# Patient Record
Sex: FEMALE | Race: BLACK OR AFRICAN AMERICAN | NOT HISPANIC OR LATINO | URBAN - METROPOLITAN AREA
[De-identification: names, ages, dates, MRNs, and addresses within clinical notes are randomized per-mention and may not be internally consistent; named-entity substitution may affect disease eponyms.]

---

## 2021-01-14 ENCOUNTER — EMERGENCY (EMERGENCY)
Facility: HOSPITAL | Age: 36
LOS: 1 days | Discharge: HOME | End: 2021-01-14
Attending: EMERGENCY MEDICINE | Admitting: EMERGENCY MEDICINE
Payer: MEDICAID

## 2021-01-14 VITALS
SYSTOLIC BLOOD PRESSURE: 105 MMHG | HEART RATE: 103 BPM | TEMPERATURE: 98 F | RESPIRATION RATE: 17 BRPM | OXYGEN SATURATION: 100 % | DIASTOLIC BLOOD PRESSURE: 67 MMHG

## 2021-01-14 VITALS
TEMPERATURE: 98 F | HEART RATE: 99 BPM | OXYGEN SATURATION: 100 % | RESPIRATION RATE: 18 BRPM | SYSTOLIC BLOOD PRESSURE: 117 MMHG | DIASTOLIC BLOOD PRESSURE: 84 MMHG

## 2021-01-14 LAB
ALBUMIN SERPL ELPH-MCNC: 3.7 G/DL — SIGNIFICANT CHANGE UP (ref 3.3–5)
ALP SERPL-CCNC: 67 U/L — SIGNIFICANT CHANGE UP (ref 40–120)
ALT FLD-CCNC: 35 U/L — HIGH (ref 4–33)
ANION GAP SERPL CALC-SCNC: 9 MMOL/L — SIGNIFICANT CHANGE UP (ref 7–14)
AST SERPL-CCNC: 30 U/L — SIGNIFICANT CHANGE UP (ref 4–32)
BASOPHILS # BLD AUTO: 0.01 K/UL — SIGNIFICANT CHANGE UP (ref 0–0.2)
BASOPHILS NFR BLD AUTO: 0.1 % — SIGNIFICANT CHANGE UP (ref 0–2)
BILIRUB SERPL-MCNC: 0.2 MG/DL — SIGNIFICANT CHANGE UP (ref 0.2–1.2)
BUN SERPL-MCNC: 15 MG/DL — SIGNIFICANT CHANGE UP (ref 7–23)
CALCIUM SERPL-MCNC: 9.2 MG/DL — SIGNIFICANT CHANGE UP (ref 8.4–10.5)
CHLORIDE SERPL-SCNC: 107 MMOL/L — SIGNIFICANT CHANGE UP (ref 98–107)
CO2 SERPL-SCNC: 27 MMOL/L — SIGNIFICANT CHANGE UP (ref 22–31)
CREAT SERPL-MCNC: 0.79 MG/DL — SIGNIFICANT CHANGE UP (ref 0.5–1.3)
EOSINOPHIL # BLD AUTO: 0.07 K/UL — SIGNIFICANT CHANGE UP (ref 0–0.5)
EOSINOPHIL NFR BLD AUTO: 0.9 % — SIGNIFICANT CHANGE UP (ref 0–6)
GLUCOSE SERPL-MCNC: 87 MG/DL — SIGNIFICANT CHANGE UP (ref 70–99)
HCT VFR BLD CALC: 41.3 % — SIGNIFICANT CHANGE UP (ref 34.5–45)
HGB BLD-MCNC: 13.1 G/DL — SIGNIFICANT CHANGE UP (ref 11.5–15.5)
IANC: 4.25 K/UL — SIGNIFICANT CHANGE UP (ref 1.5–8.5)
IMM GRANULOCYTES NFR BLD AUTO: 0.4 % — SIGNIFICANT CHANGE UP (ref 0–1.5)
LIDOCAIN IGE QN: 19 U/L — SIGNIFICANT CHANGE UP (ref 7–60)
LYMPHOCYTES # BLD AUTO: 2.11 K/UL — SIGNIFICANT CHANGE UP (ref 1–3.3)
LYMPHOCYTES # BLD AUTO: 28.6 % — SIGNIFICANT CHANGE UP (ref 13–44)
MCHC RBC-ENTMCNC: 28.5 PG — SIGNIFICANT CHANGE UP (ref 27–34)
MCHC RBC-ENTMCNC: 31.7 GM/DL — LOW (ref 32–36)
MCV RBC AUTO: 89.8 FL — SIGNIFICANT CHANGE UP (ref 80–100)
MONOCYTES # BLD AUTO: 0.92 K/UL — HIGH (ref 0–0.9)
MONOCYTES NFR BLD AUTO: 12.4 % — SIGNIFICANT CHANGE UP (ref 2–14)
NEUTROPHILS # BLD AUTO: 4.25 K/UL — SIGNIFICANT CHANGE UP (ref 1.8–7.4)
NEUTROPHILS NFR BLD AUTO: 57.6 % — SIGNIFICANT CHANGE UP (ref 43–77)
NRBC # BLD: 0 /100 WBCS — SIGNIFICANT CHANGE UP
NRBC # FLD: 0 K/UL — SIGNIFICANT CHANGE UP
PLATELET # BLD AUTO: 226 K/UL — SIGNIFICANT CHANGE UP (ref 150–400)
POTASSIUM SERPL-MCNC: 3.9 MMOL/L — SIGNIFICANT CHANGE UP (ref 3.5–5.3)
POTASSIUM SERPL-SCNC: 3.9 MMOL/L — SIGNIFICANT CHANGE UP (ref 3.5–5.3)
PROT SERPL-MCNC: 7.2 G/DL — SIGNIFICANT CHANGE UP (ref 6–8.3)
RBC # BLD: 4.6 M/UL — SIGNIFICANT CHANGE UP (ref 3.8–5.2)
RBC # FLD: 14.9 % — HIGH (ref 10.3–14.5)
SODIUM SERPL-SCNC: 143 MMOL/L — SIGNIFICANT CHANGE UP (ref 135–145)
TROPONIN T, HIGH SENSITIVITY RESULT: <6 NG/L — SIGNIFICANT CHANGE UP
WBC # BLD: 7.39 K/UL — SIGNIFICANT CHANGE UP (ref 3.8–10.5)
WBC # FLD AUTO: 7.39 K/UL — SIGNIFICANT CHANGE UP (ref 3.8–10.5)

## 2021-01-14 PROCEDURE — 99282 EMERGENCY DEPT VISIT SF MDM: CPT

## 2021-01-14 RX ORDER — SODIUM CHLORIDE 9 MG/ML
1000 INJECTION INTRAMUSCULAR; INTRAVENOUS; SUBCUTANEOUS ONCE
Refills: 0 | Status: COMPLETED | OUTPATIENT
Start: 2021-01-14 | End: 2021-01-14

## 2021-01-14 RX ORDER — ONDANSETRON 8 MG/1
8 TABLET, FILM COATED ORAL ONCE
Refills: 0 | Status: COMPLETED | OUTPATIENT
Start: 2021-01-14 | End: 2021-01-14

## 2021-01-14 RX ORDER — ONDANSETRON 8 MG/1
4 TABLET, FILM COATED ORAL ONCE
Refills: 0 | Status: COMPLETED | OUTPATIENT
Start: 2021-01-14 | End: 2021-01-14

## 2021-01-14 RX ADMIN — ONDANSETRON 8 MILLIGRAM(S): 8 TABLET, FILM COATED ORAL at 12:07

## 2021-01-14 RX ADMIN — SODIUM CHLORIDE 1000 MILLILITER(S): 9 INJECTION INTRAMUSCULAR; INTRAVENOUS; SUBCUTANEOUS at 12:07

## 2021-01-14 RX ADMIN — SODIUM CHLORIDE 1000 MILLILITER(S): 9 INJECTION INTRAMUSCULAR; INTRAVENOUS; SUBCUTANEOUS at 14:29

## 2021-01-14 NOTE — ED PROVIDER NOTE - PATIENT PORTAL LINK FT
You can access the FollowMyHealth Patient Portal offered by [facility] by registering at the following website: [email or web address]. By joining [employer]’s FollowMyHealth portal, you will also be able to view your health information using other applications (apps) compatible with our system.

## 2021-01-14 NOTE — ED PROVIDER NOTE - NSFOLLOWUPINSTRUCTIONS_ED_ALL_ED_FT
- Continue all regular medications  - Follow up with your primary doctor within 3 days  - Return to the ER for any worsening symptoms or concerns

## 2021-01-14 NOTE — ED ADULT NURSE NOTE - OBJECTIVE STATEMENT
pt from [facility] with staff at bed side. as per paperwork pt has been experiencing vomiting, denies abd pain abd non tender. was found to have elevated clonpipin levels so clonipin was held since yesterday. presents for vomiting. not currently vomiting denies urianry s/s. Vitally stable IV 20 g RAC labs sent medicated as ordered.

## 2021-01-14 NOTE — ED PROVIDER NOTE - PROGRESS NOTE DETAILS
Anabell PGY2: signed out patient to on call [facility] doctor. Also updated psychiatrist Dr. Arevalo of case and plan. Patient ambulatory only minimal lightheadedness sitting to standing. Orthstatics (-). Vomited x1, Will give more zofran and discharge. Tachycardia improved to 101.

## 2021-01-14 NOTE — ED PROVIDER NOTE - CLINICAL SUMMARY MEDICAL DECISION MAKING FREE TEXT BOX
lurdes: pt here with high clonazapine level (over 1000), wbc already checked by psych but they were concerned pt with orthostasis today.  pt with mild tachycardia and nasuea, abd soft no r/g.  will hydrate and check orthostasis

## 2021-01-14 NOTE — ED PROVIDER NOTE - ATTENDING CONTRIBUTION TO CARE
lurdes: pt here with high clonazapine level (over 1000), wbc already checked by psych but they were concerned pt with orthostasis today.  pt with mild tachycardia and nasuea, abd soft no r/g.  will hydrate and check orthostasis    I performed a history and physical exam of the patient and discussed their management with the resident and /or advanced care provider. I reviewed the resident and /or ACP's note and agree with the documented findings and plan of care. My medical decison making and observations are found above. lurdes: pt here with high clonazapine level (over 1000), wbc already checked by psych but they were concerned pt with orthostasis today.  pt with mild tachycardia and nasuea, abd soft no r/g.  will hydrate and check orthostasis    I performed a history and physical exam of the patient and discussed their management with the resident and /or advanced care provider. I reviewed the resident and /or ACP's note and agree with the documented findings and plan of care. My medical decision making and observations are found above..

## 2021-01-14 NOTE — ED PROVIDER NOTE - OBJECTIVE STATEMENT
34yo female schizophrenia, seizure d/o on depakote sent by [place] for increased Clozapine level of 1085 on 1/12/21. Per [facility] psychiatrist Mickey, patient recently transferred from [facility] and did new admission labs on her. Noted patient to be orthostatic today and nauseous with vomiting and diarrhea x1. Last clozapine dose 2 days ago. Patient says she has felt nauseous for 1 month with intermittent vomiting. Denies vaginal bleeding/discharge, abdominal pain, abdominal surgeries, fever.

## 2021-01-14 NOTE — ED ADULT NURSE NOTE - CHIEF COMPLAINT QUOTE
Patient brought to ER by EMS from [place] [address] for c/o nausea. As per facility they are concerned because her Clonazepam levels are high (1085). Pt accompanied by staff.

## 2021-01-14 NOTE — ED ADULT TRIAGE NOTE - CHIEF COMPLAINT QUOTE
Patient brought to ER by EMS from [place] [address] for c/o nausea. As per facility they are concerned because her Clonazepam levels are high (1085). Pt accompanied by staff.

## 2021-01-14 NOTE — ED PROVIDER NOTE - PHYSICAL EXAMINATION
Physical Exam:  Gen: NAD, AOx3, non-toxic appearing, able to ambulate without assistance  Head: NCAT  HEENT: EOMI, PEERLA, normal conjunctiva, tongue midline, oral mucosa moist  Lung: CTAB, no respiratory distress, no wheezes/rhonchi/rales B/L, speaking in full sentences  CV: RRR, no murmurs, rubs or gallops, distal pulses 2+ b/l  Abd: soft, NT, ND, no guarding, no rigidity, no rebound tenderness, no CVA tenderness   Skin: Warm, well perfused, no rash, no leg swelling  Psych: normal affect, calm

## 2021-01-15 ENCOUNTER — EMERGENCY (EMERGENCY)
Facility: HOSPITAL | Age: 36
LOS: 1 days | Discharge: HOME | End: 2021-01-15
Attending: EMERGENCY MEDICINE | Admitting: EMERGENCY MEDICINE
Payer: MEDICAID

## 2021-01-15 VITALS
OXYGEN SATURATION: 100 % | TEMPERATURE: 98 F | SYSTOLIC BLOOD PRESSURE: 129 MMHG | DIASTOLIC BLOOD PRESSURE: 50 MMHG | RESPIRATION RATE: 16 BRPM | HEART RATE: 100 BPM

## 2021-01-15 VITALS
TEMPERATURE: 98 F | HEART RATE: 109 BPM | SYSTOLIC BLOOD PRESSURE: 150 MMHG | DIASTOLIC BLOOD PRESSURE: 108 MMHG | RESPIRATION RATE: 18 BRPM

## 2021-01-15 PROBLEM — F20.9 SCHIZOPHRENIA, UNSPECIFIED: Chronic | Status: ACTIVE | Noted: 2021-01-14

## 2021-01-15 LAB
ALBUMIN SERPL ELPH-MCNC: 3.3 G/DL — SIGNIFICANT CHANGE UP (ref 3.3–5)
ALP SERPL-CCNC: 57 U/L — SIGNIFICANT CHANGE UP (ref 40–120)
ALT FLD-CCNC: 35 U/L — HIGH (ref 4–33)
ANION GAP SERPL CALC-SCNC: 10 MMOL/L — SIGNIFICANT CHANGE UP (ref 7–14)
AST SERPL-CCNC: 45 U/L — HIGH (ref 4–32)
BASOPHILS # BLD AUTO: 0.01 K/UL — SIGNIFICANT CHANGE UP (ref 0–0.2)
BASOPHILS NFR BLD AUTO: 0.1 % — SIGNIFICANT CHANGE UP (ref 0–2)
BILIRUB SERPL-MCNC: 0.3 MG/DL — SIGNIFICANT CHANGE UP (ref 0.2–1.2)
BUN SERPL-MCNC: 10 MG/DL — SIGNIFICANT CHANGE UP (ref 7–23)
CALCIUM SERPL-MCNC: 8.4 MG/DL — SIGNIFICANT CHANGE UP (ref 8.4–10.5)
CHLORIDE SERPL-SCNC: 109 MMOL/L — HIGH (ref 98–107)
CO2 SERPL-SCNC: 25 MMOL/L — SIGNIFICANT CHANGE UP (ref 22–31)
CREAT SERPL-MCNC: 0.76 MG/DL — SIGNIFICANT CHANGE UP (ref 0.5–1.3)
EOSINOPHIL # BLD AUTO: 0.16 K/UL — SIGNIFICANT CHANGE UP (ref 0–0.5)
EOSINOPHIL NFR BLD AUTO: 2.1 % — SIGNIFICANT CHANGE UP (ref 0–6)
GLUCOSE SERPL-MCNC: 90 MG/DL — SIGNIFICANT CHANGE UP (ref 70–99)
HCT VFR BLD CALC: 38.5 % — SIGNIFICANT CHANGE UP (ref 34.5–45)
HGB BLD-MCNC: 12.2 G/DL — SIGNIFICANT CHANGE UP (ref 11.5–15.5)
IANC: 4.3 K/UL — SIGNIFICANT CHANGE UP (ref 1.5–8.5)
IMM GRANULOCYTES NFR BLD AUTO: 0.3 % — SIGNIFICANT CHANGE UP (ref 0–1.5)
LYMPHOCYTES # BLD AUTO: 1.95 K/UL — SIGNIFICANT CHANGE UP (ref 1–3.3)
LYMPHOCYTES # BLD AUTO: 25.9 % — SIGNIFICANT CHANGE UP (ref 13–44)
MCHC RBC-ENTMCNC: 28.6 PG — SIGNIFICANT CHANGE UP (ref 27–34)
MCHC RBC-ENTMCNC: 31.7 GM/DL — LOW (ref 32–36)
MCV RBC AUTO: 90.4 FL — SIGNIFICANT CHANGE UP (ref 80–100)
MONOCYTES # BLD AUTO: 1.09 K/UL — HIGH (ref 0–0.9)
MONOCYTES NFR BLD AUTO: 14.5 % — HIGH (ref 2–14)
NEUTROPHILS # BLD AUTO: 4.3 K/UL — SIGNIFICANT CHANGE UP (ref 1.8–7.4)
NEUTROPHILS NFR BLD AUTO: 57.1 % — SIGNIFICANT CHANGE UP (ref 43–77)
NRBC # BLD: 0 /100 WBCS — SIGNIFICANT CHANGE UP
NRBC # FLD: 0.03 K/UL — HIGH
PLATELET # BLD AUTO: 211 K/UL — SIGNIFICANT CHANGE UP (ref 150–400)
POTASSIUM SERPL-MCNC: 3.9 MMOL/L — SIGNIFICANT CHANGE UP (ref 3.5–5.3)
POTASSIUM SERPL-SCNC: 3.9 MMOL/L — SIGNIFICANT CHANGE UP (ref 3.5–5.3)
PROT SERPL-MCNC: 6.4 G/DL — SIGNIFICANT CHANGE UP (ref 6–8.3)
RBC # BLD: 4.26 M/UL — SIGNIFICANT CHANGE UP (ref 3.8–5.2)
RBC # FLD: 14.9 % — HIGH (ref 10.3–14.5)
SARS-COV-2 RNA SPEC QL NAA+PROBE: SIGNIFICANT CHANGE UP
SODIUM SERPL-SCNC: 144 MMOL/L — SIGNIFICANT CHANGE UP (ref 135–145)
WBC # BLD: 7.53 K/UL — SIGNIFICANT CHANGE UP (ref 3.8–10.5)
WBC # FLD AUTO: 7.53 K/UL — SIGNIFICANT CHANGE UP (ref 3.8–10.5)

## 2021-01-15 PROCEDURE — 99284 EMERGENCY DEPT VISIT MOD MDM: CPT

## 2021-01-15 RX ORDER — SODIUM CHLORIDE 9 MG/ML
1000 INJECTION, SOLUTION INTRAVENOUS ONCE
Refills: 0 | Status: COMPLETED | OUTPATIENT
Start: 2021-01-15 | End: 2021-01-15

## 2021-01-15 RX ORDER — SODIUM CHLORIDE 9 MG/ML
1000 INJECTION, SOLUTION INTRAVENOUS
Refills: 0 | Status: DISCONTINUED | OUTPATIENT
Start: 2021-01-15 | End: 2021-01-19

## 2021-01-15 RX ADMIN — SODIUM CHLORIDE 1000 MILLILITER(S): 9 INJECTION, SOLUTION INTRAVENOUS at 22:44

## 2021-01-15 RX ADMIN — SODIUM CHLORIDE 1000 MILLILITER(S): 9 INJECTION, SOLUTION INTRAVENOUS at 14:29

## 2021-01-15 RX ADMIN — SODIUM CHLORIDE 2000 MILLILITER(S): 9 INJECTION, SOLUTION INTRAVENOUS at 20:30

## 2021-01-15 NOTE — ED ADULT NURSE NOTE - OBJECTIVE STATEMENT
36 y/o female presents to ED from [place] [address]. Pt a&ox3, arrives with staff, complaining of n/v/diarrhea. Pt brought in for hypotension. Pt complaining of 5 episodes of diarrhea since this morning. Pt here yesterday for same symptoms. 20g IV placed to RAC. Labs obtained as ordered. 20g IV placed to RAC. Labs obtained as ordered. Will monitor.

## 2021-01-15 NOTE — ED PROVIDER NOTE - CLINICAL SUMMARY MEDICAL DECISION MAKING FREE TEXT BOX
48yoF from [facility] schizophrenia, seizures on depakote presenting for hypotension in setting of n/v/d. Unchanged from yesterday. Suspecting orthostatic hypotension in setting of dehydration. Will give IVF, labs and check orthostatics. If negative can return to [facility].

## 2021-01-15 NOTE — ED PROVIDER NOTE - OBJECTIVE STATEMENT
48yoF from [facility] schizophrenia, seizures on depakote presenting for hypotension. Pt was here yesterday for same reason, had elevated clozapine level, found to have orthostatic hypotension that improved with IVF in setting of nausea, emesis, and diarrhea. Pt states she continues to be nauseous but no recent emesis, but having lots of diarrhea described as watery with partial formed portions. 35 yoF from [facility] schizophrenia, seizures on depakote presenting for hypotension. Pt was here yesterday for same reason, had elevated clozapine level, found to have orthostatic hypotension that improved with IVF in setting of nausea, emesis, and diarrhea. Pt states she continues to be nauseous but no recent emesis, but having lots of diarrhea described as watery with partial formed portions.

## 2021-01-15 NOTE — ED ADULT TRIAGE NOTE - CHIEF COMPLAINT QUOTE
Pt from [facility] arrives with staff ,sent in for hypotension was seen in [facility] yesterday for same thing. Pt also with n/v/d since last night. abnormal blood levels  clonazaine

## 2021-01-15 NOTE — ED CLERICAL - NS ED CLERK NOTE PRE-ARRIVAL INFORMATION; ADDITIONAL PRE-ARRIVAL INFORMATION
Seen yesterday for dehydration, diarrhea, dizziness. Returning with same symptoms.  Hx schizoaffective disorder

## 2021-01-15 NOTE — ED PROVIDER NOTE - ATTENDING CONTRIBUTION TO CARE
Dr. Ross: 34 yo female with seizure disorder, schizophrenia, ED visit yesterday for N/V/D and hypotension now with recurrence of N/V/D and low BP.  No abdominal pain or fever.  On exam pt overall well appearing, in NAD at time of my eval, +dry MM, heart RRR, lungs CTAB, abd NTND, extremities without swelling, strength 5/5 in all extremities and skin without rash.

## 2021-01-15 NOTE — ED PROVIDER NOTE - PATIENT PORTAL LINK FT
You can access the FollowMyHealth Patient Portal offered by [facility] by registering at the following website: [email or web address]. By joining [employer]’s FollowMyHealth portal, you will also be able to view your health information using other applications (apps) compatible with our system.

## 2021-01-15 NOTE — ED ADULT NURSE NOTE - CHIEF COMPLAINT QUOTE
Pt from [facility] 40 arrives with staff ,sent in for hypotension was seen in [facility] yesterday for same thing. Pt also with n/v/d since last night. abnormal blood levels  clonazaine

## 2021-01-15 NOTE — ED PROVIDER NOTE - NSFOLLOWUPINSTRUCTIONS_ED_ALL_ED_FT
- Continue all regular medications  - Eat small meals and take small sips of liquid while feeling nauseous and having diarrhea  - Follow up with your primary doctor within 3 days  - You were given copies of labs and/or imaging results if applicable, please take them to your follow up appointments  - Return to the ER for any worsening symptoms or concerns

## 2021-01-22 LAB
CLOZAPINE SERPL-MCNC: 987 NG/ML — HIGH (ref 350–600)
CLOZAPINE SPEC-SCNC: 1353 NG/ML — SIGNIFICANT CHANGE UP
NORCLOZAPINE SERPL-MCNC: 366 NG/ML — SIGNIFICANT CHANGE UP

## 2021-01-29 NOTE — ED POST DISCHARGE NOTE - RESULT SUMMARY
MICHELLE Bledsoe: Clozapine level elevated, known to pt. Called [facility] back, spoke w/ RN Ms. Alberto, states they are following her levels closely. Copies of results faxed to [phone] with confirmation.

## 2021-02-14 NOTE — ED ADULT NURSE NOTE - PRO INTERPRETER NEED 2
;      [facility] Emergency Department  [address], [place] [address]  [phone]     Clinical Summary     PERSON INFORMATION   Name LANDON DEVINE Age  46 Years  1972 12:00 AM   Acct# [id number] Sex Female Phone [phone]   Dispo Type Home - (i.e. Home on oxygen, DME)-  Arrival 2019 6:49 PM Checkout 2019 9:50 PM    Address: [address] [place] [address] [address] [place] [address]      Visit Reason MID BACK PAIN     ED Physician Note      ED Time Seen By Provider Entered On:  2019 19:18     Performed On:  2019 19:18  by Carlene Jacobs               Time Seen By Provider   Time Seen by Provider :   2019 19:18    Carlene Jacobs - 2019 19:18           VITALS INFORMATION  Vitals/Ht/Wt  Peripheral Pulse Rate:  77 bpm  Respiratory Rate:  16 br/min  Oxygen Saturation:  96 %  Oxygen Therapy:  Room air  Systolic Blood Pressure:  128 mmHg  Diastolic Blood Pressure:  99 mmHg  Mean Arterial Pressure:  109 mmHg  Height:  170 cm  Weight:  110 kg       MEDICAL INFORMATION   Allergy Info:          aspirin             Prescriptions:                  Prescription Display   acetaminophen-hydrocodone (Norco 325 mg-5 mg oral tablet) 1 tab, PO, q6hr, PRN for pain, Supervising Physician: Dr. Chad Torres  PREETHI [id number], x 2 day, # 8 tab, 0 Refill(s), Tablet   cyclobenzaprine (Flexeril 10 mg oral tablet) 10 mg = 1 tab(s), PO, Tab, TID, PRN for spasm, Supervising Physician: Dr. Chad Torres NPI [id number], X 3 day, # 9 tab, 0 Refill(s)   ibuprofen (Motrin 600 mg oral tablet) 600 mg = 1 tab, PO, Tablet, q6hr, Supervising Physician: Dr. Torres, X 5 day, # 20 tab, 0 Refill(s)   lidocaine-menthol topical (lidocaine-menthol 4.5%-5% topical film) 1 patch, Topical, qDay, Supervising Physician: Dr. Chad Torres NPI [id number], x 5 day, # 5 each, 0 Refill(s)   predniSONE (predniSONE 50 mg oral tablet) 50 mg = 1 tab(s), PO, Tab, qDay, Supervising Physician:  Dr. Chad Torres NPI [id number], X 3 day, # 3 tab, 0 Refill(s)          DISCHARGE INFORMATION   Discharge Disposition: Home - (i.e. Home on oxygen, DME)- 01     PATIENT EDUCATION INFORMATION   Instructions:       Musculoskeletal Pain; Back Exercises; Back Pain, Adult   Follow up:                  With: Address: When:   Follow up with primary care provider  Within 2 to 3 days   Comments:   Follow-up with primary care provider   Continue activity as tolerated   Take medication as directed   Do not drive or operate machinery while taking Flexeril or Norco   Return to emergency department for new or worsening symptoms            DIAGNOSIS  Back pain         English

## 2024-01-01 ENCOUNTER — EMERGENCY (EMERGENCY)
Facility: HOSPITAL | Age: 39
LOS: 1 days | Discharge: HOME | End: 2024-01-01
Attending: STUDENT IN AN ORGANIZED HEALTH CARE EDUCATION/TRAINING PROGRAM | Admitting: STUDENT IN AN ORGANIZED HEALTH CARE EDUCATION/TRAINING PROGRAM
Payer: MEDICAID

## 2024-01-01 VITALS
DIASTOLIC BLOOD PRESSURE: 82 MMHG | SYSTOLIC BLOOD PRESSURE: 124 MMHG | TEMPERATURE: 98 F | RESPIRATION RATE: 16 BRPM | OXYGEN SATURATION: 97 % | HEART RATE: 124 BPM

## 2024-01-01 PROCEDURE — 93010 ELECTROCARDIOGRAM REPORT: CPT

## 2024-01-01 PROCEDURE — 99284 EMERGENCY DEPT VISIT MOD MDM: CPT

## 2024-01-01 RX ORDER — SODIUM CHLORIDE 9 MG/ML
1000 INJECTION INTRAMUSCULAR; INTRAVENOUS; SUBCUTANEOUS ONCE
Refills: 0 | Status: COMPLETED | OUTPATIENT
Start: 2024-01-01 | End: 2024-01-01

## 2024-01-01 RX ADMIN — Medication 400 MILLIGRAM(S): at 23:58

## 2024-01-01 RX ADMIN — SODIUM CHLORIDE 1000 MILLILITER(S): 9 INJECTION INTRAMUSCULAR; INTRAVENOUS; SUBCUTANEOUS at 23:54

## 2024-01-01 NOTE — ED ADULT NURSE NOTE - OBJECTIVE STATEMENT
pt received to room 20 from [facility] 60 a&ox3 with past medical history of seizures and schizophrenia. pt denies SI/HI/AH/VH/TH. pt c/o tremors/unsteady gait/generalized weakness after taking 7pm medications. pt with no neuro or sensory deficits. able to move x4 extremities. pt abdomen soft and nondistended. pt warm to touch. pt febrile. MD aware. pt denies chest pain, sob, nausea, vomiting, dizziness, headache. pt endorses sick contacts. 20g placed to the Right AC. labs collected and sent. pt medicated as per EMAR. pt respirations even and unlabored with no accessory muscle use. pt pending XR. pt appears in NAD, safety maintained. pt received to room 20 from [facility] 59 a&ox3 with past medical history of seizures and schizophrenia. pt denies SI/HI/AH/VH/TH. pt c/o tremors/unsteady gait/generalized weakness after taking 7pm medications. pt with no neuro or sensory deficits. able to move x4 extremities. pt abdomen soft and nondistended. pt warm to touch. pt febrile. MD aware. pt denies chest pain, sob, nausea, vomiting, dizziness, headache. pt endorses sick contacts. 20g placed to the Right AC. labs collected and sent. pt medicated as per EMAR. pt respirations even and unlabored with no accessory muscle use. pt pending XR. pt appears in NAD, safety maintained.

## 2024-01-01 NOTE — ED PROVIDER NOTE - NSFOLLOWUPCLINICS_GEN_ALL_ED_FT
[facility] [place]  Internal Medicine  [address] [place] [address]  Phone: [phone]  Fax: [phone]  Follow Up Time: 7-10 Days     [facility] [place]  Internal Medicine  [address] [place] [address]  Phone: [phone]  Fax: [phone]  Follow Up Time: 7-10 Days

## 2024-01-01 NOTE — ED ADULT NURSE NOTE - NSFALLRISKINTERV_ED_ALL_ED
Assistance OOB with selected safe patient handling equipment if applicable/Assistance with ambulation/Communicate fall risk and risk factors to all staff, patient, and family/Provide visual cue: yellow wristband, yellow gown, etc/Reinforce activity limits and safety measures with patient and family/Call bell, personal items and telephone in reach/Instruct patient to call for assistance before getting out of bed/chair/stretcher/Non-slip footwear applied when patient is off stretcher/[place] to call system/Physically safe environment - no spills, clutter or unnecessary equipment/Purposeful Proactive Rounding/Room/bathroom lighting operational, light cord in reach Assistance OOB with selected safe patient handling equipment if applicable/Assistance with ambulation/Communicate fall risk and risk factors to all staff, patient, and family/Provide visual cue: yellow wristband, yellow gown, etc/Reinforce activity limits and safety measures with patient and family/Call bell, personal items and telephone in reach/Instruct patient to call for assistance before getting out of bed/chair/stretcher/Non-slip footwear applied when patient is off stretcher/[place] to call system/Physically safe environment - no spills, clutter or unnecessary equipment/Purposeful Proactive Rounding/Room/bathroom lighting operational, light cord in reach

## 2024-01-01 NOTE — ED PROVIDER NOTE - PROGRESS NOTE DETAILS
T 102.2. Will culture, send gas. Hold off on abx for now Lactate 2.8, will give 2nd liter of fluids UA not concerning for infection. Pt ambulating independently. Influenza positive. Social work aware and will d/w [place] regarding disposition.

## 2024-01-01 NOTE — ED ADULT NURSE NOTE - CHIEF COMPLAINT QUOTE
From [place], c/o tremors/unsteady gait/generalized weakness after taking 7pm medications. Following all commands, pt c/o feeling cold, no code stroke per MD Byoce h/o schizophrenia From [place], c/o tremors/unsteady gait/generalized weakness after taking 7pm medications. Following all commands, pt c/o feeling cold, no code stroke per MD Boyce h/o schizophrenia

## 2024-01-01 NOTE — ED PROVIDER NOTE - PHYSICAL EXAMINATION
VITALS:  T(C): 36.8 (01-01-24 @ 19:56), Max: 36.8 (01-01-24 @ 19:56)  HR: 124 (01-01-24 @ 19:56) (124 - 124)  BP: 124/82 (01-01-24 @ 19:56) (124/82 - 124/82)  RR: 16 (01-01-24 @ 19:56) (16 - 16)  SpO2: 97% (01-01-24 @ 19:56) (97% - 97%)    GENERAL: NAD, lying in bed comfortably   HEAD:  Atraumatic, Normocephalic  EYES: EOMI, PERRLA, conjunctiva and sclera clear  ENT: Dry mucous membranes  NECK: Supple, No JVD  CHEST/LUNG: Clear to auscultation bilaterally; No rhonchi or wheezing. Unlabored respirations  HEART: Tachycardic, no murmurs  Peripheral edema: Trace  ABDOMEN: BSx4; Soft, nontender, nondistended  EXTREMITIES:  1+ radial pulses  NERVOUS SYSTEM:  A&Ox3, no gross lateralizing deficits   SKIN: Dry LE skin VITALS:  T(C): 36.8 (01-01-24 @ 19:56), Max: 36.8 (01-01-24 @ 19:56)  HR: 124 (01-01-24 @ 19:56) (124 - 124)  BP: 124/82 (01-01-24 @ 19:56) (124/82 - 124/82)  RR: 16 (01-01-24 @ 19:56) (16 - 16)  SpO2: 97% (01-01-24 @ 19:56) (97% - 97%)    GENERAL: NAD, lying in bed comfortably, slow to answer questions  HEAD:  Atraumatic, Normocephalic  EYES: EOMI, PERRLA, conjunctiva and sclera clear  ENT: Dry mucous membranes  NECK: Supple, No JVD  CHEST/LUNG: Clear to auscultation bilaterally; No rhonchi or wheezing. Unlabored respirations  HEART: Tachycardic, no murmurs  Peripheral edema: Trace  ABDOMEN: BSx4; Soft, nontender, nondistended  EXTREMITIES:  1+ radial pulses  NERVOUS SYSTEM:  A&Ox3, faint jerking UEs  SKIN: Dry LE skin

## 2024-01-01 NOTE — ED ADULT TRIAGE NOTE - CHIEF COMPLAINT QUOTE
From [place], c/o tremors/unsteady gait/generalized weakness after taking 7pm medications. Following all commands, pt c/o feeling cold, no code stroke per MD Boyce h/o schizophrenia From [place], c/o tremors/unsteady gait/generalized weakness after taking 7pm medications. Following all commands, pt c/o feeling cold, no code stroke per MD Boyce h/o schizophrenia

## 2024-01-01 NOTE — ED PROVIDER NOTE - CLINICAL SUMMARY MEDICAL DECISION MAKING FREE TEXT BOX
The patient is a 38 YOF with history of schizophrenia on clozapine, seizures on depakote who presents from [place] with unsteady gait and 'not feeling well' since receiving her meds at 7pm. T 102.2. Differential includes bacterial, viral infection, electrolyte abnormality, abnormal depakote levels. Pt found to be influenza A positive. Given 2L IVF. UA not concerning for infection. Blood cultures sent. Will DC back to [place]. The patient is a 38 YOF with history of schizophrenia on clozapine, seizures on depakote who presents from [place] with unsteady gait and 'not feeling well' since receiving her meds at 7pm. T 102.2. Differential includes bacterial, viral infection, electrolyte abnormality, abnormal depakote levels. Pt found to be influenza A positive. Given 2L IVF. UA not concerning for infection. Blood cultures sent. Will DC back to [place].

## 2024-01-01 NOTE — ED PROVIDER NOTE - OBJECTIVE STATEMENT
The patient is a 38 YOF with history of schizophrenia on clozapine, seizures on depakote who presents from [place] with unsteady gait and 'not feeling well' since receiving her meds at 7pm. Patient herself states that she has been having b/l arm  tremors for the last 2-3 weeks but per facility patient had unsteady gait just from today. At baseline, patient is AOx3 and walks independently. She attends a day program.     VS notable for tachycardia to 124  On exam patient is laying comfortably, able to follow commands and answer simple questions.    Meds from [facility]: clozapine 100mg qhs  haldol depot 200mg q4 weeks  depakote 500mg 2 tabs qhs  senna 2 tabs qd  venlafaxine 150mg 1d The patient is a 38 YOF with history of schizophrenia on clozapine, seizures on depakote who presents from [place] with unsteady gait and 'not feeling well' since receiving her meds at 7pm. Patient herself states that she has been having b/l arm  tremors for the last 2-3 weeks but per facility patient had unsteady gait just from today. At baseline, patient is AOx3 and walks independently. She attends a day program.     VS notable for tachycardia to 124  On exam patient is laying comfortably, able to follow commands and answer simple questions.    Meds from [place]: clozapine 100mg qhs  haldol depot 200mg q4 weeks  depakote 500mg 2 tabs qhs  senna 2 tabs qd  venlafaxine 150mg 1d Two to four times a month The patient is a 38 YOF with history of schizophrenia on clozapine, seizures on depakote who presents from [place] with unsteady gait and 'not feeling well' since receiving her meds at 7pm. Patient herself states that she has been having b/l arm  tremors for the last 2-3 weeks but per facility patient had unsteady gait just from today. At baseline, patient is AOx3 and walks independently. She attends a day program.     VS notable for tachycardia to 124  On exam patient is laying comfortably, able to follow commands and answer simple questions.    Meds from [place]: clozapine 100mg qhs  haldol depot 200mg q4 weeks  depakote 500mg 2 tabs qhs  senna 2 tabs qd  venlafaxine 150mg 1d    Will check labs for lytes, depakote level, give 1L IVF The patient is a 38 YOF with history of schizophrenia on clozapine, seizures on depakote who presents from [place] with unsteady gait and 'not feeling well' since receiving her meds at 7pm. Patient herself states that she has been having b/l arm  tremors for the last 2-3 weeks but per facility patient had unsteady gait just from today. At baseline, patient is AOx3 and walks independently. She attends a day program.     VS notable for tachycardia to 124  On exam patient is laying comfortably, able to follow commands and answer simple questions.    Meds from [place]: clozapine 100mg qhs  haldol depot 200mg q4 weeks  depakote 500mg 2 tabs qhs  senna 2 tabs qd  venlafaxine 150mg 1d    Will check labs for lytes, depakote level, give 1L IVF

## 2024-01-01 NOTE — ED ADULT NURSE NOTE - NS ED NURSE RECORD ANOTHER VITAL SIGN
Cough for 2 weeks, low grade fever last night, should he be seen again? Seen at the start of this illness, (physical) mom is concerned that it is going on so long now. Treating his discomfort with Tylenol.  Does not seem to be having difficulty breathing, no retracting.  Drinking fine. Please advise. Appointment?  Simin Cuadra, [facility]  Triage Nurse   Yes

## 2024-01-01 NOTE — ED PROVIDER NOTE - NSFOLLOWUPINSTRUCTIONS_ED_ALL_ED_FT
You came to the hospital because you were not feeling well. You were found to have influenza A. Please follow up with your primary care doctor within 1 week of discharge for follow up of your symptoms. You should isolate yourself until your symptoms resolve.    Please follow up with your primary care doctor within 1 week of discharge for further medical care.  If you experience fevers, chills, shortness of breath, chest pain, severe abdominal pain, falls with head trauma, or fainting, then please seek immediate emergency medical services.

## 2024-01-01 NOTE — ED PROVIDER NOTE - ATTENDING CONTRIBUTION TO CARE
Dr. Silva, Attending Physician-  I performed a face to face bedside interview with patient regarding history of present illness, review of symptoms and past medical history. I completed an independent physical exam.  I have discussed patient's plan of care with the resident.

## 2024-01-01 NOTE — ED ADULT NURSE NOTE - DRUG PRE-SCREENING (DAST -1)
----- Message from Mark Waller MD sent at 10/22/2020  8:40 AM CDT -----  Please notify patient that she does not need  Medication for her bladder. Schedule Renal US. Statement Selected

## 2024-01-01 NOTE — ED PROVIDER NOTE - PATIENT PORTAL LINK FT
You can access the FollowMyHealth Patient Portal offered by [facility] by registering at the following website: [email or web address]. By joining [employer]’s FollowMyHealth portal, you will also be able to view your health information using other applications (apps) compatible with our system. You can access the FollowMyHealth Patient Portal offered by [facility] by registering at the following website: [email or web address]. By joining [email or web address]’s FollowMyHealth portal, you will also be able to view your health information using other applications (apps) compatible with our system.

## 2024-01-02 VITALS
OXYGEN SATURATION: 97 % | DIASTOLIC BLOOD PRESSURE: 76 MMHG | SYSTOLIC BLOOD PRESSURE: 132 MMHG | RESPIRATION RATE: 16 BRPM | HEART RATE: 97 BPM | TEMPERATURE: 99 F

## 2024-01-02 LAB
ALBUMIN SERPL ELPH-MCNC: 3.3 G/DL — SIGNIFICANT CHANGE UP (ref 3.3–5)
ALBUMIN SERPL ELPH-MCNC: 3.3 G/DL — SIGNIFICANT CHANGE UP (ref 3.3–5)
ALP SERPL-CCNC: 40 U/L — SIGNIFICANT CHANGE UP (ref 40–120)
ALP SERPL-CCNC: 40 U/L — SIGNIFICANT CHANGE UP (ref 40–120)
ALT FLD-CCNC: 17 U/L — SIGNIFICANT CHANGE UP (ref 4–33)
ALT FLD-CCNC: 17 U/L — SIGNIFICANT CHANGE UP (ref 4–33)
ANION GAP SERPL CALC-SCNC: 9 MMOL/L — SIGNIFICANT CHANGE UP (ref 7–14)
ANION GAP SERPL CALC-SCNC: 9 MMOL/L — SIGNIFICANT CHANGE UP (ref 7–14)
APPEARANCE UR: ABNORMAL
APPEARANCE UR: ABNORMAL
APTT BLD: 34.2 SEC — SIGNIFICANT CHANGE UP (ref 24.5–35.6)
APTT BLD: 34.2 SEC — SIGNIFICANT CHANGE UP (ref 24.5–35.6)
AST SERPL-CCNC: 28 U/L — SIGNIFICANT CHANGE UP (ref 4–32)
AST SERPL-CCNC: 28 U/L — SIGNIFICANT CHANGE UP (ref 4–32)
B PERT DNA SPEC QL NAA+PROBE: SIGNIFICANT CHANGE UP
B PERT DNA SPEC QL NAA+PROBE: SIGNIFICANT CHANGE UP
B PERT+PARAPERT DNA PNL SPEC NAA+PROBE: SIGNIFICANT CHANGE UP
B PERT+PARAPERT DNA PNL SPEC NAA+PROBE: SIGNIFICANT CHANGE UP
BACTERIA # UR AUTO: NEGATIVE /HPF — SIGNIFICANT CHANGE UP
BACTERIA # UR AUTO: NEGATIVE /HPF — SIGNIFICANT CHANGE UP
BASE EXCESS BLDV CALC-SCNC: 4.6 MMOL/L — HIGH (ref -2–3)
BASE EXCESS BLDV CALC-SCNC: 4.6 MMOL/L — HIGH (ref -2–3)
BASOPHILS # BLD AUTO: 0.04 K/UL — SIGNIFICANT CHANGE UP (ref 0–0.2)
BASOPHILS # BLD AUTO: 0.04 K/UL — SIGNIFICANT CHANGE UP (ref 0–0.2)
BASOPHILS NFR BLD AUTO: 0.5 % — SIGNIFICANT CHANGE UP (ref 0–2)
BASOPHILS NFR BLD AUTO: 0.5 % — SIGNIFICANT CHANGE UP (ref 0–2)
BILIRUB SERPL-MCNC: 0.3 MG/DL — SIGNIFICANT CHANGE UP (ref 0.2–1.2)
BILIRUB SERPL-MCNC: 0.3 MG/DL — SIGNIFICANT CHANGE UP (ref 0.2–1.2)
BILIRUB UR-MCNC: ABNORMAL
BILIRUB UR-MCNC: ABNORMAL
BORDETELLA PARAPERTUSSIS (RAPRVP): SIGNIFICANT CHANGE UP
BORDETELLA PARAPERTUSSIS (RAPRVP): SIGNIFICANT CHANGE UP
BUN SERPL-MCNC: 8 MG/DL — SIGNIFICANT CHANGE UP (ref 7–23)
BUN SERPL-MCNC: 8 MG/DL — SIGNIFICANT CHANGE UP (ref 7–23)
C PNEUM DNA SPEC QL NAA+PROBE: SIGNIFICANT CHANGE UP
C PNEUM DNA SPEC QL NAA+PROBE: SIGNIFICANT CHANGE UP
CA-I SERPL-SCNC: 1.25 MMOL/L — SIGNIFICANT CHANGE UP (ref 1.15–1.33)
CA-I SERPL-SCNC: 1.25 MMOL/L — SIGNIFICANT CHANGE UP (ref 1.15–1.33)
CALCIUM SERPL-MCNC: 9.1 MG/DL — SIGNIFICANT CHANGE UP (ref 8.4–10.5)
CALCIUM SERPL-MCNC: 9.1 MG/DL — SIGNIFICANT CHANGE UP (ref 8.4–10.5)
CAST: 28 /LPF — HIGH (ref 0–4)
CAST: 28 /LPF — HIGH (ref 0–4)
CHLORIDE BLDV-SCNC: 105 MMOL/L — SIGNIFICANT CHANGE UP (ref 96–108)
CHLORIDE BLDV-SCNC: 105 MMOL/L — SIGNIFICANT CHANGE UP (ref 96–108)
CHLORIDE SERPL-SCNC: 104 MMOL/L — SIGNIFICANT CHANGE UP (ref 98–107)
CHLORIDE SERPL-SCNC: 104 MMOL/L — SIGNIFICANT CHANGE UP (ref 98–107)
CK SERPL-CCNC: 300 U/L — HIGH (ref 25–170)
CK SERPL-CCNC: 300 U/L — HIGH (ref 25–170)
CO2 BLDV-SCNC: 31.2 MMOL/L — HIGH (ref 22–26)
CO2 BLDV-SCNC: 31.2 MMOL/L — HIGH (ref 22–26)
CO2 SERPL-SCNC: 27 MMOL/L — SIGNIFICANT CHANGE UP (ref 22–31)
CO2 SERPL-SCNC: 27 MMOL/L — SIGNIFICANT CHANGE UP (ref 22–31)
COLOR SPEC: SIGNIFICANT CHANGE UP
COLOR SPEC: SIGNIFICANT CHANGE UP
CREAT SERPL-MCNC: 0.94 MG/DL — SIGNIFICANT CHANGE UP (ref 0.5–1.3)
CREAT SERPL-MCNC: 0.94 MG/DL — SIGNIFICANT CHANGE UP (ref 0.5–1.3)
DIFF PNL FLD: NEGATIVE — SIGNIFICANT CHANGE UP
DIFF PNL FLD: NEGATIVE — SIGNIFICANT CHANGE UP
EGFR: 79 ML/MIN/1.73M2 — SIGNIFICANT CHANGE UP
EGFR: 79 ML/MIN/1.73M2 — SIGNIFICANT CHANGE UP
EOSINOPHIL # BLD AUTO: 0.03 K/UL — SIGNIFICANT CHANGE UP (ref 0–0.5)
EOSINOPHIL # BLD AUTO: 0.03 K/UL — SIGNIFICANT CHANGE UP (ref 0–0.5)
EOSINOPHIL NFR BLD AUTO: 0.4 % — SIGNIFICANT CHANGE UP (ref 0–6)
EOSINOPHIL NFR BLD AUTO: 0.4 % — SIGNIFICANT CHANGE UP (ref 0–6)
FLUAV H3 RNA SPEC QL NAA+PROBE: DETECTED
FLUAV H3 RNA SPEC QL NAA+PROBE: DETECTED
FLUBV RNA SPEC QL NAA+PROBE: SIGNIFICANT CHANGE UP
FLUBV RNA SPEC QL NAA+PROBE: SIGNIFICANT CHANGE UP
GAS PNL BLDV: 138 MMOL/L — SIGNIFICANT CHANGE UP (ref 136–145)
GAS PNL BLDV: 138 MMOL/L — SIGNIFICANT CHANGE UP (ref 136–145)
GAS PNL BLDV: SIGNIFICANT CHANGE UP
GAS PNL BLDV: SIGNIFICANT CHANGE UP
GLUCOSE BLDV-MCNC: 108 MG/DL — HIGH (ref 70–99)
GLUCOSE BLDV-MCNC: 108 MG/DL — HIGH (ref 70–99)
GLUCOSE SERPL-MCNC: 110 MG/DL — HIGH (ref 70–99)
GLUCOSE SERPL-MCNC: 110 MG/DL — HIGH (ref 70–99)
GLUCOSE UR QL: NEGATIVE MG/DL — SIGNIFICANT CHANGE UP
GLUCOSE UR QL: NEGATIVE MG/DL — SIGNIFICANT CHANGE UP
HADV DNA SPEC QL NAA+PROBE: SIGNIFICANT CHANGE UP
HADV DNA SPEC QL NAA+PROBE: SIGNIFICANT CHANGE UP
HCO3 BLDV-SCNC: 30 MMOL/L — HIGH (ref 22–29)
HCO3 BLDV-SCNC: 30 MMOL/L — HIGH (ref 22–29)
HCOV 229E RNA SPEC QL NAA+PROBE: SIGNIFICANT CHANGE UP
HCOV 229E RNA SPEC QL NAA+PROBE: SIGNIFICANT CHANGE UP
HCOV HKU1 RNA SPEC QL NAA+PROBE: SIGNIFICANT CHANGE UP
HCOV HKU1 RNA SPEC QL NAA+PROBE: SIGNIFICANT CHANGE UP
HCOV NL63 RNA SPEC QL NAA+PROBE: SIGNIFICANT CHANGE UP
HCOV NL63 RNA SPEC QL NAA+PROBE: SIGNIFICANT CHANGE UP
HCOV OC43 RNA SPEC QL NAA+PROBE: SIGNIFICANT CHANGE UP
HCOV OC43 RNA SPEC QL NAA+PROBE: SIGNIFICANT CHANGE UP
HCT VFR BLD CALC: 35 % — SIGNIFICANT CHANGE UP (ref 34.5–45)
HCT VFR BLD CALC: 35 % — SIGNIFICANT CHANGE UP (ref 34.5–45)
HCT VFR BLDA CALC: 34 % — LOW (ref 34.5–46.5)
HCT VFR BLDA CALC: 34 % — LOW (ref 34.5–46.5)
HGB BLD CALC-MCNC: 11.2 G/DL — LOW (ref 11.7–16.1)
HGB BLD CALC-MCNC: 11.2 G/DL — LOW (ref 11.7–16.1)
HGB BLD-MCNC: 11.1 G/DL — LOW (ref 11.5–15.5)
HGB BLD-MCNC: 11.1 G/DL — LOW (ref 11.5–15.5)
HMPV RNA SPEC QL NAA+PROBE: SIGNIFICANT CHANGE UP
HMPV RNA SPEC QL NAA+PROBE: SIGNIFICANT CHANGE UP
HPIV1 RNA SPEC QL NAA+PROBE: SIGNIFICANT CHANGE UP
HPIV1 RNA SPEC QL NAA+PROBE: SIGNIFICANT CHANGE UP
HPIV2 RNA SPEC QL NAA+PROBE: SIGNIFICANT CHANGE UP
HPIV2 RNA SPEC QL NAA+PROBE: SIGNIFICANT CHANGE UP
HPIV3 RNA SPEC QL NAA+PROBE: SIGNIFICANT CHANGE UP
HPIV3 RNA SPEC QL NAA+PROBE: SIGNIFICANT CHANGE UP
HPIV4 RNA SPEC QL NAA+PROBE: SIGNIFICANT CHANGE UP
HPIV4 RNA SPEC QL NAA+PROBE: SIGNIFICANT CHANGE UP
HYALINE CASTS # UR AUTO: PRESENT
HYALINE CASTS # UR AUTO: PRESENT
IANC: 5.29 K/UL — SIGNIFICANT CHANGE UP (ref 1.8–7.4)
IANC: 5.29 K/UL — SIGNIFICANT CHANGE UP (ref 1.8–7.4)
IMM GRANULOCYTES NFR BLD AUTO: 0.3 % — SIGNIFICANT CHANGE UP (ref 0–0.9)
IMM GRANULOCYTES NFR BLD AUTO: 0.3 % — SIGNIFICANT CHANGE UP (ref 0–0.9)
INR BLD: 1.21 RATIO — HIGH (ref 0.85–1.18)
INR BLD: 1.21 RATIO — HIGH (ref 0.85–1.18)
KETONES UR-MCNC: 15 MG/DL
KETONES UR-MCNC: 15 MG/DL
LACTATE BLDV-MCNC: 2.8 MMOL/L — HIGH (ref 0.5–2)
LACTATE BLDV-MCNC: 2.8 MMOL/L — HIGH (ref 0.5–2)
LEUKOCYTE ESTERASE UR-ACNC: ABNORMAL
LEUKOCYTE ESTERASE UR-ACNC: ABNORMAL
LYMPHOCYTES # BLD AUTO: 1.6 K/UL — SIGNIFICANT CHANGE UP (ref 1–3.3)
LYMPHOCYTES # BLD AUTO: 1.6 K/UL — SIGNIFICANT CHANGE UP (ref 1–3.3)
LYMPHOCYTES # BLD AUTO: 20.8 % — SIGNIFICANT CHANGE UP (ref 13–44)
LYMPHOCYTES # BLD AUTO: 20.8 % — SIGNIFICANT CHANGE UP (ref 13–44)
M PNEUMO DNA SPEC QL NAA+PROBE: SIGNIFICANT CHANGE UP
M PNEUMO DNA SPEC QL NAA+PROBE: SIGNIFICANT CHANGE UP
MAGNESIUM SERPL-MCNC: 2.4 MG/DL — SIGNIFICANT CHANGE UP (ref 1.6–2.6)
MAGNESIUM SERPL-MCNC: 2.4 MG/DL — SIGNIFICANT CHANGE UP (ref 1.6–2.6)
MCHC RBC-ENTMCNC: 26.3 PG — LOW (ref 27–34)
MCHC RBC-ENTMCNC: 26.3 PG — LOW (ref 27–34)
MCHC RBC-ENTMCNC: 31.7 GM/DL — LOW (ref 32–36)
MCHC RBC-ENTMCNC: 31.7 GM/DL — LOW (ref 32–36)
MCV RBC AUTO: 82.9 FL — SIGNIFICANT CHANGE UP (ref 80–100)
MCV RBC AUTO: 82.9 FL — SIGNIFICANT CHANGE UP (ref 80–100)
MONOCYTES # BLD AUTO: 0.72 K/UL — SIGNIFICANT CHANGE UP (ref 0–0.9)
MONOCYTES # BLD AUTO: 0.72 K/UL — SIGNIFICANT CHANGE UP (ref 0–0.9)
MONOCYTES NFR BLD AUTO: 9.4 % — SIGNIFICANT CHANGE UP (ref 2–14)
MONOCYTES NFR BLD AUTO: 9.4 % — SIGNIFICANT CHANGE UP (ref 2–14)
NEUTROPHILS # BLD AUTO: 5.29 K/UL — SIGNIFICANT CHANGE UP (ref 1.8–7.4)
NEUTROPHILS # BLD AUTO: 5.29 K/UL — SIGNIFICANT CHANGE UP (ref 1.8–7.4)
NEUTROPHILS NFR BLD AUTO: 68.6 % — SIGNIFICANT CHANGE UP (ref 43–77)
NEUTROPHILS NFR BLD AUTO: 68.6 % — SIGNIFICANT CHANGE UP (ref 43–77)
NITRITE UR-MCNC: NEGATIVE — SIGNIFICANT CHANGE UP
NITRITE UR-MCNC: NEGATIVE — SIGNIFICANT CHANGE UP
NRBC # BLD: 0 /100 WBCS — SIGNIFICANT CHANGE UP (ref 0–0)
NRBC # BLD: 0 /100 WBCS — SIGNIFICANT CHANGE UP (ref 0–0)
NRBC # FLD: 0 K/UL — SIGNIFICANT CHANGE UP (ref 0–0)
NRBC # FLD: 0 K/UL — SIGNIFICANT CHANGE UP (ref 0–0)
PCO2 BLDV: 46 MMHG — SIGNIFICANT CHANGE UP (ref 39–52)
PCO2 BLDV: 46 MMHG — SIGNIFICANT CHANGE UP (ref 39–52)
PH BLDV: 7.42 — SIGNIFICANT CHANGE UP (ref 7.32–7.43)
PH BLDV: 7.42 — SIGNIFICANT CHANGE UP (ref 7.32–7.43)
PH UR: 6 — SIGNIFICANT CHANGE UP (ref 5–8)
PH UR: 6 — SIGNIFICANT CHANGE UP (ref 5–8)
PLATELET # BLD AUTO: 162 K/UL — SIGNIFICANT CHANGE UP (ref 150–400)
PLATELET # BLD AUTO: 162 K/UL — SIGNIFICANT CHANGE UP (ref 150–400)
PO2 BLDV: 51 MMHG — HIGH (ref 25–45)
PO2 BLDV: 51 MMHG — HIGH (ref 25–45)
POTASSIUM BLDV-SCNC: 3.5 MMOL/L — SIGNIFICANT CHANGE UP (ref 3.5–5.1)
POTASSIUM BLDV-SCNC: 3.5 MMOL/L — SIGNIFICANT CHANGE UP (ref 3.5–5.1)
POTASSIUM SERPL-MCNC: 3.6 MMOL/L — SIGNIFICANT CHANGE UP (ref 3.5–5.3)
POTASSIUM SERPL-MCNC: 3.6 MMOL/L — SIGNIFICANT CHANGE UP (ref 3.5–5.3)
POTASSIUM SERPL-SCNC: 3.6 MMOL/L — SIGNIFICANT CHANGE UP (ref 3.5–5.3)
POTASSIUM SERPL-SCNC: 3.6 MMOL/L — SIGNIFICANT CHANGE UP (ref 3.5–5.3)
PROT SERPL-MCNC: 6.8 G/DL — SIGNIFICANT CHANGE UP (ref 6–8.3)
PROT SERPL-MCNC: 6.8 G/DL — SIGNIFICANT CHANGE UP (ref 6–8.3)
PROT UR-MCNC: 100 MG/DL
PROT UR-MCNC: 100 MG/DL
PROTHROM AB SERPL-ACNC: 13.5 SEC — HIGH (ref 9.5–13)
PROTHROM AB SERPL-ACNC: 13.5 SEC — HIGH (ref 9.5–13)
RAPID RVP RESULT: DETECTED
RAPID RVP RESULT: DETECTED
RBC # BLD: 4.22 M/UL — SIGNIFICANT CHANGE UP (ref 3.8–5.2)
RBC # BLD: 4.22 M/UL — SIGNIFICANT CHANGE UP (ref 3.8–5.2)
RBC # FLD: 17.7 % — HIGH (ref 10.3–14.5)
RBC # FLD: 17.7 % — HIGH (ref 10.3–14.5)
RBC CASTS # UR COMP ASSIST: 6 /HPF — HIGH (ref 0–4)
RBC CASTS # UR COMP ASSIST: 6 /HPF — HIGH (ref 0–4)
REVIEW: SIGNIFICANT CHANGE UP
REVIEW: SIGNIFICANT CHANGE UP
RSV RNA SPEC QL NAA+PROBE: SIGNIFICANT CHANGE UP
RSV RNA SPEC QL NAA+PROBE: SIGNIFICANT CHANGE UP
RV+EV RNA SPEC QL NAA+PROBE: SIGNIFICANT CHANGE UP
RV+EV RNA SPEC QL NAA+PROBE: SIGNIFICANT CHANGE UP
SAO2 % BLDV: 80.5 % — SIGNIFICANT CHANGE UP (ref 67–88)
SAO2 % BLDV: 80.5 % — SIGNIFICANT CHANGE UP (ref 67–88)
SARS-COV-2 RNA SPEC QL NAA+PROBE: SIGNIFICANT CHANGE UP
SARS-COV-2 RNA SPEC QL NAA+PROBE: SIGNIFICANT CHANGE UP
SODIUM SERPL-SCNC: 140 MMOL/L — SIGNIFICANT CHANGE UP (ref 135–145)
SODIUM SERPL-SCNC: 140 MMOL/L — SIGNIFICANT CHANGE UP (ref 135–145)
SP GR SPEC: 1.04 — HIGH (ref 1–1.03)
SP GR SPEC: 1.04 — HIGH (ref 1–1.03)
SQUAMOUS # UR AUTO: 4 /HPF — SIGNIFICANT CHANGE UP (ref 0–5)
SQUAMOUS # UR AUTO: 4 /HPF — SIGNIFICANT CHANGE UP (ref 0–5)
TSH SERPL-MCNC: 3.76 UIU/ML — SIGNIFICANT CHANGE UP (ref 0.27–4.2)
TSH SERPL-MCNC: 3.76 UIU/ML — SIGNIFICANT CHANGE UP (ref 0.27–4.2)
UROBILINOGEN FLD QL: 1 MG/DL — SIGNIFICANT CHANGE UP (ref 0.2–1)
UROBILINOGEN FLD QL: 1 MG/DL — SIGNIFICANT CHANGE UP (ref 0.2–1)
VALPROATE SERPL-MCNC: 85.4 UG/ML — SIGNIFICANT CHANGE UP (ref 50–100)
VALPROATE SERPL-MCNC: 85.4 UG/ML — SIGNIFICANT CHANGE UP (ref 50–100)
WBC # BLD: 7.7 K/UL — SIGNIFICANT CHANGE UP (ref 3.8–10.5)
WBC # BLD: 7.7 K/UL — SIGNIFICANT CHANGE UP (ref 3.8–10.5)
WBC # FLD AUTO: 7.7 K/UL — SIGNIFICANT CHANGE UP (ref 3.8–10.5)
WBC # FLD AUTO: 7.7 K/UL — SIGNIFICANT CHANGE UP (ref 3.8–10.5)
WBC UR QL: 1 /HPF — SIGNIFICANT CHANGE UP (ref 0–5)
WBC UR QL: 1 /HPF — SIGNIFICANT CHANGE UP (ref 0–5)

## 2024-01-02 PROCEDURE — 71045 X-RAY EXAM CHEST 1 VIEW: CPT | Mod: 26

## 2024-01-02 RX ORDER — ACETAMINOPHEN 500 MG
1000 TABLET ORAL ONCE
Refills: 0 | Status: COMPLETED | OUTPATIENT
Start: 2024-01-02 | End: 2024-01-01

## 2024-01-02 RX ORDER — SODIUM CHLORIDE 9 MG/ML
1000 INJECTION, SOLUTION INTRAVENOUS ONCE
Refills: 0 | Status: COMPLETED | OUTPATIENT
Start: 2024-01-02 | End: 2024-01-02

## 2024-01-02 RX ADMIN — Medication 1000 MILLIGRAM(S): at 01:35

## 2024-01-02 RX ADMIN — SODIUM CHLORIDE 1000 MILLILITER(S): 9 INJECTION, SOLUTION INTRAVENOUS at 01:23

## 2024-01-02 NOTE — PROVIDER CONTACT NOTE (OTHER) - ASSESSMENT
HENRIETTA called residence [phone]; spoke with Mr. Evans, who confirms that pt is a resident and can return.  As per Mr. Evans, pt should travel back to the residence via ambulance.  Discussed with provider, who is aware and in agreement with the plan. HENRIETTA called residence [phone]; spoke with Mr. Evans, who confirms that pt is a resident and can return.  As per Mr. Evans, pt should travel back to the residence via ambulance.  Discussed with provider, who is aware and in agreement with the plan.

## 2024-01-02 NOTE — ED ADULT NURSE REASSESSMENT NOTE - NS ED NURSE REASSESS COMMENT FT1
DROPLET PRECAUTIONS MAINTAINED. pt flu +. pt straight cath with rn ana at bedside using 14 fr moses sterile technique. ~200cc dark yellow urine collected and sent. pt ambulatory with steady gait. pt pending DC

## 2024-01-02 NOTE — ED ADULT NURSE REASSESSMENT NOTE - NS ED NURSE REASSESS COMMENT FT1
pt a&ox3, remains lethargic. pt unable to provide urine sample at this time. MD Silva aware. pt pending urine. pt respirations even and unlabored. pt pending dispo, safety maintained

## 2024-01-02 NOTE — ED ADULT NURSE REASSESSMENT NOTE - NS ED NURSE REASSESS COMMENT FT1
received report from audelia moon. Pt is a/o x 3. pt afebrile at this time. pt pending urine. pt respirations even and unlabored with no accessory muscle use, pt normal sinus on monitor. safety maintained

## 2024-01-02 NOTE — PROVIDER CONTACT NOTE (OTHER) - RECOMMENDATIONS
Pt to travel back to [place] residence via ambulance; verbal huddle complete. Pt to travel back to [place] residence via ambulance; verbal huddle complete.

## 2024-01-02 NOTE — PROVIDER CONTACT NOTE (OTHER) - BACKGROUND
Pt is from [facility] [place]; [address]. Pt is from [facility]; [address].

## 2024-01-03 ENCOUNTER — INPATIENT (INPATIENT)
Facility: HOSPITAL | Age: 39
LOS: 13 days | Discharge: PSYCHIATRIC OR SUBSTANCE USE FACILITY | End: 2024-01-17
Attending: STUDENT IN AN ORGANIZED HEALTH CARE EDUCATION/TRAINING PROGRAM | Admitting: STUDENT IN AN ORGANIZED HEALTH CARE EDUCATION/TRAINING PROGRAM
Payer: MEDICAID

## 2024-01-03 VITALS
SYSTOLIC BLOOD PRESSURE: 97 MMHG | TEMPERATURE: 98 F | RESPIRATION RATE: 18 BRPM | OXYGEN SATURATION: 94 % | HEART RATE: 116 BPM | DIASTOLIC BLOOD PRESSURE: 69 MMHG

## 2024-01-03 DIAGNOSIS — R78.81 BACTEREMIA: ICD-10-CM

## 2024-01-03 PROBLEM — G40.909 EPILEPSY, UNSPECIFIED, NOT INTRACTABLE, WITHOUT STATUS EPILEPTICUS: Chronic | Status: ACTIVE | Noted: 2024-01-01

## 2024-01-03 LAB
-  COAGULASE NEGATIVE STAPHYLOCOCCUS: SIGNIFICANT CHANGE UP
-  COAGULASE NEGATIVE STAPHYLOCOCCUS: SIGNIFICANT CHANGE UP
ALBUMIN SERPL ELPH-MCNC: 3 G/DL — LOW (ref 3.3–5)
ALBUMIN SERPL ELPH-MCNC: 3 G/DL — LOW (ref 3.3–5)
ALP SERPL-CCNC: 21 U/L — LOW (ref 40–120)
ALP SERPL-CCNC: 21 U/L — LOW (ref 40–120)
ALT FLD-CCNC: SIGNIFICANT CHANGE UP U/L (ref 4–33)
ALT FLD-CCNC: SIGNIFICANT CHANGE UP U/L (ref 4–33)
ANION GAP SERPL CALC-SCNC: 10 MMOL/L — SIGNIFICANT CHANGE UP (ref 7–14)
ANION GAP SERPL CALC-SCNC: 10 MMOL/L — SIGNIFICANT CHANGE UP (ref 7–14)
ANION GAP SERPL CALC-SCNC: 8 MMOL/L — SIGNIFICANT CHANGE UP (ref 7–14)
ANION GAP SERPL CALC-SCNC: 8 MMOL/L — SIGNIFICANT CHANGE UP (ref 7–14)
AST SERPL-CCNC: SIGNIFICANT CHANGE UP U/L (ref 4–32)
AST SERPL-CCNC: SIGNIFICANT CHANGE UP U/L (ref 4–32)
BASE EXCESS BLDV CALC-SCNC: 0.2 MMOL/L — SIGNIFICANT CHANGE UP (ref -2–3)
BASE EXCESS BLDV CALC-SCNC: 0.2 MMOL/L — SIGNIFICANT CHANGE UP (ref -2–3)
BASOPHILS # BLD AUTO: 0.03 K/UL — SIGNIFICANT CHANGE UP (ref 0–0.2)
BASOPHILS # BLD AUTO: 0.03 K/UL — SIGNIFICANT CHANGE UP (ref 0–0.2)
BASOPHILS NFR BLD AUTO: 0.7 % — SIGNIFICANT CHANGE UP (ref 0–2)
BASOPHILS NFR BLD AUTO: 0.7 % — SIGNIFICANT CHANGE UP (ref 0–2)
BILIRUB SERPL-MCNC: 0.4 MG/DL — SIGNIFICANT CHANGE UP (ref 0.2–1.2)
BILIRUB SERPL-MCNC: 0.4 MG/DL — SIGNIFICANT CHANGE UP (ref 0.2–1.2)
BLOOD GAS VENOUS COMPREHENSIVE RESULT: SIGNIFICANT CHANGE UP
BLOOD GAS VENOUS COMPREHENSIVE RESULT: SIGNIFICANT CHANGE UP
BUN SERPL-MCNC: 4 MG/DL — LOW (ref 7–23)
CALCIUM SERPL-MCNC: 7.7 MG/DL — LOW (ref 8.4–10.5)
CALCIUM SERPL-MCNC: 7.7 MG/DL — LOW (ref 8.4–10.5)
CALCIUM SERPL-MCNC: 8.3 MG/DL — LOW (ref 8.4–10.5)
CALCIUM SERPL-MCNC: 8.3 MG/DL — LOW (ref 8.4–10.5)
CHLORIDE BLDV-SCNC: 111 MMOL/L — HIGH (ref 96–108)
CHLORIDE BLDV-SCNC: 111 MMOL/L — HIGH (ref 96–108)
CHLORIDE SERPL-SCNC: 104 MMOL/L — SIGNIFICANT CHANGE UP (ref 98–107)
CHLORIDE SERPL-SCNC: 104 MMOL/L — SIGNIFICANT CHANGE UP (ref 98–107)
CHLORIDE SERPL-SCNC: 109 MMOL/L — HIGH (ref 98–107)
CHLORIDE SERPL-SCNC: 109 MMOL/L — HIGH (ref 98–107)
CO2 BLDV-SCNC: 26.7 MMOL/L — HIGH (ref 22–26)
CO2 BLDV-SCNC: 26.7 MMOL/L — HIGH (ref 22–26)
CO2 SERPL-SCNC: 25 MMOL/L — SIGNIFICANT CHANGE UP (ref 22–31)
CO2 SERPL-SCNC: 25 MMOL/L — SIGNIFICANT CHANGE UP (ref 22–31)
CO2 SERPL-SCNC: 26 MMOL/L — SIGNIFICANT CHANGE UP (ref 22–31)
CO2 SERPL-SCNC: 26 MMOL/L — SIGNIFICANT CHANGE UP (ref 22–31)
CREAT SERPL-MCNC: 0.76 MG/DL — SIGNIFICANT CHANGE UP (ref 0.5–1.3)
CREAT SERPL-MCNC: 0.76 MG/DL — SIGNIFICANT CHANGE UP (ref 0.5–1.3)
CREAT SERPL-MCNC: 0.9 MG/DL — SIGNIFICANT CHANGE UP (ref 0.5–1.3)
CREAT SERPL-MCNC: 0.9 MG/DL — SIGNIFICANT CHANGE UP (ref 0.5–1.3)
CULTURE RESULTS: NO GROWTH — SIGNIFICANT CHANGE UP
CULTURE RESULTS: NO GROWTH — SIGNIFICANT CHANGE UP
EGFR: 102 ML/MIN/1.73M2 — SIGNIFICANT CHANGE UP
EGFR: 102 ML/MIN/1.73M2 — SIGNIFICANT CHANGE UP
EGFR: 83 ML/MIN/1.73M2 — SIGNIFICANT CHANGE UP
EGFR: 83 ML/MIN/1.73M2 — SIGNIFICANT CHANGE UP
EOSINOPHIL # BLD AUTO: 0 K/UL — SIGNIFICANT CHANGE UP (ref 0–0.5)
EOSINOPHIL # BLD AUTO: 0 K/UL — SIGNIFICANT CHANGE UP (ref 0–0.5)
EOSINOPHIL NFR BLD AUTO: 0 % — SIGNIFICANT CHANGE UP (ref 0–6)
EOSINOPHIL NFR BLD AUTO: 0 % — SIGNIFICANT CHANGE UP (ref 0–6)
GAS PNL BLDV: 140 MMOL/L — SIGNIFICANT CHANGE UP (ref 136–145)
GAS PNL BLDV: 140 MMOL/L — SIGNIFICANT CHANGE UP (ref 136–145)
GAS PNL BLDV: SIGNIFICANT CHANGE UP
GAS PNL BLDV: SIGNIFICANT CHANGE UP
GLUCOSE BLDV-MCNC: 74 MG/DL — SIGNIFICANT CHANGE UP (ref 70–99)
GLUCOSE BLDV-MCNC: 74 MG/DL — SIGNIFICANT CHANGE UP (ref 70–99)
GLUCOSE SERPL-MCNC: 100 MG/DL — HIGH (ref 70–99)
GLUCOSE SERPL-MCNC: 100 MG/DL — HIGH (ref 70–99)
GLUCOSE SERPL-MCNC: 95 MG/DL — SIGNIFICANT CHANGE UP (ref 70–99)
GLUCOSE SERPL-MCNC: 95 MG/DL — SIGNIFICANT CHANGE UP (ref 70–99)
GRAM STN FLD: ABNORMAL
HCO3 BLDV-SCNC: 25 MMOL/L — SIGNIFICANT CHANGE UP (ref 22–29)
HCO3 BLDV-SCNC: 25 MMOL/L — SIGNIFICANT CHANGE UP (ref 22–29)
HCT VFR BLD CALC: 32.4 % — LOW (ref 34.5–45)
HCT VFR BLD CALC: 32.4 % — LOW (ref 34.5–45)
HCT VFR BLDA CALC: 26 % — LOW (ref 34.5–46.5)
HCT VFR BLDA CALC: 26 % — LOW (ref 34.5–46.5)
HGB BLD CALC-MCNC: 8.6 G/DL — LOW (ref 11.7–16.1)
HGB BLD CALC-MCNC: 8.6 G/DL — LOW (ref 11.7–16.1)
HGB BLD-MCNC: 9.9 G/DL — LOW (ref 11.5–15.5)
HGB BLD-MCNC: 9.9 G/DL — LOW (ref 11.5–15.5)
IANC: 1.51 K/UL — LOW (ref 1.8–7.4)
IANC: 1.51 K/UL — LOW (ref 1.8–7.4)
IMM GRANULOCYTES NFR BLD AUTO: 0.2 % — SIGNIFICANT CHANGE UP (ref 0–0.9)
IMM GRANULOCYTES NFR BLD AUTO: 0.2 % — SIGNIFICANT CHANGE UP (ref 0–0.9)
LACTATE BLDV-MCNC: 1 MMOL/L — SIGNIFICANT CHANGE UP (ref 0.5–2)
LACTATE BLDV-MCNC: 1 MMOL/L — SIGNIFICANT CHANGE UP (ref 0.5–2)
LYMPHOCYTES # BLD AUTO: 2.12 K/UL — SIGNIFICANT CHANGE UP (ref 1–3.3)
LYMPHOCYTES # BLD AUTO: 2.12 K/UL — SIGNIFICANT CHANGE UP (ref 1–3.3)
LYMPHOCYTES # BLD AUTO: 48.1 % — HIGH (ref 13–44)
LYMPHOCYTES # BLD AUTO: 48.1 % — HIGH (ref 13–44)
MCHC RBC-ENTMCNC: 26.4 PG — LOW (ref 27–34)
MCHC RBC-ENTMCNC: 26.4 PG — LOW (ref 27–34)
MCHC RBC-ENTMCNC: 30.6 GM/DL — LOW (ref 32–36)
MCHC RBC-ENTMCNC: 30.6 GM/DL — LOW (ref 32–36)
MCV RBC AUTO: 86.4 FL — SIGNIFICANT CHANGE UP (ref 80–100)
MCV RBC AUTO: 86.4 FL — SIGNIFICANT CHANGE UP (ref 80–100)
METHOD TYPE: SIGNIFICANT CHANGE UP
METHOD TYPE: SIGNIFICANT CHANGE UP
MONOCYTES # BLD AUTO: 0.74 K/UL — SIGNIFICANT CHANGE UP (ref 0–0.9)
MONOCYTES # BLD AUTO: 0.74 K/UL — SIGNIFICANT CHANGE UP (ref 0–0.9)
MONOCYTES NFR BLD AUTO: 16.8 % — HIGH (ref 2–14)
MONOCYTES NFR BLD AUTO: 16.8 % — HIGH (ref 2–14)
NEUTROPHILS # BLD AUTO: 1.51 K/UL — LOW (ref 1.8–7.4)
NEUTROPHILS # BLD AUTO: 1.51 K/UL — LOW (ref 1.8–7.4)
NEUTROPHILS NFR BLD AUTO: 34.2 % — LOW (ref 43–77)
NEUTROPHILS NFR BLD AUTO: 34.2 % — LOW (ref 43–77)
NRBC # BLD: 0 /100 WBCS — SIGNIFICANT CHANGE UP (ref 0–0)
NRBC # BLD: 0 /100 WBCS — SIGNIFICANT CHANGE UP (ref 0–0)
NRBC # FLD: 0 K/UL — SIGNIFICANT CHANGE UP (ref 0–0)
NRBC # FLD: 0 K/UL — SIGNIFICANT CHANGE UP (ref 0–0)
PCO2 BLDV: 43 MMHG — SIGNIFICANT CHANGE UP (ref 39–52)
PCO2 BLDV: 43 MMHG — SIGNIFICANT CHANGE UP (ref 39–52)
PH BLDV: 7.38 — SIGNIFICANT CHANGE UP (ref 7.32–7.43)
PH BLDV: 7.38 — SIGNIFICANT CHANGE UP (ref 7.32–7.43)
PLATELET # BLD AUTO: 239 K/UL — SIGNIFICANT CHANGE UP (ref 150–400)
PLATELET # BLD AUTO: 239 K/UL — SIGNIFICANT CHANGE UP (ref 150–400)
PO2 BLDV: 53 MMHG — HIGH (ref 25–45)
PO2 BLDV: 53 MMHG — HIGH (ref 25–45)
POTASSIUM BLDV-SCNC: 4.1 MMOL/L — SIGNIFICANT CHANGE UP (ref 3.5–5.1)
POTASSIUM BLDV-SCNC: 4.1 MMOL/L — SIGNIFICANT CHANGE UP (ref 3.5–5.1)
POTASSIUM SERPL-MCNC: 3.4 MMOL/L — LOW (ref 3.5–5.3)
POTASSIUM SERPL-MCNC: 3.4 MMOL/L — LOW (ref 3.5–5.3)
POTASSIUM SERPL-MCNC: SIGNIFICANT CHANGE UP MMOL/L (ref 3.5–5.3)
POTASSIUM SERPL-MCNC: SIGNIFICANT CHANGE UP MMOL/L (ref 3.5–5.3)
POTASSIUM SERPL-SCNC: 3.4 MMOL/L — LOW (ref 3.5–5.3)
POTASSIUM SERPL-SCNC: 3.4 MMOL/L — LOW (ref 3.5–5.3)
POTASSIUM SERPL-SCNC: SIGNIFICANT CHANGE UP MMOL/L (ref 3.5–5.3)
POTASSIUM SERPL-SCNC: SIGNIFICANT CHANGE UP MMOL/L (ref 3.5–5.3)
PROCALCITONIN SERPL-MCNC: 0.06 NG/ML — SIGNIFICANT CHANGE UP (ref 0.02–0.1)
PROCALCITONIN SERPL-MCNC: 0.06 NG/ML — SIGNIFICANT CHANGE UP (ref 0.02–0.1)
PROT SERPL-MCNC: SIGNIFICANT CHANGE UP G/DL (ref 6–8.3)
PROT SERPL-MCNC: SIGNIFICANT CHANGE UP G/DL (ref 6–8.3)
RBC # BLD: 3.75 M/UL — LOW (ref 3.8–5.2)
RBC # BLD: 3.75 M/UL — LOW (ref 3.8–5.2)
RBC # FLD: 19.8 % — HIGH (ref 10.3–14.5)
RBC # FLD: 19.8 % — HIGH (ref 10.3–14.5)
SAO2 % BLDV: 80.1 % — SIGNIFICANT CHANGE UP (ref 67–88)
SAO2 % BLDV: 80.1 % — SIGNIFICANT CHANGE UP (ref 67–88)
SODIUM SERPL-SCNC: 140 MMOL/L — SIGNIFICANT CHANGE UP (ref 135–145)
SODIUM SERPL-SCNC: 140 MMOL/L — SIGNIFICANT CHANGE UP (ref 135–145)
SODIUM SERPL-SCNC: 142 MMOL/L — SIGNIFICANT CHANGE UP (ref 135–145)
SODIUM SERPL-SCNC: 142 MMOL/L — SIGNIFICANT CHANGE UP (ref 135–145)
SPECIMEN SOURCE: SIGNIFICANT CHANGE UP
VALPROATE SERPL-MCNC: 49.3 UG/ML — LOW (ref 50–100)
VALPROATE SERPL-MCNC: 49.3 UG/ML — LOW (ref 50–100)
WBC # BLD: 4.41 K/UL — SIGNIFICANT CHANGE UP (ref 3.8–10.5)
WBC # BLD: 4.41 K/UL — SIGNIFICANT CHANGE UP (ref 3.8–10.5)
WBC # FLD AUTO: 4.41 K/UL — SIGNIFICANT CHANGE UP (ref 3.8–10.5)
WBC # FLD AUTO: 4.41 K/UL — SIGNIFICANT CHANGE UP (ref 3.8–10.5)

## 2024-01-03 PROCEDURE — 99223 1ST HOSP IP/OBS HIGH 75: CPT

## 2024-01-03 PROCEDURE — 71045 X-RAY EXAM CHEST 1 VIEW: CPT | Mod: 26

## 2024-01-03 PROCEDURE — 99285 EMERGENCY DEPT VISIT HI MDM: CPT

## 2024-01-03 RX ORDER — CLOZAPINE 150 MG/1
100 TABLET, ORALLY DISINTEGRATING ORAL AT BEDTIME
Refills: 0 | Status: DISCONTINUED | OUTPATIENT
Start: 2024-01-03 | End: 2024-01-04

## 2024-01-03 RX ORDER — SODIUM CHLORIDE 9 MG/ML
2000 INJECTION INTRAMUSCULAR; INTRAVENOUS; SUBCUTANEOUS ONCE
Refills: 0 | Status: COMPLETED | OUTPATIENT
Start: 2024-01-03 | End: 2024-01-03

## 2024-01-03 RX ORDER — LANOLIN ALCOHOL/MO/W.PET/CERES
3 CREAM (GRAM) TOPICAL AT BEDTIME
Refills: 0 | Status: DISCONTINUED | OUTPATIENT
Start: 2024-01-03 | End: 2024-01-17

## 2024-01-03 RX ORDER — FOLIC ACID/VIT B COMPLEX AND C 400 MCG
0 TABLET ORAL
Refills: 0 | DISCHARGE

## 2024-01-03 RX ORDER — ATORVASTATIN CALCIUM 80 MG/1
10 TABLET, FILM COATED ORAL AT BEDTIME
Refills: 0 | Status: DISCONTINUED | OUTPATIENT
Start: 2024-01-03 | End: 2024-01-17

## 2024-01-03 RX ORDER — ENOXAPARIN SODIUM 100 MG/ML
40 INJECTION SUBCUTANEOUS EVERY 12 HOURS
Refills: 0 | Status: DISCONTINUED | OUTPATIENT
Start: 2024-01-03 | End: 2024-01-17

## 2024-01-03 RX ORDER — AZITHROMYCIN 500 MG/1
500 TABLET, FILM COATED ORAL ONCE
Refills: 0 | Status: COMPLETED | OUTPATIENT
Start: 2024-01-03 | End: 2024-01-03

## 2024-01-03 RX ORDER — FAMOTIDINE 10 MG/ML
20 INJECTION INTRAVENOUS DAILY
Refills: 0 | Status: DISCONTINUED | OUTPATIENT
Start: 2024-01-03 | End: 2024-01-17

## 2024-01-03 RX ORDER — LITHIUM CARBONATE 300 MG/1
300 TABLET, EXTENDED RELEASE ORAL
Refills: 0 | Status: DISCONTINUED | OUTPATIENT
Start: 2024-01-03 | End: 2024-01-17

## 2024-01-03 RX ORDER — SENNA PLUS 8.6 MG/1
2 TABLET ORAL AT BEDTIME
Refills: 0 | Status: DISCONTINUED | OUTPATIENT
Start: 2024-01-03 | End: 2024-01-17

## 2024-01-03 RX ORDER — DIVALPROEX SODIUM 500 MG/1
1000 TABLET, DELAYED RELEASE ORAL AT BEDTIME
Refills: 0 | Status: DISCONTINUED | OUTPATIENT
Start: 2024-01-03 | End: 2024-01-17

## 2024-01-03 RX ORDER — ACETAMINOPHEN 500 MG
650 TABLET ORAL EVERY 6 HOURS
Refills: 0 | Status: DISCONTINUED | OUTPATIENT
Start: 2024-01-03 | End: 2024-01-17

## 2024-01-03 RX ORDER — POTASSIUM CHLORIDE 20 MEQ
40 PACKET (EA) ORAL ONCE
Refills: 0 | Status: COMPLETED | OUTPATIENT
Start: 2024-01-03 | End: 2024-01-04

## 2024-01-03 RX ORDER — VENLAFAXINE HCL 75 MG
150 CAPSULE, EXT RELEASE 24 HR ORAL DAILY
Refills: 0 | Status: DISCONTINUED | OUTPATIENT
Start: 2024-01-03 | End: 2024-01-17

## 2024-01-03 RX ORDER — CEFTRIAXONE 500 MG/1
1000 INJECTION, POWDER, FOR SOLUTION INTRAMUSCULAR; INTRAVENOUS ONCE
Refills: 0 | Status: COMPLETED | OUTPATIENT
Start: 2024-01-03 | End: 2024-01-03

## 2024-01-03 RX ORDER — METOPROLOL TARTRATE 50 MG
12.5 TABLET ORAL DAILY
Refills: 0 | Status: DISCONTINUED | OUTPATIENT
Start: 2024-01-03 | End: 2024-01-15

## 2024-01-03 RX ORDER — ONDANSETRON 8 MG/1
4 TABLET, FILM COATED ORAL EVERY 8 HOURS
Refills: 0 | Status: DISCONTINUED | OUTPATIENT
Start: 2024-01-03 | End: 2024-01-17

## 2024-01-03 RX ADMIN — CEFTRIAXONE 100 MILLIGRAM(S): 500 INJECTION, POWDER, FOR SOLUTION INTRAMUSCULAR; INTRAVENOUS at 14:13

## 2024-01-03 RX ADMIN — SODIUM CHLORIDE 2000 MILLILITER(S): 9 INJECTION INTRAMUSCULAR; INTRAVENOUS; SUBCUTANEOUS at 11:26

## 2024-01-03 RX ADMIN — AZITHROMYCIN 255 MILLIGRAM(S): 500 TABLET, FILM COATED ORAL at 19:42

## 2024-01-03 NOTE — ED PROVIDER NOTE - ATTENDING APP SHARED VISIT CONTRIBUTION OF CARE
39 yo F hx schizophrenia, seizure disorder, presenting from [facility] after patient was called back to ER for positive blood cultures. Pt seen in ED on 1/1/2024 and found to be flu+ at that time, blood cultures were sent and one bottle grew back gram positive cocci. Pt denies cp, sob, palpitations, abdominal pain. She reports nausea, no vomiting.    VITALS: reviewed  GEN: NAD, A & O x 4  HEAD/EYES: NCAT, EOMI, anicteric sclerae,   ENT: mucus membranes moist, oropharynx WNL, trachea midline,  RESP: lungs CTA with equal breath sounds bilaterally, chest wall nontender and atraumatic  CV: heart with reg rhythm S1, S2, distal pulses intact and symmetric bilaterally  ABDOMEN: normoactive bowel sounds, soft, nondistended, nontender, no palpable masses  : no CVAT  MSK: extremities atraumatic and nontender, no edema, no asymmetry.  SKIN: warm, dry, no rash, no bruising, no cyanosis. color appropriate for ethnicity  NEURO: alert, mentating appropriately, no facial asymmetry.   PSYCH: Affect appropriate    plan for repeat labs including blood cultures 37 yo F hx schizophrenia, seizure disorder, presenting from [facility] after patient was called back to ER for positive blood cultures. Pt seen in ED on 1/1/2024 and found to be flu+ at that time, blood cultures were sent and one bottle grew back gram positive cocci. Pt denies cp, sob, palpitations, abdominal pain. She reports nausea, no vomiting.    VITALS: reviewed  GEN: NAD, A & O x 4  HEAD/EYES: NCAT, EOMI, anicteric sclerae,   ENT: mucus membranes moist, oropharynx WNL, trachea midline,  RESP: lungs CTA with equal breath sounds bilaterally, chest wall nontender and atraumatic  CV: heart with reg rhythm S1, S2, distal pulses intact and symmetric bilaterally  ABDOMEN: normoactive bowel sounds, soft, nondistended, nontender, no palpable masses  : no CVAT  MSK: extremities atraumatic and nontender, no edema, no asymmetry.  SKIN: warm, dry, no rash, no bruising, no cyanosis. color appropriate for ethnicity  NEURO: alert, mentating appropriately, no facial asymmetry.   PSYCH: Affect appropriate    plan for repeat labs including blood cultures

## 2024-01-03 NOTE — H&P ADULT - NSHPREVIEWOFSYSTEMS_GEN_ALL_CORE
limited due to pt participation     CONSTITUTIONAL: +fatigue/weakness No weight loss, fever, chills  HEENT:  Eyes:  No visual loss, blurred vision, double vision or yellow sclerae. Ears, Nose, Throat:  No hearing loss, sneezing, congestion, runny nose or sore throat.  SKIN:  No rash or itching.  CARDIOVASCULAR:  No chest pain, chest pressure or chest discomfort. No palpitations or edema.  RESPIRATORY:  +cough No shortness of breath or sputum.  GASTROINTESTINAL:  No anorexia, nausea, vomiting or diarrhea. No abdominal pain or blood.  GENITOURINARY:  Denies hematuria, dysuria.   NEUROLOGICAL:  +UE tremors/twitching No headache, dizziness, syncope, paralysis, ataxia, numbness or tingling in the extremities. No change in bowel or bladder control.  MUSCULOSKELETAL:  No muscle, back pain, joint pain or stiffness.  PSYCHIATRIC:  No history of depression or anxiety.  ALLERGIES:  No history of asthma, hives, eczema or rhinitis.

## 2024-01-03 NOTE — ED ADULT NURSE NOTE - CHIEF COMPLAINT QUOTE
Pt arrives from [facility] 60. Pt called back for positive blood cultures. Recently seen for Flu. Hx Schizophrenia, Seizure disorder. Offers no complaints. Pt arrives from [facility] 60. Pt called back for positive blood cultures. Recently seen for Flu. Hx Schizophrenia, Seizure disorder. Offers no complaints.

## 2024-01-03 NOTE — ED PROVIDER NOTE - IV ALTEPLASE INCLUSION HIDDEN
----- Message from Elijah Mackey MD sent at 3/17/2017  4:47 PM CDT -----  Pls contact pt, let her know that OR should be put on hold for now.  CT does not reveal any significant stenosis of trachea.  Pt should RV to discuss results and re-evaluate symptoms in more detail in light of CT results.  DSL    ----- Message -----     From: Antony, Rad Results In     Sent: 3/17/2017   3:04 PM       To: SERA Alvarado Ent Result Pool        
Patient called and informed of results and md recommendations. F/u appointment moved up to discuss CT results further. Patient verbalized understanding, agrees with plan and will call back with any other questions or concerns.    Message routed to surgery scheduling to have procedure cancelled.  
show

## 2024-01-03 NOTE — ED ADULT NURSE NOTE - NSFALLUNIVINTERV_ED_ALL_ED
Bed/Stretcher in lowest position, wheels locked, appropriate side rails in place/Call bell, personal items and telephone in reach/Instruct patient to call for assistance before getting out of bed/chair/stretcher/Non-slip footwear applied when patient is off stretcher/[place] to call system/Physically safe environment - no spills, clutter or unnecessary equipment/Purposeful proactive rounding/Room/bathroom lighting operational, light cord in reach Bed/Stretcher in lowest position, wheels locked, appropriate side rails in place/Call bell, personal items and telephone in reach/Instruct patient to call for assistance before getting out of bed/chair/stretcher/Non-slip footwear applied when patient is off stretcher/[place] to call system/Physically safe environment - no spills, clutter or unnecessary equipment/Purposeful proactive rounding/Room/bathroom lighting operational, light cord in reach

## 2024-01-03 NOTE — ED ADULT NURSE NOTE - OBJECTIVE STATEMENT
Pt received to intake room 8 arrives from [facility] [address] coming to the ED as a call back for positive blood cultures. Patient A&Ox3, lethargic, awakes to verbal stimuli Hx of seizures and schizophrenia, answering questions appropriately. RR equal and unlabored. No acute distress noted. Denies SI/HI/hallucinations. FS obtained. 20G IV placed in the R AC, labs drawn and sent. Medicated as ordered. Care plan continued. Comfort measures provided. Safety maintained. Awaiting lab results and imaging. Pt received to intake room 8 arrives from [facility] building [address] coming to the ED as a call back for positive blood cultures. Patient A&Ox3, lethargic, awakes to verbal stimuli Hx of seizures and schizophrenia, answering questions appropriately. RR equal and unlabored. No acute distress noted. Denies SI/HI/hallucinations. FS obtained. 20G IV placed in the R AC, labs drawn and sent. Medicated as ordered. Care plan continued. Comfort measures provided. Safety maintained. Awaiting lab results and imaging.

## 2024-01-03 NOTE — H&P ADULT - NSHPLABSRESULTS_GEN_ALL_CORE
(01-03 @ 11:00)                      9.9  4.41 )-----------( 239                 32.4    Neutrophils = 1.51 (34.2%)  Lymphocytes = 2.12 (48.1%)  Eosinophils = 0.00 (0.0%)  Basophils = 0.03 (0.7%)  Monocytes = 0.74 (16.8%)  Bands = --%    01-03    142  |  109<H>  |  4<L>  ----------------------------<  100<H>  3.4<L>   |  25  |  0.90    Ca    7.7<L>      03 Jan 2024 15:14    TPro  TNP  /  Alb  3.0<L>  /  TBili  0.4  /  DBili  x   /  AST  TNP  /  ALT  TNP  /  AlkPhos  21<L>  01-03          RVP:(01-02 @ 01:23)  Detected      Venous Blood Gas:  01-03 @ 11:47  7.38/43/53/25/80.1  VBG Lactate: 1.0      Urinalysis Basic - ( 03 Jan 2024 15:14 )    Color: x / Appearance: x / SG: x / pH: x  Gluc: 100 mg/dL / Ketone: x  / Bili: x / Urobili: x   Blood: x / Protein: x / Nitrite: x   Leuk Esterase: x / RBC: x / WBC x   Sq Epi: x / Non Sq Epi: x / Bacteria: x    Labs personally reviewed  Imaging reviewed  EKG: sinus tachy @ 112, otherwise no acute st changes

## 2024-01-03 NOTE — ED PROVIDER NOTE - OBJECTIVE STATEMENT
38-year-old female with past medical history of schizophrenia, seizure disorder, from [facility] presenting for abnormal lab results after being recalled.  Patient was seen here few days ago, found to be influenza A positive, blood cultures returned positive for gram-positive cocci on cultures.  Patient complains of persistent cough, denies any chest pain or shortness of breath, also denies abdominal pain, nausea, vomiting, fevers, and chills.  Denies any other complaints. 38-year-old female with past medical history of schizophrenia, seizure disorder, from [facility] presenting for abnormal lab results after being recalled.  Patient was seen here few days ago, found to be influenza A positive, blood cultures returned positive for gram-positive cocci on cultures.  Patient complains of persistent cough, denies any chest pain or shortness of breath, also denies abdominal pain, nausea, vomiting, fevers, and chills.  Denies any other complaints.

## 2024-01-03 NOTE — ED ADULT NURSE NOTE - NS ED PATIENT SAFETY CONCERN
From: Mary Galarza  Sent: 7/29/2018 9:47 PM EDT  Subject: Medication Renewal Request    Mary Griffiths would like a refill of the following medications:     butalbital-acetaminophen-caffeine (FIORICET, [address]) -40 MG per tablet Ad Sheehan DOChemo    Preferred pharmacy: [facility], [address] [place] [address] [address] [phone] [facility] [phone]
No

## 2024-01-03 NOTE — ED ADULT TRIAGE NOTE - CHIEF COMPLAINT QUOTE
Pt arrives from [facility] 60. Pt called back for positive blood cultures. Recently seen for Flu. Hx Schizophrenia, Seizure disorder. Offers no complaints. Pt arrives from [facility] 60. Pt called back for positive blood cultures. Recently seen for Flu. Hx Schizophrenia, Seizure disorder. Offers no complaints.

## 2024-01-03 NOTE — H&P ADULT - PROBLEM SELECTOR PLAN 1
-Pt lethargic currently although arousable. No focal neuro deficits on exam however with intermittent jerking of upper extremities. AOx3 but slow to questioning (unclear if baseline)  -collateral at [facility] confirmed that pt often lethargic at night. Pt noted to be lethargic when she was in the ED a few nights ago as well  -unclear etiology. Related to infection - influenza, less likely bacterial infection vs 2/2 anti-psychotic medications, undiagnosed MAYCOL?  -c/f clozapine related side effects given lethargic, possible dystonic reaction w/ jerking/tremor of UE and drop in ANC. Hgb also with slight drop. Will decrease to clozapine 100mg as do not want to abruptly stop treatment  -check clozapine, lithium levels  -VPA levels low however check ammonia   -check VBG  - consult in am  -aspiration, fall, sz precautions -Pt lethargic currently although arousable. No focal neuro deficits on exam however with intermittent jerking of upper extremities. AOx3 but slow to questioning (unclear if baseline)  -collateral at [facility] confirmed that pt often lethargic at night. Pt noted to be lethargic when she was in the ED a few nights ago as well  -unclear etiology. Related to infection - influenza, less likely bacterial infection vs 2/2 anti-psychotic medications, undiagnosed MAYCOL?  -c/f clozapine related side effects given lethargic, possible dystonic reaction w/ jerking/tremor of UE and drop in ANC. Hgb also with slight drop. Will decrease to clozapine 100mg as do not want to abruptly stop treatment  -check clozapine, lithium levels  -VPA levels low however check ammonia   -check VBG  - consult in am  -aspiration, fall, sz precautions

## 2024-01-03 NOTE — ED PROVIDER NOTE - CLINICAL SUMMARY MEDICAL DECISION MAKING FREE TEXT BOX
38-year-old female with past medical history of schizophrenia, seizure disorder, from [facility] presenting for abnormal lab results after being recalled. VS reviewed, tachycardic. Pt is well appearing, in no acute distress. Imp: Positive blood cultures, recently tested positive for Influenza A, repeat blood cultures, likely admit. 38-year-old female with past medical history of schizophrenia, seizure disorder, from [facility] presenting for abnormal lab results after being recalled. VS reviewed, tachycardic. Pt is well appearing, in no acute distress. Imp: Positive blood cultures, recently tested positive for Influenza A, repeat blood cultures, likely admit.

## 2024-01-03 NOTE — ED ADULT NURSE REASSESSMENT NOTE - NS ED NURSE REASSESS COMMENT FT1
Pt alert and orientedx4, very lethargic. Pt VS are WDL and pt is arousable. MAR called as pt is not signed out to provider yet. New 22G iv placed in left hand.

## 2024-01-03 NOTE — H&P ADULT - NSHPPHYSICALEXAM_GEN_ALL_CORE
GENERAL APPEARANCE: Well developed, obese habitus, lethargic but arousable. NAD.   HEENT:  PERRL, EOMI. External auditory canals normal, hearing grossly intact.  NECK: Neck supple, non-tender   CARDIAC: Normal S1 and S2. No S3, S4 or murmurs. Rhythm is regular.  LUNGS: Clear to auscultation and percussion without rales, rhonchi, wheezing or diminished breath sounds.  ABDOMEN: Positive bowel sounds. Soft, nondistended, nontender. No guarding or rebound.   MUSCULOSKELETAL: ROM intact spine and extremities. No joint erythema or tenderness.   BACK: normal posture, no spinal deformity, symmetry of spinal muscles, without tenderness  EXTREMITIES: No significant deformity or joint abnormality. No edema. Peripheral pulses intact.   NEUROLOGICAL: CN II-XII intact. Strength and sensation symmetric and intact throughout. intermittent jerking motions of both arms   SKIN: no rashes noted  PSYCHIATRIC: AOx3 however pt slow to questioning. Odd affect

## 2024-01-03 NOTE — H&P ADULT - PROBLEM SELECTOR PLAN 4
-c/f clozapine toxicity as above. Decreasing dose to 100mg qhs.   -pt denies A/V hallucinations, no evidence of decompensation   -c/w lithium pending serum level  -c/w venlafaxine, VPA  -Please consult  in am for further recs

## 2024-01-03 NOTE — H&P ADULT - NSICDXFAMILYHX_GEN_ALL_CORE_FT
Refill denied, not seen in over a year. No f/u appt made, was \"return as needed\".    FAMILY HISTORY:  No pertinent family history in first degree relatives

## 2024-01-03 NOTE — H&P ADULT - ASSESSMENT
38-year-old female with past medical history of schizophrenia, seizure disorder, from [facility], HTN, DM?, HLD sent in for blood cx growing staph capitis in the setting of influenza + 38-year-old female with past medical history of schizophrenia, seizure disorder, from [facility], HTN, DM?, HLD sent in for blood cx growing staph capitis in the setting of influenza +

## 2024-01-03 NOTE — H&P ADULT - HISTORY OF PRESENT ILLNESS
38-year-old female with past medical history of schizophrenia, seizure disorder, from [facility],  HTN, DM?, HLD presenting for abnormal lab results after being recalled.  Patient was seen here few days ago, found to be influenza A positive, blood cultures returned positive for gram-positive cocci on cultures.  Patient complains of persistent cough, denies any chest pain or shortness of breath,also denies abdominal pain, nausea, vomiting, fevers, and chills.  Pt a limited historian, lethargic but does arouse to physical stimuli. During pt's initial ED visit, she reported unsteady gait and 'not feeling well' since receiving her meds at 7pm. Patient at the time states that she has been having b/l arm  tremors for the last 2-3 weeks. At baseline, patient is AOx3 and walks independently. She attends a day program. I reached out to [facility] to obtain more collateral, was able to speak to a nurse on the floor however he states not seeing the patient the last week. He reports pt normally lethargic in the evenings but the tremors in the upper extremities are new. Denies pt using cpap. 38-year-old female with past medical history of schizophrenia, seizure disorder, from [facility],  HTN, DM?, HLD presenting for abnormal lab results after being recalled.  Patient was seen here few days ago, found to be influenza A positive, blood cultures returned positive for gram-positive cocci on cultures.  Patient complains of persistent cough, denies any chest pain or shortness of breath,also denies abdominal pain, nausea, vomiting, fevers, and chills.  Pt a limited historian, lethargic but does arouse to physical stimuli. During pt's initial ED visit, she reported unsteady gait and 'not feeling well' since receiving her meds at 7pm. Patient at the time states that she has been having b/l arm  tremors for the last 2-3 weeks. At baseline, patient is AOx3 and walks independently. She attends a day program. I reached out to [facility] to obtain more collateral, was able to speak to a nurse on the floor however he states not seeing the patient the last week. He reports pt normally lethargic in the evenings but the tremors in the upper extremities are new. Denies pt using cpap.

## 2024-01-03 NOTE — H&P ADULT - PROBLEM SELECTOR PLAN 2
-blood cultures positive for staph capitis in 1 bottle. Staph capitis is a common skin nisreen. Given no white count, fevers, suspect more likely represents contamination. Procal neg  -treated with CTX, azithro, will hold on further antibiotics. Low threshold to restart if recurrent fevers or worsening WBC  -f/u repeat blood cultures

## 2024-01-04 DIAGNOSIS — F20.9 SCHIZOPHRENIA, UNSPECIFIED: ICD-10-CM

## 2024-01-04 DIAGNOSIS — Z29.9 ENCOUNTER FOR PROPHYLACTIC MEASURES, UNSPECIFIED: ICD-10-CM

## 2024-01-04 DIAGNOSIS — J10.1 INFLUENZA DUE TO OTHER IDENTIFIED INFLUENZA VIRUS WITH OTHER RESPIRATORY MANIFESTATIONS: ICD-10-CM

## 2024-01-04 DIAGNOSIS — G93.40 ENCEPHALOPATHY, UNSPECIFIED: ICD-10-CM

## 2024-01-04 DIAGNOSIS — G40.909 EPILEPSY, UNSPECIFIED, NOT INTRACTABLE, WITHOUT STATUS EPILEPTICUS: ICD-10-CM

## 2024-01-04 DIAGNOSIS — E11.9 TYPE 2 DIABETES MELLITUS WITHOUT COMPLICATIONS: ICD-10-CM

## 2024-01-04 DIAGNOSIS — R78.81 BACTEREMIA: ICD-10-CM

## 2024-01-04 LAB
A1C WITH ESTIMATED AVERAGE GLUCOSE RESULT: 4.8 % — SIGNIFICANT CHANGE UP (ref 4–5.6)
A1C WITH ESTIMATED AVERAGE GLUCOSE RESULT: 4.8 % — SIGNIFICANT CHANGE UP (ref 4–5.6)
ALBUMIN SERPL ELPH-MCNC: 3.2 G/DL — LOW (ref 3.3–5)
ALBUMIN SERPL ELPH-MCNC: 3.2 G/DL — LOW (ref 3.3–5)
ALP SERPL-CCNC: 33 U/L — LOW (ref 40–120)
ALP SERPL-CCNC: 33 U/L — LOW (ref 40–120)
ALT FLD-CCNC: 31 U/L — SIGNIFICANT CHANGE UP (ref 4–33)
ALT FLD-CCNC: 31 U/L — SIGNIFICANT CHANGE UP (ref 4–33)
ANION GAP SERPL CALC-SCNC: 11 MMOL/L — SIGNIFICANT CHANGE UP (ref 7–14)
ANION GAP SERPL CALC-SCNC: 11 MMOL/L — SIGNIFICANT CHANGE UP (ref 7–14)
AST SERPL-CCNC: 42 U/L — HIGH (ref 4–32)
AST SERPL-CCNC: 42 U/L — HIGH (ref 4–32)
BASE EXCESS BLDV CALC-SCNC: 3.7 MMOL/L — HIGH (ref -2–3)
BASE EXCESS BLDV CALC-SCNC: 3.7 MMOL/L — HIGH (ref -2–3)
BASOPHILS # BLD AUTO: 0.05 K/UL — SIGNIFICANT CHANGE UP (ref 0–0.2)
BASOPHILS # BLD AUTO: 0.05 K/UL — SIGNIFICANT CHANGE UP (ref 0–0.2)
BASOPHILS NFR BLD AUTO: 0.9 % — SIGNIFICANT CHANGE UP (ref 0–2)
BASOPHILS NFR BLD AUTO: 0.9 % — SIGNIFICANT CHANGE UP (ref 0–2)
BILIRUB SERPL-MCNC: 0.2 MG/DL — SIGNIFICANT CHANGE UP (ref 0.2–1.2)
BILIRUB SERPL-MCNC: 0.2 MG/DL — SIGNIFICANT CHANGE UP (ref 0.2–1.2)
BLOOD GAS VENOUS COMPREHENSIVE RESULT: SIGNIFICANT CHANGE UP
BLOOD GAS VENOUS COMPREHENSIVE RESULT: SIGNIFICANT CHANGE UP
BUN SERPL-MCNC: 5 MG/DL — LOW (ref 7–23)
BUN SERPL-MCNC: 5 MG/DL — LOW (ref 7–23)
BURR CELLS BLD QL SMEAR: PRESENT — SIGNIFICANT CHANGE UP
BURR CELLS BLD QL SMEAR: PRESENT — SIGNIFICANT CHANGE UP
CALCIUM SERPL-MCNC: 8.4 MG/DL — SIGNIFICANT CHANGE UP (ref 8.4–10.5)
CALCIUM SERPL-MCNC: 8.4 MG/DL — SIGNIFICANT CHANGE UP (ref 8.4–10.5)
CHLORIDE BLDV-SCNC: 108 MMOL/L — SIGNIFICANT CHANGE UP (ref 96–108)
CHLORIDE BLDV-SCNC: 108 MMOL/L — SIGNIFICANT CHANGE UP (ref 96–108)
CHLORIDE SERPL-SCNC: 106 MMOL/L — SIGNIFICANT CHANGE UP (ref 98–107)
CHLORIDE SERPL-SCNC: 106 MMOL/L — SIGNIFICANT CHANGE UP (ref 98–107)
CLOZAPINE SERPL-MCNC: 677 NG/ML — HIGH (ref 350–600)
CLOZAPINE SERPL-MCNC: 677 NG/ML — HIGH (ref 350–600)
CO2 BLDV-SCNC: 31.5 MMOL/L — HIGH (ref 22–26)
CO2 BLDV-SCNC: 31.5 MMOL/L — HIGH (ref 22–26)
CO2 SERPL-SCNC: 27 MMOL/L — SIGNIFICANT CHANGE UP (ref 22–31)
CO2 SERPL-SCNC: 27 MMOL/L — SIGNIFICANT CHANGE UP (ref 22–31)
CREAT SERPL-MCNC: 0.79 MG/DL — SIGNIFICANT CHANGE UP (ref 0.5–1.3)
CREAT SERPL-MCNC: 0.79 MG/DL — SIGNIFICANT CHANGE UP (ref 0.5–1.3)
CULTURE RESULTS: ABNORMAL
CULTURE RESULTS: ABNORMAL
DACRYOCYTES BLD QL SMEAR: SLIGHT — SIGNIFICANT CHANGE UP
DACRYOCYTES BLD QL SMEAR: SLIGHT — SIGNIFICANT CHANGE UP
EGFR: 98 ML/MIN/1.73M2 — SIGNIFICANT CHANGE UP
EGFR: 98 ML/MIN/1.73M2 — SIGNIFICANT CHANGE UP
ELLIPTOCYTES BLD QL SMEAR: SIGNIFICANT CHANGE UP
ELLIPTOCYTES BLD QL SMEAR: SIGNIFICANT CHANGE UP
EOSINOPHIL # BLD AUTO: 0.05 K/UL — SIGNIFICANT CHANGE UP (ref 0–0.5)
EOSINOPHIL # BLD AUTO: 0.05 K/UL — SIGNIFICANT CHANGE UP (ref 0–0.5)
EOSINOPHIL NFR BLD AUTO: 0.9 % — SIGNIFICANT CHANGE UP (ref 0–6)
EOSINOPHIL NFR BLD AUTO: 0.9 % — SIGNIFICANT CHANGE UP (ref 0–6)
ESTIMATED AVERAGE GLUCOSE: 91 — SIGNIFICANT CHANGE UP
ESTIMATED AVERAGE GLUCOSE: 91 — SIGNIFICANT CHANGE UP
GAS PNL BLDV: 141 MMOL/L — SIGNIFICANT CHANGE UP (ref 136–145)
GAS PNL BLDV: 141 MMOL/L — SIGNIFICANT CHANGE UP (ref 136–145)
GLUCOSE BLDC GLUCOMTR-MCNC: 101 MG/DL — HIGH (ref 70–99)
GLUCOSE BLDC GLUCOMTR-MCNC: 101 MG/DL — HIGH (ref 70–99)
GLUCOSE BLDC GLUCOMTR-MCNC: 103 MG/DL — HIGH (ref 70–99)
GLUCOSE BLDC GLUCOMTR-MCNC: 103 MG/DL — HIGH (ref 70–99)
GLUCOSE BLDC GLUCOMTR-MCNC: 108 MG/DL — HIGH (ref 70–99)
GLUCOSE BLDC GLUCOMTR-MCNC: 108 MG/DL — HIGH (ref 70–99)
GLUCOSE BLDC GLUCOMTR-MCNC: 109 MG/DL — HIGH (ref 70–99)
GLUCOSE BLDC GLUCOMTR-MCNC: 109 MG/DL — HIGH (ref 70–99)
GLUCOSE BLDC GLUCOMTR-MCNC: 110 MG/DL — HIGH (ref 70–99)
GLUCOSE BLDC GLUCOMTR-MCNC: 110 MG/DL — HIGH (ref 70–99)
GLUCOSE BLDC GLUCOMTR-MCNC: 84 MG/DL — SIGNIFICANT CHANGE UP (ref 70–99)
GLUCOSE BLDC GLUCOMTR-MCNC: 84 MG/DL — SIGNIFICANT CHANGE UP (ref 70–99)
GLUCOSE BLDV-MCNC: 95 MG/DL — SIGNIFICANT CHANGE UP (ref 70–99)
GLUCOSE BLDV-MCNC: 95 MG/DL — SIGNIFICANT CHANGE UP (ref 70–99)
GLUCOSE SERPL-MCNC: 108 MG/DL — HIGH (ref 70–99)
GLUCOSE SERPL-MCNC: 108 MG/DL — HIGH (ref 70–99)
HCO3 BLDV-SCNC: 30 MMOL/L — HIGH (ref 22–29)
HCO3 BLDV-SCNC: 30 MMOL/L — HIGH (ref 22–29)
HCT VFR BLD CALC: 30 % — LOW (ref 34.5–45)
HCT VFR BLD CALC: 30 % — LOW (ref 34.5–45)
HCT VFR BLDA CALC: 29 % — LOW (ref 34.5–46.5)
HCT VFR BLDA CALC: 29 % — LOW (ref 34.5–46.5)
HGB BLD CALC-MCNC: 9.5 G/DL — LOW (ref 11.7–16.1)
HGB BLD CALC-MCNC: 9.5 G/DL — LOW (ref 11.7–16.1)
HGB BLD-MCNC: 9.3 G/DL — LOW (ref 11.5–15.5)
HGB BLD-MCNC: 9.3 G/DL — LOW (ref 11.5–15.5)
IANC: 1.32 K/UL — LOW (ref 1.8–7.4)
IANC: 1.32 K/UL — LOW (ref 1.8–7.4)
LACTATE BLDV-MCNC: 1.3 MMOL/L — SIGNIFICANT CHANGE UP (ref 0.5–2)
LACTATE BLDV-MCNC: 1.3 MMOL/L — SIGNIFICANT CHANGE UP (ref 0.5–2)
LITHIUM SERPL-MCNC: 0.8 MMOL/L — SIGNIFICANT CHANGE UP (ref 0.6–1.2)
LITHIUM SERPL-MCNC: 0.8 MMOL/L — SIGNIFICANT CHANGE UP (ref 0.6–1.2)
LYMPHOCYTES # BLD AUTO: 2.53 K/UL — SIGNIFICANT CHANGE UP (ref 1–3.3)
LYMPHOCYTES # BLD AUTO: 2.53 K/UL — SIGNIFICANT CHANGE UP (ref 1–3.3)
LYMPHOCYTES # BLD AUTO: 46.4 % — HIGH (ref 13–44)
LYMPHOCYTES # BLD AUTO: 46.4 % — HIGH (ref 13–44)
MAGNESIUM SERPL-MCNC: 2.4 MG/DL — SIGNIFICANT CHANGE UP (ref 1.6–2.6)
MAGNESIUM SERPL-MCNC: 2.4 MG/DL — SIGNIFICANT CHANGE UP (ref 1.6–2.6)
MANUAL SMEAR VERIFICATION: SIGNIFICANT CHANGE UP
MANUAL SMEAR VERIFICATION: SIGNIFICANT CHANGE UP
MCHC RBC-ENTMCNC: 26.4 PG — LOW (ref 27–34)
MCHC RBC-ENTMCNC: 26.4 PG — LOW (ref 27–34)
MCHC RBC-ENTMCNC: 31 GM/DL — LOW (ref 32–36)
MCHC RBC-ENTMCNC: 31 GM/DL — LOW (ref 32–36)
MCV RBC AUTO: 85.2 FL — SIGNIFICANT CHANGE UP (ref 80–100)
MCV RBC AUTO: 85.2 FL — SIGNIFICANT CHANGE UP (ref 80–100)
MONOCYTES # BLD AUTO: 0.58 K/UL — SIGNIFICANT CHANGE UP (ref 0–0.9)
MONOCYTES # BLD AUTO: 0.58 K/UL — SIGNIFICANT CHANGE UP (ref 0–0.9)
MONOCYTES NFR BLD AUTO: 10.7 % — SIGNIFICANT CHANGE UP (ref 2–14)
MONOCYTES NFR BLD AUTO: 10.7 % — SIGNIFICANT CHANGE UP (ref 2–14)
NEUTROPHILS # BLD AUTO: 2.04 K/UL — SIGNIFICANT CHANGE UP (ref 1.8–7.4)
NEUTROPHILS # BLD AUTO: 2.04 K/UL — SIGNIFICANT CHANGE UP (ref 1.8–7.4)
NEUTROPHILS NFR BLD AUTO: 36.6 % — LOW (ref 43–77)
NEUTROPHILS NFR BLD AUTO: 36.6 % — LOW (ref 43–77)
NEUTS BAND # BLD: 0.9 % — SIGNIFICANT CHANGE UP (ref 0–6)
NEUTS BAND # BLD: 0.9 % — SIGNIFICANT CHANGE UP (ref 0–6)
NRBC # BLD: 0 /100 WBCS — SIGNIFICANT CHANGE UP (ref 0–0)
NRBC # BLD: 0 /100 WBCS — SIGNIFICANT CHANGE UP (ref 0–0)
NRBC # BLD: 1 /100 WBCS — HIGH (ref 0–0)
NRBC # BLD: 1 /100 WBCS — HIGH (ref 0–0)
NRBC # FLD: 0 K/UL — SIGNIFICANT CHANGE UP (ref 0–0)
NRBC # FLD: 0 K/UL — SIGNIFICANT CHANGE UP (ref 0–0)
ORGANISM # SPEC MICROSCOPIC CNT: ABNORMAL
PCO2 BLDV: 53 MMHG — HIGH (ref 39–52)
PCO2 BLDV: 53 MMHG — HIGH (ref 39–52)
PH BLDV: 7.36 — SIGNIFICANT CHANGE UP (ref 7.32–7.43)
PH BLDV: 7.36 — SIGNIFICANT CHANGE UP (ref 7.32–7.43)
PHOSPHATE SERPL-MCNC: 2.7 MG/DL — SIGNIFICANT CHANGE UP (ref 2.5–4.5)
PHOSPHATE SERPL-MCNC: 2.7 MG/DL — SIGNIFICANT CHANGE UP (ref 2.5–4.5)
PLAT MORPH BLD: ABNORMAL
PLAT MORPH BLD: ABNORMAL
PLATELET # BLD AUTO: 165 K/UL — SIGNIFICANT CHANGE UP (ref 150–400)
PLATELET # BLD AUTO: 165 K/UL — SIGNIFICANT CHANGE UP (ref 150–400)
PLATELET COUNT - ESTIMATE: NORMAL — SIGNIFICANT CHANGE UP
PLATELET COUNT - ESTIMATE: NORMAL — SIGNIFICANT CHANGE UP
PO2 BLDV: 38 MMHG — SIGNIFICANT CHANGE UP (ref 25–45)
PO2 BLDV: 38 MMHG — SIGNIFICANT CHANGE UP (ref 25–45)
POIKILOCYTOSIS BLD QL AUTO: SIGNIFICANT CHANGE UP
POIKILOCYTOSIS BLD QL AUTO: SIGNIFICANT CHANGE UP
POLYCHROMASIA BLD QL SMEAR: SLIGHT — SIGNIFICANT CHANGE UP
POLYCHROMASIA BLD QL SMEAR: SLIGHT — SIGNIFICANT CHANGE UP
POTASSIUM BLDV-SCNC: 3.1 MMOL/L — LOW (ref 3.5–5.1)
POTASSIUM BLDV-SCNC: 3.1 MMOL/L — LOW (ref 3.5–5.1)
POTASSIUM SERPL-MCNC: 3.3 MMOL/L — LOW (ref 3.5–5.3)
POTASSIUM SERPL-MCNC: 3.3 MMOL/L — LOW (ref 3.5–5.3)
POTASSIUM SERPL-SCNC: 3.3 MMOL/L — LOW (ref 3.5–5.3)
POTASSIUM SERPL-SCNC: 3.3 MMOL/L — LOW (ref 3.5–5.3)
PROT SERPL-MCNC: 6.5 G/DL — SIGNIFICANT CHANGE UP (ref 6–8.3)
PROT SERPL-MCNC: 6.5 G/DL — SIGNIFICANT CHANGE UP (ref 6–8.3)
RBC # BLD: 3.52 M/UL — LOW (ref 3.8–5.2)
RBC # BLD: 3.52 M/UL — LOW (ref 3.8–5.2)
RBC # FLD: 17.9 % — HIGH (ref 10.3–14.5)
RBC # FLD: 17.9 % — HIGH (ref 10.3–14.5)
RBC BLD AUTO: ABNORMAL
RBC BLD AUTO: ABNORMAL
SAO2 % BLDV: 52.1 % — LOW (ref 67–88)
SAO2 % BLDV: 52.1 % — LOW (ref 67–88)
SMUDGE CELLS # BLD: PRESENT — SIGNIFICANT CHANGE UP
SMUDGE CELLS # BLD: PRESENT — SIGNIFICANT CHANGE UP
SODIUM SERPL-SCNC: 144 MMOL/L — SIGNIFICANT CHANGE UP (ref 135–145)
SODIUM SERPL-SCNC: 144 MMOL/L — SIGNIFICANT CHANGE UP (ref 135–145)
SPECIMEN SOURCE: SIGNIFICANT CHANGE UP
SPECIMEN SOURCE: SIGNIFICANT CHANGE UP
TARGETS BLD QL SMEAR: SLIGHT — SIGNIFICANT CHANGE UP
TARGETS BLD QL SMEAR: SLIGHT — SIGNIFICANT CHANGE UP
VARIANT LYMPHS # BLD: 3.6 % — SIGNIFICANT CHANGE UP (ref 0–6)
VARIANT LYMPHS # BLD: 3.6 % — SIGNIFICANT CHANGE UP (ref 0–6)
WBC # BLD: 5.45 K/UL — SIGNIFICANT CHANGE UP (ref 3.8–10.5)
WBC # BLD: 5.45 K/UL — SIGNIFICANT CHANGE UP (ref 3.8–10.5)
WBC # FLD AUTO: 5.45 K/UL — SIGNIFICANT CHANGE UP (ref 3.8–10.5)
WBC # FLD AUTO: 5.45 K/UL — SIGNIFICANT CHANGE UP (ref 3.8–10.5)

## 2024-01-04 PROCEDURE — 99223 1ST HOSP IP/OBS HIGH 75: CPT

## 2024-01-04 PROCEDURE — 99233 SBSQ HOSP IP/OBS HIGH 50: CPT

## 2024-01-04 RX ORDER — DEXTROSE 50 % IN WATER 50 %
25 SYRINGE (ML) INTRAVENOUS ONCE
Refills: 0 | Status: DISCONTINUED | OUTPATIENT
Start: 2024-01-04 | End: 2024-01-11

## 2024-01-04 RX ORDER — SODIUM CHLORIDE 9 MG/ML
1000 INJECTION, SOLUTION INTRAVENOUS
Refills: 0 | Status: DISCONTINUED | OUTPATIENT
Start: 2024-01-04 | End: 2024-01-11

## 2024-01-04 RX ORDER — INSULIN LISPRO 100/ML
VIAL (ML) SUBCUTANEOUS
Refills: 0 | Status: DISCONTINUED | OUTPATIENT
Start: 2024-01-04 | End: 2024-01-05

## 2024-01-04 RX ORDER — POTASSIUM CHLORIDE 20 MEQ
10 PACKET (EA) ORAL
Refills: 0 | Status: COMPLETED | OUTPATIENT
Start: 2024-01-04 | End: 2024-01-04

## 2024-01-04 RX ORDER — DEXTROSE 50 % IN WATER 50 %
12.5 SYRINGE (ML) INTRAVENOUS ONCE
Refills: 0 | Status: DISCONTINUED | OUTPATIENT
Start: 2024-01-04 | End: 2024-01-11

## 2024-01-04 RX ORDER — DEXTROSE 50 % IN WATER 50 %
15 SYRINGE (ML) INTRAVENOUS ONCE
Refills: 0 | Status: DISCONTINUED | OUTPATIENT
Start: 2024-01-04 | End: 2024-01-11

## 2024-01-04 RX ORDER — INSULIN LISPRO 100/ML
VIAL (ML) SUBCUTANEOUS AT BEDTIME
Refills: 0 | Status: DISCONTINUED | OUTPATIENT
Start: 2024-01-04 | End: 2024-01-05

## 2024-01-04 RX ORDER — GLUCAGON INJECTION, SOLUTION 0.5 MG/.1ML
1 INJECTION, SOLUTION SUBCUTANEOUS ONCE
Refills: 0 | Status: DISCONTINUED | OUTPATIENT
Start: 2024-01-04 | End: 2024-01-11

## 2024-01-04 RX ADMIN — Medication 75 MILLIGRAM(S): at 18:54

## 2024-01-04 RX ADMIN — LITHIUM CARBONATE 300 MILLIGRAM(S): 300 TABLET, EXTENDED RELEASE ORAL at 15:17

## 2024-01-04 RX ADMIN — DIVALPROEX SODIUM 1000 MILLIGRAM(S): 500 TABLET, DELAYED RELEASE ORAL at 01:39

## 2024-01-04 RX ADMIN — Medication 40 MILLIEQUIVALENT(S): at 01:39

## 2024-01-04 RX ADMIN — Medication 75 MILLIGRAM(S): at 06:24

## 2024-01-04 RX ADMIN — DIVALPROEX SODIUM 1000 MILLIGRAM(S): 500 TABLET, DELAYED RELEASE ORAL at 22:42

## 2024-01-04 RX ADMIN — SENNA PLUS 2 TABLET(S): 8.6 TABLET ORAL at 22:41

## 2024-01-04 RX ADMIN — LITHIUM CARBONATE 300 MILLIGRAM(S): 300 TABLET, EXTENDED RELEASE ORAL at 18:54

## 2024-01-04 RX ADMIN — Medication 100 MILLIEQUIVALENT(S): at 06:22

## 2024-01-04 RX ADMIN — Medication 100 MILLIEQUIVALENT(S): at 11:19

## 2024-01-04 RX ADMIN — Medication 100 MILLIEQUIVALENT(S): at 09:41

## 2024-01-04 RX ADMIN — Medication 150 MILLIGRAM(S): at 12:13

## 2024-01-04 RX ADMIN — ATORVASTATIN CALCIUM 10 MILLIGRAM(S): 80 TABLET, FILM COATED ORAL at 22:42

## 2024-01-04 RX ADMIN — LITHIUM CARBONATE 300 MILLIGRAM(S): 300 TABLET, EXTENDED RELEASE ORAL at 01:39

## 2024-01-04 RX ADMIN — FAMOTIDINE 20 MILLIGRAM(S): 10 INJECTION INTRAVENOUS at 12:13

## 2024-01-04 NOTE — BH CONSULTATION LIAISON ASSESSMENT NOTE - SUMMARY
Patient is a 38-year-old woman with past history of schizoaffective disorder, seizure disorder, from [facility], HTN, DM?, HLD, pt. was  sent in for blood cx growing staph capitis in the setting of influenza +. Psychiatry consulted fro medication management.     Patient seen, oriented, but lethargic and slow to respond, mild tremors noted in both UE. She firmly denies AVH, denies CAH, denies SI and HI. Denies feeling unsafe in the hospital. Care coordinated with outpt. psychiatrist, at baseline pt. is alert and has chronic tremors in both UE.    Labs: Lithium level 0.8, ANC 1.51, VPA level 49.30, Clozapine level 677      PLAN:  1-HOLD Clozapine in the setting of lethargy, low ANC and high Clozapine level    [] Monitor ANC and Clozapine level closely  2-Continue Lithium 300mg BID and Effexor 150mg po daily     [] Monitor BUN/CR, Lithium Level  3- Haldol dec 200mg q4 weeks, last received 12/27/23, next injection is due on 1/24/24.  4- PRN for agitation: Zyprexa 2.5mg PO/IM q6h prn for agitation, may give additional Zyprexa 2.5mg for refractory agitation (ensure qtc <500)  5- Care coordinated with outpatient psychiatrist Dr. Whitaker [phone]  6-Will follow       Patient is a 38-year-old woman with past history of schizoaffective disorder, seizure disorder, from [facility], HTN, DM?, HLD, pt. was  sent in for blood cx growing staph capitis in the setting of influenza +. Psychiatry consulted fro medication management.     Patient seen, oriented, but lethargic and slow to respond, mild tremors noted in both UE. She firmly denies AVH, denies CAH, denies SI and HI. Denies feeling unsafe in the hospital. Care coordinated with outpt. psychiatrist, at baseline pt. is alert and has chronic tremors in both UE.    Labs: Lithium level 0.8, ANC 1.51, VPA level 49.30, Clozapine level 677      PLAN:  1-HOLD Clozapine in the setting of lethargy, low ANC and high Clozapine level    [] Monitor ANC and Clozapine level closely  2-Continue Lithium 300mg BID and Effexor 150mg po daily     [] Monitor BUN/CR, Lithium Level  3- Haldol dec 200mg q4 weeks, last received 12/27/23, next injection is due on 1/24/24.  4- PRN for agitation: Zyprexa 2.5mg PO/IM q6h prn for agitation, may give additional Zyprexa 2.5mg for refractory agitation (ensure qtc <500)  5- Care coordinated with outpatient psychiatrist Dr. Whitaker [phone]  6-Will follow       Patient is a 38-year-old woman with past history of schizoaffective disorder, seizure disorder, from [facility], HTN, DM?, HLD, pt. was  sent in for blood cx growing staph capitis in the setting of influenza +. Psychiatry consulted fro medication management.     Patient seen, oriented, but lethargic and slow to respond, mild tremors noted in both UE. She firmly denies AVH, denies CAH, denies SI and HI. Denies feeling unsafe in the hospital. Care coordinated with outpt. psychiatrist, at baseline pt. is alert and has chronic tremors in both UE.    Labs: Lithium level 0.8, ANC 1.51, VPA level 49.30, Clozapine level 677      PLAN:  1-HOLD Clozapine in the setting of lethargy, low ANC and high Clozapine level    [] Monitor ANC and Clozapine level closely  2-Continue Lithium 300mg BID and Effexor 150mg po daily     [] Monitor BUN/CR, Lithium Level  3- Haldol dec 200mg q4 weeks, last received 12/27/23, next injection is due on 1/24/24.  4- PRN for agitation: Zyprexa 2.5mg PO/IM q6h prn for agitation, may give additional Zyprexa 2.5mg for refractory agitation (ensure qtc <500)  5- Care coordinated with outpatient psychiatrist Dr. Whitaker [phone] and care coordinator Meagan Mckeon [phone]  6-Will follow       Patient is a 38-year-old woman with past history of schizoaffective disorder, seizure disorder, from [facility], HTN, DM?, HLD, pt. was  sent in for blood cx growing staph capitis in the setting of influenza +. Psychiatry consulted fro medication management.     Patient seen, oriented, but lethargic and slow to respond, mild tremors noted in both UE. She firmly denies AVH, denies CAH, denies SI and HI. Denies feeling unsafe in the hospital. Care coordinated with outpt. psychiatrist, at baseline pt. is alert and has chronic tremors in both UE.    Labs: Lithium level 0.8, ANC 1.51, VPA level 49.30, Clozapine level 677      PLAN:  1-HOLD Clozapine in the setting of lethargy, low ANC and high Clozapine level    [] Monitor ANC and Clozapine level closely  2-Continue Lithium 300mg BID and Effexor 150mg po daily     [] Monitor BUN/CR, Lithium Level  3- Haldol dec 200mg q4 weeks, last received 12/27/23, next injection is due on 1/24/24.  4- PRN for agitation: Zyprexa 2.5mg PO/IM q6h prn for agitation, may give additional Zyprexa 2.5mg for refractory agitation (ensure qtc <500)  5- Care coordinated with outpatient psychiatrist Dr. Whitaker [phone] and care coordinator Meagan Mckeon [phone]  6-Will follow       Patient is a 38-year-old woman with past history of schizoaffective disorder, seizure disorder, from [facility], HTN, DM?, HLD, pt. was  sent in for blood cx growing staph capitis in the setting of influenza +. Psychiatry consulted fro medication management.     Patient seen, oriented, but lethargic and slow to respond, mild tremors noted in both UE. She firmly denies AVH, denies CAH, denies SI and HI. Denies feeling unsafe in the hospital. Care coordinated with outpt. psychiatrist, at baseline pt. is alert and has chronic tremors in both UE.    Labs: Lithium level 0.8, ANC 1.51, VPA level 49.30, Clozapine level 677      PLAN:  1-HOLD Clozapine in the setting of lethargy, low ANC and high Clozapine level    [] Monitor ANC and Clozapine level closely  2-Continue Lithium 300mg BID and Effexor 150mg po daily     [] Monitor BUN/CR, Lithium Level  3- Haldol dec 200mg q4 weeks, last received 12/27/23, next injection is due on 1/24/24.  4- PRN for agitation: Zyprexa 2.5mg PO/IM q6h prn for agitation, may give additional Zyprexa 2.5mg for refractory agitation (ensure qtc <500)  5- Care coordinated with outpatient psychiatrist Dr. Whitaker [phone] and care coordinator Meagan Mckeon [phone]  6- Have low threshold to start 1:1 if needed    Case d/w ACP in detail. recs given    Patient is a 38-year-old woman with past history of schizoaffective disorder, seizure disorder, from [facility], HTN, DM?, HLD, pt. was  sent in for blood cx growing staph capitis in the setting of influenza +. Psychiatry consulted fro medication management.     Patient seen, oriented, but lethargic and slow to respond, mild tremors noted in both UE. She firmly denies AVH, denies CAH, denies SI and HI. Denies feeling unsafe in the hospital. Care coordinated with outpt. psychiatrist, at baseline pt. is alert and has chronic tremors in both UE.    Labs: Lithium level 0.8, ANC 1.51, VPA level 49.30, Clozapine level 677      PLAN:  1-HOLD Clozapine in the setting of lethargy, low ANC and high Clozapine level    [] Monitor ANC and Clozapine level closely  2-Continue Lithium 300mg BID and Effexor 150mg po daily     [] Monitor BUN/CR, Lithium Level  3- Haldol dec 200mg q4 weeks, last received 12/27/23, next injection is due on 1/24/24.  4- PRN for agitation: Zyprexa 2.5mg PO/IM q6h prn for agitation, may give additional Zyprexa 2.5mg for refractory agitation (ensure qtc <500)  5- Care coordinated with outpatient psychiatrist Dr. Whitaker [phone] and care coordinator Meagan Mckeon [phone]  6- Have low threshold to start 1:1 if needed    Case d/w ACP in detail. recs given

## 2024-01-04 NOTE — BH CONSULTATION LIAISON ASSESSMENT NOTE - CURRENT MEDICATION
MEDICATIONS  (STANDING):  atorvastatin 10 milliGRAM(s) Oral at bedtime  cloZAPine 100 milliGRAM(s) Oral at bedtime  dextrose 5%. 1000 milliLiter(s) (50 mL/Hr) IV Continuous <Continuous>  dextrose 5%. 1000 milliLiter(s) (100 mL/Hr) IV Continuous <Continuous>  dextrose 50% Injectable 12.5 Gram(s) IV Push once  dextrose 50% Injectable 25 Gram(s) IV Push once  dextrose 50% Injectable 25 Gram(s) IV Push once  divalproex DR 1000 milliGRAM(s) Oral at bedtime  enoxaparin Injectable 40 milliGRAM(s) SubCutaneous every 24 hours  famotidine    Tablet 20 milliGRAM(s) Oral daily  glucagon  Injectable 1 milliGRAM(s) IntraMuscular once  insulin lispro (ADMELOG) corrective regimen sliding scale   SubCutaneous three times a day before meals  insulin lispro (ADMELOG) corrective regimen sliding scale   SubCutaneous at bedtime  lithium 300 milliGRAM(s) Oral two times a day  metoprolol succinate ER 12.5 milliGRAM(s) Oral daily  oseltamivir 75 milliGRAM(s) Oral two times a day  senna 2 Tablet(s) Oral at bedtime  venlafaxine XR. 150 milliGRAM(s) Oral daily    MEDICATIONS  (PRN):  acetaminophen     Tablet .. 650 milliGRAM(s) Oral every 6 hours PRN Temp greater or equal to 38C (100.4F), Mild Pain (1 - 3)  aluminum hydroxide/magnesium hydroxide/simethicone Suspension 30 milliLiter(s) Oral every 4 hours PRN Dyspepsia  dextrose Oral Gel 15 Gram(s) Oral once PRN Blood Glucose LESS THAN 70 milliGRAM(s)/deciliter  melatonin 3 milliGRAM(s) Oral at bedtime PRN Insomnia  ondansetron Injectable 4 milliGRAM(s) IV Push every 8 hours PRN Nausea and/or Vomiting

## 2024-01-04 NOTE — BH CONSULTATION LIAISON ASSESSMENT NOTE - RISK ASSESSMENT
Risk factors; psychiatric diagnosis, h/o aggression, h/o hearing voices chronically  Protective factors; Has outpt. providers, denies si and hi, engaged in treatment

## 2024-01-04 NOTE — PROGRESS NOTE ADULT - PROBLEM SELECTOR PLAN 1
ANO3, No focal neuro deficits on exam however with intermittent jerking of upper extremities (which per outpt psychiatrist is her chronic baseline dt Haldol.     -collateral at [facility] confirmed that pt often lethargic at night.   -bilateral tremor known to outpt psych and is chronic  -per , hold clozapine for now (level 677)  -cont with lithium 300mg bid, effexor 150mg qd and Depakote 1g qhs (on for seizure)  -cont to monitor lithium, clozapine level and monitor ANC   -appreciate psych recs  -aspiration, fall, sz precautions ANO3, No focal neuro deficits on exam however with intermittent jerking of upper extremities (which per outpt psychiatrist is her chronic baseline dt Haldol.     -collateral at [facility] confirmed that pt often lethargic at night.   -bilateral tremor known to outpt psych and is chronic  -per , hold clozapine for now (level 677)  -cont with lithium 300mg bid, effexor 150mg qd and Depakote 1g qhs (on for seizure)  -cont to monitor lithium, clozapine level and monitor ANC   -appreciate psych recs  -aspiration, fall, sz precautions

## 2024-01-04 NOTE — PROGRESS NOTE ADULT - ASSESSMENT
38-year-old female with past medical history of schizophrenia, seizure disorder, from [facility], HTN, DM?, HLD sent in for blood cx growing staph capitis and noted to have of influenza + 38-year-old female with past medical history of schizophrenia, seizure disorder, from [facility], HTN, DM?, HLD sent in for blood cx growing staph capitis and noted to have of influenza +

## 2024-01-04 NOTE — BH CONSULTATION LIAISON ASSESSMENT NOTE - NSBHTIMEACTIVITIESPERFORMED_PSY_A_CORE
Care coordinated with medical team and outpt. providers Care coordinated with medical team and outpt. providers , psychiatrist/care coordinator

## 2024-01-04 NOTE — PROGRESS NOTE ADULT - PROBLEM SELECTOR PLAN 4
outpt meds: Clozapine 175mg qhs,  lithium 300mg bid, effexor 150mg qd and Depakote 1g qhs, Haldol dec inj 200mg q4 weeks, last received 12/27/23, next injection is due on 1/24/24.   outpt psych: Dr. Whitaker [phone]    -pt denies A/V hallucinations, no evidence of decompensation   -hold clozapine per psych  -cont with lithium 300mg bid, effexor 150mg qd and Depakote 1g qhs (on for seizure)  -cont to monitor lithium, clozapine level and monitor ANC   -appreciate psych recs outpt meds: Clozapine 175mg qhs,  lithium 300mg bid, effexor 150mg qd and Depakote 1g qhs, Haldol dec inj 200mg q4 weeks, last received 12/27/23, next injection is due on 1/24/24.   outpt psych: Dr. Whitaker [phone]    -pt denies A/V hallucinations, no evidence of decompensation   -hold clozapine per psych  -cont with lithium 300mg bid, effexor 150mg qd and Depakote 1g qhs (on for seizure)  -cont to monitor lithium, clozapine level and monitor ANC   -appreciate psych recs

## 2024-01-04 NOTE — BH CONSULTATION LIAISON ASSESSMENT NOTE - NSBHCHARTREVIEWVS_PSY_A_CORE FT
Vital Signs Last 24 Hrs  T(C): 37 (04 Jan 2024 09:47), Max: 37.2 (03 Jan 2024 22:20)  T(F): 98.6 (04 Jan 2024 09:47), Max: 99 (03 Jan 2024 22:20)  HR: 91 (04 Jan 2024 09:47) (82 - 91)  BP: 123/75 (04 Jan 2024 09:47) (94/58 - 138/82)  BP(mean): 80 (04 Jan 2024 02:04) (80 - 80)  RR: 17 (04 Jan 2024 09:47) (16 - 19)  SpO2: 97% (04 Jan 2024 09:47) (92% - 100%)    Parameters below as of 04 Jan 2024 09:47  Patient On (Oxygen Delivery Method): room air
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
## 2024-01-04 NOTE — PROGRESS NOTE ADULT - SUBJECTIVE AND OBJECTIVE BOX
[facility] Department of Hospital Medicine  Saurav Valencia MD  Available on MS Teams    Patient is a 38y old  Female who presents with a chief complaint of + blood cultures (03 Jan 2024 23:04)    OVERNIGHT EVENTS: No acute events overnight    SUBJECTIVE: Pt seen and examined at bedside this morning. Patient is calm, cooperative, states she came in to the hospital because of twitching. feels safe in he hospital. denies SI/HI, denies hallucinations. denies having fever/chills, no rhinorrhea or sob, no cp. unable to recall her medications. per staff, pt ate full breakfast with some assistance, able to ambulate to the bathroom without assistance.    MEDICATIONS  (STANDING):  atorvastatin 10 milliGRAM(s) Oral at bedtime  dextrose 5%. 1000 milliLiter(s) (50 mL/Hr) IV Continuous <Continuous>  dextrose 5%. 1000 milliLiter(s) (100 mL/Hr) IV Continuous <Continuous>  dextrose 50% Injectable 25 Gram(s) IV Push once  dextrose 50% Injectable 12.5 Gram(s) IV Push once  dextrose 50% Injectable 25 Gram(s) IV Push once  divalproex DR 1000 milliGRAM(s) Oral at bedtime  enoxaparin Injectable 40 milliGRAM(s) SubCutaneous every 24 hours  famotidine    Tablet 20 milliGRAM(s) Oral daily  glucagon  Injectable 1 milliGRAM(s) IntraMuscular once  insulin lispro (ADMELOG) corrective regimen sliding scale   SubCutaneous three times a day before meals  insulin lispro (ADMELOG) corrective regimen sliding scale   SubCutaneous at bedtime  lithium 300 milliGRAM(s) Oral two times a day  metoprolol succinate ER 12.5 milliGRAM(s) Oral daily  oseltamivir 75 milliGRAM(s) Oral two times a day  senna 2 Tablet(s) Oral at bedtime  venlafaxine XR. 150 milliGRAM(s) Oral daily    MEDICATIONS  (PRN):  acetaminophen     Tablet .. 650 milliGRAM(s) Oral every 6 hours PRN Temp greater or equal to 38C (100.4F), Mild Pain (1 - 3)  aluminum hydroxide/magnesium hydroxide/simethicone Suspension 30 milliLiter(s) Oral every 4 hours PRN Dyspepsia  dextrose Oral Gel 15 Gram(s) Oral once PRN Blood Glucose LESS THAN 70 milliGRAM(s)/deciliter  melatonin 3 milliGRAM(s) Oral at bedtime PRN Insomnia  ondansetron Injectable 4 milliGRAM(s) IV Push every 8 hours PRN Nausea and/or Vomiting      CAPILLARY BLOOD GLUCOSE      POCT Blood Glucose.: 109 mg/dL (04 Jan 2024 12:41)  POCT Blood Glucose.: 84 mg/dL (04 Jan 2024 08:54)  POCT Blood Glucose.: 103 mg/dL (04 Jan 2024 03:01)  POCT Blood Glucose.: 110 mg/dL (04 Jan 2024 00:08)    I&O's Summary    03 Jan 2024 07:01  -  04 Jan 2024 07:00  --------------------------------------------------------  IN: 0 mL / OUT: 800 mL / NET: -800 mL        PHYSICAL EXAM:  Vital Signs Last 24 Hrs  T(C): 37 (04 Jan 2024 09:47), Max: 37.2 (03 Jan 2024 22:20)  T(F): 98.6 (04 Jan 2024 09:47), Max: 99 (03 Jan 2024 22:20)  HR: 91 (04 Jan 2024 09:47) (82 - 91)  BP: 123/75 (04 Jan 2024 09:47) (94/58 - 138/82)  BP(mean): 80 (04 Jan 2024 02:04) (80 - 80)  RR: 17 (04 Jan 2024 09:47) (16 - 19)  SpO2: 97% (04 Jan 2024 09:47) (92% - 100%)    Parameters below as of 04 Jan 2024 09:47  Patient On (Oxygen Delivery Method): room air        CONSTITUTIONAL: NAD  HEAD: Normocephalic, atraumatic  EYES: EOMI, PERRL  RESPIRATORY: No increased work of breathing, CTAB, no wheezes or crackles appreciated  CARDIOVASCULAR: RRR, S1 and S2 present, no m/r/g  ABDOMEN: soft, NT, ND, bowel sounds present  EXTREMITIES: No LE edema  MUSCULOSKELETAL: no joint swelling, no tenderness to palpation  NEURO: A&Ox3, moving all extremities, no muscle rigidity  psych: flat affect and slowed speech, no abnormal behavior or disorganized thoughts    LABS:                        9.3    5.45  )-----------( 165      ( 04 Jan 2024 02:03 )             30.0     01-04    144  |  106  |  5<L>  ----------------------------<  108<H>  3.3<L>   |  27  |  0.79    Ca    8.4      04 Jan 2024 02:03  Phos  2.7     01-04  Mg     2.40     01-04    TPro  6.5  /  Alb  3.2<L>  /  TBili  0.2  /  DBili  x   /  AST  42<H>  /  ALT  31  /  AlkPhos  33<L>  01-04          Urinalysis Basic - ( 04 Jan 2024 02:03 )    Color: x / Appearance: x / SG: x / pH: x  Gluc: 108 mg/dL / Ketone: x  / Bili: x / Urobili: x   Blood: x / Protein: x / Nitrite: x   Leuk Esterase: x / RBC: x / WBC x   Sq Epi: x / Non Sq Epi: x / Bacteria: x        Culture - Urine (collected 02 Jan 2024 09:27)  Source: Clean Catch Clean Catch (Midstream)  Final Report (03 Jan 2024 08:21):    No growth    Culture - Blood (collected 02 Jan 2024 00:00)  Source: .Blood Blood  Preliminary Report (04 Jan 2024 07:01):    No growth at 48 Hours    Culture - Blood (collected 01 Jan 2024 23:45)  Source: .Blood Blood  Gram Stain (03 Jan 2024 15:35):    Growth in aerobic bottle: Gram Positive Cocci in Clusters    Growth in anaerobic bottle: Gram Positive Cocci in Clusters  Final Report (04 Jan 2024 13:24):    Growth in aerobic and anaerobic bottles: Staphylococcus capitis    Isolation of Coagulase negative Staphylococcus from single blood culture    sets may represent    contamination. Contact the Microbiology Department at [phone] if    susceptibility testing is    clinically indicated.    Direct identification is available within approximately 3-5    hours either by Blood Panel Multiplexed PCR or Direct    MALDI-TOF. Details: [email or web address]  Organism: Blood Culture PCR (04 Jan 2024 13:24)  Organism: Blood Culture PCR (04 Jan 2024 13:24)        RADIOLOGY & ADDITIONAL TESTS:    Results Reviewed:   Imaging Personally Reviewed:  Electrocardiogram Personally Reviewed:    COORDINATION OF CARE:  Care Discussed with Consultants/Other Providers [Y/N]:  Prior or Outpatient Records Reviewed [Y/N]:   [facility] Department of Hospital Medicine  Saurav Valencia MD  Available on MS Teams    Patient is a 38y old  Female who presents with a chief complaint of + blood cultures (03 Jan 2024 23:04)    OVERNIGHT EVENTS: No acute events overnight    SUBJECTIVE: Pt seen and examined at bedside this morning. Patient is calm, cooperative, states she came in to the hospital because of twitching. feels safe in he hospital. denies SI/HI, denies hallucinations. denies having fever/chills, no rhinorrhea or sob, no cp. unable to recall her medications. per staff, pt ate full breakfast with some assistance, able to ambulate to the bathroom without assistance.    MEDICATIONS  (STANDING):  atorvastatin 10 milliGRAM(s) Oral at bedtime  dextrose 5%. 1000 milliLiter(s) (50 mL/Hr) IV Continuous <Continuous>  dextrose 5%. 1000 milliLiter(s) (100 mL/Hr) IV Continuous <Continuous>  dextrose 50% Injectable 25 Gram(s) IV Push once  dextrose 50% Injectable 12.5 Gram(s) IV Push once  dextrose 50% Injectable 25 Gram(s) IV Push once  divalproex DR 1000 milliGRAM(s) Oral at bedtime  enoxaparin Injectable 40 milliGRAM(s) SubCutaneous every 24 hours  famotidine    Tablet 20 milliGRAM(s) Oral daily  glucagon  Injectable 1 milliGRAM(s) IntraMuscular once  insulin lispro (ADMELOG) corrective regimen sliding scale   SubCutaneous three times a day before meals  insulin lispro (ADMELOG) corrective regimen sliding scale   SubCutaneous at bedtime  lithium 300 milliGRAM(s) Oral two times a day  metoprolol succinate ER 12.5 milliGRAM(s) Oral daily  oseltamivir 75 milliGRAM(s) Oral two times a day  senna 2 Tablet(s) Oral at bedtime  venlafaxine XR. 150 milliGRAM(s) Oral daily    MEDICATIONS  (PRN):  acetaminophen     Tablet .. 650 milliGRAM(s) Oral every 6 hours PRN Temp greater or equal to 38C (100.4F), Mild Pain (1 - 3)  aluminum hydroxide/magnesium hydroxide/simethicone Suspension 30 milliLiter(s) Oral every 4 hours PRN Dyspepsia  dextrose Oral Gel 15 Gram(s) Oral once PRN Blood Glucose LESS THAN 70 milliGRAM(s)/deciliter  melatonin 3 milliGRAM(s) Oral at bedtime PRN Insomnia  ondansetron Injectable 4 milliGRAM(s) IV Push every 8 hours PRN Nausea and/or Vomiting      CAPILLARY BLOOD GLUCOSE      POCT Blood Glucose.: 109 mg/dL (04 Jan 2024 12:41)  POCT Blood Glucose.: 84 mg/dL (04 Jan 2024 08:54)  POCT Blood Glucose.: 103 mg/dL (04 Jan 2024 03:01)  POCT Blood Glucose.: 110 mg/dL (04 Jan 2024 00:08)    I&O's Summary    03 Jan 2024 07:01  -  04 Jan 2024 07:00  --------------------------------------------------------  IN: 0 mL / OUT: 800 mL / NET: -800 mL        PHYSICAL EXAM:  Vital Signs Last 24 Hrs  T(C): 37 (04 Jan 2024 09:47), Max: 37.2 (03 Jan 2024 22:20)  T(F): 98.6 (04 Jan 2024 09:47), Max: 99 (03 Jan 2024 22:20)  HR: 91 (04 Jan 2024 09:47) (82 - 91)  BP: 123/75 (04 Jan 2024 09:47) (94/58 - 138/82)  BP(mean): 80 (04 Jan 2024 02:04) (80 - 80)  RR: 17 (04 Jan 2024 09:47) (16 - 19)  SpO2: 97% (04 Jan 2024 09:47) (92% - 100%)    Parameters below as of 04 Jan 2024 09:47  Patient On (Oxygen Delivery Method): room air        CONSTITUTIONAL: NAD  HEAD: Normocephalic, atraumatic  EYES: EOMI, PERRL  RESPIRATORY: No increased work of breathing, CTAB, no wheezes or crackles appreciated  CARDIOVASCULAR: RRR, S1 and S2 present, no m/r/g  ABDOMEN: soft, NT, ND, bowel sounds present  EXTREMITIES: No LE edema  MUSCULOSKELETAL: no joint swelling, no tenderness to palpation  NEURO: A&Ox3, moving all extremities, no muscle rigidity  psych: flat affect and slowed speech, no abnormal behavior or disorganized thoughts    LABS:                        9.3    5.45  )-----------( 165      ( 04 Jan 2024 02:03 )             30.0     01-04    144  |  106  |  5<L>  ----------------------------<  108<H>  3.3<L>   |  27  |  0.79    Ca    8.4      04 Jan 2024 02:03  Phos  2.7     01-04  Mg     2.40     01-04    TPro  6.5  /  Alb  3.2<L>  /  TBili  0.2  /  DBili  x   /  AST  42<H>  /  ALT  31  /  AlkPhos  33<L>  01-04          Urinalysis Basic - ( 04 Jan 2024 02:03 )    Color: x / Appearance: x / SG: x / pH: x  Gluc: 108 mg/dL / Ketone: x  / Bili: x / Urobili: x   Blood: x / Protein: x / Nitrite: x   Leuk Esterase: x / RBC: x / WBC x   Sq Epi: x / Non Sq Epi: x / Bacteria: x        Culture - Urine (collected 02 Jan 2024 09:27)  Source: Clean Catch Clean Catch (Midstream)  Final Report (03 Jan 2024 08:21):    No growth    Culture - Blood (collected 02 Jan 2024 00:00)  Source: .Blood Blood  Preliminary Report (04 Jan 2024 07:01):    No growth at 48 Hours    Culture - Blood (collected 01 Jan 2024 23:45)  Source: .Blood Blood  Gram Stain (03 Jan 2024 15:35):    Growth in aerobic bottle: Gram Positive Cocci in Clusters    Growth in anaerobic bottle: Gram Positive Cocci in Clusters  Final Report (04 Jan 2024 13:24):    Growth in aerobic and anaerobic bottles: Staphylococcus capitis    Isolation of Coagulase negative Staphylococcus from single blood culture    sets may represent    contamination. Contact the Microbiology Department at [phone] if    susceptibility testing is    clinically indicated.    Direct identification is available within approximately 3-5    hours either by Blood Panel Multiplexed PCR or Direct    MALDI-TOF. Details: [email or web address]  Organism: Blood Culture PCR (04 Jan 2024 13:24)  Organism: Blood Culture PCR (04 Jan 2024 13:24)        RADIOLOGY & ADDITIONAL TESTS:    Results Reviewed:   Imaging Personally Reviewed:  Electrocardiogram Personally Reviewed:    COORDINATION OF CARE:  Care Discussed with Consultants/Other Providers [Y/N]:  Prior or Outpatient Records Reviewed [Y/N]:

## 2024-01-05 DIAGNOSIS — E86.0 DEHYDRATION: ICD-10-CM

## 2024-01-05 LAB
ALBUMIN SERPL ELPH-MCNC: 2.8 G/DL — LOW (ref 3.3–5)
ALBUMIN SERPL ELPH-MCNC: 2.8 G/DL — LOW (ref 3.3–5)
ALP SERPL-CCNC: 29 U/L — LOW (ref 40–120)
ALP SERPL-CCNC: 29 U/L — LOW (ref 40–120)
ALT FLD-CCNC: 26 U/L — SIGNIFICANT CHANGE UP (ref 4–33)
ALT FLD-CCNC: 26 U/L — SIGNIFICANT CHANGE UP (ref 4–33)
AMMONIA BLD-MCNC: 41 UMOL/L — SIGNIFICANT CHANGE UP (ref 11–55)
AMMONIA BLD-MCNC: 41 UMOL/L — SIGNIFICANT CHANGE UP (ref 11–55)
ANION GAP SERPL CALC-SCNC: 7 MMOL/L — SIGNIFICANT CHANGE UP (ref 7–14)
ANION GAP SERPL CALC-SCNC: 7 MMOL/L — SIGNIFICANT CHANGE UP (ref 7–14)
AST SERPL-CCNC: 31 U/L — SIGNIFICANT CHANGE UP (ref 4–32)
AST SERPL-CCNC: 31 U/L — SIGNIFICANT CHANGE UP (ref 4–32)
BASOPHILS # BLD AUTO: 0.03 K/UL — SIGNIFICANT CHANGE UP (ref 0–0.2)
BASOPHILS # BLD AUTO: 0.03 K/UL — SIGNIFICANT CHANGE UP (ref 0–0.2)
BASOPHILS NFR BLD AUTO: 0.5 % — SIGNIFICANT CHANGE UP (ref 0–2)
BASOPHILS NFR BLD AUTO: 0.5 % — SIGNIFICANT CHANGE UP (ref 0–2)
BILIRUB SERPL-MCNC: <0.2 MG/DL — SIGNIFICANT CHANGE UP (ref 0.2–1.2)
BILIRUB SERPL-MCNC: <0.2 MG/DL — SIGNIFICANT CHANGE UP (ref 0.2–1.2)
BUN SERPL-MCNC: 2 MG/DL — LOW (ref 7–23)
BUN SERPL-MCNC: 2 MG/DL — LOW (ref 7–23)
CALCIUM SERPL-MCNC: 8.6 MG/DL — SIGNIFICANT CHANGE UP (ref 8.4–10.5)
CALCIUM SERPL-MCNC: 8.6 MG/DL — SIGNIFICANT CHANGE UP (ref 8.4–10.5)
CHLORIDE SERPL-SCNC: 111 MMOL/L — HIGH (ref 98–107)
CHLORIDE SERPL-SCNC: 111 MMOL/L — HIGH (ref 98–107)
CO2 SERPL-SCNC: 28 MMOL/L — SIGNIFICANT CHANGE UP (ref 22–31)
CO2 SERPL-SCNC: 28 MMOL/L — SIGNIFICANT CHANGE UP (ref 22–31)
CREAT SERPL-MCNC: 0.75 MG/DL — SIGNIFICANT CHANGE UP (ref 0.5–1.3)
CREAT SERPL-MCNC: 0.75 MG/DL — SIGNIFICANT CHANGE UP (ref 0.5–1.3)
EGFR: 104 ML/MIN/1.73M2 — SIGNIFICANT CHANGE UP
EGFR: 104 ML/MIN/1.73M2 — SIGNIFICANT CHANGE UP
EOSINOPHIL # BLD AUTO: 0.18 K/UL — SIGNIFICANT CHANGE UP (ref 0–0.5)
EOSINOPHIL # BLD AUTO: 0.18 K/UL — SIGNIFICANT CHANGE UP (ref 0–0.5)
EOSINOPHIL NFR BLD AUTO: 2.9 % — SIGNIFICANT CHANGE UP (ref 0–6)
EOSINOPHIL NFR BLD AUTO: 2.9 % — SIGNIFICANT CHANGE UP (ref 0–6)
GLUCOSE BLDC GLUCOMTR-MCNC: 105 MG/DL — HIGH (ref 70–99)
GLUCOSE BLDC GLUCOMTR-MCNC: 105 MG/DL — HIGH (ref 70–99)
GLUCOSE BLDC GLUCOMTR-MCNC: 106 MG/DL — HIGH (ref 70–99)
GLUCOSE BLDC GLUCOMTR-MCNC: 106 MG/DL — HIGH (ref 70–99)
GLUCOSE BLDC GLUCOMTR-MCNC: 83 MG/DL — SIGNIFICANT CHANGE UP (ref 70–99)
GLUCOSE BLDC GLUCOMTR-MCNC: 83 MG/DL — SIGNIFICANT CHANGE UP (ref 70–99)
GLUCOSE BLDC GLUCOMTR-MCNC: 88 MG/DL — SIGNIFICANT CHANGE UP (ref 70–99)
GLUCOSE BLDC GLUCOMTR-MCNC: 88 MG/DL — SIGNIFICANT CHANGE UP (ref 70–99)
GLUCOSE BLDC GLUCOMTR-MCNC: 93 MG/DL — SIGNIFICANT CHANGE UP (ref 70–99)
GLUCOSE BLDC GLUCOMTR-MCNC: 93 MG/DL — SIGNIFICANT CHANGE UP (ref 70–99)
GLUCOSE SERPL-MCNC: 89 MG/DL — SIGNIFICANT CHANGE UP (ref 70–99)
GLUCOSE SERPL-MCNC: 89 MG/DL — SIGNIFICANT CHANGE UP (ref 70–99)
HCT VFR BLD CALC: 29.5 % — LOW (ref 34.5–45)
HCT VFR BLD CALC: 29.5 % — LOW (ref 34.5–45)
HGB BLD-MCNC: 9.1 G/DL — LOW (ref 11.5–15.5)
HGB BLD-MCNC: 9.1 G/DL — LOW (ref 11.5–15.5)
IANC: 1.07 K/UL — LOW (ref 1.8–7.4)
IANC: 1.07 K/UL — LOW (ref 1.8–7.4)
IMM GRANULOCYTES NFR BLD AUTO: 0.3 % — SIGNIFICANT CHANGE UP (ref 0–0.9)
IMM GRANULOCYTES NFR BLD AUTO: 0.3 % — SIGNIFICANT CHANGE UP (ref 0–0.9)
LYMPHOCYTES # BLD AUTO: 4.35 K/UL — HIGH (ref 1–3.3)
LYMPHOCYTES # BLD AUTO: 4.35 K/UL — HIGH (ref 1–3.3)
LYMPHOCYTES # BLD AUTO: 68.9 % — HIGH (ref 13–44)
LYMPHOCYTES # BLD AUTO: 68.9 % — HIGH (ref 13–44)
MAGNESIUM SERPL-MCNC: 2.7 MG/DL — HIGH (ref 1.6–2.6)
MAGNESIUM SERPL-MCNC: 2.7 MG/DL — HIGH (ref 1.6–2.6)
MCHC RBC-ENTMCNC: 26.2 PG — LOW (ref 27–34)
MCHC RBC-ENTMCNC: 26.2 PG — LOW (ref 27–34)
MCHC RBC-ENTMCNC: 30.8 GM/DL — LOW (ref 32–36)
MCHC RBC-ENTMCNC: 30.8 GM/DL — LOW (ref 32–36)
MCV RBC AUTO: 85 FL — SIGNIFICANT CHANGE UP (ref 80–100)
MCV RBC AUTO: 85 FL — SIGNIFICANT CHANGE UP (ref 80–100)
MONOCYTES # BLD AUTO: 0.66 K/UL — SIGNIFICANT CHANGE UP (ref 0–0.9)
MONOCYTES # BLD AUTO: 0.66 K/UL — SIGNIFICANT CHANGE UP (ref 0–0.9)
MONOCYTES NFR BLD AUTO: 10.5 % — SIGNIFICANT CHANGE UP (ref 2–14)
MONOCYTES NFR BLD AUTO: 10.5 % — SIGNIFICANT CHANGE UP (ref 2–14)
NEUTROPHILS # BLD AUTO: 1.07 K/UL — LOW (ref 1.8–7.4)
NEUTROPHILS # BLD AUTO: 1.07 K/UL — LOW (ref 1.8–7.4)
NEUTROPHILS NFR BLD AUTO: 16.9 % — LOW (ref 43–77)
NEUTROPHILS NFR BLD AUTO: 16.9 % — LOW (ref 43–77)
NRBC # BLD: 0 /100 WBCS — SIGNIFICANT CHANGE UP (ref 0–0)
NRBC # BLD: 0 /100 WBCS — SIGNIFICANT CHANGE UP (ref 0–0)
NRBC # FLD: 0 K/UL — SIGNIFICANT CHANGE UP (ref 0–0)
NRBC # FLD: 0 K/UL — SIGNIFICANT CHANGE UP (ref 0–0)
PLATELET # BLD AUTO: 199 K/UL — SIGNIFICANT CHANGE UP (ref 150–400)
PLATELET # BLD AUTO: 199 K/UL — SIGNIFICANT CHANGE UP (ref 150–400)
POTASSIUM SERPL-MCNC: 3.6 MMOL/L — SIGNIFICANT CHANGE UP (ref 3.5–5.3)
POTASSIUM SERPL-MCNC: 3.6 MMOL/L — SIGNIFICANT CHANGE UP (ref 3.5–5.3)
POTASSIUM SERPL-SCNC: 3.6 MMOL/L — SIGNIFICANT CHANGE UP (ref 3.5–5.3)
POTASSIUM SERPL-SCNC: 3.6 MMOL/L — SIGNIFICANT CHANGE UP (ref 3.5–5.3)
PROT SERPL-MCNC: 5.7 G/DL — LOW (ref 6–8.3)
PROT SERPL-MCNC: 5.7 G/DL — LOW (ref 6–8.3)
RBC # BLD: 3.47 M/UL — LOW (ref 3.8–5.2)
RBC # BLD: 3.47 M/UL — LOW (ref 3.8–5.2)
RBC # FLD: 18 % — HIGH (ref 10.3–14.5)
RBC # FLD: 18 % — HIGH (ref 10.3–14.5)
SODIUM SERPL-SCNC: 146 MMOL/L — HIGH (ref 135–145)
SODIUM SERPL-SCNC: 146 MMOL/L — HIGH (ref 135–145)
WBC # BLD: 6.31 K/UL — SIGNIFICANT CHANGE UP (ref 3.8–10.5)
WBC # BLD: 6.31 K/UL — SIGNIFICANT CHANGE UP (ref 3.8–10.5)
WBC # FLD AUTO: 6.31 K/UL — SIGNIFICANT CHANGE UP (ref 3.8–10.5)
WBC # FLD AUTO: 6.31 K/UL — SIGNIFICANT CHANGE UP (ref 3.8–10.5)

## 2024-01-05 PROCEDURE — 99232 SBSQ HOSP IP/OBS MODERATE 35: CPT

## 2024-01-05 PROCEDURE — 99233 SBSQ HOSP IP/OBS HIGH 50: CPT

## 2024-01-05 RX ORDER — SODIUM CHLORIDE 9 MG/ML
1000 INJECTION, SOLUTION INTRAVENOUS
Refills: 0 | Status: DISCONTINUED | OUTPATIENT
Start: 2024-01-05 | End: 2024-01-07

## 2024-01-05 RX ADMIN — ENOXAPARIN SODIUM 40 MILLIGRAM(S): 100 INJECTION SUBCUTANEOUS at 19:01

## 2024-01-05 RX ADMIN — DIVALPROEX SODIUM 1000 MILLIGRAM(S): 500 TABLET, DELAYED RELEASE ORAL at 22:41

## 2024-01-05 RX ADMIN — ATORVASTATIN CALCIUM 10 MILLIGRAM(S): 80 TABLET, FILM COATED ORAL at 22:41

## 2024-01-05 RX ADMIN — SENNA PLUS 2 TABLET(S): 8.6 TABLET ORAL at 22:41

## 2024-01-05 RX ADMIN — ENOXAPARIN SODIUM 40 MILLIGRAM(S): 100 INJECTION SUBCUTANEOUS at 07:29

## 2024-01-05 RX ADMIN — LITHIUM CARBONATE 300 MILLIGRAM(S): 300 TABLET, EXTENDED RELEASE ORAL at 07:01

## 2024-01-05 RX ADMIN — Medication 150 MILLIGRAM(S): at 12:32

## 2024-01-05 RX ADMIN — Medication 75 MILLIGRAM(S): at 07:01

## 2024-01-05 RX ADMIN — SODIUM CHLORIDE 50 MILLILITER(S): 9 INJECTION, SOLUTION INTRAVENOUS at 13:20

## 2024-01-05 RX ADMIN — FAMOTIDINE 20 MILLIGRAM(S): 10 INJECTION INTRAVENOUS at 12:32

## 2024-01-05 RX ADMIN — Medication 75 MILLIGRAM(S): at 19:01

## 2024-01-05 RX ADMIN — LITHIUM CARBONATE 300 MILLIGRAM(S): 300 TABLET, EXTENDED RELEASE ORAL at 19:01

## 2024-01-05 NOTE — PATIENT PROFILE ADULT - FUNCTIONAL SCREEN CURRENT LEVEL: SWALLOWING (IF SCORE 2 OR MORE FOR ANY ITEM, CONSULT REHAB SERVICES), MLM)
patient is lethargic, unable to assess patient is lethargic, unable to assess/0 = swallows foods/liquids without difficulty

## 2024-01-05 NOTE — PATIENT PROFILE ADULT - VISION (WITH CORRECTIVE LENSES IF THE PATIENT USUALLY WEARS THEM):
patient is lethargic, unable to assess patient is lethargic, unable to assess/Normal vision: sees adequately in most situations; can see medication labels, newsprint

## 2024-01-05 NOTE — PROGRESS NOTE ADULT - PROBLEM SELECTOR PLAN 1
ANO3 at baseline currently lethargic, No focal neuro deficits on exam however with intermittent jerking of upper extremities (which per outpt psychiatrist is her chronic baseline dt Haldol.     -collateral at [facility] confirmed that pt often lethargic at night but now is worse.  -bilateral tremor known to outpt psych and is chronic  -per , cont to hold clozapine for now (level 677)  -cont with lithium 300mg bid, effexor 150mg qd and Depakote 1g qhs (on for seizure)  -cont to monitor lithium, clozapine level and monitor ANC   - no ss of sepsis, work up neg  -appreciate psych recs  -aspiration, fall, sz precautions ANO3 at baseline currently lethargic, No focal neuro deficits on exam however with intermittent jerking of upper extremities (which per outpt psychiatrist is her chronic baseline dt Haldol.     -collateral at [facility] confirmed that pt often lethargic at night but now is worse.  -bilateral tremor known to outpt psych and is chronic  -per , cont to hold clozapine for now (level 677)  -cont with lithium 300mg bid, effexor 150mg qd and Depakote 1g qhs (on for seizure)  -cont to monitor lithium, clozapine level and monitor ANC   - no ss of sepsis, work up neg  -appreciate psych recs  -aspiration, fall, sz precautions

## 2024-01-05 NOTE — PATIENT PROFILE ADULT - FALL HARM RISK - HARM RISK INTERVENTIONS
Assistance with ambulation/Assistance OOB with selected safe patient handling equipment/Communicate Risk of Fall with Harm to all staff/Reinforce activity limits and safety measures with patient and family/Tailored Fall Risk Interventions/Visual Cue: Yellow wristband and red socks/Bed in lowest position, wheels locked, appropriate side rails in place/Call bell, personal items and telephone in reach/Instruct patient to call for assistance before getting out of bed or chair/Non-slip footwear when patient is out of bed/[place] to call system/Physically safe environment - no spills, clutter or unnecessary equipment/Purposeful Proactive Rounding/Room/bathroom lighting operational, light cord in reach Assistance with ambulation/Assistance OOB with selected safe patient handling equipment/Communicate Risk of Fall with Harm to all staff/Reinforce activity limits and safety measures with patient and family/Tailored Fall Risk Interventions/Visual Cue: Yellow wristband and red socks/Bed in lowest position, wheels locked, appropriate side rails in place/Call bell, personal items and telephone in reach/Instruct patient to call for assistance before getting out of bed or chair/Non-slip footwear when patient is out of bed/[place] to call system/Physically safe environment - no spills, clutter or unnecessary equipment/Purposeful Proactive Rounding/Room/bathroom lighting operational, light cord in reach

## 2024-01-05 NOTE — PATIENT PROFILE ADULT - CENTRAL VENOUS CATHETER/PICC LINE
ED Triage Note       ED Secondary Triage Entered On:  7/28/2022 3:33 EDT    Performed On:  7/28/2022 3:25 EDT by Mina MEREDITH               General Information   Barriers to Learning :   None evident   ED Home Meds Section :   Document assessment   [facility] ED Fall Risk Section :   Not applicable   ED Advance Directives Section :   Document assessment   ED Palliative Screen :   N/A (prefilled for <66yo)   Mina MEREDITH - 7/28/2022 3:33 EDT   (As Of: 7/28/2022 03:33:23 EDT)   Diagnoses(Active)    Body aches  Date:   7/28/2022 ; Diagnosis Type:   Reason For Visit ; Confirmation:   Complaint of ; Clinical Dx: Body aches ; Classification:   Medical ; Clinical Service:   Emergency medicine ; Code:   PNED ; Probability:   0 ; Diagnosis Code:   [id number]      Fever  Date:   7/28/2022 ; Diagnosis Type:   Reason For Visit ; Confirmation:   Complaint of ; Clinical Dx:    Fever ; Classification:   Medical ; Clinical Service:   Emergency medicine ; Code:   PNED ; Probability:   0 ; Diagnosis Code:   [id number]      Vomiting  Date:   7/28/2022 ; Diagnosis Type:   Reason For Visit ; Confirmation:   Complaint of ; Clinical Dx:   Vomiting ; Classification:   Medical ; Clinical Service:   Emergency medicine ; Code:   PNED ; Probability:   0 ; Diagnosis Code:   [id number]             -    Procedure History   (As Of: 7/28/2022 03:33:23 EDT)     ED Advance Directive   Advance Directive :   No   Mina MEREDITH - 7/28/2022 3:33 EDT   Med Hx   Medication List   (As Of: 7/28/2022 03:33:23 EDT)   Normal Order    ibuprofen 200 mg Tab  :   ibuprofen 200 mg Tab ; Status:   Ordered ; Ordered As Mnemonic:   Motrin ; Simple Display Line:   600 mg, 3 tabs, Oral, Once ; Ordering Provider:   Susan ANDERSEN; Catalog Code:   ibuprofen ; Order Dt/Tm:   7/28/2022 03:19:59 EDT          ondansetron 4 mg Dis Tab  :   ondansetron 4 mg Dis Tab ; Status:   Completed ; Ordered As Mnemonic:   Zofran ODT ; Simple Display Line:   4 mg, 1 tabs, Oral, Once ; Ordering Provider:   Vicky ANDERSEN; Catalog Code:   ondansetron ; Order Dt/Tm:   7/28/2022 03:19:59 EDT            Prescription/Discharge Order    ondansetron  :   ondansetron ; Status:   Prescribed ; Ordered As Mnemonic:   Zofran ODT 4 mg oral tablet, disintegrating ; Simple Display Line:   4 mg, 1 tabs, Oral, TID, for 3 days, 9 tabs, 0 Refill(s) ;  Ordering Provider:   Vicky ANDERSEN; Catalog Code:   ondansetron ; Order Dt/Tm:   7/28/2022 03:22:58 EDT no

## 2024-01-05 NOTE — BH CONSULTATION LIAISON PROGRESS NOTE - NSBHASSESSMENTFT_PSY_ALL_CORE
Patient is a 38-year-old woman with past history of schizoaffective disorder, seizure disorder, from [facility], HTN, DM?, HLD, pt. was  sent in for blood cx growing staph capitis in the setting of influenza +. Psychiatry consulted fro medication management.     1/4: Patient seen, oriented, but lethargic and slow to respond, mild tremors noted in both UE. She firmly denies AVH, denies CAH, denies SI and HI. Denies feeling unsafe in the hospital. Care coordinated with outpt. psychiatrist, at baseline pt. is alert and has chronic tremors in both UE.    Labs: Lithium level 0.8, ANC 1.51, VPA level 49.30, Clozapine level 677    1/5: Patient lethargic, interview limited at this time. Labs: ANC 1.07 today.         PLAN:  1-HOLD Clozapine in the setting of lethargy, low ANC and high Clozapine level    [] Monitor ANC closely and please obtain Clozapine level   2-Continue Lithium 300mg BID and Effexor 150mg po daily     [] Monitor BUN/CR, Lithium Level  3- Haldol dec 200mg q4 weeks, last received 12/27/23, next injection is due on 1/24/24.  4- PRN for agitation: Zyprexa 2.5mg PO/IM q6h prn for agitation, may give additional Zyprexa 2.5mg for refractory agitation (ensure qtc <500)  5- Care coordinated with outpatient psychiatrist Dr. Whitaker [phone] and care coordinator Meagan Mckeon [phone] on 1/4  6- Have low threshold to start 1:1 if needed given h/o agitation in the past     Patient is a 38-year-old woman with past history of schizoaffective disorder, seizure disorder, from [facility], HTN, DM?, HLD, pt. was  sent in for blood cx growing staph capitis in the setting of influenza +. Psychiatry consulted fro medication management.     1/4: Patient seen, oriented, but lethargic and slow to respond, mild tremors noted in both UE. She firmly denies AVH, denies CAH, denies SI and HI. Denies feeling unsafe in the hospital. Care coordinated with outpt. psychiatrist, at baseline pt. is alert and has chronic tremors in both UE.    Labs: Lithium level 0.8, ANC 1.51, VPA level 49.30, Clozapine level 677    1/5: Patient lethargic, interview limited at this time. Labs: ANC 1.07 today.         PLAN:  1-HOLD Clozapine in the setting of lethargy, low ANC and high Clozapine level    [] Monitor ANC closely and please obtain Clozapine level   2-Continue Lithium 300mg BID and Effexor 150mg po daily     [] Monitor BUN/CR, Lithium Level  3- Haldol dec 200mg q4 weeks, last received 12/27/23, next injection is due on 1/24/24.  4- PRN for agitation: Zyprexa 2.5mg PO/IM q6h prn for agitation, may give additional Zyprexa 2.5mg for refractory agitation (ensure qtc <500)  5- Care coordinated with outpatient psychiatrist Dr. Whitaker [phone] and care coordinator Meagan Mckeon [phone] on 1/4  6- Have low threshold to start 1:1 if needed given h/o agitation in the past     Patient is a 38-year-old woman with past history of schizoaffective disorder, seizure disorder, from [facility], HTN, DM?, HLD, pt. was  sent in for blood cx growing staph capitis in the setting of influenza +. Psychiatry consulted fro medication management.     1/4: Patient seen, oriented, but lethargic and slow to respond, mild tremors noted in both UE. She firmly denies AVH, denies CAH, denies SI and HI. Denies feeling unsafe in the hospital. Care coordinated with outpt. psychiatrist, at baseline pt. is alert and has chronic tremors in both UE.    Labs: Lithium level 0.8, ANC 1.51, VPA level 49.30, Clozapine level 677    1/5: Patient lethargic, interview limited at this time. No overt catatonic sxs . Labs: ANC 1.07 today.     PLAN:  1-HOLD Clozapine in the setting of lethargy, low ANC and high Clozapine level    [] Monitor ANC closely and please obtain Clozapine level   2-Continue Lithium 300mg BID and Effexor 150mg po daily     [] Monitor BUN/CR, Lithium Level  3- Haldol dec 200mg q4 weeks, last received 12/27/23, next injection is due on 1/24/24.  4- PRN for agitation: Zyprexa 2.5mg PO/IM q6h prn for agitation, may give additional Zyprexa 2.5mg for refractory agitation (ensure qtc <500)  5- Care coordinated with outpatient psychiatrist Dr. Whitaker [phone] and care coordinator Meagan Mckeon [phone] on 1/4  6- Have low threshold to start 1:1 if needed given h/o agitation in the past    Case d/w Dr. Valencia     Patient is a 38-year-old woman with past history of schizoaffective disorder, seizure disorder, from [facility], HTN, DM?, HLD, pt. was  sent in for blood cx growing staph capitis in the setting of influenza +. Psychiatry consulted fro medication management.     1/4: Patient seen, oriented, but lethargic and slow to respond, mild tremors noted in both UE. She firmly denies AVH, denies CAH, denies SI and HI. Denies feeling unsafe in the hospital. Care coordinated with outpt. psychiatrist, at baseline pt. is alert and has chronic tremors in both UE.    Labs: Lithium level 0.8, ANC 1.51, VPA level 49.30, Clozapine level 677    1/5: Patient lethargic, interview limited at this time. No overt catatonic sxs . Labs: ANC 1.07 today.     PLAN:  1-HOLD Clozapine in the setting of lethargy, low ANC and high Clozapine level    [] Monitor ANC closely and please obtain Clozapine level   2-Continue Lithium 300mg BID and Effexor 150mg po daily     [] Monitor BUN/CR, Lithium Level  3- Haldol dec 200mg q4 weeks, last received 12/27/23, next injection is due on 1/24/24.  4- PRN for agitation: Zyprexa 2.5mg PO/IM q6h prn for agitation, may give additional Zyprexa 2.5mg for refractory agitation (ensure qtc <500)  5- Care coordinated with outpatient psychiatrist Dr. Whitaker [phone] and care coordinator Meagan Mckeon [phone] on 1/4  6- Have low threshold to start 1:1 if needed given h/o agitation in the past    Case d/w Dr. Valencia     Patient is a 38-year-old woman with past history of schizoaffective disorder, seizure disorder, from [facility], HTN, DM?, HLD, pt. was  sent in for blood cx growing staph capitis in the setting of influenza +. Psychiatry consulted fro medication management.     1/4: Patient seen, oriented, but lethargic and slow to respond, mild tremors noted in both UE. She firmly denies AVH, denies CAH, denies SI and HI. Denies feeling unsafe in the hospital. Care coordinated with outpt. psychiatrist, at baseline pt. is alert and has chronic tremors in both UE.    Labs: Lithium level 0.8, ANC 1.51, VPA level 49.30, Clozapine level 677    1/5: Patient lethargic, interview limited at this time. No overt catatonic sxs , no stiffness or rigidity. Labs: ANC 1.07 today.     PLAN:  1-HOLD Clozapine in the setting of lethargy, low ANC and high Clozapine level    [] Monitor ANC closely and please obtain Clozapine level   2-Continue Lithium 300mg BID and Effexor 150mg po daily     [] Monitor BUN/CR, Lithium Level  3- Haldol dec 200mg q4 weeks, last received 12/27/23, next injection is due on 1/24/24.  4- PRN for agitation: Zyprexa 2.5mg PO/IM q6h prn for agitation, may give additional Zyprexa 2.5mg for refractory agitation (ensure qtc <500)  5- Care coordinated with outpatient psychiatrist Dr. Whitaker [phone] and care coordinator Meagan Mckeon [phone] on 1/4  6- Have low threshold to start 1:1 if needed given h/o agitation in the past    Case d/w Dr. Valencia     Patient is a 38-year-old woman with past history of schizoaffective disorder, seizure disorder, from [facility], HTN, DM?, HLD, pt. was  sent in for blood cx growing staph capitis in the setting of influenza +. Psychiatry consulted fro medication management.     1/4: Patient seen, oriented, but lethargic and slow to respond, mild tremors noted in both UE. She firmly denies AVH, denies CAH, denies SI and HI. Denies feeling unsafe in the hospital. Care coordinated with outpt. psychiatrist, at baseline pt. is alert and has chronic tremors in both UE.    Labs: Lithium level 0.8, ANC 1.51, VPA level 49.30, Clozapine level 677    1/5: Patient lethargic, interview limited at this time. No overt catatonic sxs , no stiffness or rigidity. Labs: ANC 1.07 today.     PLAN:  1-HOLD Clozapine in the setting of lethargy, low ANC and high Clozapine level    [] Monitor ANC closely and please obtain Clozapine level   2-Continue Lithium 300mg BID and Effexor 150mg po daily     [] Monitor BUN/CR, Lithium Level  3- Haldol dec 200mg q4 weeks, last received 12/27/23, next injection is due on 1/24/24.  4- PRN for agitation: Zyprexa 2.5mg PO/IM q6h prn for agitation, may give additional Zyprexa 2.5mg for refractory agitation (ensure qtc <500)  5- Care coordinated with outpatient psychiatrist Dr. Whitaker [phone] and care coordinator Meagan Mckeon [phone] on 1/4  6- Have low threshold to start 1:1 if needed given h/o agitation in the past    Case d/w Dr. Valencia

## 2024-01-05 NOTE — PROGRESS NOTE ADULT - ASSESSMENT
38-year-old female with past medical history of schizophrenia, seizure disorder, from [facility], HTN, DM?, HLD sent in for blood cx growing staph capitis and noted to have of influenza + 38-year-old female with past medical history of schizophrenia, seizure disorder, from [facility], HTN, DM?, HLD sent in for blood cx growing staph capitis and noted to have of influenza +

## 2024-01-05 NOTE — PATIENT PROFILE ADULT - FUNCTIONAL ASSESSMENT - BASIC MOBILITY 6.
3-calculated by average/Not able to assess (calculate score using [facility] averaging method)  3-calculated by average/Not able to assess (calculate score using [facility] averaging method) 4 = No assist / stand by assistance

## 2024-01-05 NOTE — PATIENT PROFILE ADULT - ARRIVAL FROM
[facility]

## 2024-01-05 NOTE — BH CONSULTATION LIAISON PROGRESS NOTE - NSBHFUPINTERVALHXFT_PSY_A_CORE
Chart/labs reviewed, no prns overnight. Patient seen/evaluated, remains lethargic/sedated, unable to engage in interview due to sedation.

## 2024-01-05 NOTE — PROGRESS NOTE ADULT - PROBLEM SELECTOR PLAN 5
outpt meds: Clozapine 175mg qhs,  lithium 300mg bid, effexor 150mg qd and Depakote 1g qhs, Haldol dec inj 200mg q4 weeks, last received 12/27/23, next injection is due on 1/24/24.   outpt psych: Dr. Whitaker [phone]    -pt denies A/V hallucinations, no evidence of decompensation   -cont hold clozapine per psych, repeat clozapine level  -cont with lithium 300mg bid, effexor 150mg qd and Depakote 1g qhs (on for seizure)  -cont to monitor lithium, clozapine level and monitor ANC   -appreciate psych recs outpt meds: Clozapine 175mg qhs,  lithium 300mg bid, effexor 150mg qd and Depakote 1g qhs, Haldol dec inj 200mg q4 weeks, last received 12/27/23, next injection is due on 1/24/24.   outpt psych: Dr. Whitaker [phone]    -pt denies A/V hallucinations, no evidence of decompensation   -cont hold clozapine per psych, repeat clozapine level  -cont with lithium 300mg bid, effexor 150mg qd and Depakote 1g qhs (on for seizure)  -cont to monitor lithium, clozapine level and monitor ANC   -appreciate psych recs

## 2024-01-05 NOTE — PROGRESS NOTE ADULT - SUBJECTIVE AND OBJECTIVE BOX
[facility] Department of Hospital Medicine  Saurav Valencia MD  Available on MS Teams    Patient is a 38y old  Female who presents with a chief complaint of + blood cultures (03 Jan 2024 23:04)    OVERNIGHT EVENTS: No acute events overnight    SUBJECTIVE: more lethargic today, less engaging and somnolent. didn't eat her breakfast. vss, no signs of rigidity noted.     MEDICATIONS  (STANDING):  atorvastatin 10 milliGRAM(s) Oral at bedtime  dextrose 5% + lactated ringers. 1000 milliLiter(s) (50 mL/Hr) IV Continuous <Continuous>  dextrose 5%. 1000 milliLiter(s) (100 mL/Hr) IV Continuous <Continuous>  dextrose 5%. 1000 milliLiter(s) (50 mL/Hr) IV Continuous <Continuous>  dextrose 50% Injectable 12.5 Gram(s) IV Push once  dextrose 50% Injectable 25 Gram(s) IV Push once  dextrose 50% Injectable 25 Gram(s) IV Push once  divalproex DR 1000 milliGRAM(s) Oral at bedtime  enoxaparin Injectable 40 milliGRAM(s) SubCutaneous every 12 hours  famotidine    Tablet 20 milliGRAM(s) Oral daily  glucagon  Injectable 1 milliGRAM(s) IntraMuscular once  insulin lispro (ADMELOG) corrective regimen sliding scale   SubCutaneous three times a day before meals  insulin lispro (ADMELOG) corrective regimen sliding scale   SubCutaneous at bedtime  lithium 300 milliGRAM(s) Oral two times a day  metoprolol succinate ER 12.5 milliGRAM(s) Oral daily  oseltamivir 75 milliGRAM(s) Oral two times a day  senna 2 Tablet(s) Oral at bedtime  venlafaxine XR. 150 milliGRAM(s) Oral daily    MEDICATIONS  (PRN):  acetaminophen     Tablet .. 650 milliGRAM(s) Oral every 6 hours PRN Temp greater or equal to 38C (100.4F), Mild Pain (1 - 3)  aluminum hydroxide/magnesium hydroxide/simethicone Suspension 30 milliLiter(s) Oral every 4 hours PRN Dyspepsia  dextrose Oral Gel 15 Gram(s) Oral once PRN Blood Glucose LESS THAN 70 milliGRAM(s)/deciliter  melatonin 3 milliGRAM(s) Oral at bedtime PRN Insomnia  ondansetron Injectable 4 milliGRAM(s) IV Push every 8 hours PRN Nausea and/or Vomiting      PHYSICAL EXAM:  Vital Signs Last 24 Hrs  T(C): 36.9 (05 Jan 2024 10:06), Max: 37.1 (04 Jan 2024 17:45)  T(F): 98.5 (05 Jan 2024 10:06), Max: 98.7 (04 Jan 2024 17:45)  HR: 77 (05 Jan 2024 10:06) (77 - 91)  BP: 107/62 (05 Jan 2024 10:06) (90/62 - 118/84)  BP(mean): --  RR: 18 (05 Jan 2024 10:06) (18 - 19)  SpO2: 98% (05 Jan 2024 10:06) (96% - 99%)    Parameters below as of 05 Jan 2024 10:06  Patient On (Oxygen Delivery Method): room air    CONSTITUTIONAL: NAD  HEAD: Normocephalic, atraumatic  EYES: EOMI, PERRL  RESPIRATORY: No increased work of breathing, CTAB, no wheezes or crackles appreciated  CARDIOVASCULAR: RRR, S1 and S2 present, no m/r/g  ABDOMEN: soft, NT, ND, bowel sounds present  EXTREMITIES: No LE edema  MUSCULOSKELETAL: no joint swelling, no tenderness to palpation  NEURO: A&Ox3, moving all extremities, no muscle rigidity  psych: flat affect and slowed speech, no abnormal behavior or disorganized thoughts    LABS:                            9.1    6.31  )-----------( 199      ( 05 Jan 2024 06:06 )             29.5     01-05    146<H>  |  111<H>  |  2<L>  ----------------------------<  89  3.6   |  28  |  0.75    Ca    8.6      05 Jan 2024 06:06  Phos  2.7     01-04  Mg     2.70     01-05    TPro  5.7<L>  /  Alb  2.8<L>  /  TBili  <0.2  /  DBili  x   /  AST  31  /  ALT  26  /  AlkPhos  29<L>  01-05      Urinalysis Basic - ( 05 Jan 2024 06:06 )    Color: x / Appearance: x / SG: x / pH: x  Gluc: 89 mg/dL / Ketone: x  / Bili: x / Urobili: x   Blood: x / Protein: x / Nitrite: x   Leuk Esterase: x / RBC: x / WBC x   Sq Epi: x / Non Sq Epi: x / Bacteria: x      CAPILLARY BLOOD GLUCOSE  93 (05 Jan 2024 09:05)      POCT Blood Glucose.: 101 mg/dL (04 Jan 2024 22:55)      RADIOLOGY & ADDITIONAL TESTS: Reviewed.

## 2024-01-05 NOTE — PATIENT PROFILE ADULT - FUNCTIONAL SCREEN CURRENT LEVEL: COMMUNICATION, MLM
patient is lethargic, unable to assess patient is lethargic, unable to assess/0 = understands/communicates without difficulty

## 2024-01-05 NOTE — BH CONSULTATION LIAISON PROGRESS NOTE - NSBHATTESTBILLING_PSY_A_CORE
[id number] OBS or IP - moderate complexity OR 35-49 mins [id number] OBS or IP - moderate complexity OR 35-49 mins

## 2024-01-06 LAB
ANION GAP SERPL CALC-SCNC: 9 MMOL/L — SIGNIFICANT CHANGE UP (ref 7–14)
ANION GAP SERPL CALC-SCNC: 9 MMOL/L — SIGNIFICANT CHANGE UP (ref 7–14)
BUN SERPL-MCNC: 3 MG/DL — LOW (ref 7–23)
BUN SERPL-MCNC: 3 MG/DL — LOW (ref 7–23)
CALCIUM SERPL-MCNC: 8.2 MG/DL — LOW (ref 8.4–10.5)
CALCIUM SERPL-MCNC: 8.2 MG/DL — LOW (ref 8.4–10.5)
CHLORIDE SERPL-SCNC: 110 MMOL/L — HIGH (ref 98–107)
CHLORIDE SERPL-SCNC: 110 MMOL/L — HIGH (ref 98–107)
CLOZAPINE SERPL-MCNC: 183 NG/ML — LOW (ref 350–600)
CLOZAPINE SERPL-MCNC: 183 NG/ML — LOW (ref 350–600)
CO2 SERPL-SCNC: 27 MMOL/L — SIGNIFICANT CHANGE UP (ref 22–31)
CO2 SERPL-SCNC: 27 MMOL/L — SIGNIFICANT CHANGE UP (ref 22–31)
CREAT SERPL-MCNC: 0.67 MG/DL — SIGNIFICANT CHANGE UP (ref 0.5–1.3)
CREAT SERPL-MCNC: 0.67 MG/DL — SIGNIFICANT CHANGE UP (ref 0.5–1.3)
EGFR: 114 ML/MIN/1.73M2 — SIGNIFICANT CHANGE UP
EGFR: 114 ML/MIN/1.73M2 — SIGNIFICANT CHANGE UP
GLUCOSE BLDC GLUCOMTR-MCNC: 87 MG/DL — SIGNIFICANT CHANGE UP (ref 70–99)
GLUCOSE BLDC GLUCOMTR-MCNC: 94 MG/DL — SIGNIFICANT CHANGE UP (ref 70–99)
GLUCOSE BLDC GLUCOMTR-MCNC: 94 MG/DL — SIGNIFICANT CHANGE UP (ref 70–99)
GLUCOSE BLDC GLUCOMTR-MCNC: 97 MG/DL — SIGNIFICANT CHANGE UP (ref 70–99)
GLUCOSE BLDC GLUCOMTR-MCNC: 97 MG/DL — SIGNIFICANT CHANGE UP (ref 70–99)
GLUCOSE SERPL-MCNC: 82 MG/DL — SIGNIFICANT CHANGE UP (ref 70–99)
GLUCOSE SERPL-MCNC: 82 MG/DL — SIGNIFICANT CHANGE UP (ref 70–99)
HCT VFR BLD CALC: 29.1 % — LOW (ref 34.5–45)
HCT VFR BLD CALC: 29.1 % — LOW (ref 34.5–45)
HGB BLD-MCNC: 9.1 G/DL — LOW (ref 11.5–15.5)
HGB BLD-MCNC: 9.1 G/DL — LOW (ref 11.5–15.5)
MAGNESIUM SERPL-MCNC: 2.6 MG/DL — SIGNIFICANT CHANGE UP (ref 1.6–2.6)
MAGNESIUM SERPL-MCNC: 2.6 MG/DL — SIGNIFICANT CHANGE UP (ref 1.6–2.6)
MCHC RBC-ENTMCNC: 26.1 PG — LOW (ref 27–34)
MCHC RBC-ENTMCNC: 26.1 PG — LOW (ref 27–34)
MCHC RBC-ENTMCNC: 31.3 GM/DL — LOW (ref 32–36)
MCHC RBC-ENTMCNC: 31.3 GM/DL — LOW (ref 32–36)
MCV RBC AUTO: 83.4 FL — SIGNIFICANT CHANGE UP (ref 80–100)
MCV RBC AUTO: 83.4 FL — SIGNIFICANT CHANGE UP (ref 80–100)
NRBC # BLD: 0 /100 WBCS — SIGNIFICANT CHANGE UP (ref 0–0)
NRBC # BLD: 0 /100 WBCS — SIGNIFICANT CHANGE UP (ref 0–0)
NRBC # FLD: 0.03 K/UL — HIGH (ref 0–0)
NRBC # FLD: 0.03 K/UL — HIGH (ref 0–0)
PHOSPHATE SERPL-MCNC: 3.4 MG/DL — SIGNIFICANT CHANGE UP (ref 2.5–4.5)
PHOSPHATE SERPL-MCNC: 3.4 MG/DL — SIGNIFICANT CHANGE UP (ref 2.5–4.5)
PLATELET # BLD AUTO: 237 K/UL — SIGNIFICANT CHANGE UP (ref 150–400)
PLATELET # BLD AUTO: 237 K/UL — SIGNIFICANT CHANGE UP (ref 150–400)
POTASSIUM SERPL-MCNC: 3.7 MMOL/L — SIGNIFICANT CHANGE UP (ref 3.5–5.3)
POTASSIUM SERPL-MCNC: 3.7 MMOL/L — SIGNIFICANT CHANGE UP (ref 3.5–5.3)
POTASSIUM SERPL-SCNC: 3.7 MMOL/L — SIGNIFICANT CHANGE UP (ref 3.5–5.3)
POTASSIUM SERPL-SCNC: 3.7 MMOL/L — SIGNIFICANT CHANGE UP (ref 3.5–5.3)
RBC # BLD: 3.49 M/UL — LOW (ref 3.8–5.2)
RBC # BLD: 3.49 M/UL — LOW (ref 3.8–5.2)
RBC # FLD: 18.3 % — HIGH (ref 10.3–14.5)
RBC # FLD: 18.3 % — HIGH (ref 10.3–14.5)
SODIUM SERPL-SCNC: 146 MMOL/L — HIGH (ref 135–145)
SODIUM SERPL-SCNC: 146 MMOL/L — HIGH (ref 135–145)
WBC # BLD: 7.73 K/UL — SIGNIFICANT CHANGE UP (ref 3.8–10.5)
WBC # BLD: 7.73 K/UL — SIGNIFICANT CHANGE UP (ref 3.8–10.5)
WBC # FLD AUTO: 7.73 K/UL — SIGNIFICANT CHANGE UP (ref 3.8–10.5)
WBC # FLD AUTO: 7.73 K/UL — SIGNIFICANT CHANGE UP (ref 3.8–10.5)

## 2024-01-06 PROCEDURE — 99233 SBSQ HOSP IP/OBS HIGH 50: CPT

## 2024-01-06 RX ADMIN — Medication 75 MILLIGRAM(S): at 18:35

## 2024-01-06 RX ADMIN — ENOXAPARIN SODIUM 40 MILLIGRAM(S): 100 INJECTION SUBCUTANEOUS at 06:59

## 2024-01-06 RX ADMIN — ATORVASTATIN CALCIUM 10 MILLIGRAM(S): 80 TABLET, FILM COATED ORAL at 21:20

## 2024-01-06 RX ADMIN — DIVALPROEX SODIUM 1000 MILLIGRAM(S): 500 TABLET, DELAYED RELEASE ORAL at 21:20

## 2024-01-06 RX ADMIN — Medication 12.5 MILLIGRAM(S): at 06:57

## 2024-01-06 RX ADMIN — SENNA PLUS 2 TABLET(S): 8.6 TABLET ORAL at 21:20

## 2024-01-06 RX ADMIN — Medication 150 MILLIGRAM(S): at 14:36

## 2024-01-06 RX ADMIN — LITHIUM CARBONATE 300 MILLIGRAM(S): 300 TABLET, EXTENDED RELEASE ORAL at 06:56

## 2024-01-06 RX ADMIN — LITHIUM CARBONATE 300 MILLIGRAM(S): 300 TABLET, EXTENDED RELEASE ORAL at 18:35

## 2024-01-06 RX ADMIN — Medication 75 MILLIGRAM(S): at 06:57

## 2024-01-06 RX ADMIN — ENOXAPARIN SODIUM 40 MILLIGRAM(S): 100 INJECTION SUBCUTANEOUS at 18:35

## 2024-01-06 RX ADMIN — FAMOTIDINE 20 MILLIGRAM(S): 10 INJECTION INTRAVENOUS at 14:36

## 2024-01-06 NOTE — PROGRESS NOTE ADULT - SUBJECTIVE AND OBJECTIVE BOX
[facility] Department of Hospital Medicine  Saurav Valencia MD  Available on MS Teams    Patient is a 38y old  Female who presents with a chief complaint of + blood cultures (03 Jan 2024 23:04)    OVERNIGHT EVENTS: No acute events overnight    SUBJECTIVE: more awake today than yesterday, answering questions. denies any complaints, state she ate breakfast and lunch.     MEDICATIONS  (STANDING):  atorvastatin 10 milliGRAM(s) Oral at bedtime  dextrose 5% + lactated ringers. 1000 milliLiter(s) (100 mL/Hr) IV Continuous <Continuous>  dextrose 5%. 1000 milliLiter(s) (100 mL/Hr) IV Continuous <Continuous>  dextrose 5%. 1000 milliLiter(s) (50 mL/Hr) IV Continuous <Continuous>  dextrose 50% Injectable 12.5 Gram(s) IV Push once  dextrose 50% Injectable 25 Gram(s) IV Push once  dextrose 50% Injectable 25 Gram(s) IV Push once  divalproex DR 1000 milliGRAM(s) Oral at bedtime  enoxaparin Injectable 40 milliGRAM(s) SubCutaneous every 12 hours  famotidine    Tablet 20 milliGRAM(s) Oral daily  glucagon  Injectable 1 milliGRAM(s) IntraMuscular once  lithium 300 milliGRAM(s) Oral two times a day  metoprolol succinate ER 12.5 milliGRAM(s) Oral daily  oseltamivir 75 milliGRAM(s) Oral two times a day  senna 2 Tablet(s) Oral at bedtime  venlafaxine XR. 150 milliGRAM(s) Oral daily    MEDICATIONS  (PRN):  acetaminophen     Tablet .. 650 milliGRAM(s) Oral every 6 hours PRN Temp greater or equal to 38C (100.4F), Mild Pain (1 - 3)  aluminum hydroxide/magnesium hydroxide/simethicone Suspension 30 milliLiter(s) Oral every 4 hours PRN Dyspepsia  dextrose Oral Gel 15 Gram(s) Oral once PRN Blood Glucose LESS THAN 70 milliGRAM(s)/deciliter  melatonin 3 milliGRAM(s) Oral at bedtime PRN Insomnia  ondansetron Injectable 4 milliGRAM(s) IV Push every 8 hours PRN Nausea and/or Vomiting    PHYSICAL EXAM:  Vital Signs Last 24 Hrs  Vital Signs Last 24 Hrs  T(C): 36.4 (06 Jan 2024 14:40), Max: 36.9 (05 Jan 2024 19:10)  T(F): 97.6 (06 Jan 2024 14:40), Max: 98.4 (05 Jan 2024 19:10)  HR: 92 (06 Jan 2024 14:40) (58 - 97)  BP: 110/90 (06 Jan 2024 14:40) (94/76 - 110/90)  BP(mean): --  RR: 18 (06 Jan 2024 14:40) (17 - 18)  SpO2: 100% (06 Jan 2024 14:40) (100% - 100%)    Parameters below as of 06 Jan 2024 14:40  Patient On (Oxygen Delivery Method): room air    CONSTITUTIONAL: NAD  HEAD: Normocephalic, atraumatic  EYES: EOMI, PERRL  RESPIRATORY: No increased work of breathing, CTAB, no wheezes or crackles appreciated  CARDIOVASCULAR: RRR, S1 and S2 present, no m/r/g  ABDOMEN: soft, NT, ND, bowel sounds present  EXTREMITIES: No LE edema  MUSCULOSKELETAL: no joint swelling, no tenderness to palpation  NEURO: A&Ox3, moving all extremities, no muscle rigidity  psych:  no abnormal behavior or disorganized thoughts    LABS:                                     9.1    7.73  )-----------( 237      ( 06 Jan 2024 06:18 )             29.1     01-06    146<H>  |  110<H>  |  3<L>  ----------------------------<  82  3.7   |  27  |  0.67    Ca    8.2<L>      06 Jan 2024 06:18  Phos  3.4     01-06  Mg     2.60     01-06    TPro  5.7<L>  /  Alb  2.8<L>  /  TBili  <0.2  /  DBili  x   /  AST  31  /  ALT  26  /  AlkPhos  29<L>  01-05      Urinalysis Basic - ( 06 Jan 2024 06:18 )    Color: x / Appearance: x / SG: x / pH: x  Gluc: 82 mg/dL / Ketone: x  / Bili: x / Urobili: x   Blood: x / Protein: x / Nitrite: x   Leuk Esterase: x / RBC: x / WBC x   Sq Epi: x / Non Sq Epi: x / Bacteria: x      CAPILLARY BLOOD GLUCOSE  93 (05 Jan 2024 09:05)      POCT Blood Glucose.: 97 mg/dL (06 Jan 2024 13:01)      RADIOLOGY & ADDITIONAL TESTS: Reviewed.

## 2024-01-06 NOTE — BH CONSULTATION LIAISON PROGRESS NOTE - NSBHASSESSMENTFT_PSY_ALL_CORE
Patient is a 38-year-old woman with past history of schizoaffective disorder, seizure disorder, from [facility], HTN, DM?, HLD, pt. was  sent in for blood cx growing staph capitis in the setting of influenza +. Psychiatry consulted for medication management.     1/4: Patient seen, oriented, but lethargic and slow to respond, mild tremors noted in both UE. She firmly denies AVH, denies CAH, denies SI and HI. Denies feeling unsafe in the hospital. Care coordinated with outpt. psychiatrist, at baseline pt. is alert and has chronic tremors in both UE.  Labs: Lithium level 0.8, ANC 1.51, VPA level 49.30, Clozapine level 677    1/5: Patient lethargic, interview limited at this time. No overt catatonic sxs , no stiffness or rigidity. Labs: ANC 1.07 today.   1/6: Patient remains lethargic, not engaged with writer.     PLAN:  1-HOLD Clozapine in the setting of lethargy, low ANC and high Clozapine level    [] Monitor ANC closely and please obtain Clozapine level   2-Continue Lithium 300mg BID and Effexor 150mg po daily     [] Monitor BUN/CR, Lithium Level  3- Haldol dec 200mg q4 weeks, last received 12/27/23, next injection is due on 1/24/24.  4- PRN for agitation: Zyprexa 2.5mg PO/IM q6h prn for agitation, may give additional Zyprexa 2.5mg for refractory agitation (ensure qtc <500)  5- Care coordinated with outpatient psychiatrist Dr. Whitaker [phone] and care coordinator Meagan Mckeon [phone] on 1/4  6- Have low threshold to start 1:1 if needed given h/o agitation in the past Patient is a 38-year-old woman with past history of schizoaffective disorder, seizure disorder, from [facility], HTN, DM?, HLD, pt. was  sent in for blood cx growing staph capitis in the setting of influenza +. Psychiatry consulted for medication management.     1/4: Patient seen, oriented, but lethargic and slow to respond, mild tremors noted in both UE. She firmly denies AVH, denies CAH, denies SI and HI. Denies feeling unsafe in the hospital. Care coordinated with outpt. psychiatrist, at baseline pt. is alert and has chronic tremors in both UE.  Labs: Lithium level 0.8, ANC 1.51, VPA level 49.30, Clozapine level 677    1/5: Patient lethargic, interview limited at this time. No overt catatonic sxs , no stiffness or rigidity. Labs: ANC 1.07 today.   1/6: Patient remains lethargic, not engaged with writer.     PLAN:  1-HOLD Clozapine in the setting of lethargy, low ANC and high Clozapine level    [] Monitor ANC closely and please obtain Clozapine level   2-Continue Lithium 300mg BID and Effexor 150mg po daily     [] Monitor BUN/CR, Lithium Level  3- Haldol dec 200mg q4 weeks, last received 12/27/23, next injection is due on 1/24/24.  4- PRN for agitation: Zyprexa 2.5mg PO/IM q6h prn for agitation, may give additional Zyprexa 2.5mg for refractory agitation (ensure qtc <500)  5- Care coordinated with outpatient psychiatrist Dr. Whitakre [phone] and care coordinator Meagan Mckeon [phone] on 1/4  6- Have low threshold to start 1:1 if needed given h/o agitation in the past

## 2024-01-06 NOTE — PROGRESS NOTE ADULT - PROBLEM SELECTOR PLAN 1
ANO3 at baseline currently lethargic, No focal neuro deficits on exam however with intermittent jerking of upper extremities (which per outpt psychiatrist is her chronic baseline dt Haldol.     -collateral at [facility] confirmed that pt often lethargic at night but now is worse.  -bilateral tremor known to outpt psych and is chronic  -per , cont to hold clozapine for now (level 677)  -cont with lithium 300mg bid, effexor 150mg qd and Depakote 1g qhs (on for seizure)  -cont to monitor lithium, clozapine level and monitor ANC   - no ss of sepsis, work up neg  -appreciate psych recs  -aspiration, fall, sz precautions ANO3 at baseline currently lethargic, No focal neuro deficits on exam however with intermittent jerking of upper extremities (which per outpt psychiatrist is her chronic baseline dt Haldol.     -collateral at [facility] confirmed that pt often lethargic at night but now is worse.  -bilateral tremor known to outpt psych and is chronic  -per , cont to hold clozapine for now (level 677)  -cont with lithium 300mg bid, effexor 150mg qd and Depakote 1g qhs (on for seizure)  -cont to monitor lithium, clozapine level and monitor ANC   - no ss of sepsis, work up neg  -appreciate psych recs  -aspiration, fall, sz precautions

## 2024-01-06 NOTE — PROGRESS NOTE ADULT - PROBLEM SELECTOR PLAN 5
outpt meds: Clozapine 175mg qhs,  lithium 300mg bid, effexor 150mg qd and Depakote 1g qhs, Haldol dec inj 200mg q4 weeks, last received 12/27/23, next injection is due on 1/24/24.   outpt psych: Dr. Whitaker [phone]    -pt denies A/V hallucinations, no evidence of decompensation   -cont hold clozapine per psych, repeat clozapine level  -cont with lithium 300mg bid, effexor 150mg qd and Depakote 1g qhs (on for seizure)  -cont to monitor lithium, clozapine level and monitor ANC   -appreciate psych recs outpt meds: Clozapine 175mg qhs,  lithium 300mg bid, effexor 150mg qd and Depakote 1g qhs, Haldol dec inj 200mg q4 weeks, last received 12/27/23, next injection is due on 1/24/24.   outpt psych: Dr. Whitaker [phone]    -pt denies A/V hallucinations, no evidence of decompensation   -cont hold clozapine per psych, repeat clozapine level  -cont with lithium 300mg bid, effexor 150mg qd and Depakote 1g qhs (on for seizure)  -cont to monitor lithium, clozapine level and monitor ANC   -appreciate psych recs

## 2024-01-06 NOTE — BH CONSULTATION LIAISON PROGRESS NOTE - NSBHFUPINTERVALHXFT_PSY_A_CORE
Chart/labs reviewed, no prns overnight. Patient seen/evaluated, remains lethargic/sedated, unable to engage in interview due to sedation. Wakes up momentarily to look at writer but then falls back asleep shortly.  Discussed with nurse at bedside who stated patient has been sleeping for most of her shift.

## 2024-01-06 NOTE — PROGRESS NOTE ADULT - ASSESSMENT
38-year-old female with past medical history of schizophrenia, seizure disorder, from [facility], HTN, DM?, HLD sent in for blood cx growing staph capitis and noted to have of influenza + 38-year-old female with past medical history of schizophrenia, seizure disorder, from [facility], HTN, DM?, HLD sent in for blood cx growing staph capitis and noted to have of influenza +

## 2024-01-07 LAB
ANION GAP SERPL CALC-SCNC: 12 MMOL/L — SIGNIFICANT CHANGE UP (ref 7–14)
ANION GAP SERPL CALC-SCNC: 12 MMOL/L — SIGNIFICANT CHANGE UP (ref 7–14)
BASOPHILS # BLD AUTO: 0.04 K/UL — SIGNIFICANT CHANGE UP (ref 0–0.2)
BASOPHILS # BLD AUTO: 0.04 K/UL — SIGNIFICANT CHANGE UP (ref 0–0.2)
BASOPHILS NFR BLD AUTO: 0.5 % — SIGNIFICANT CHANGE UP (ref 0–2)
BASOPHILS NFR BLD AUTO: 0.5 % — SIGNIFICANT CHANGE UP (ref 0–2)
BUN SERPL-MCNC: 3 MG/DL — LOW (ref 7–23)
BUN SERPL-MCNC: 3 MG/DL — LOW (ref 7–23)
CALCIUM SERPL-MCNC: 8.4 MG/DL — SIGNIFICANT CHANGE UP (ref 8.4–10.5)
CALCIUM SERPL-MCNC: 8.4 MG/DL — SIGNIFICANT CHANGE UP (ref 8.4–10.5)
CHLORIDE SERPL-SCNC: 111 MMOL/L — HIGH (ref 98–107)
CHLORIDE SERPL-SCNC: 111 MMOL/L — HIGH (ref 98–107)
CO2 SERPL-SCNC: 23 MMOL/L — SIGNIFICANT CHANGE UP (ref 22–31)
CO2 SERPL-SCNC: 23 MMOL/L — SIGNIFICANT CHANGE UP (ref 22–31)
CREAT SERPL-MCNC: 0.73 MG/DL — SIGNIFICANT CHANGE UP (ref 0.5–1.3)
CREAT SERPL-MCNC: 0.73 MG/DL — SIGNIFICANT CHANGE UP (ref 0.5–1.3)
CULTURE RESULTS: SIGNIFICANT CHANGE UP
CULTURE RESULTS: SIGNIFICANT CHANGE UP
EGFR: 107 ML/MIN/1.73M2 — SIGNIFICANT CHANGE UP
EGFR: 107 ML/MIN/1.73M2 — SIGNIFICANT CHANGE UP
EOSINOPHIL # BLD AUTO: 0.26 K/UL — SIGNIFICANT CHANGE UP (ref 0–0.5)
EOSINOPHIL # BLD AUTO: 0.26 K/UL — SIGNIFICANT CHANGE UP (ref 0–0.5)
EOSINOPHIL NFR BLD AUTO: 2.9 % — SIGNIFICANT CHANGE UP (ref 0–6)
EOSINOPHIL NFR BLD AUTO: 2.9 % — SIGNIFICANT CHANGE UP (ref 0–6)
GLUCOSE BLDC GLUCOMTR-MCNC: 102 MG/DL — HIGH (ref 70–99)
GLUCOSE BLDC GLUCOMTR-MCNC: 102 MG/DL — HIGH (ref 70–99)
GLUCOSE BLDC GLUCOMTR-MCNC: 126 MG/DL — HIGH (ref 70–99)
GLUCOSE BLDC GLUCOMTR-MCNC: 126 MG/DL — HIGH (ref 70–99)
GLUCOSE BLDC GLUCOMTR-MCNC: 81 MG/DL — SIGNIFICANT CHANGE UP (ref 70–99)
GLUCOSE BLDC GLUCOMTR-MCNC: 81 MG/DL — SIGNIFICANT CHANGE UP (ref 70–99)
GLUCOSE BLDC GLUCOMTR-MCNC: 86 MG/DL — SIGNIFICANT CHANGE UP (ref 70–99)
GLUCOSE BLDC GLUCOMTR-MCNC: 86 MG/DL — SIGNIFICANT CHANGE UP (ref 70–99)
GLUCOSE SERPL-MCNC: 83 MG/DL — SIGNIFICANT CHANGE UP (ref 70–99)
GLUCOSE SERPL-MCNC: 83 MG/DL — SIGNIFICANT CHANGE UP (ref 70–99)
HCT VFR BLD CALC: 32.7 % — LOW (ref 34.5–45)
HCT VFR BLD CALC: 32.7 % — LOW (ref 34.5–45)
HGB BLD-MCNC: 10 G/DL — LOW (ref 11.5–15.5)
HGB BLD-MCNC: 10 G/DL — LOW (ref 11.5–15.5)
IANC: 1.69 K/UL — LOW (ref 1.8–7.4)
IANC: 1.69 K/UL — LOW (ref 1.8–7.4)
IMM GRANULOCYTES NFR BLD AUTO: 0.9 % — SIGNIFICANT CHANGE UP (ref 0–0.9)
IMM GRANULOCYTES NFR BLD AUTO: 0.9 % — SIGNIFICANT CHANGE UP (ref 0–0.9)
LYMPHOCYTES # BLD AUTO: 5.95 K/UL — HIGH (ref 1–3.3)
LYMPHOCYTES # BLD AUTO: 5.95 K/UL — HIGH (ref 1–3.3)
LYMPHOCYTES # BLD AUTO: 67.4 % — HIGH (ref 13–44)
LYMPHOCYTES # BLD AUTO: 67.4 % — HIGH (ref 13–44)
MAGNESIUM SERPL-MCNC: 2.6 MG/DL — SIGNIFICANT CHANGE UP (ref 1.6–2.6)
MAGNESIUM SERPL-MCNC: 2.6 MG/DL — SIGNIFICANT CHANGE UP (ref 1.6–2.6)
MCHC RBC-ENTMCNC: 26 PG — LOW (ref 27–34)
MCHC RBC-ENTMCNC: 26 PG — LOW (ref 27–34)
MCHC RBC-ENTMCNC: 30.6 GM/DL — LOW (ref 32–36)
MCHC RBC-ENTMCNC: 30.6 GM/DL — LOW (ref 32–36)
MCV RBC AUTO: 85.2 FL — SIGNIFICANT CHANGE UP (ref 80–100)
MCV RBC AUTO: 85.2 FL — SIGNIFICANT CHANGE UP (ref 80–100)
MONOCYTES # BLD AUTO: 0.81 K/UL — SIGNIFICANT CHANGE UP (ref 0–0.9)
MONOCYTES # BLD AUTO: 0.81 K/UL — SIGNIFICANT CHANGE UP (ref 0–0.9)
MONOCYTES NFR BLD AUTO: 9.2 % — SIGNIFICANT CHANGE UP (ref 2–14)
MONOCYTES NFR BLD AUTO: 9.2 % — SIGNIFICANT CHANGE UP (ref 2–14)
NEUTROPHILS # BLD AUTO: 1.69 K/UL — LOW (ref 1.8–7.4)
NEUTROPHILS # BLD AUTO: 1.69 K/UL — LOW (ref 1.8–7.4)
NEUTROPHILS NFR BLD AUTO: 19.1 % — LOW (ref 43–77)
NEUTROPHILS NFR BLD AUTO: 19.1 % — LOW (ref 43–77)
NRBC # BLD: 0 /100 WBCS — SIGNIFICANT CHANGE UP (ref 0–0)
NRBC # BLD: 0 /100 WBCS — SIGNIFICANT CHANGE UP (ref 0–0)
NRBC # FLD: 0.03 K/UL — HIGH (ref 0–0)
NRBC # FLD: 0.03 K/UL — HIGH (ref 0–0)
PHOSPHATE SERPL-MCNC: 3.1 MG/DL — SIGNIFICANT CHANGE UP (ref 2.5–4.5)
PHOSPHATE SERPL-MCNC: 3.1 MG/DL — SIGNIFICANT CHANGE UP (ref 2.5–4.5)
PLATELET # BLD AUTO: 328 K/UL — SIGNIFICANT CHANGE UP (ref 150–400)
PLATELET # BLD AUTO: 328 K/UL — SIGNIFICANT CHANGE UP (ref 150–400)
POTASSIUM SERPL-MCNC: 4 MMOL/L — SIGNIFICANT CHANGE UP (ref 3.5–5.3)
POTASSIUM SERPL-MCNC: 4 MMOL/L — SIGNIFICANT CHANGE UP (ref 3.5–5.3)
POTASSIUM SERPL-SCNC: 4 MMOL/L — SIGNIFICANT CHANGE UP (ref 3.5–5.3)
POTASSIUM SERPL-SCNC: 4 MMOL/L — SIGNIFICANT CHANGE UP (ref 3.5–5.3)
RBC # BLD: 3.84 M/UL — SIGNIFICANT CHANGE UP (ref 3.8–5.2)
RBC # BLD: 3.84 M/UL — SIGNIFICANT CHANGE UP (ref 3.8–5.2)
RBC # FLD: 18.5 % — HIGH (ref 10.3–14.5)
RBC # FLD: 18.5 % — HIGH (ref 10.3–14.5)
SODIUM SERPL-SCNC: 146 MMOL/L — HIGH (ref 135–145)
SODIUM SERPL-SCNC: 146 MMOL/L — HIGH (ref 135–145)
SPECIMEN SOURCE: SIGNIFICANT CHANGE UP
SPECIMEN SOURCE: SIGNIFICANT CHANGE UP
WBC # BLD: 8.83 K/UL — SIGNIFICANT CHANGE UP (ref 3.8–10.5)
WBC # BLD: 8.83 K/UL — SIGNIFICANT CHANGE UP (ref 3.8–10.5)
WBC # FLD AUTO: 8.83 K/UL — SIGNIFICANT CHANGE UP (ref 3.8–10.5)
WBC # FLD AUTO: 8.83 K/UL — SIGNIFICANT CHANGE UP (ref 3.8–10.5)

## 2024-01-07 PROCEDURE — 99232 SBSQ HOSP IP/OBS MODERATE 35: CPT

## 2024-01-07 RX ORDER — SODIUM CHLORIDE 9 MG/ML
1000 INJECTION, SOLUTION INTRAVENOUS
Refills: 0 | Status: DISCONTINUED | OUTPATIENT
Start: 2024-01-07 | End: 2024-01-08

## 2024-01-07 RX ADMIN — SODIUM CHLORIDE 100 MILLILITER(S): 9 INJECTION, SOLUTION INTRAVENOUS at 18:35

## 2024-01-07 RX ADMIN — Medication 75 MILLIGRAM(S): at 05:56

## 2024-01-07 RX ADMIN — FAMOTIDINE 20 MILLIGRAM(S): 10 INJECTION INTRAVENOUS at 13:09

## 2024-01-07 RX ADMIN — SENNA PLUS 2 TABLET(S): 8.6 TABLET ORAL at 21:48

## 2024-01-07 RX ADMIN — DIVALPROEX SODIUM 1000 MILLIGRAM(S): 500 TABLET, DELAYED RELEASE ORAL at 21:48

## 2024-01-07 RX ADMIN — SODIUM CHLORIDE 100 MILLILITER(S): 9 INJECTION, SOLUTION INTRAVENOUS at 21:49

## 2024-01-07 RX ADMIN — LITHIUM CARBONATE 300 MILLIGRAM(S): 300 TABLET, EXTENDED RELEASE ORAL at 17:36

## 2024-01-07 RX ADMIN — ENOXAPARIN SODIUM 40 MILLIGRAM(S): 100 INJECTION SUBCUTANEOUS at 17:36

## 2024-01-07 RX ADMIN — LITHIUM CARBONATE 300 MILLIGRAM(S): 300 TABLET, EXTENDED RELEASE ORAL at 05:58

## 2024-01-07 RX ADMIN — Medication 150 MILLIGRAM(S): at 13:10

## 2024-01-07 RX ADMIN — Medication 75 MILLIGRAM(S): at 17:36

## 2024-01-07 RX ADMIN — ATORVASTATIN CALCIUM 10 MILLIGRAM(S): 80 TABLET, FILM COATED ORAL at 21:48

## 2024-01-07 RX ADMIN — ENOXAPARIN SODIUM 40 MILLIGRAM(S): 100 INJECTION SUBCUTANEOUS at 05:55

## 2024-01-07 NOTE — DIETITIAN INITIAL EVALUATION ADULT - PERTINENT LABORATORY DATA
01-07    146<H>  |  111<H>  |  3<L>  ----------------------------<  83  4.0   |  23  |  0.73    Ca    8.4      07 Jan 2024 05:40  Phos  3.1     01-07  Mg     2.60     01-07    POCT Blood Glucose.: 126 mg/dL (01-07-24 @ 12:14)  A1C with Estimated Average Glucose Result: 4.8 % (01-04-24 @ 02:03)

## 2024-01-07 NOTE — DISCHARGE NOTE PROVIDER - CARE PROVIDER_API CALL
jameson [facility] psych  Phone: [phone]  Fax: (   )    -  Follow Up Time:    jameson henriquez psych  Phone: [phone]  Fax: (   )    -  Follow Up Time:    jameson henriquez psych  Phone: [phone]  Fax: (   )    -  Follow Up Time:

## 2024-01-07 NOTE — DIETITIAN INITIAL EVALUATION ADULT - ORAL INTAKE PTA/DIET HISTORY
Patient unable to verbalize intake PTA. Attempted to contact pt's care coordinator at [facility] ([phone]), no answer. Writer left a voicemail with call back number.  Patient unable to verbalize intake PTA. Attempted to contact pt's care coordinator at [facility] ([phone]), no answer. Writer left a voicemail with call back number.  Patient unable to verbalize intake PTA. Attempted to contact pt's care coordinator at [facility]. \[facility].\"" ([phone]), no answer, unable to leave voicemail.  Patient unable to verbalize intake PTA. Attempted to contact pt's care coordinator at [facility] ([phone]), no answer, unable to leave voicemail.

## 2024-01-07 NOTE — DISCHARGE NOTE PROVIDER - DETAILS OF MALNUTRITION DIAGNOSIS/DIAGNOSES
This patient has been assessed with a concern for Malnutrition and was treated during this hospitalization for the following Nutrition diagnosis/diagnoses:     -  01/07/2024: Morbid obesity (BMI > 40)

## 2024-01-07 NOTE — DIETITIAN INITIAL EVALUATION ADULT - DIET TYPE
Recommend liberalize diet to regular to promote intake. Patient with current fair/variable intake and well managed DM (A1C 4.8, Glucose 82-89 mg/dL)/regular/colostomy Recommend liberalize diet to regular to promote intake. Patient with current fair/variable intake and well managed DM (A1C 4.8, Glucose 82-89 mg/dL)/regular

## 2024-01-07 NOTE — DIETITIAN INITIAL EVALUATION ADULT - PERTINENT MEDS FT
MEDICATIONS  (STANDING):  atorvastatin 10 milliGRAM(s) Oral at bedtime  dextrose 5% + lactated ringers. 1000 milliLiter(s) (100 mL/Hr) IV Continuous <Continuous>  dextrose 5%. 1000 milliLiter(s) (50 mL/Hr) IV Continuous <Continuous>  dextrose 5%. 1000 milliLiter(s) (100 mL/Hr) IV Continuous <Continuous>  dextrose 50% Injectable 12.5 Gram(s) IV Push once  dextrose 50% Injectable 25 Gram(s) IV Push once  dextrose 50% Injectable 25 Gram(s) IV Push once  divalproex DR 1000 milliGRAM(s) Oral at bedtime  enoxaparin Injectable 40 milliGRAM(s) SubCutaneous every 12 hours  famotidine    Tablet 20 milliGRAM(s) Oral daily  glucagon  Injectable 1 milliGRAM(s) IntraMuscular once  lithium 300 milliGRAM(s) Oral two times a day  metoprolol succinate ER 12.5 milliGRAM(s) Oral daily  oseltamivir 75 milliGRAM(s) Oral two times a day  senna 2 Tablet(s) Oral at bedtime  venlafaxine XR. 150 milliGRAM(s) Oral daily    MEDICATIONS  (PRN):  acetaminophen     Tablet .. 650 milliGRAM(s) Oral every 6 hours PRN Temp greater or equal to 38C (100.4F), Mild Pain (1 - 3)  aluminum hydroxide/magnesium hydroxide/simethicone Suspension 30 milliLiter(s) Oral every 4 hours PRN Dyspepsia  dextrose Oral Gel 15 Gram(s) Oral once PRN Blood Glucose LESS THAN 70 milliGRAM(s)/deciliter  melatonin 3 milliGRAM(s) Oral at bedtime PRN Insomnia  ondansetron Injectable 4 milliGRAM(s) IV Push every 8 hours PRN Nausea and/or Vomiting

## 2024-01-07 NOTE — DIETITIAN INITIAL EVALUATION ADULT - PROBLEM SELECTOR PLAN 1
-Pt lethargic currently although arousable. No focal neuro deficits on exam however with intermittent jerking of upper extremities. AOx3 but slow to questioning (unclear if baseline)  -collateral at [facility] confirmed that pt often lethargic at night. Pt noted to be lethargic when she was in the ED a few nights ago as well  -unclear etiology. Related to infection - influenza, less likely bacterial infection vs 2/2 anti-psychotic medications, undiagnosed MAYCOL?  -c/f clozapine related side effects given lethargic, possible dystonic reaction w/ jerking/tremor of UE and drop in ANC. Hgb also with slight drop. Will decrease to clozapine 100mg as do not want to abruptly stop treatment  -check clozapine, lithium levels  -VPA levels low however check ammonia   -check VBG  - consult in am  -aspiration, fall, sz precautions -Pt lethargic currently although arousable. No focal neuro deficits on exam however with intermittent jerking of upper extremities. AOx3 but slow to questioning (unclear if baseline)  -collateral at [facility] confirmed that pt often lethargic at night. Pt noted to be lethargic when she was in the ED a few nights ago as well  -unclear etiology. Related to infection - influenza, less likely bacterial infection vs 2/2 anti-psychotic medications, undiagnosed MAYCOL?  -c/f clozapine related side effects given lethargic, possible dystonic reaction w/ jerking/tremor of UE and drop in ANC. Hgb also with slight drop. Will decrease to clozapine 100mg as do not want to abruptly stop treatment  -check clozapine, lithium levels  -VPA levels low however check ammonia   -check VBG  - consult in am  -aspiration, fall, sz precautions

## 2024-01-07 NOTE — DIETITIAN INITIAL EVALUATION ADULT - PERSON TAUGHT/METHOD
Writer thanked nursing team for continued care and suggested continuing encouragement/feeding assistance at meal times for adequate PO intake./RN/verbal instruction/caregiver

## 2024-01-07 NOTE — PROGRESS NOTE ADULT - SUBJECTIVE AND OBJECTIVE BOX
[facility] Department of Hospital Medicine  Rosy Keys MD  Available on MS Teams  Pager: [phone]    Patient is a 38y old  Female who presents with a chief complaint of Bacteremia     (07 Jan 2024 13:25)      OVERNIGHT EVENTS: No acute events overnight    SUBJECTIVE: Pt seen and examined at bedside this morning.     ADDITIONAL REVIEW OF SYSTEMS:    MEDICATIONS  (STANDING):  atorvastatin 10 milliGRAM(s) Oral at bedtime  dextrose 5% + lactated ringers. 1000 milliLiter(s) (100 mL/Hr) IV Continuous <Continuous>  dextrose 5%. 1000 milliLiter(s) (100 mL/Hr) IV Continuous <Continuous>  dextrose 5%. 1000 milliLiter(s) (50 mL/Hr) IV Continuous <Continuous>  dextrose 50% Injectable 12.5 Gram(s) IV Push once  dextrose 50% Injectable 25 Gram(s) IV Push once  dextrose 50% Injectable 25 Gram(s) IV Push once  divalproex DR 1000 milliGRAM(s) Oral at bedtime  enoxaparin Injectable 40 milliGRAM(s) SubCutaneous every 12 hours  famotidine    Tablet 20 milliGRAM(s) Oral daily  glucagon  Injectable 1 milliGRAM(s) IntraMuscular once  lithium 300 milliGRAM(s) Oral two times a day  metoprolol succinate ER 12.5 milliGRAM(s) Oral daily  oseltamivir 75 milliGRAM(s) Oral two times a day  senna 2 Tablet(s) Oral at bedtime  venlafaxine XR. 150 milliGRAM(s) Oral daily    MEDICATIONS  (PRN):  acetaminophen     Tablet .. 650 milliGRAM(s) Oral every 6 hours PRN Temp greater or equal to 38C (100.4F), Mild Pain (1 - 3)  aluminum hydroxide/magnesium hydroxide/simethicone Suspension 30 milliLiter(s) Oral every 4 hours PRN Dyspepsia  dextrose Oral Gel 15 Gram(s) Oral once PRN Blood Glucose LESS THAN 70 milliGRAM(s)/deciliter  melatonin 3 milliGRAM(s) Oral at bedtime PRN Insomnia  ondansetron Injectable 4 milliGRAM(s) IV Push every 8 hours PRN Nausea and/or Vomiting      CAPILLARY BLOOD GLUCOSE      POCT Blood Glucose.: 126 mg/dL (07 Jan 2024 12:14)  POCT Blood Glucose.: 86 mg/dL (07 Jan 2024 08:51)  POCT Blood Glucose.: 94 mg/dL (06 Jan 2024 22:25)  POCT Blood Glucose.: 87 mg/dL (06 Jan 2024 17:38)    I&O's Summary      PHYSICAL EXAM:  Vital Signs Last 24 Hrs  T(C): 36.9 (07 Jan 2024 10:50), Max: 36.9 (07 Jan 2024 10:50)  T(F): 98.4 (07 Jan 2024 10:50), Max: 98.4 (07 Jan 2024 10:50)  HR: 76 (07 Jan 2024 10:50) (76 - 81)  BP: 94/81 (07 Jan 2024 10:50) (94/81 - 107/81)  BP(mean): --  RR: 16 (07 Jan 2024 10:50) (16 - 18)  SpO2: 99% (07 Jan 2024 05:55) (99% - 100%)    Parameters below as of 07 Jan 2024 05:55  Patient On (Oxygen Delivery Method): room air        CONSTITUTIONAL: NAD, well-developed  HEAD: Normocephalic, atraumatic  EYES: EOMI, PERRL  ENT: no rhinorrhea, no pharyngeal erythema  RESPIRATORY: No increased work of breathing, CTAB, no wheezes or crackles appreciated  CARDIOVASCULAR: RRR, S1 and S2 present, no m/r/g  ABDOMEN: soft, NT, ND, bowel sounds present  EXTREMITIES: No LE edema  MUSCULOSKELETAL: no joint swelling, no tenderness to palpation  NEURO: A&Ox3, moving all extremities    LABS:                        10.0   8.83  )-----------( 328      ( 07 Jan 2024 05:40 )             32.7     01-07    146<H>  |  111<H>  |  3<L>  ----------------------------<  83  4.0   |  23  |  0.73    Ca    8.4      07 Jan 2024 05:40  Phos  3.1     01-07  Mg     2.60     01-07            Urinalysis Basic - ( 07 Jan 2024 05:40 )    Color: x / Appearance: x / SG: x / pH: x  Gluc: 83 mg/dL / Ketone: x  / Bili: x / Urobili: x   Blood: x / Protein: x / Nitrite: x   Leuk Esterase: x / RBC: x / WBC x   Sq Epi: x / Non Sq Epi: x / Bacteria: x          RADIOLOGY & ADDITIONAL TESTS:    Results Reviewed:   Imaging Personally Reviewed:  Electrocardiogram Personally Reviewed:    COORDINATION OF CARE:  Care Discussed with Consultants/Other Providers [Y/N]:  Prior or Outpatient Records Reviewed [Y/N]:   [facility] Department of Hospital Medicine  Rosy Keys MD  Available on MS Teams  Pager: [phone]    Patient is a 38y old  Female who presents with a chief complaint of Bacteremia     (07 Jan 2024 13:25)      OVERNIGHT EVENTS: No acute events overnight    SUBJECTIVE: Pt seen and examined at bedside this morning.     ADDITIONAL REVIEW OF SYSTEMS:    MEDICATIONS  (STANDING):  atorvastatin 10 milliGRAM(s) Oral at bedtime  dextrose 5% + lactated ringers. 1000 milliLiter(s) (100 mL/Hr) IV Continuous <Continuous>  dextrose 5%. 1000 milliLiter(s) (100 mL/Hr) IV Continuous <Continuous>  dextrose 5%. 1000 milliLiter(s) (50 mL/Hr) IV Continuous <Continuous>  dextrose 50% Injectable 12.5 Gram(s) IV Push once  dextrose 50% Injectable 25 Gram(s) IV Push once  dextrose 50% Injectable 25 Gram(s) IV Push once  divalproex DR 1000 milliGRAM(s) Oral at bedtime  enoxaparin Injectable 40 milliGRAM(s) SubCutaneous every 12 hours  famotidine    Tablet 20 milliGRAM(s) Oral daily  glucagon  Injectable 1 milliGRAM(s) IntraMuscular once  lithium 300 milliGRAM(s) Oral two times a day  metoprolol succinate ER 12.5 milliGRAM(s) Oral daily  oseltamivir 75 milliGRAM(s) Oral two times a day  senna 2 Tablet(s) Oral at bedtime  venlafaxine XR. 150 milliGRAM(s) Oral daily    MEDICATIONS  (PRN):  acetaminophen     Tablet .. 650 milliGRAM(s) Oral every 6 hours PRN Temp greater or equal to 38C (100.4F), Mild Pain (1 - 3)  aluminum hydroxide/magnesium hydroxide/simethicone Suspension 30 milliLiter(s) Oral every 4 hours PRN Dyspepsia  dextrose Oral Gel 15 Gram(s) Oral once PRN Blood Glucose LESS THAN 70 milliGRAM(s)/deciliter  melatonin 3 milliGRAM(s) Oral at bedtime PRN Insomnia  ondansetron Injectable 4 milliGRAM(s) IV Push every 8 hours PRN Nausea and/or Vomiting      CAPILLARY BLOOD GLUCOSE      POCT Blood Glucose.: 126 mg/dL (07 Jan 2024 12:14)  POCT Blood Glucose.: 86 mg/dL (07 Jan 2024 08:51)  POCT Blood Glucose.: 94 mg/dL (06 Jan 2024 22:25)  POCT Blood Glucose.: 87 mg/dL (06 Jan 2024 17:38)    I&O's Summary      PHYSICAL EXAM:  Vital Signs Last 24 Hrs  T(C): 36.9 (07 Jan 2024 10:50), Max: 36.9 (07 Jan 2024 10:50)  T(F): 98.4 (07 Jan 2024 10:50), Max: 98.4 (07 Jan 2024 10:50)  HR: 76 (07 Jan 2024 10:50) (76 - 81)  BP: 94/81 (07 Jan 2024 10:50) (94/81 - 107/81)  BP(mean): --  RR: 16 (07 Jan 2024 10:50) (16 - 18)  SpO2: 99% (07 Jan 2024 05:55) (99% - 100%)    Parameters below as of 07 Jan 2024 05:55  Patient On (Oxygen Delivery Method): room air        CONSTITUTIONAL: NAD, well-developed  HEAD: Normocephalic, atraumatic  EYES: EOMI, PERRL  ENT: no rhinorrhea, no pharyngeal erythema  RESPIRATORY: No increased work of breathing, CTAB, no wheezes or crackles appreciated  CARDIOVASCULAR: RRR, S1 and S2 present, no m/r/g  ABDOMEN: soft, NT, ND, bowel sounds present  EXTREMITIES: No LE edema  MUSCULOSKELETAL: no joint swelling, no tenderness to palpation  NEURO: A&Ox3, moving all extremities    LABS:                        10.0   8.83  )-----------( 328      ( 07 Jan 2024 05:40 )             32.7     01-07    146<H>  |  111<H>  |  3<L>  ----------------------------<  83  4.0   |  23  |  0.73    Ca    8.4      07 Jan 2024 05:40  Phos  3.1     01-07  Mg     2.60     01-07            Urinalysis Basic - ( 07 Jan 2024 05:40 )    Color: x / Appearance: x / SG: x / pH: x  Gluc: 83 mg/dL / Ketone: x  / Bili: x / Urobili: x   Blood: x / Protein: x / Nitrite: x   Leuk Esterase: x / RBC: x / WBC x   Sq Epi: x / Non Sq Epi: x / Bacteria: x          RADIOLOGY & ADDITIONAL TESTS:    Results Reviewed:   Imaging Personally Reviewed:  Electrocardiogram Personally Reviewed:    COORDINATION OF CARE:  Care Discussed with Consultants/Other Providers [Y/N]:  Prior or Outpatient Records Reviewed [Y/N]:   [facility] Department of Hospital Medicine      Patient is a 38y old  Female who presents with a chief complaint of Bacteremia    SUBJECTIVE: NAEO. pt asleep but awakes to verbal, answers questions with short sentences. no ridigity noted. still with bilateral UE jerking.    MEDICATIONS  (STANDING):  atorvastatin 10 milliGRAM(s) Oral at bedtime  dextrose 5% + lactated ringers. 1000 milliLiter(s) (100 mL/Hr) IV Continuous <Continuous>  dextrose 5%. 1000 milliLiter(s) (100 mL/Hr) IV Continuous <Continuous>  dextrose 5%. 1000 milliLiter(s) (50 mL/Hr) IV Continuous <Continuous>  dextrose 50% Injectable 12.5 Gram(s) IV Push once  dextrose 50% Injectable 25 Gram(s) IV Push once  dextrose 50% Injectable 25 Gram(s) IV Push once  divalproex DR 1000 milliGRAM(s) Oral at bedtime  enoxaparin Injectable 40 milliGRAM(s) SubCutaneous every 12 hours  famotidine    Tablet 20 milliGRAM(s) Oral daily  glucagon  Injectable 1 milliGRAM(s) IntraMuscular once  lithium 300 milliGRAM(s) Oral two times a day  metoprolol succinate ER 12.5 milliGRAM(s) Oral daily  oseltamivir 75 milliGRAM(s) Oral two times a day  senna 2 Tablet(s) Oral at bedtime  venlafaxine XR. 150 milliGRAM(s) Oral daily    MEDICATIONS  (PRN):  acetaminophen     Tablet .. 650 milliGRAM(s) Oral every 6 hours PRN Temp greater or equal to 38C (100.4F), Mild Pain (1 - 3)  aluminum hydroxide/magnesium hydroxide/simethicone Suspension 30 milliLiter(s) Oral every 4 hours PRN Dyspepsia  dextrose Oral Gel 15 Gram(s) Oral once PRN Blood Glucose LESS THAN 70 milliGRAM(s)/deciliter  melatonin 3 milliGRAM(s) Oral at bedtime PRN Insomnia  ondansetron Injectable 4 milliGRAM(s) IV Push every 8 hours PRN Nausea and/or Vomiting      CAPILLARY BLOOD GLUCOSE      POCT Blood Glucose.: 126 mg/dL (07 Jan 2024 12:14)  POCT Blood Glucose.: 86 mg/dL (07 Jan 2024 08:51)  POCT Blood Glucose.: 94 mg/dL (06 Jan 2024 22:25)  POCT Blood Glucose.: 87 mg/dL (06 Jan 2024 17:38)    I&O's Summary      PHYSICAL EXAM:  Vital Signs Last 24 Hrs  T(C): 36.9 (07 Jan 2024 10:50), Max: 36.9 (07 Jan 2024 10:50)  T(F): 98.4 (07 Jan 2024 10:50), Max: 98.4 (07 Jan 2024 10:50)  HR: 76 (07 Jan 2024 10:50) (76 - 81)  BP: 94/81 (07 Jan 2024 10:50) (94/81 - 107/81)  BP(mean): --  RR: 16 (07 Jan 2024 10:50) (16 - 18)  SpO2: 99% (07 Jan 2024 05:55) (99% - 100%)    Parameters below as of 07 Jan 2024 05:55  Patient On (Oxygen Delivery Method): room air        CONSTITUTIONAL: NAD, well-developed  HEAD: Normocephalic, atraumatic  EYES: EOMI, PERRL  ENT: no rhinorrhea, no pharyngeal erythema  RESPIRATORY: No increased work of breathing, CTAB, no wheezes or crackles appreciated  CARDIOVASCULAR: RRR, S1 and S2 present, no m/r/g  ABDOMEN: soft, NT, ND, bowel sounds present  EXTREMITIES: No LE edema  MUSCULOSKELETAL: no joint swelling, no tenderness to palpation  NEURO: A&Ox3, moving all extremities    LABS:                        10.0   8.83  )-----------( 328      ( 07 Jan 2024 05:40 )             32.7     01-07    146<H>  |  111<H>  |  3<L>  ----------------------------<  83  4.0   |  23  |  0.73    Ca    8.4      07 Jan 2024 05:40  Phos  3.1     01-07  Mg     2.60     01-07            Urinalysis Basic - ( 07 Jan 2024 05:40 )    Color: x / Appearance: x / SG: x / pH: x  Gluc: 83 mg/dL / Ketone: x  / Bili: x / Urobili: x   Blood: x / Protein: x / Nitrite: x   Leuk Esterase: x / RBC: x / WBC x   Sq Epi: x / Non Sq Epi: x / Bacteria: x          RADIOLOGY & ADDITIONAL TESTS:    Results Reviewed:   Imaging Personally Reviewed:  Electrocardiogram Personally Reviewed:    COORDINATION OF CARE:  Care Discussed with Consultants/Other Providers [Y/N]:  Prior or Outpatient Records Reviewed [Y/N]:   [facility] Department of Hospital Medicine      Patient is a 38y old  Female who presents with a chief complaint of Bacteremia    SUBJECTIVE: NAEO. pt asleep but awakes to verbal, answers questions with short sentences. no ridigity noted. still with bilateral UE jerking.    MEDICATIONS  (STANDING):  atorvastatin 10 milliGRAM(s) Oral at bedtime  dextrose 5% + lactated ringers. 1000 milliLiter(s) (100 mL/Hr) IV Continuous <Continuous>  dextrose 5%. 1000 milliLiter(s) (100 mL/Hr) IV Continuous <Continuous>  dextrose 5%. 1000 milliLiter(s) (50 mL/Hr) IV Continuous <Continuous>  dextrose 50% Injectable 12.5 Gram(s) IV Push once  dextrose 50% Injectable 25 Gram(s) IV Push once  dextrose 50% Injectable 25 Gram(s) IV Push once  divalproex DR 1000 milliGRAM(s) Oral at bedtime  enoxaparin Injectable 40 milliGRAM(s) SubCutaneous every 12 hours  famotidine    Tablet 20 milliGRAM(s) Oral daily  glucagon  Injectable 1 milliGRAM(s) IntraMuscular once  lithium 300 milliGRAM(s) Oral two times a day  metoprolol succinate ER 12.5 milliGRAM(s) Oral daily  oseltamivir 75 milliGRAM(s) Oral two times a day  senna 2 Tablet(s) Oral at bedtime  venlafaxine XR. 150 milliGRAM(s) Oral daily    MEDICATIONS  (PRN):  acetaminophen     Tablet .. 650 milliGRAM(s) Oral every 6 hours PRN Temp greater or equal to 38C (100.4F), Mild Pain (1 - 3)  aluminum hydroxide/magnesium hydroxide/simethicone Suspension 30 milliLiter(s) Oral every 4 hours PRN Dyspepsia  dextrose Oral Gel 15 Gram(s) Oral once PRN Blood Glucose LESS THAN 70 milliGRAM(s)/deciliter  melatonin 3 milliGRAM(s) Oral at bedtime PRN Insomnia  ondansetron Injectable 4 milliGRAM(s) IV Push every 8 hours PRN Nausea and/or Vomiting      CAPILLARY BLOOD GLUCOSE      POCT Blood Glucose.: 126 mg/dL (07 Jan 2024 12:14)  POCT Blood Glucose.: 86 mg/dL (07 Jan 2024 08:51)  POCT Blood Glucose.: 94 mg/dL (06 Jan 2024 22:25)  POCT Blood Glucose.: 87 mg/dL (06 Jan 2024 17:38)    I&O's Summary      PHYSICAL EXAM:  Vital Signs Last 24 Hrs  T(C): 36.9 (07 Jan 2024 10:50), Max: 36.9 (07 Jan 2024 10:50)  T(F): 98.4 (07 Jan 2024 10:50), Max: 98.4 (07 Jan 2024 10:50)  HR: 76 (07 Jan 2024 10:50) (76 - 81)  BP: 94/81 (07 Jan 2024 10:50) (94/81 - 107/81)  BP(mean): --  RR: 16 (07 Jan 2024 10:50) (16 - 18)  SpO2: 99% (07 Jan 2024 05:55) (99% - 100%)    Parameters below as of 07 Jan 2024 05:55  Patient On (Oxygen Delivery Method): room air        CONSTITUTIONAL: NAD, well-developed  HEAD: Normocephalic, atraumatic  EYES: EOMI, PERRL  ENT: no rhinorrhea, no pharyngeal erythema  RESPIRATORY: No increased work of breathing, CTAB, no wheezes or crackles appreciated  CARDIOVASCULAR: RRR, S1 and S2 present, no m/r/g  ABDOMEN: soft, NT, ND, bowel sounds present  EXTREMITIES: No LE edema  MUSCULOSKELETAL: no joint swelling, no tenderness to palpation  NEURO: A&Ox3, moving all extremities    LABS:                        10.0   8.83  )-----------( 328      ( 07 Jan 2024 05:40 )             32.7     01-07    146<H>  |  111<H>  |  3<L>  ----------------------------<  83  4.0   |  23  |  0.73    Ca    8.4      07 Jan 2024 05:40  Phos  3.1     01-07  Mg     2.60     01-07            Urinalysis Basic - ( 07 Jan 2024 05:40 )    Color: x / Appearance: x / SG: x / pH: x  Gluc: 83 mg/dL / Ketone: x  / Bili: x / Urobili: x   Blood: x / Protein: x / Nitrite: x   Leuk Esterase: x / RBC: x / WBC x   Sq Epi: x / Non Sq Epi: x / Bacteria: x          RADIOLOGY & ADDITIONAL TESTS:    Results Reviewed:   Imaging Personally Reviewed:  Electrocardiogram Personally Reviewed:    COORDINATION OF CARE:  Care Discussed with Consultants/Other Providers [Y/N]:  Prior or Outpatient Records Reviewed [Y/N]:

## 2024-01-07 NOTE — DIETITIAN INITIAL EVALUATION ADULT - NS FNS DIET ORDER
Diet, Regular:   Consistent Carbohydrate {Evening Snack} (CSTCHOSN) (01-03-24 @ 22:36) [Active]

## 2024-01-07 NOTE — DISCHARGE NOTE PROVIDER - NSDCMRMEDTOKEN_GEN_ALL_CORE_FT
atorvastatin 10 mg oral tablet: 1 tab(s) orally once a day  cloZAPine 100 mg oral tablet: 1 tab(s) orally once a day (at bedtime)  cloZAPine 25 mg oral tablet: 1 tab(s) orally once a day (at bedtime)  cloZAPine 50 mg oral tablet: 1 tab(s) orally once a day (at bedtime)  Depakote 500 mg oral delayed release tablet: 2 tab(s) orally once a day (at bedtime)  Effexor  mg oral capsule, extended release: 1 cap(s) orally once a day  famotidine 20 mg oral tablet: 1 tab(s) orally once a day  Haldol Decanoate 100 mg/mL intramuscular solution: 200 milligram(s) intramuscularly every 4 weeks  lithium 300 mg oral tablet: 1 tab(s) orally 2 times a day  metFORMIN 500 mg oral tablet: 1 tab(s) orally 2 times a day  Metoprolol Succinate ER 25 mg oral tablet, extended release: 0.5 tab(s) orally once a day  Senna 8.6 mg oral tablet: 2 tab(s) orally once a day   atorvastatin 10 mg oral tablet: 1 tab(s) orally once a day  cholecalciferol oral tablet: 1000 unit(s) orally once a day  Depakote 500 mg oral delayed release tablet: 2 tab(s) orally once a day (at bedtime)  Effexor  mg oral capsule, extended release: 1 cap(s) orally once a day  famotidine 20 mg oral tablet: 1 tab(s) orally once a day  folic acid 1 mg oral tablet: 1 tab(s) orally once a day  Haldol Decanoate 100 mg/mL intramuscular solution: 200 milligram(s) intramuscularly every 4 weeks  lithium 300 mg oral tablet: 1 tab(s) orally 2 times a day  metFORMIN 500 mg oral tablet: 1 tab(s) orally 2 times a day  Metoprolol Succinate ER 25 mg oral tablet, extended release: 0.5 tab(s) orally once a day  OLANZapine 15 mg oral tablet: 1 tab(s) orally once a day (at bedtime)  Senna 8.6 mg oral tablet: 2 tab(s) orally once a day

## 2024-01-07 NOTE — PROGRESS NOTE ADULT - ASSESSMENT
38-year-old female with past medical history of schizophrenia, seizure disorder, from [facility], HTN, DM?, HLD sent in for blood cx growing staph capitis and noted to have of influenza + 38-year-old female with past medical history of schizophrenia, seizure disorder, from [facility], HTN, DM?, HLD sent in for blood cx growing staph capitis and noted to have of influenza +

## 2024-01-07 NOTE — DISCHARGE NOTE PROVIDER - NSDCCAREPROVSEEN_GEN_ALL_CORE_FT
Albertina Liu MD    [facility] ACP  Albertina Liu MD    [facility] ACP  Albertina Liu MD    [facility]

## 2024-01-07 NOTE — DIETITIAN INITIAL EVALUATION ADULT - NAME AND PHONE
Maria M Shafer RD  [employer] Teams or Pager [phone] Maria M Shafer RD  [employer] Teams or Pager [phone]

## 2024-01-07 NOTE — PROGRESS NOTE ADULT - PROBLEM SELECTOR PLAN 5
outpt meds: Clozapine 175mg qhs,  lithium 300mg bid, effexor 150mg qd and Depakote 1g qhs, Haldol dec inj 200mg q4 weeks, last received 12/27/23, next injection is due on 1/24/24.   outpt psych: Dr. Whitaker [phone]    -pt denies A/V hallucinations, no evidence of decompensation   -cont hold clozapine per psych, repeat clozapine level  -cont with lithium 300mg bid, effexor 150mg qd and Depakote 1g qhs (on for seizure)  -cont to monitor lithium, clozapine level and monitor ANC   -appreciate psych recs outpt meds: Clozapine 175mg qhs,  lithium 300mg bid, effexor 150mg qd and Depakote 1g qhs, Haldol dec inj 200mg q4 weeks, last received 12/27/23, next injection is due on 1/24/24.   outpt psych: Dr. Whitaker [phone]    -pt denies A/V hallucinations, no evidence of decompensation   -cont hold clozapine per psych, repeat clozapine level  -cont with lithium 300mg bid, effexor 150mg qd and Depakote 1g qhs (on for seizure)  -cont to monitor lithium, clozapine level and monitor ANC   -appreciate psych recs outpt meds: Clozapine 175mg qhs,  lithium 300mg bid, effexor 150mg qd and Depakote 1g qhs, Haldol dec inj 200mg q4 weeks, last received 12/27/23, next injection is due on 1/24/24.   outpt psych: Dr. Whitaker [phone]    -pt denies A/V hallucinations, no evidence of decompensation   -cont hold clozapine per psych, repeat clozapine level improved  -cont with lithium 300mg bid, effexor 150mg qd and Depakote 1g qhs (on for seizure)  -cont to monitor lithium, clozapine level and monitor ANC   -appreciate psych recs outpt meds: Clozapine 175mg qhs,  lithium 300mg bid, effexor 150mg qd and Depakote 1g qhs, Haldol dec inj 200mg q4 weeks, last received 12/27/23, next injection is due on 1/24/24.   outpt psych: Dr. Whitaker [phone]    -pt denies A/V hallucinations, no evidence of decompensation   -cont hold clozapine per psych, repeat clozapine level improved  -cont with lithium 300mg bid, effexor 150mg qd and Depakote 1g qhs (on for seizure)  -cont to monitor lithium, clozapine level and monitor ANC   -appreciate psych recs Personal collateral

## 2024-01-07 NOTE — DISCHARGE NOTE PROVIDER - PROVIDER TOKENS
FREE:[LAST:[martinez],PHONE:[phone]:[(   )    -],ADDRESS:[outpt psych]] FREE:[LAST:[martinez],PHONE:[phone]:[(   )    -],ADDRESS:[outpt psych]] FREE:[LAST:[martinez],PHONE:[phone]:[(   )    -],ADDRESS:[outpt psych]]

## 2024-01-07 NOTE — DIETITIAN INITIAL EVALUATION ADULT - OTHER INFO
Patient seen bedside, appeared to be sleeping but easily woken upon calling pt's name. Patient reported her appetite is "okay". Denied nausea/vomitting/diarrhea/constipation. Denied food allergies. Inquired if patient had any food preferences or enjoyed meals so far. Patient did not respond. Unclear if patient is reliable historian. Interview continued with RN. RN reported patient ate orange and diet gingerale this AM. Patient did not have a bowel movement this morning. Bowel movements not reported per flowsheet. Per chart review: Patient ate breakfast and lunch on 1/6 Progress Note. No weight history to assess per Octavio CHANDRA.      Patient seen bedside, appeared to be sleeping but easily woken upon calling pt's name. Patient reported her appetite is "okay". Denied nausea/vomitting/diarrhea/constipation. Denied food allergies. Inquired if patient had any food preferences or enjoyed meals so far. Patient did not respond. Unclear if patient is a reliable historian. Patient was on her side and turned away from writer, appearing to end discussion. Interview continued with RN. RN reported patient ate orange and diet gingerale this AM. Patient did not have a bowel movement this morning. Bowel movements not reported per flowsheet. Per chart review: Patient ate breakfast and lunch on 1/6 Progress Note. No weight history to assess per Octavio CHANDRA. DM appears well managed with glucose 82-89 mg/dL during hospitalization and A1C 4.8%.

## 2024-01-07 NOTE — PROGRESS NOTE ADULT - PROBLEM SELECTOR PLAN 1
ANO3 at baseline currently lethargic, No focal neuro deficits on exam however with intermittent jerking of upper extremities (which per outpt psychiatrist is her chronic baseline dt Haldol.     -collateral at [facility] confirmed that pt often lethargic at night but now is worse.  -bilateral tremor known to outpt psych and is chronic  -per , cont to hold clozapine for now (level 677)  -cont with lithium 300mg bid, effexor 150mg qd and Depakote 1g qhs (on for seizure)  -cont to monitor lithium, clozapine level and monitor ANC   - no ss of sepsis, work up neg  -appreciate psych recs  -aspiration, fall, sz precautions ANO3 at baseline currently lethargic, No focal neuro deficits on exam however with intermittent jerking of upper extremities (which per outpt psychiatrist is her chronic baseline dt Haldol.     -collateral at [facility] confirmed that pt often lethargic at night but now is worse.  -bilateral tremor known to outpt psych and is chronic  -per , cont to hold clozapine for now (level 677)  -cont with lithium 300mg bid, effexor 150mg qd and Depakote 1g qhs (on for seizure)  -cont to monitor lithium, clozapine level and monitor ANC   - no ss of sepsis, work up neg  -appreciate psych recs  -aspiration, fall, sz precautions ANO3 at baseline currently lethargic, No focal neuro deficits on exam however with intermittent jerking of upper extremities (which per outpt psychiatrist is her chronic baseline dt Haldol.     -collateral at [facility] confirmed that pt often lethargic at night but now is worse.  -bilateral tremor known to outpt psych and is chronic  -per , cont to hold clozapine for now (level 677-->183, ANC improved   -cont with lithium 300mg bid, effexor 150mg qd and Depakote 1g qhs (on for seizure)  -cont to monitor lithium, clozapine level and monitor ANC   - no ss of sepsis, work up neg  -appreciate psych recs  -aspiration, fall, sz precautions ANO3 at baseline currently lethargic, No focal neuro deficits on exam however with intermittent jerking of upper extremities (which per outpt psychiatrist is her chronic baseline dt Haldol.     -collateral at [facility] confirmed that pt often lethargic at night but now is worse.  -bilateral tremor known to outpt psych and is chronic  -per , cont to hold clozapine for now (level 677-->183, ANC improved   -cont with lithium 300mg bid, effexor 150mg qd and Depakote 1g qhs (on for seizure)  -cont to monitor lithium, clozapine level and monitor ANC   - no ss of sepsis, work up neg  -appreciate psych recs  -aspiration, fall, sz precautions

## 2024-01-07 NOTE — DISCHARGE NOTE PROVIDER - NSDCCPCAREPLAN_GEN_ALL_CORE_FT
PRINCIPAL DISCHARGE DIAGNOSIS  Diagnosis: Positive blood culture  Assessment and Plan of Treatment: Reapeat blood cultures were negative, likely representing contamination.      SECONDARY DISCHARGE DIAGNOSES  Diagnosis: Influenza  Assessment and Plan of Treatment: You were treated with course of tamliflu with improvement.    Diagnosis: Schizophrenia  Assessment and Plan of Treatment: Your clozapine dose was adjusted in setting of your viral illness. Follow up with your psychiatrist.     PRINCIPAL DISCHARGE DIAGNOSIS  Diagnosis: Positive blood culture  Assessment and Plan of Treatment: Reapeat blood cultures were negative, likely representing contamination.      SECONDARY DISCHARGE DIAGNOSES  Diagnosis: Influenza  Assessment and Plan of Treatment: You were treated with course of tamliflu with improvement.    Diagnosis: Schizophrenia  Assessment and Plan of Treatment: Clozapine was discontinued. Continue medications as recommended by psychiatry. Planned for transfer to inpatient psych at [facility]. 2PC consent done.     PRINCIPAL DISCHARGE DIAGNOSIS  Diagnosis: Positive blood culture  Assessment and Plan of Treatment: Reapeat blood cultures were negative, likely representing contamination.      SECONDARY DISCHARGE DIAGNOSES  Diagnosis: Influenza  Assessment and Plan of Treatment: You were treated with course of tamliflu with improvement.    Diagnosis: Schizophrenia  Assessment and Plan of Treatment: Clozapine was discontinued. Continue medications as recommended by psychiatry. Planned for transfer to inpatient psych at [facility]. 2PC consent done.     PRINCIPAL DISCHARGE DIAGNOSIS  Diagnosis: Positive blood culture  Assessment and Plan of Treatment: Reapeat blood cultures were negative, likely representing contamination.      SECONDARY DISCHARGE DIAGNOSES  Diagnosis: Influenza  Assessment and Plan of Treatment: You were treated with course of tamliflu with improvement.    Diagnosis: Schizophrenia  Assessment and Plan of Treatment: Clozapine was discontinued. Continue medications as recommended by psychiatry. Planned for transfer to inpatient psych at [facility]. 2PC consent done.

## 2024-01-07 NOTE — DIETITIAN INITIAL EVALUATION ADULT - ADD RECOMMEND
1. Monitor PO intake/tolerance, labs, weights, BMs, and skin integrity  2. Please obtain weekly wt  3. Provide feeding assistance prn   4. Encourage PO intake at meal times

## 2024-01-07 NOTE — DISCHARGE NOTE PROVIDER - HOSPITAL COURSE
38-year-old female with past medical history of schizophrenia, seizure disorder, from [facility], HTN, DM?, HLD sent in for blood cx growing staph capitis and noted to have Influenza. Repeat blood cultures performed which were all negative. Evaluated by psych for hx of schizophrenia on clozapine and possible acute change in mental status. initially recommended holding clozapine pending further evaluation.    38-year-old female with past medical history of schizophrenia, seizure disorder, from [facility], HTN, DM?, HLD sent in for blood cx growing staph capitis and noted to have Influenza. Repeat blood cultures performed which were all negative. Evaluated by psych for hx of schizophrenia on clozapine and possible acute change in mental status. initially recommended holding clozapine pending further evaluation.    38-year-old female with past medical history of schizophrenia, seizure disorder, from [facility], HTN, DM?, HLD sent in for blood cx growing staph capitis and noted to have Influenza. Repeat blood cultures performed which were all negative. Evaluated by psych for hx of schizophrenia on clozapine and possible acute change in mental status. initially recommended holding clozapine pending further evaluation.    38-year-old female with past medical history of schizophrenia, seizure disorder, from [facility], HTN, DM?, HLD sent in for blood cx growing staph capitis and noted to have Influenza. Repeat blood cultures performed which were all negative. Evaluated by psych for hx of schizophrenia on clozapine and possible acute change in mental status. initially recommended holding clozapine pending further evaluation. Closely Monitored Clozapine and ANC level.   38-year-old female with past medical history of schizophrenia, seizure disorder, from [facility], HTN, DM?, HLD sent in for blood cx growing staph capitis and noted to have Influenza. Repeat blood cultures performed which were all negative. Evaluated by psych for hx of schizophrenia on clozapine and possible acute change in mental status. initially recommended holding clozapine pending further evaluation. Closely Monitored Clozapine and ANC level.   38-year-old female with past medical history of schizophrenia, seizure disorder, from [facility], HTN, DM?, HLD sent in for blood cx growing staph capitis and noted to have Influenza. Repeat blood cultures performed which were all negative. Evaluated by psych for hx of schizophrenia on clozapine and possible acute change in mental status. initially recommended holding clozapine pending further evaluation. Closely Monitored Clozapine and ANC level.   38-year-old female with past medical history of schizophrenia, seizure disorder, from [facility], HTN, DM?, HLD sent in for blood cx growing staph capitis and noted to have Influenza. Repeat blood cultures performed which were all negative. Evaluated by psych for hx of schizophrenia on clozapine and possible acute change in mental status. initially recommended holding clozapine given sedation. You were found to be Flu+ and were treated with course of tamiflu. We monitored clozapine and ANC level and had to hold several doses due to drop in ANC. Clozapine was restarted and___   38-year-old female with past medical history of schizophrenia, seizure disorder, from [facility], HTN, DM?, HLD sent in for blood cx growing staph capitis and noted to have Influenza. Repeat blood cultures performed which were all negative. Evaluated by psych for hx of schizophrenia on clozapine and possible acute change in mental status. initially recommended holding clozapine given sedation. You were found to be Flu+ and were treated with course of tamiflu. We monitored clozapine and ANC level and had to hold several doses due to drop in ANC. Clozapine was restarted and___   38-year-old female with past medical history of schizophrenia, seizure disorder, from [facility], HTN, DM?, HLD sent in for blood cx growing staph capitis and noted to have Influenza. Repeat blood cultures performed which were all negative. Evaluated by psych for hx of schizophrenia on clozapine and possible acute change in mental status. initially recommended holding clozapine given sedation. You were found to be Flu+ and were treated with course of tamiflu. We monitored clozapine and ANC level and had to hold several doses due to drop in ANC. Clozapine was restarted and___   38-year-old female with past medical history of schizophrenia, seizure disorder, from [facility], HTN, DM?, HLD sent in for blood cx growing staph capitis and noted to have Influenza. Repeat blood cultures performed which were all negative. Evaluated by psych for hx of schizophrenia on clozapine and possible acute change in mental status. initially recommended holding clozapine given sedation. She was found to be Flu+ and were treated with course of tamiflu. We monitored clozapine and ANC level and had to hold several doses due to drop in ANC. Unfortunately due to holding clozapine she started to hear voices telling her to kill herself. She was started on olanzapine _____   38-year-old female with past medical history of schizophrenia, seizure disorder, from [facility], HTN, DM?, HLD sent in for blood cx growing staph capitis and noted to have Influenza. Repeat blood cultures performed which were all negative. Evaluated by psych for hx of schizophrenia on clozapine and possible acute change in mental status. initially recommended holding clozapine given sedation. She was found to be Flu+ and were treated with course of tamiflu. We monitored clozapine and ANC level and had to hold several doses due to drop in ANC. Unfortunately due to holding clozapine she started to hear voices telling her to kill herself. She was started on olanzapine _____   38-year-old female with past medical history of schizophrenia, seizure disorder, from [facility], HTN, DM?, HLD sent in for blood cx growing staph capitis and noted to have Influenza. Repeat blood cultures performed which were all negative. Evaluated by psych for hx of schizophrenia on clozapine and possible acute change in mental status. initially recommended holding clozapine given sedation. She was found to be Flu+ and were treated with course of tamiflu. We monitored clozapine and ANC level and had to hold several doses due to drop in ANC. Unfortunately due to holding clozapine she started to hear voices telling her to kill herself. She was started on olanzapine _____   38-year-old female with past medical history of schizophrenia, seizure disorder, from [facility], HTN, DM?, HLD sent in for blood cx growing staph capitis and noted to have Influenza. Repeat blood cultures performed which were all negative. Evaluated by psych for hx of schizophrenia on clozapine and possible acute change in mental status. initially recommended holding clozapine given sedation. She was found to be Flu+ and were treated with course of tamiflu. We monitored clozapine and ANC level and had to hold several doses due to drop in ANC. Unfortunately due to holding clozapine she started to hear voices telling her to kill herself. She was started on olanzapine   38-year-old female with past medical history of schizophrenia, seizure disorder, from [facility], HTN, DM?, HLD sent in for blood cx growing staph capitis and noted to have Influenza. Repeat blood cultures performed which were all negative. Evaluated by psych for hx of schizophrenia on clozapine and possible acute change in mental status. initially recommended holding clozapine given sedation. She was found to be Flu+ and were treated with course of tamiflu. We monitored clozapine and ANC level and had to hold several doses due to drop in ANC. Unfortunately due to holding clozapine she started to hear voices telling her to kill herself. She was started on olanzapine   38-year-old female with past medical history of schizophrenia, seizure disorder, from [facility], HTN, DM?, HLD sent in for blood cx growing staph capitis and noted to have Influenza. Repeat blood cultures performed which were all negative. Evaluated by psych for hx of schizophrenia on clozapine and possible acute change in mental status. initially recommended holding clozapine given sedation. She was found to be Flu+ and were treated with course of tamiflu. We monitored clozapine and ANC level and had to hold several doses due to drop in ANC. Unfortunately due to holding clozapine she started to hear voices telling her to kill herself. She was started on olanzapine   38-year-old female with past medical history of schizophrenia, seizure disorder, from [facility], HTN, DM?, HLD sent in for blood cx growing staph capitis and noted to have Influenza. Repeat blood cultures performed which were all negative. Evaluated by psych for hx of schizophrenia on clozapine and possible acute change in mental status. initially recommended holding clozapine given sedation. She was found to be Flu+ and were treated with course of tamiflu. We monitored clozapine and ANC level and had to hold several doses due to drop in ANC. Unfortunately due to holding clozapine she started to hear voices telling her to kill herself. She was started on olanzapine. Pt to be transferred to inpt psych [facility].  team coordinated care and 2PC consent done today. Was pending repeat RVP/covid which is negative 1/16.    38-year-old female with past medical history of schizophrenia, seizure disorder, from [facility], HTN, DM?, HLD sent in for blood cx growing staph capitis and noted to have Influenza. Repeat blood cultures performed which were all negative. Evaluated by psych for hx of schizophrenia on clozapine and possible acute change in mental status. initially recommended holding clozapine given sedation. She was found to be Flu+ and were treated with course of tamiflu. We monitored clozapine and ANC level and had to hold several doses due to drop in ANC. Unfortunately due to holding clozapine she started to hear voices telling her to kill herself. She was started on olanzapine. Pt to be transferred to inpt psych [facility].  team coordinated care and 2PC consent done today. Was pending repeat RVP/covid which is negative 1/16.    38-year-old female with past medical history of schizophrenia, seizure disorder, from [facility], HTN, DM?, HLD sent in for blood cx growing staph capitis and noted to have Influenza. Repeat blood cultures performed which were all negative. Evaluated by psych for hx of schizophrenia on clozapine and possible acute change in mental status. initially recommended holding clozapine given sedation. She was found to be Flu+ and were treated with course of tamiflu. We monitored clozapine and ANC level and had to hold several doses due to drop in ANC. Unfortunately due to holding clozapine she started to hear voices telling her to kill herself. She was started on olanzapine. Pt to be transferred to inpt psych [facility].  team coordinated care and 2PC consent done today. Was pending repeat RVP/covid which is negative 1/16.

## 2024-01-07 NOTE — DIETITIAN INITIAL EVALUATION ADULT - PHYSCIAL ASSESSMENT
Patient did not allow physical nutrition focused physical exam. Patient was seen in hospital gown laying on her side. Visual assessment conducted. No overt physical signs of malnutrition at present./obese Patient did not allow physical nutrition focused physical exam. Patient was seen in hospital gown laying on her side and turned away during conversation. Visual assessment conducted. No overt physical signs of malnutrition at present./obese

## 2024-01-08 LAB
ANION GAP SERPL CALC-SCNC: 10 MMOL/L — SIGNIFICANT CHANGE UP (ref 7–14)
ANION GAP SERPL CALC-SCNC: 10 MMOL/L — SIGNIFICANT CHANGE UP (ref 7–14)
BASOPHILS # BLD AUTO: 0.02 K/UL — SIGNIFICANT CHANGE UP (ref 0–0.2)
BASOPHILS # BLD AUTO: 0.02 K/UL — SIGNIFICANT CHANGE UP (ref 0–0.2)
BASOPHILS NFR BLD AUTO: 0.2 % — SIGNIFICANT CHANGE UP (ref 0–2)
BASOPHILS NFR BLD AUTO: 0.2 % — SIGNIFICANT CHANGE UP (ref 0–2)
BUN SERPL-MCNC: 3 MG/DL — LOW (ref 7–23)
BUN SERPL-MCNC: 3 MG/DL — LOW (ref 7–23)
CALCIUM SERPL-MCNC: 8.3 MG/DL — LOW (ref 8.4–10.5)
CALCIUM SERPL-MCNC: 8.3 MG/DL — LOW (ref 8.4–10.5)
CHLORIDE SERPL-SCNC: 111 MMOL/L — HIGH (ref 98–107)
CHLORIDE SERPL-SCNC: 111 MMOL/L — HIGH (ref 98–107)
CLOZAPINE SERPL-MCNC: 275 NG/ML — LOW (ref 350–600)
CLOZAPINE SERPL-MCNC: 275 NG/ML — LOW (ref 350–600)
CO2 SERPL-SCNC: 25 MMOL/L — SIGNIFICANT CHANGE UP (ref 22–31)
CO2 SERPL-SCNC: 25 MMOL/L — SIGNIFICANT CHANGE UP (ref 22–31)
CREAT SERPL-MCNC: 0.73 MG/DL — SIGNIFICANT CHANGE UP (ref 0.5–1.3)
CREAT SERPL-MCNC: 0.73 MG/DL — SIGNIFICANT CHANGE UP (ref 0.5–1.3)
CULTURE RESULTS: SIGNIFICANT CHANGE UP
EGFR: 107 ML/MIN/1.73M2 — SIGNIFICANT CHANGE UP
EGFR: 107 ML/MIN/1.73M2 — SIGNIFICANT CHANGE UP
EOSINOPHIL # BLD AUTO: 0.17 K/UL — SIGNIFICANT CHANGE UP (ref 0–0.5)
EOSINOPHIL # BLD AUTO: 0.17 K/UL — SIGNIFICANT CHANGE UP (ref 0–0.5)
EOSINOPHIL NFR BLD AUTO: 2.1 % — SIGNIFICANT CHANGE UP (ref 0–6)
EOSINOPHIL NFR BLD AUTO: 2.1 % — SIGNIFICANT CHANGE UP (ref 0–6)
GLUCOSE BLDC GLUCOMTR-MCNC: 101 MG/DL — HIGH (ref 70–99)
GLUCOSE BLDC GLUCOMTR-MCNC: 101 MG/DL — HIGH (ref 70–99)
GLUCOSE BLDC GLUCOMTR-MCNC: 123 MG/DL — HIGH (ref 70–99)
GLUCOSE BLDC GLUCOMTR-MCNC: 123 MG/DL — HIGH (ref 70–99)
GLUCOSE BLDC GLUCOMTR-MCNC: 82 MG/DL — SIGNIFICANT CHANGE UP (ref 70–99)
GLUCOSE BLDC GLUCOMTR-MCNC: 82 MG/DL — SIGNIFICANT CHANGE UP (ref 70–99)
GLUCOSE BLDC GLUCOMTR-MCNC: 91 MG/DL — SIGNIFICANT CHANGE UP (ref 70–99)
GLUCOSE BLDC GLUCOMTR-MCNC: 91 MG/DL — SIGNIFICANT CHANGE UP (ref 70–99)
GLUCOSE SERPL-MCNC: 88 MG/DL — SIGNIFICANT CHANGE UP (ref 70–99)
GLUCOSE SERPL-MCNC: 88 MG/DL — SIGNIFICANT CHANGE UP (ref 70–99)
HCT VFR BLD CALC: 31.9 % — LOW (ref 34.5–45)
HCT VFR BLD CALC: 31.9 % — LOW (ref 34.5–45)
HGB BLD-MCNC: 9.7 G/DL — LOW (ref 11.5–15.5)
HGB BLD-MCNC: 9.7 G/DL — LOW (ref 11.5–15.5)
IANC: 4.34 K/UL — SIGNIFICANT CHANGE UP (ref 1.8–7.4)
IANC: 4.34 K/UL — SIGNIFICANT CHANGE UP (ref 1.8–7.4)
IMM GRANULOCYTES NFR BLD AUTO: 0.7 % — SIGNIFICANT CHANGE UP (ref 0–0.9)
IMM GRANULOCYTES NFR BLD AUTO: 0.7 % — SIGNIFICANT CHANGE UP (ref 0–0.9)
LYMPHOCYTES # BLD AUTO: 2.63 K/UL — SIGNIFICANT CHANGE UP (ref 1–3.3)
LYMPHOCYTES # BLD AUTO: 2.63 K/UL — SIGNIFICANT CHANGE UP (ref 1–3.3)
LYMPHOCYTES # BLD AUTO: 32 % — SIGNIFICANT CHANGE UP (ref 13–44)
LYMPHOCYTES # BLD AUTO: 32 % — SIGNIFICANT CHANGE UP (ref 13–44)
MAGNESIUM SERPL-MCNC: 2.5 MG/DL — SIGNIFICANT CHANGE UP (ref 1.6–2.6)
MAGNESIUM SERPL-MCNC: 2.5 MG/DL — SIGNIFICANT CHANGE UP (ref 1.6–2.6)
MCHC RBC-ENTMCNC: 25.9 PG — LOW (ref 27–34)
MCHC RBC-ENTMCNC: 25.9 PG — LOW (ref 27–34)
MCHC RBC-ENTMCNC: 30.4 GM/DL — LOW (ref 32–36)
MCHC RBC-ENTMCNC: 30.4 GM/DL — LOW (ref 32–36)
MCV RBC AUTO: 85.3 FL — SIGNIFICANT CHANGE UP (ref 80–100)
MCV RBC AUTO: 85.3 FL — SIGNIFICANT CHANGE UP (ref 80–100)
MONOCYTES # BLD AUTO: 1 K/UL — HIGH (ref 0–0.9)
MONOCYTES # BLD AUTO: 1 K/UL — HIGH (ref 0–0.9)
MONOCYTES NFR BLD AUTO: 12.2 % — SIGNIFICANT CHANGE UP (ref 2–14)
MONOCYTES NFR BLD AUTO: 12.2 % — SIGNIFICANT CHANGE UP (ref 2–14)
NEUTROPHILS # BLD AUTO: 4.34 K/UL — SIGNIFICANT CHANGE UP (ref 1.8–7.4)
NEUTROPHILS # BLD AUTO: 4.34 K/UL — SIGNIFICANT CHANGE UP (ref 1.8–7.4)
NEUTROPHILS NFR BLD AUTO: 52.8 % — SIGNIFICANT CHANGE UP (ref 43–77)
NEUTROPHILS NFR BLD AUTO: 52.8 % — SIGNIFICANT CHANGE UP (ref 43–77)
NRBC # BLD: 0 /100 WBCS — SIGNIFICANT CHANGE UP (ref 0–0)
NRBC # BLD: 0 /100 WBCS — SIGNIFICANT CHANGE UP (ref 0–0)
NRBC # FLD: 0.02 K/UL — HIGH (ref 0–0)
NRBC # FLD: 0.02 K/UL — HIGH (ref 0–0)
PHOSPHATE SERPL-MCNC: 2.8 MG/DL — SIGNIFICANT CHANGE UP (ref 2.5–4.5)
PHOSPHATE SERPL-MCNC: 2.8 MG/DL — SIGNIFICANT CHANGE UP (ref 2.5–4.5)
PLATELET # BLD AUTO: 368 K/UL — SIGNIFICANT CHANGE UP (ref 150–400)
PLATELET # BLD AUTO: 368 K/UL — SIGNIFICANT CHANGE UP (ref 150–400)
POTASSIUM SERPL-MCNC: 3.6 MMOL/L — SIGNIFICANT CHANGE UP (ref 3.5–5.3)
POTASSIUM SERPL-MCNC: 3.6 MMOL/L — SIGNIFICANT CHANGE UP (ref 3.5–5.3)
POTASSIUM SERPL-SCNC: 3.6 MMOL/L — SIGNIFICANT CHANGE UP (ref 3.5–5.3)
POTASSIUM SERPL-SCNC: 3.6 MMOL/L — SIGNIFICANT CHANGE UP (ref 3.5–5.3)
RBC # BLD: 3.74 M/UL — LOW (ref 3.8–5.2)
RBC # BLD: 3.74 M/UL — LOW (ref 3.8–5.2)
RBC # FLD: 18.8 % — HIGH (ref 10.3–14.5)
RBC # FLD: 18.8 % — HIGH (ref 10.3–14.5)
SODIUM SERPL-SCNC: 146 MMOL/L — HIGH (ref 135–145)
SODIUM SERPL-SCNC: 146 MMOL/L — HIGH (ref 135–145)
SPECIMEN SOURCE: SIGNIFICANT CHANGE UP
WBC # BLD: 8.22 K/UL — SIGNIFICANT CHANGE UP (ref 3.8–10.5)
WBC # BLD: 8.22 K/UL — SIGNIFICANT CHANGE UP (ref 3.8–10.5)
WBC # FLD AUTO: 8.22 K/UL — SIGNIFICANT CHANGE UP (ref 3.8–10.5)
WBC # FLD AUTO: 8.22 K/UL — SIGNIFICANT CHANGE UP (ref 3.8–10.5)

## 2024-01-08 PROCEDURE — 99232 SBSQ HOSP IP/OBS MODERATE 35: CPT

## 2024-01-08 RX ORDER — CLOZAPINE 150 MG/1
25 TABLET, ORALLY DISINTEGRATING ORAL AT BEDTIME
Refills: 0 | Status: DISCONTINUED | OUTPATIENT
Start: 2024-01-08 | End: 2024-01-10

## 2024-01-08 RX ORDER — OLANZAPINE 15 MG/1
2.5 TABLET, FILM COATED ORAL EVERY 6 HOURS
Refills: 0 | Status: DISCONTINUED | OUTPATIENT
Start: 2024-01-08 | End: 2024-01-17

## 2024-01-08 RX ORDER — SODIUM CHLORIDE 9 MG/ML
1000 INJECTION, SOLUTION INTRAVENOUS
Refills: 0 | Status: DISCONTINUED | OUTPATIENT
Start: 2024-01-08 | End: 2024-01-11

## 2024-01-08 RX ADMIN — SENNA PLUS 2 TABLET(S): 8.6 TABLET ORAL at 23:15

## 2024-01-08 RX ADMIN — LITHIUM CARBONATE 300 MILLIGRAM(S): 300 TABLET, EXTENDED RELEASE ORAL at 17:28

## 2024-01-08 RX ADMIN — CLOZAPINE 25 MILLIGRAM(S): 150 TABLET, ORALLY DISINTEGRATING ORAL at 23:15

## 2024-01-08 RX ADMIN — Medication 75 MILLIGRAM(S): at 05:41

## 2024-01-08 RX ADMIN — ATORVASTATIN CALCIUM 10 MILLIGRAM(S): 80 TABLET, FILM COATED ORAL at 23:15

## 2024-01-08 RX ADMIN — ENOXAPARIN SODIUM 40 MILLIGRAM(S): 100 INJECTION SUBCUTANEOUS at 05:42

## 2024-01-08 RX ADMIN — Medication 75 MILLIGRAM(S): at 17:29

## 2024-01-08 RX ADMIN — DIVALPROEX SODIUM 1000 MILLIGRAM(S): 500 TABLET, DELAYED RELEASE ORAL at 23:14

## 2024-01-08 RX ADMIN — SODIUM CHLORIDE 100 MILLILITER(S): 9 INJECTION, SOLUTION INTRAVENOUS at 05:44

## 2024-01-08 RX ADMIN — FAMOTIDINE 20 MILLIGRAM(S): 10 INJECTION INTRAVENOUS at 12:11

## 2024-01-08 RX ADMIN — LITHIUM CARBONATE 300 MILLIGRAM(S): 300 TABLET, EXTENDED RELEASE ORAL at 05:41

## 2024-01-08 RX ADMIN — ENOXAPARIN SODIUM 40 MILLIGRAM(S): 100 INJECTION SUBCUTANEOUS at 17:28

## 2024-01-08 RX ADMIN — SODIUM CHLORIDE 125 MILLILITER(S): 9 INJECTION, SOLUTION INTRAVENOUS at 07:44

## 2024-01-08 RX ADMIN — Medication 150 MILLIGRAM(S): at 12:11

## 2024-01-08 NOTE — BH CONSULTATION LIAISON PROGRESS NOTE - NSBHATTESTBILLING_PSY_A_CORE
[id number] OBS or IP - moderate complexity OR 35-49 mins [id number] OBS or IP - moderate complexity OR 35-49 mins

## 2024-01-08 NOTE — PROGRESS NOTE ADULT - SUBJECTIVE AND OBJECTIVE BOX
Patient is a 38y old  Female who presents with a chief complaint of + blood cultures (07 Jan 2024 20:44)    Duglas Gonzalez MD   [facility] Division of Hospital Medicine   Pager [phone]  Reachable on [employer] Teams     SUBJECTIVE / OVERNIGHT EVENTS:  Patient seen and examined this morning. No events overnight.   She is very slow to respond to questions. Answering appropriately, knows that she is in hospital.  And that it is 2024. unable to tell me why she is here.       MEDICATIONS  (STANDING):  atorvastatin 10 milliGRAM(s) Oral at bedtime  dextrose 5% + lactated ringers. 1000 milliLiter(s) (125 mL/Hr) IV Continuous <Continuous>  dextrose 5%. 1000 milliLiter(s) (50 mL/Hr) IV Continuous <Continuous>  dextrose 5%. 1000 milliLiter(s) (100 mL/Hr) IV Continuous <Continuous>  dextrose 50% Injectable 12.5 Gram(s) IV Push once  dextrose 50% Injectable 25 Gram(s) IV Push once  dextrose 50% Injectable 25 Gram(s) IV Push once  divalproex DR 1000 milliGRAM(s) Oral at bedtime  enoxaparin Injectable 40 milliGRAM(s) SubCutaneous every 12 hours  famotidine    Tablet 20 milliGRAM(s) Oral daily  glucagon  Injectable 1 milliGRAM(s) IntraMuscular once  lithium 300 milliGRAM(s) Oral two times a day  metoprolol succinate ER 12.5 milliGRAM(s) Oral daily  oseltamivir 75 milliGRAM(s) Oral two times a day  senna 2 Tablet(s) Oral at bedtime  venlafaxine XR. 150 milliGRAM(s) Oral daily    MEDICATIONS  (PRN):  acetaminophen     Tablet .. 650 milliGRAM(s) Oral every 6 hours PRN Temp greater or equal to 38C (100.4F), Mild Pain (1 - 3)  aluminum hydroxide/magnesium hydroxide/simethicone Suspension 30 milliLiter(s) Oral every 4 hours PRN Dyspepsia  dextrose Oral Gel 15 Gram(s) Oral once PRN Blood Glucose LESS THAN 70 milliGRAM(s)/deciliter  melatonin 3 milliGRAM(s) Oral at bedtime PRN Insomnia  ondansetron Injectable 4 milliGRAM(s) IV Push every 8 hours PRN Nausea and/or Vomiting      Vital Signs Last 24 Hrs  T(C): 37.1 (08 Jan 2024 10:38), Max: 37.1 (08 Jan 2024 10:38)  T(F): 98.7 (08 Jan 2024 10:38), Max: 98.7 (08 Jan 2024 10:38)  HR: 87 (08 Jan 2024 10:38) (75 - 88)  BP: 108/87 (08 Jan 2024 10:38) (95/67 - 108/87)  BP(mean): --  RR: 16 (08 Jan 2024 10:38) (16 - 17)  SpO2: 100% (08 Jan 2024 10:38) (97% - 100%)  CAPILLARY BLOOD GLUCOSE      POCT Blood Glucose.: 91 mg/dL (08 Jan 2024 12:26)  POCT Blood Glucose.: 101 mg/dL (08 Jan 2024 09:10)  POCT Blood Glucose.: 102 mg/dL (07 Jan 2024 21:08)  POCT Blood Glucose.: 81 mg/dL (07 Jan 2024 16:32)    I&O's Summary    07 Jan 2024 07:01  -  08 Jan 2024 07:00  --------------------------------------------------------  IN: 1440 mL / OUT: 0 mL / NET: 1440 mL        General: woman lawing down in bed appears comfortable in NAD, awake and alert  Eyes: conjunctiva clear, nonicteric sclera  HENMT: NCAT, MMM  Respiratory: No respiratory distress, CTABL, No rales, rhonchi, wheezing.  Cardiovascular: S1,S2; Regular rate and rhythm; No m/g/r. 2+ peripheral pulses  Gastrointestinal: Soft, Nontender, obese abdomen; +BS.   Extremities: No c/c/e; warm to touch  Neurological: Moving all 4 extremities; Sensation to LT grossly in tact.  Skin: No rashes, No erythema   Psych: AAOx3; flat affect    LABS:                        9.7    8.22  )-----------( 368      ( 08 Jan 2024 05:53 )             31.9     01-08    146<H>  |  111<H>  |  3<L>  ----------------------------<  88  3.6   |  25  |  0.73    Ca    8.3<L>      08 Jan 2024 05:53  Phos  2.8     01-08  Mg     2.50     01-08            Urinalysis Basic - ( 08 Jan 2024 05:53 )    Color: x / Appearance: x / SG: x / pH: x  Gluc: 88 mg/dL / Ketone: x  / Bili: x / Urobili: x   Blood: x / Protein: x / Nitrite: x   Leuk Esterase: x / RBC: x / WBC x   Sq Epi: x / Non Sq Epi: x / Bacteria: x        RADIOLOGY & ADDITIONAL TESTS:    Imaging Personally Reviewed:    Consultant(s) Notes Reviewed:      Care Discussed with Consultants/Other Providers:   Patient is a 38y old  Female who presents with a chief complaint of + blood cultures (07 Jan 2024 20:44)    Duglas Gonzalez MD   [facility] Division of Hospital Medicine   Pager [phone]  Reachable on [employer] Teams     SUBJECTIVE / OVERNIGHT EVENTS:  Patient seen and examined this morning. No events overnight.   She is very slow to respond to questions. Answering appropriately, knows that she is in hospital.  And that it is 2024. unable to tell me why she is here.       MEDICATIONS  (STANDING):  atorvastatin 10 milliGRAM(s) Oral at bedtime  dextrose 5% + lactated ringers. 1000 milliLiter(s) (125 mL/Hr) IV Continuous <Continuous>  dextrose 5%. 1000 milliLiter(s) (50 mL/Hr) IV Continuous <Continuous>  dextrose 5%. 1000 milliLiter(s) (100 mL/Hr) IV Continuous <Continuous>  dextrose 50% Injectable 12.5 Gram(s) IV Push once  dextrose 50% Injectable 25 Gram(s) IV Push once  dextrose 50% Injectable 25 Gram(s) IV Push once  divalproex DR 1000 milliGRAM(s) Oral at bedtime  enoxaparin Injectable 40 milliGRAM(s) SubCutaneous every 12 hours  famotidine    Tablet 20 milliGRAM(s) Oral daily  glucagon  Injectable 1 milliGRAM(s) IntraMuscular once  lithium 300 milliGRAM(s) Oral two times a day  metoprolol succinate ER 12.5 milliGRAM(s) Oral daily  oseltamivir 75 milliGRAM(s) Oral two times a day  senna 2 Tablet(s) Oral at bedtime  venlafaxine XR. 150 milliGRAM(s) Oral daily    MEDICATIONS  (PRN):  acetaminophen     Tablet .. 650 milliGRAM(s) Oral every 6 hours PRN Temp greater or equal to 38C (100.4F), Mild Pain (1 - 3)  aluminum hydroxide/magnesium hydroxide/simethicone Suspension 30 milliLiter(s) Oral every 4 hours PRN Dyspepsia  dextrose Oral Gel 15 Gram(s) Oral once PRN Blood Glucose LESS THAN 70 milliGRAM(s)/deciliter  melatonin 3 milliGRAM(s) Oral at bedtime PRN Insomnia  ondansetron Injectable 4 milliGRAM(s) IV Push every 8 hours PRN Nausea and/or Vomiting      Vital Signs Last 24 Hrs  T(C): 37.1 (08 Jan 2024 10:38), Max: 37.1 (08 Jan 2024 10:38)  T(F): 98.7 (08 Jan 2024 10:38), Max: 98.7 (08 Jan 2024 10:38)  HR: 87 (08 Jan 2024 10:38) (75 - 88)  BP: 108/87 (08 Jan 2024 10:38) (95/67 - 108/87)  BP(mean): --  RR: 16 (08 Jan 2024 10:38) (16 - 17)  SpO2: 100% (08 Jan 2024 10:38) (97% - 100%)  CAPILLARY BLOOD GLUCOSE      POCT Blood Glucose.: 91 mg/dL (08 Jan 2024 12:26)  POCT Blood Glucose.: 101 mg/dL (08 Jan 2024 09:10)  POCT Blood Glucose.: 102 mg/dL (07 Jan 2024 21:08)  POCT Blood Glucose.: 81 mg/dL (07 Jan 2024 16:32)    I&O's Summary    07 Jan 2024 07:01  -  08 Jan 2024 07:00  --------------------------------------------------------  IN: 1440 mL / OUT: 0 mL / NET: 1440 mL        General: woman lawing down in bed appears comfortable in NAD, awake and alert  Eyes: conjunctiva clear, nonicteric sclera  HENMT: NCAT, MMM  Respiratory: No respiratory distress, CTABL, No rales, rhonchi, wheezing.  Cardiovascular: S1,S2; Regular rate and rhythm; No m/g/r. 2+ peripheral pulses  Gastrointestinal: Soft, Nontender, obese abdomen; +BS.   Extremities: No c/c/e; warm to touch  Neurological: Moving all 4 extremities; Sensation to LT grossly in tact.  Skin: No rashes, No erythema   Psych: AAOx3; flat affect    LABS:                        9.7    8.22  )-----------( 368      ( 08 Jan 2024 05:53 )             31.9     01-08    146<H>  |  111<H>  |  3<L>  ----------------------------<  88  3.6   |  25  |  0.73    Ca    8.3<L>      08 Jan 2024 05:53  Phos  2.8     01-08  Mg     2.50     01-08            Urinalysis Basic - ( 08 Jan 2024 05:53 )    Color: x / Appearance: x / SG: x / pH: x  Gluc: 88 mg/dL / Ketone: x  / Bili: x / Urobili: x   Blood: x / Protein: x / Nitrite: x   Leuk Esterase: x / RBC: x / WBC x   Sq Epi: x / Non Sq Epi: x / Bacteria: x        RADIOLOGY & ADDITIONAL TESTS:    Imaging Personally Reviewed:    Consultant(s) Notes Reviewed:      Care Discussed with Consultants/Other Providers:

## 2024-01-08 NOTE — PROGRESS NOTE ADULT - ASSESSMENT
38-year-old female with past medical history of schizophrenia, seizure disorder, from [facility], HTN, DM?, HLD sent in for blood cx growing staph capitis and noted to have of influenza + 38-year-old female with past medical history of schizophrenia, seizure disorder, from [facility], HTN, DM?, HLD sent in for blood cx growing staph capitis and noted to have of influenza +

## 2024-01-08 NOTE — BH CONSULTATION LIAISON PROGRESS NOTE - NSBHASSESSMENTFT_PSY_ALL_CORE
*** in progress, all recs incomplete    Patient is a 38-year-old woman with past history of schizoaffective disorder, seizure disorder, from [place], HTN, DM?, HLD, pt. was  sent in for blood cx growing staph capitis in the setting of influenza +. Psychiatry consulted for medication management.     1/4: Patient seen, oriented, but lethargic and slow to respond, mild tremors noted in both UE. She firmly denies AVH, denies CAH, denies SI and HI. Denies feeling unsafe in the hospital. Care coordinated with outpt. psychiatrist, at baseline pt. is alert and has chronic tremors in both UE.  Labs: Lithium level 0.8, ANC 1.51, VPA level 49.30, Clozapine level 677    1/5: Patient lethargic, interview limited at this time. No overt catatonic sxs , no stiffness or rigidity. Labs: ANC 1.07 today.   1/6: Patient remains lethargic, not engaged with writer.     PLAN:  1-HOLD Clozapine in the setting of lethargy, low ANC and high Clozapine level    [] Monitor ANC closely and please obtain Clozapine level   2-Continue Lithium 300mg BID and Effexor 150mg po daily     [] Monitor BUN/CR, Lithium Level  3- Haldol dec 200mg q4 weeks, last received 12/27/23, next injection is due on 1/24/24.  4- PRN for agitation: Zyprexa 2.5mg PO/IM q6h prn for agitation, may give additional Zyprexa 2.5mg for refractory agitation (ensure qtc <500)  5- Care coordinated with outpatient psychiatrist Dr. Whitaker [phone] and care coordinator Meagan Mckeon [phone] on 1/4  6- Have low threshold to start 1:1 if needed given h/o agitation in the past *** in progress, all recs incomplete    Patient is a 38-year-old woman with past history of schizoaffective disorder, seizure disorder, from [place], HTN, DM?, HLD, pt. was  sent in for blood cx growing staph capitis in the setting of influenza +. Psychiatry consulted for medication management.     1/4: Patient seen, oriented, but lethargic and slow to respond, mild tremors noted in both UE. She firmly denies AVH, denies CAH, denies SI and HI. Denies feeling unsafe in the hospital. Care coordinated with outpt. psychiatrist, at baseline pt. is alert and has chronic tremors in both UE.  Labs: Lithium level 0.8, ANC 1.51, VPA level 49.30, Clozapine level 677    1/5: Patient lethargic, interview limited at this time. No overt catatonic sxs , no stiffness or rigidity. Labs: ANC 1.07 today.   1/6: Patient remains lethargic, not engaged with writer.     PLAN:  1-HOLD Clozapine in the setting of lethargy, low ANC and high Clozapine level    [] Monitor ANC closely and please obtain Clozapine level   2-Continue Lithium 300mg BID and Effexor 150mg po daily     [] Monitor BUN/CR, Lithium Level  3- Haldol dec 200mg q4 weeks, last received 12/27/23, next injection is due on 1/24/24.  4- PRN for agitation: Zyprexa 2.5mg PO/IM q6h prn for agitation, may give additional Zyprexa 2.5mg for refractory agitation (ensure qtc <500)  5- Care coordinated with outpatient psychiatrist Dr. Whitaker [phone] and care coordinator Meagan Mckeon [phone] on 1/4  6- Have low threshold to start 1:1 if needed given h/o agitation in the past Patient is a 38-year-old woman with past history of schizoaffective disorder, living in group home on [facility] grounds, with multiple past inpatient admissions, and PMH of seizure disorder, HTN, DM?, HLD who was BIBEMS activated by facility due to acute influenza infection. She was medically admitted due to blood cultures growing staph capitis, now thought to be contaminant. Psychiatry was consulted for medication management of home clozapine dose in the setting of increased sedation, decreasing ANC, and missed dose.     1/4: Patient seen, oriented, but lethargic and slow to respond, mild tremors noted in both UE. She firmly denies AVH, denies CAH, denies SI and HI. Denies feeling unsafe in the hospital. Care coordinated with outpt. psychiatrist, at baseline pt. is alert and has chronic tremors in both UE.  Labs: Lithium level 0.8, ANC 1.51, VPA level 49.30, Clozapine level 677  1/5: Patient lethargic, interview limited at this time. No overt catatonic sxs , no stiffness or rigidity. Labs: ANC 1.07 today.   1/6: Patient remains lethargic, not engaged with writer.   1/8: Patient awakes to verbal and tactile stimulation, although still sedated during daytime. Asking appropriate questions about discharge plan. No evidence of acute psychosis or mood symptoms indicating decompensation. ANC improved to 4.34.    PLAN:  1-RESTART clozapine 25 mg qHS as patient's ANC normalized and lethargy has been improving. Monitor weekly ANCs. Ensure QTc < 500 ms.    [] Monitor weekly ANCs     [] Clozapine level slightly supratherapeutic at 677 on 1/4, decreased to 183 on 1/6 after stopped > please recheck in 5 days if still admitted  2-Continue Lithium 300 mg BID and Effexor 150mg po daily     [] Monitor BUN/CR, Lithium Level 0.8 on 1/4  3- Haldol dec 200mg q4 weeks, last received 12/27/23, next injection is due on 1/24/24.  4- PRN for agitation: Zyprexa 2.5mg PO/IM q6h prn for agitation, may give additional Zyprexa 2.5mg for refractory agitation (ensure QTc <500); avoid IM/IV Ativan within 1 hour of Zyprexa  5- Care coordinated with outpatient psychiatrist Dr. Whitaker [phone] and care coordinator Meagan Mckeon [phone] on 1/4 > team called on 1/8 to schedule outpatient follow-up early next week, office will call back with date  6- Continue routine obs as pt denies SI/HI. Have low threshold to start 1:1 if needed given h/o agitation in the past Patient is a 38-year-old woman with past history of schizoaffective disorder, living in group home on [facility] grounds, with multiple past inpatient admissions, and PMH of seizure disorder, HTN, DM?, HLD who was BIBEMS activated by facility due to acute influenza infection. She was medically admitted due to blood cultures growing staph capitis, now thought to be contaminant. Psychiatry was consulted for medication management of home clozapine dose in the setting of increased sedation, decreasing ANC, and missed dose.     1/4: Patient seen, oriented, but lethargic and slow to respond, mild tremors noted in both UE. She firmly denies AVH, denies CAH, denies SI and HI. Denies feeling unsafe in the hospital. Care coordinated with outpt. psychiatrist, at baseline pt. is alert and has chronic tremors in both UE.  Labs: Lithium level 0.8, ANC 1.51, VPA level 49.30, Clozapine level 677  1/5: Patient lethargic, interview limited at this time. No overt catatonic sxs , no stiffness or rigidity. Labs: ANC 1.07 today.   1/6: Patient remains lethargic, not engaged with writer.   1/8: Patient awakes to verbal and tactile stimulation, although still sedated during daytime. Asking appropriate questions about discharge plan. No evidence of acute psychosis or mood symptoms indicating decompensation. ANC improved to 4.34.    PLAN:  1-RESTART clozapine 25 mg qHS as patient's ANC normalized and lethargy has been improving. Monitor weekly ANCs. Ensure QTc < 500 ms.    [] Monitor weekly ANCs     [] Clozapine level slightly supratherapeutic at 677 on 1/4, decreased to 183 on 1/6 after stopped > please recheck in 5 days if still admitted  2-Continue Lithium 300 mg BID and Effexor 150mg po daily     [] Monitor BUN/CR, Lithium Level 0.8 on 1/4  3- Haldol dec 200mg q4 weeks, last received 12/27/23, next injection is due on 1/24/24.  4- PRN for agitation: Zyprexa 2.5mg PO/IM q6h prn for agitation, may give additional Zyprexa 2.5mg for refractory agitation (ensure QTc <500); avoid IM/IV Ativan within 1 hour of Zyprexa  5- Care coordinated with outpatient psychiatrist Dr. Whitaker [phone] and care coordinator Meagan Mckeon [phone] on 1/4 > team called on 1/8 to schedule outpatient follow-up early next week, office will call back with date  6- Continue routine obs as pt denies SI/HI. Have low threshold to start 1:1 if needed given h/o agitation in the past Patient is a 38-year-old woman with past history of schizoaffective disorder, living in group home on [facility] grounds, with multiple past inpatient admissions, and PMH of seizure disorder, HTN, DM?, HLD who was BIBEMS activated by facility due to acute influenza infection. She was medically admitted due to blood cultures growing staph capitis, now thought to be contaminant. Psychiatry was consulted for medication management of home clozapine dose in the setting of increased sedation, decreasing ANC, and missed dose.     1/4: Patient seen, oriented, but lethargic and slow to respond, mild tremors noted in both UE. She firmly denies AVH, denies CAH, denies SI and HI. Denies feeling unsafe in the hospital. Care coordinated with outpt. psychiatrist, at baseline pt. is alert and has chronic tremors in both UE.  Labs: Lithium level 0.8, ANC 1.51, VPA level 49.30, Clozapine level 677  1/5: Patient lethargic, interview limited at this time. No overt catatonic sxs , no stiffness or rigidity. Labs: ANC 1.07 today.   1/6: Patient remains lethargic, not engaged with writer.   1/8: Patient awakes to verbal and tactile stimulation, although still sedated during daytime. Asking appropriate questions about discharge plan. No evidence of acute psychosis or mood symptoms indicating decompensation. ANC improved to 4.34.    PLAN:  1-RESTART clozapine 25 mg qHS as patient's ANC normalized and lethargy has been improving. Monitor ANCs. Ensure QTc < 500 ms.    [] Monitor ANCs closely while at [facility]    [] Clozapine level slightly supratherapeutic at 677 on 1/4, decreased to 183 on 1/6 after stopped > please recheck in 5 days if still admitted  2-Continue Lithium 300 mg BID and Effexor 150mg po daily     [] Monitor BUN/CR, Lithium Level 0.8 on 1/4  3- Haldol dec 200mg q4 weeks, last received 12/27/23, next injection is due on 1/24/24.  4- PRN for agitation: Zyprexa 2.5mg PO/IM q6h prn for agitation, may give additional Zyprexa 2.5mg for refractory agitation (ensure QTc <500); avoid IM/IV Ativan within 1 hour of Zyprexa  5- Care coordinated with outpatient psychiatrist Dr. Wihtaker [phone] and care coordinator Meagan Mckeon [phone] on 1/4 > team called on 1/8 to schedule outpatient follow-up early next week, office will call back with date  6- Continue routine obs as pt denies SI/HI. Have low threshold to start 1:1 if needed given h/o agitation in the past Patient is a 38-year-old woman with past history of schizoaffective disorder, living in group home on [facility] grounds, with multiple past inpatient admissions, and PMH of seizure disorder, HTN, DM?, HLD who was BIBEMS activated by facility due to acute influenza infection. She was medically admitted due to blood cultures growing staph capitis, now thought to be contaminant. Psychiatry was consulted for medication management of home clozapine dose in the setting of increased sedation, decreasing ANC, and missed dose.     1/4: Patient seen, oriented, but lethargic and slow to respond, mild tremors noted in both UE. She firmly denies AVH, denies CAH, denies SI and HI. Denies feeling unsafe in the hospital. Care coordinated with outpt. psychiatrist, at baseline pt. is alert and has chronic tremors in both UE.  Labs: Lithium level 0.8, ANC 1.51, VPA level 49.30, Clozapine level 677  1/5: Patient lethargic, interview limited at this time. No overt catatonic sxs , no stiffness or rigidity. Labs: ANC 1.07 today.   1/6: Patient remains lethargic, not engaged with writer.   1/8: Patient awakes to verbal and tactile stimulation, although still sedated during daytime. Asking appropriate questions about discharge plan. No evidence of acute psychosis or mood symptoms indicating decompensation. ANC improved to 4.34.    PLAN:  1-RESTART clozapine 25 mg qHS as patient's ANC normalized and lethargy has been improving. Monitor ANCs. Ensure QTc < 500 ms.    [] Monitor ANCs closely while at [facility]    [] Clozapine level slightly supratherapeutic at 677 on 1/4, decreased to 183 on 1/6 after stopped > please recheck in 5 days if still admitted  2-Continue Lithium 300 mg BID and Effexor 150mg po daily     [] Monitor BUN/CR, Lithium Level 0.8 on 1/4  3- Haldol dec 200mg q4 weeks, last received 12/27/23, next injection is due on 1/24/24.  4- PRN for agitation: Zyprexa 2.5mg PO/IM q6h prn for agitation, may give additional Zyprexa 2.5mg for refractory agitation (ensure QTc <500); avoid IM/IV Ativan within 1 hour of Zyprexa  5- Care coordinated with outpatient psychiatrist Dr. Whitaker [phone] and care coordinator Meagan Mckeon [phone] on 1/4 > team called on 1/8 to schedule outpatient follow-up early next week, office will call back with date  6- Continue routine obs as pt denies SI/HI. Have low threshold to start 1:1 if needed given h/o agitation in the past Patient is a 38-year-old woman with past history of schizoaffective disorder, living in group home on [facility] grounds, with multiple past inpatient admissions, and PMH of seizure disorder, HTN, DM?, HLD who was BIBEMS activated by facility due to acute influenza infection. She was medically admitted due to blood cultures growing staph capitis, now thought to be contaminant. Psychiatry was consulted for medication management of home clozapine dose in the setting of increased sedation, decreasing ANC, and missed dose.     1/4: Patient seen, oriented, but lethargic and slow to respond, mild tremors noted in both UE. She firmly denies AVH, denies CAH, denies SI and HI. Denies feeling unsafe in the hospital. Care coordinated with outpt. psychiatrist, at baseline pt. is alert and has chronic tremors in both UE.  Labs: Lithium level 0.8, ANC 1.51, VPA level 49.30, Clozapine level 677  1/5: Patient lethargic, interview limited at this time. No overt catatonic sxs , no stiffness or rigidity. Labs: ANC 1.07 today.   1/6: Patient remains lethargic, not engaged with writer.   1/8: Patient awakes to verbal and tactile stimulation, although still sedated during daytime. Asking appropriate questions about discharge plan. No evidence of acute psychosis or mood symptoms indicating decompensation. ANC improved to 4.34.    PLAN:  1-RESTART clozapine 25 mg qHS as patient's ANC normalized and lethargy has been improving. Monitor ANCs. Ensure QTc < 500 ms.    [] Monitor ANCs closely while at [facility]    [] Clozapine level slightly supratherapeutic at 677 on 1/4, decreased to 183 on 1/6 after stopped  2-Continue Lithium 300 mg BID and Effexor 150mg po daily     [] Monitor BUN/CR, Lithium level 0.8 on 1/4  3- Haldol dec 200mg q4 weeks, last received 12/27/23, next injection is due on 1/24/24.  4- PRN for agitation: Zyprexa 2.5mg PO/IM q6h prn for agitation, may give additional Zyprexa 2.5mg for refractory agitation (ensure QTc <500); avoid IM/IV Ativan within 1 hour of Zyprexa  5- Care coordinated with outpatient psychiatrist Dr. Whitaker [phone] and care coordinator Meagan Mckeon [phone] on 1/4 > team called on 1/8 to schedule outpatient follow-up early next week, office will call back with date  6- Continue routine obs as pt denies SI/HI. Have low threshold to start 1:1 if needed given h/o agitation in the past Patient is a 38-year-old woman with past history of schizoaffective disorder, living in group home on [facility] grounds, with multiple past inpatient admissions, and PMH of seizure disorder, HTN, DM?, HLD who was BIBEMS activated by facility due to acute influenza infection. She was medically admitted due to blood cultures growing staph capitis, now thought to be contaminant. Psychiatry was consulted for medication management of home clozapine dose in the setting of increased sedation, decreasing ANC, and missed dose.     1/4: Patient seen, oriented, but lethargic and slow to respond, mild tremors noted in both UE. She firmly denies AVH, denies CAH, denies SI and HI. Denies feeling unsafe in the hospital. Care coordinated with outpt. psychiatrist, at baseline pt. is alert and has chronic tremors in both UE.  Labs: Lithium level 0.8, ANC 1.51, VPA level 49.30, Clozapine level 677  1/5: Patient lethargic, interview limited at this time. No overt catatonic sxs , no stiffness or rigidity. Labs: ANC 1.07 today.   1/6: Patient remains lethargic, not engaged with writer.   1/8: Patient awakes to verbal and tactile stimulation, although still sedated during daytime. Asking appropriate questions about discharge plan. No evidence of acute psychosis or mood symptoms indicating decompensation. ANC improved to 4.34.    PLAN:  1-RESTART clozapine 25 mg qHS as patient's ANC normalized and lethargy has been improving. Monitor ANCs. Ensure QTc < 500 ms.    [] Monitor ANCs closely while at [facility]    [] Clozapine level slightly supratherapeutic at 677 on 1/4, decreased to 183 on 1/6 after stopped  2-Continue Lithium 300 mg BID and Effexor 150mg po daily     [] Monitor BUN/CR, Lithium level 0.8 on 1/4  3- Haldol dec 200mg q4 weeks, last received 12/27/23, next injection is due on 1/24/24.  4- PRN for agitation: Zyprexa 2.5mg PO/IM q6h prn for agitation, may give additional Zyprexa 2.5mg for refractory agitation (ensure QTc <500); avoid IM/IV Ativan within 1 hour of Zyprexa  5- Care coordinated with outpatient psychiatrist Dr. Whitaker [phone] and care coordinator Meagan Mckeon [phone] on 1/4 > team called on 1/8 to schedule outpatient follow-up early next week, office will call back with date  6- Continue routine obs as pt denies SI/HI. Have low threshold to start 1:1 if needed given h/o agitation in the past

## 2024-01-08 NOTE — BH CONSULTATION LIAISON PROGRESS NOTE - NSBHFUPINTERVALHXFT_PSY_A_CORE
Chart/labs reviewed, no prns overnight. Patient seen/evaluated, remains lethargic/sedated, unable to engage in interview due to sedation. Wakes up momentarily to look at writer but then falls back asleep shortly.  Discussed with nurse at bedside who stated patient has been sleeping for most of her shift. Chart reviewed and case discussed with staff. No acute events over the weekend. No PRN medications required/requested. Adherent with standing medications. Per staff, patient remains sedated and sleeps for most of the day.    Today, patient is initially lethargic and minimally engaging in interview. However, she responds in 1-2 word answers and tracks interviewers with eyes when window blinds are opened and lights are turned on. Patient reports that her mood is fine. She denies any AVH, paranoia, S/IIP, or H/IIP. She reports sleeping more than her baseline, including both at night and during the day. Reports that her flu symptoms are improving. She states she has been eating meals. Patient asks team when she can return home and smiles at team appropriately. Chart reviewed and case discussed with staff. No acute events over the weekend. No PRN medications required/requested. Adherent with standing medications. Per staff, patient remains sedated and sleeps for most of the day.    Today, patient is initially lethargic and minimally engaging in interview. However, she responds in 1-2 word answers and tracks interviewers with eyes when window blinds are opened and lights are turned on. Patient reports that her mood is fine. She denies any AVH, paranoia, S/IIP, or H/IIP. Denies CAH. She reports sleeping more than her baseline, including both at night and during the day. Reports that her flu symptoms are improving. She states she has been eating meals. Patient asks team when she can return home and smiles at team appropriately.

## 2024-01-08 NOTE — PROGRESS NOTE ADULT - PROBLEM SELECTOR PLAN 1
ANO3 at baseline currently lethargic, No focal neuro deficits on exam however with intermittent jerking of upper extremities (which per outpt psychiatrist is her chronic baseline dt Haldol.   -previous attending confirmed with Ana Paula that pt often lethargic at night but now is worse.  -bilateral tremor known to outpt psych and is chronic  -per , cont to hold clozapine for now (level 677-->183, ANC improved   -cont with lithium 300mg bid, effexor 150mg qd and Depakote 1g qhs (on for seizure)  -cont to monitor lithium, clozapine level and monitor ANC   - no ss of sepsis, work up neg  -appreciate psych recs  -aspiration, fall, sz precautions

## 2024-01-08 NOTE — BH CONSULTATION LIAISON PROGRESS NOTE - MSE UNSTRUCTURED FT
Patient lethargic, unable to engage in interview. Interview/MSE limited at this time

## 2024-01-08 NOTE — PROGRESS NOTE ADULT - PROBLEM SELECTOR PLAN 5
outpt meds: Clozapine 175mg qhs,  lithium 300mg bid, effexor 150mg qd and Depakote 1g qhs, Haldol dec inj 200mg q4 weeks, last received 12/27/23, next injection is due on 1/24/24.   outpt psych: Dr. Whitaker [phone]    -pt denies A/V hallucinations, no evidence of decompensation   -cont hold clozapine per psych, repeat clozapine level low  -cont with lithium 300mg bid, effexor 150mg qd and Depakote 1g qhs (on for seizure)  -cont to monitor lithium, clozapine level and monitor ANC   -appreciate psych recs outpt meds: Clozapine 175mg qhs,  lithium 300mg bid, effexor 150mg qd and Depakote 1g qhs, Haldol dec inj 200mg q4 weeks, last received 12/27/23, next injection is due on 1/24/24.   outpt psych: Dr. Whitaker [phone]    -pt denies A/V hallucinations, no evidence of decompensation   -cont hold clozapine per psych, repeat clozapine level low  -cont with lithium 300mg bid, effexor 150mg qd and Depakote 1g qhs (on for seizure)  -cont to monitor lithium, clozapine level and monitor ANC   -appreciate psych recs

## 2024-01-09 LAB
24R-OH-CALCIDIOL SERPL-MCNC: 28.2 NG/ML — LOW (ref 30–80)
24R-OH-CALCIDIOL SERPL-MCNC: 28.2 NG/ML — LOW (ref 30–80)
ANION GAP SERPL CALC-SCNC: 10 MMOL/L — SIGNIFICANT CHANGE UP (ref 7–14)
ANION GAP SERPL CALC-SCNC: 10 MMOL/L — SIGNIFICANT CHANGE UP (ref 7–14)
BASOPHILS # BLD AUTO: 0.02 K/UL — SIGNIFICANT CHANGE UP (ref 0–0.2)
BASOPHILS # BLD AUTO: 0.02 K/UL — SIGNIFICANT CHANGE UP (ref 0–0.2)
BASOPHILS NFR BLD AUTO: 0.3 % — SIGNIFICANT CHANGE UP (ref 0–2)
BASOPHILS NFR BLD AUTO: 0.3 % — SIGNIFICANT CHANGE UP (ref 0–2)
BUN SERPL-MCNC: <2 MG/DL — LOW (ref 7–23)
BUN SERPL-MCNC: <2 MG/DL — LOW (ref 7–23)
CALCIUM SERPL-MCNC: 8.5 MG/DL — SIGNIFICANT CHANGE UP (ref 8.4–10.5)
CALCIUM SERPL-MCNC: 8.5 MG/DL — SIGNIFICANT CHANGE UP (ref 8.4–10.5)
CHLORIDE SERPL-SCNC: 111 MMOL/L — HIGH (ref 98–107)
CHLORIDE SERPL-SCNC: 111 MMOL/L — HIGH (ref 98–107)
CHOLEST SERPL-MCNC: 79 MG/DL — SIGNIFICANT CHANGE UP
CHOLEST SERPL-MCNC: 79 MG/DL — SIGNIFICANT CHANGE UP
CO2 SERPL-SCNC: 25 MMOL/L — SIGNIFICANT CHANGE UP (ref 22–31)
CO2 SERPL-SCNC: 25 MMOL/L — SIGNIFICANT CHANGE UP (ref 22–31)
CREAT SERPL-MCNC: 0.76 MG/DL — SIGNIFICANT CHANGE UP (ref 0.5–1.3)
CREAT SERPL-MCNC: 0.76 MG/DL — SIGNIFICANT CHANGE UP (ref 0.5–1.3)
EGFR: 102 ML/MIN/1.73M2 — SIGNIFICANT CHANGE UP
EGFR: 102 ML/MIN/1.73M2 — SIGNIFICANT CHANGE UP
EOSINOPHIL # BLD AUTO: 0.17 K/UL — SIGNIFICANT CHANGE UP (ref 0–0.5)
EOSINOPHIL # BLD AUTO: 0.17 K/UL — SIGNIFICANT CHANGE UP (ref 0–0.5)
EOSINOPHIL NFR BLD AUTO: 2.3 % — SIGNIFICANT CHANGE UP (ref 0–6)
EOSINOPHIL NFR BLD AUTO: 2.3 % — SIGNIFICANT CHANGE UP (ref 0–6)
FERRITIN SERPL-MCNC: 32 NG/ML — SIGNIFICANT CHANGE UP (ref 15–150)
FERRITIN SERPL-MCNC: 32 NG/ML — SIGNIFICANT CHANGE UP (ref 15–150)
FOLATE SERPL-MCNC: 6.8 NG/ML — SIGNIFICANT CHANGE UP (ref 3.1–17.5)
FOLATE SERPL-MCNC: 6.8 NG/ML — SIGNIFICANT CHANGE UP (ref 3.1–17.5)
GLUCOSE BLDC GLUCOMTR-MCNC: 101 MG/DL — HIGH (ref 70–99)
GLUCOSE BLDC GLUCOMTR-MCNC: 101 MG/DL — HIGH (ref 70–99)
GLUCOSE BLDC GLUCOMTR-MCNC: 102 MG/DL — HIGH (ref 70–99)
GLUCOSE BLDC GLUCOMTR-MCNC: 102 MG/DL — HIGH (ref 70–99)
GLUCOSE BLDC GLUCOMTR-MCNC: 82 MG/DL — SIGNIFICANT CHANGE UP (ref 70–99)
GLUCOSE BLDC GLUCOMTR-MCNC: 82 MG/DL — SIGNIFICANT CHANGE UP (ref 70–99)
GLUCOSE BLDC GLUCOMTR-MCNC: 88 MG/DL — SIGNIFICANT CHANGE UP (ref 70–99)
GLUCOSE BLDC GLUCOMTR-MCNC: 88 MG/DL — SIGNIFICANT CHANGE UP (ref 70–99)
GLUCOSE SERPL-MCNC: 85 MG/DL — SIGNIFICANT CHANGE UP (ref 70–99)
GLUCOSE SERPL-MCNC: 85 MG/DL — SIGNIFICANT CHANGE UP (ref 70–99)
HCT VFR BLD CALC: 31.4 % — LOW (ref 34.5–45)
HCT VFR BLD CALC: 31.4 % — LOW (ref 34.5–45)
HDLC SERPL-MCNC: 32 MG/DL — LOW
HDLC SERPL-MCNC: 32 MG/DL — LOW
HGB BLD-MCNC: 9.6 G/DL — LOW (ref 11.5–15.5)
HGB BLD-MCNC: 9.6 G/DL — LOW (ref 11.5–15.5)
IANC: 1.98 K/UL — SIGNIFICANT CHANGE UP (ref 1.8–7.4)
IANC: 1.98 K/UL — SIGNIFICANT CHANGE UP (ref 1.8–7.4)
IMM GRANULOCYTES NFR BLD AUTO: 0.9 % — SIGNIFICANT CHANGE UP (ref 0–0.9)
IMM GRANULOCYTES NFR BLD AUTO: 0.9 % — SIGNIFICANT CHANGE UP (ref 0–0.9)
IRON SATN MFR SERPL: 21 UG/DL — LOW (ref 30–160)
IRON SATN MFR SERPL: 21 UG/DL — LOW (ref 30–160)
IRON SATN MFR SERPL: 7 % — LOW (ref 14–50)
IRON SATN MFR SERPL: 7 % — LOW (ref 14–50)
LIPID PNL WITH DIRECT LDL SERPL: 24 MG/DL — SIGNIFICANT CHANGE UP
LIPID PNL WITH DIRECT LDL SERPL: 24 MG/DL — SIGNIFICANT CHANGE UP
LITHIUM SERPL-MCNC: 0.4 MMOL/L — LOW (ref 0.6–1.2)
LITHIUM SERPL-MCNC: 0.4 MMOL/L — LOW (ref 0.6–1.2)
LYMPHOCYTES # BLD AUTO: 3.81 K/UL — HIGH (ref 1–3.3)
LYMPHOCYTES # BLD AUTO: 3.81 K/UL — HIGH (ref 1–3.3)
LYMPHOCYTES # BLD AUTO: 51 % — HIGH (ref 13–44)
LYMPHOCYTES # BLD AUTO: 51 % — HIGH (ref 13–44)
MAGNESIUM SERPL-MCNC: 2.3 MG/DL — SIGNIFICANT CHANGE UP (ref 1.6–2.6)
MAGNESIUM SERPL-MCNC: 2.3 MG/DL — SIGNIFICANT CHANGE UP (ref 1.6–2.6)
MCHC RBC-ENTMCNC: 26 PG — LOW (ref 27–34)
MCHC RBC-ENTMCNC: 26 PG — LOW (ref 27–34)
MCHC RBC-ENTMCNC: 30.6 GM/DL — LOW (ref 32–36)
MCHC RBC-ENTMCNC: 30.6 GM/DL — LOW (ref 32–36)
MCV RBC AUTO: 85.1 FL — SIGNIFICANT CHANGE UP (ref 80–100)
MCV RBC AUTO: 85.1 FL — SIGNIFICANT CHANGE UP (ref 80–100)
MONOCYTES # BLD AUTO: 1.42 K/UL — HIGH (ref 0–0.9)
MONOCYTES # BLD AUTO: 1.42 K/UL — HIGH (ref 0–0.9)
MONOCYTES NFR BLD AUTO: 19 % — HIGH (ref 2–14)
MONOCYTES NFR BLD AUTO: 19 % — HIGH (ref 2–14)
NEUTROPHILS # BLD AUTO: 1.98 K/UL — SIGNIFICANT CHANGE UP (ref 1.8–7.4)
NEUTROPHILS # BLD AUTO: 1.98 K/UL — SIGNIFICANT CHANGE UP (ref 1.8–7.4)
NEUTROPHILS NFR BLD AUTO: 26.5 % — LOW (ref 43–77)
NEUTROPHILS NFR BLD AUTO: 26.5 % — LOW (ref 43–77)
NON HDL CHOLESTEROL: 47 MG/DL — SIGNIFICANT CHANGE UP
NON HDL CHOLESTEROL: 47 MG/DL — SIGNIFICANT CHANGE UP
NRBC # BLD: 0 /100 WBCS — SIGNIFICANT CHANGE UP (ref 0–0)
NRBC # BLD: 0 /100 WBCS — SIGNIFICANT CHANGE UP (ref 0–0)
NRBC # FLD: 0 K/UL — SIGNIFICANT CHANGE UP (ref 0–0)
NRBC # FLD: 0 K/UL — SIGNIFICANT CHANGE UP (ref 0–0)
PHOSPHATE SERPL-MCNC: 2.8 MG/DL — SIGNIFICANT CHANGE UP (ref 2.5–4.5)
PHOSPHATE SERPL-MCNC: 2.8 MG/DL — SIGNIFICANT CHANGE UP (ref 2.5–4.5)
PLATELET # BLD AUTO: 445 K/UL — HIGH (ref 150–400)
PLATELET # BLD AUTO: 445 K/UL — HIGH (ref 150–400)
POTASSIUM SERPL-MCNC: 3.5 MMOL/L — SIGNIFICANT CHANGE UP (ref 3.5–5.3)
POTASSIUM SERPL-MCNC: 3.5 MMOL/L — SIGNIFICANT CHANGE UP (ref 3.5–5.3)
POTASSIUM SERPL-SCNC: 3.5 MMOL/L — SIGNIFICANT CHANGE UP (ref 3.5–5.3)
POTASSIUM SERPL-SCNC: 3.5 MMOL/L — SIGNIFICANT CHANGE UP (ref 3.5–5.3)
RBC # BLD: 3.69 M/UL — LOW (ref 3.8–5.2)
RBC # BLD: 3.69 M/UL — LOW (ref 3.8–5.2)
RBC # FLD: 18.6 % — HIGH (ref 10.3–14.5)
RBC # FLD: 18.6 % — HIGH (ref 10.3–14.5)
SODIUM SERPL-SCNC: 146 MMOL/L — HIGH (ref 135–145)
SODIUM SERPL-SCNC: 146 MMOL/L — HIGH (ref 135–145)
TIBC SERPL-MCNC: 291 UG/DL — SIGNIFICANT CHANGE UP (ref 220–430)
TIBC SERPL-MCNC: 291 UG/DL — SIGNIFICANT CHANGE UP (ref 220–430)
TRIGL SERPL-MCNC: 115 MG/DL — SIGNIFICANT CHANGE UP
TRIGL SERPL-MCNC: 115 MG/DL — SIGNIFICANT CHANGE UP
UIBC SERPL-MCNC: 270 UG/DL — SIGNIFICANT CHANGE UP (ref 110–370)
UIBC SERPL-MCNC: 270 UG/DL — SIGNIFICANT CHANGE UP (ref 110–370)
VIT B12 SERPL-MCNC: 1221 PG/ML — HIGH (ref 200–900)
VIT B12 SERPL-MCNC: 1221 PG/ML — HIGH (ref 200–900)
WBC # BLD: 7.47 K/UL — SIGNIFICANT CHANGE UP (ref 3.8–10.5)
WBC # BLD: 7.47 K/UL — SIGNIFICANT CHANGE UP (ref 3.8–10.5)
WBC # FLD AUTO: 7.47 K/UL — SIGNIFICANT CHANGE UP (ref 3.8–10.5)
WBC # FLD AUTO: 7.47 K/UL — SIGNIFICANT CHANGE UP (ref 3.8–10.5)

## 2024-01-09 PROCEDURE — 99232 SBSQ HOSP IP/OBS MODERATE 35: CPT

## 2024-01-09 RX ORDER — FOLIC ACID 0.8 MG
1 TABLET ORAL DAILY
Refills: 0 | Status: DISCONTINUED | OUTPATIENT
Start: 2024-01-09 | End: 2024-01-17

## 2024-01-09 RX ORDER — IRON SUCROSE 20 MG/ML
200 INJECTION, SOLUTION INTRAVENOUS EVERY 24 HOURS
Refills: 0 | Status: COMPLETED | OUTPATIENT
Start: 2024-01-09 | End: 2024-01-12

## 2024-01-09 RX ORDER — CHOLECALCIFEROL (VITAMIN D3) 125 MCG
1000 CAPSULE ORAL DAILY
Refills: 0 | Status: DISCONTINUED | OUTPATIENT
Start: 2024-01-09 | End: 2024-01-17

## 2024-01-09 RX ADMIN — DIVALPROEX SODIUM 1000 MILLIGRAM(S): 500 TABLET, DELAYED RELEASE ORAL at 22:55

## 2024-01-09 RX ADMIN — ATORVASTATIN CALCIUM 10 MILLIGRAM(S): 80 TABLET, FILM COATED ORAL at 22:54

## 2024-01-09 RX ADMIN — SENNA PLUS 2 TABLET(S): 8.6 TABLET ORAL at 22:54

## 2024-01-09 RX ADMIN — LITHIUM CARBONATE 300 MILLIGRAM(S): 300 TABLET, EXTENDED RELEASE ORAL at 17:29

## 2024-01-09 RX ADMIN — Medication 150 MILLIGRAM(S): at 11:35

## 2024-01-09 RX ADMIN — CLOZAPINE 25 MILLIGRAM(S): 150 TABLET, ORALLY DISINTEGRATING ORAL at 22:58

## 2024-01-09 RX ADMIN — Medication 1000 UNIT(S): at 11:35

## 2024-01-09 RX ADMIN — Medication 1 MILLIGRAM(S): at 11:35

## 2024-01-09 RX ADMIN — IRON SUCROSE 200 MILLIGRAM(S): 20 INJECTION, SOLUTION INTRAVENOUS at 17:31

## 2024-01-09 RX ADMIN — ENOXAPARIN SODIUM 40 MILLIGRAM(S): 100 INJECTION SUBCUTANEOUS at 17:31

## 2024-01-09 RX ADMIN — LITHIUM CARBONATE 300 MILLIGRAM(S): 300 TABLET, EXTENDED RELEASE ORAL at 06:15

## 2024-01-09 RX ADMIN — FAMOTIDINE 20 MILLIGRAM(S): 10 INJECTION INTRAVENOUS at 11:36

## 2024-01-09 RX ADMIN — ENOXAPARIN SODIUM 40 MILLIGRAM(S): 100 INJECTION SUBCUTANEOUS at 06:14

## 2024-01-09 RX ADMIN — Medication 12.5 MILLIGRAM(S): at 06:14

## 2024-01-09 NOTE — PROGRESS NOTE ADULT - ASSESSMENT
38-year-old female with past medical history of schizophrenia, seizure disorder, from [facility], HTN, DM?, HLD sent in for blood cx growing staph capitis and noted to have of influenza + 38-year-old female with past medical history of schizophrenia, seizure disorder, from [facility], HTN, DM?, HLD sent in for blood cx growing staph capitis and noted to have of influenza +

## 2024-01-09 NOTE — PROGRESS NOTE ADULT - SUBJECTIVE AND OBJECTIVE BOX
Patient is a 38y old  Female who presents with a chief complaint of + blood cultures (08 Jan 2024 14:15)    Duglas Gonzalez MD   [facility] Division of Hospital Medicine   Pager [phone]  Reachable on Microsoft Teams     SUBJECTIVE / OVERNIGHT EVENTS:  Patient seen and examined this morning. Still with severely delayed responses, although improving.   Walked to bathroom by herself .    MEDICATIONS  (STANDING):  atorvastatin 10 milliGRAM(s) Oral at bedtime  cholecalciferol 1000 Unit(s) Oral daily  cloZAPine 25 milliGRAM(s) Oral at bedtime  dextrose 5% + lactated ringers. 1000 milliLiter(s) (125 mL/Hr) IV Continuous <Continuous>  dextrose 5%. 1000 milliLiter(s) (50 mL/Hr) IV Continuous <Continuous>  dextrose 5%. 1000 milliLiter(s) (100 mL/Hr) IV Continuous <Continuous>  dextrose 50% Injectable 12.5 Gram(s) IV Push once  dextrose 50% Injectable 25 Gram(s) IV Push once  dextrose 50% Injectable 25 Gram(s) IV Push once  divalproex DR 1000 milliGRAM(s) Oral at bedtime  enoxaparin Injectable 40 milliGRAM(s) SubCutaneous every 12 hours  famotidine    Tablet 20 milliGRAM(s) Oral daily  folic acid 1 milliGRAM(s) Oral daily  glucagon  Injectable 1 milliGRAM(s) IntraMuscular once  iron sucrose Injectable 200 milliGRAM(s) IV Push every 24 hours  lithium 300 milliGRAM(s) Oral two times a day  metoprolol succinate ER 12.5 milliGRAM(s) Oral daily  senna 2 Tablet(s) Oral at bedtime  venlafaxine XR. 150 milliGRAM(s) Oral daily    MEDICATIONS  (PRN):  acetaminophen     Tablet .. 650 milliGRAM(s) Oral every 6 hours PRN Temp greater or equal to 38C (100.4F), Mild Pain (1 - 3)  aluminum hydroxide/magnesium hydroxide/simethicone Suspension 30 milliLiter(s) Oral every 4 hours PRN Dyspepsia  dextrose Oral Gel 15 Gram(s) Oral once PRN Blood Glucose LESS THAN 70 milliGRAM(s)/deciliter  melatonin 3 milliGRAM(s) Oral at bedtime PRN Insomnia  OLANZapine Injectable 2.5 milliGRAM(s) IntraMuscular every 6 hours PRN severe agitation  ondansetron Injectable 4 milliGRAM(s) IV Push every 8 hours PRN Nausea and/or Vomiting      Vital Signs Last 24 Hrs  T(C): 36.6 (09 Jan 2024 10:57), Max: 37.2 (09 Jan 2024 06:16)  T(F): 97.9 (09 Jan 2024 10:57), Max: 98.9 (09 Jan 2024 06:16)  HR: 81 (09 Jan 2024 10:57) (81 - 106)  BP: 90/80 (09 Jan 2024 10:57) (90/80 - 117/74)  BP(mean): 82 (09 Jan 2024 06:16) (82 - 82)  RR: 18 (09 Jan 2024 10:57) (17 - 18)  SpO2: 100% (09 Jan 2024 10:57) (100% - 100%)  CAPILLARY BLOOD GLUCOSE      POCT Blood Glucose.: 102 mg/dL (09 Jan 2024 17:18)  POCT Blood Glucose.: 101 mg/dL (09 Jan 2024 12:53)  POCT Blood Glucose.: 82 mg/dL (09 Jan 2024 08:38)  POCT Blood Glucose.: 123 mg/dL (08 Jan 2024 21:42)  POCT Blood Glucose.: 82 mg/dL (08 Jan 2024 18:01)    I&O's Summary    08 Jan 2024 07:01  -  09 Jan 2024 07:00  --------------------------------------------------------  IN: 900 mL / OUT: 0 mL / NET: 900 mL      General: woman lawing down in bed appears comfortable in NAD, awake and alert  Respiratory: No respiratory distress, CTABL, No rales, rhonchi, wheezing.  Cardiovascular: S1,S2; Regular rate and rhythm; No m/g/r.  Gastrointestinal: Soft, Nontender, obese abdomen; +BS.   Extremities: No c/c/e; warm to touch  Neurological: Moving all 4 extremities; Sensation to LT grossly in tact in BLEs.   Skin: No rashes, No erythema   Psych: AAOx3; delayed reposnse, flat affect    LABS:                        9.6    7.47  )-----------( 445      ( 09 Jan 2024 07:06 )             31.4     01-09    146<H>  |  111<H>  |  <2<L>  ----------------------------<  85  3.5   |  25  |  0.76    Ca    8.5      09 Jan 2024 07:06  Phos  2.8     01-09  Mg     2.30     01-09            Urinalysis Basic - ( 09 Jan 2024 07:06 )    Color: x / Appearance: x / SG: x / pH: x  Gluc: 85 mg/dL / Ketone: x  / Bili: x / Urobili: x   Blood: x / Protein: x / Nitrite: x   Leuk Esterase: x / RBC: x / WBC x   Sq Epi: x / Non Sq Epi: x / Bacteria: x        RADIOLOGY & ADDITIONAL TESTS:    Imaging Personally Reviewed:    Consultant(s) Notes Reviewed:      Care Discussed with Consultants/Other Providers: Psych attending    Patient is a 38y old  Female who presents with a chief complaint of + blood cultures (08 Jan 2024 14:15)    Duglas Gonzalez MD   [facility] Division of Hospital Medicine   Pager [phone]  Reachable on Microsoft Teams     SUBJECTIVE / OVERNIGHT EVENTS:  Patient seen and examined this morning. Still with severely delayed responses, although improving.   Walked to bathroom by herself .    MEDICATIONS  (STANDING):  atorvastatin 10 milliGRAM(s) Oral at bedtime  cholecalciferol 1000 Unit(s) Oral daily  cloZAPine 25 milliGRAM(s) Oral at bedtime  dextrose 5% + lactated ringers. 1000 milliLiter(s) (125 mL/Hr) IV Continuous <Continuous>  dextrose 5%. 1000 milliLiter(s) (50 mL/Hr) IV Continuous <Continuous>  dextrose 5%. 1000 milliLiter(s) (100 mL/Hr) IV Continuous <Continuous>  dextrose 50% Injectable 12.5 Gram(s) IV Push once  dextrose 50% Injectable 25 Gram(s) IV Push once  dextrose 50% Injectable 25 Gram(s) IV Push once  divalproex DR 1000 milliGRAM(s) Oral at bedtime  enoxaparin Injectable 40 milliGRAM(s) SubCutaneous every 12 hours  famotidine    Tablet 20 milliGRAM(s) Oral daily  folic acid 1 milliGRAM(s) Oral daily  glucagon  Injectable 1 milliGRAM(s) IntraMuscular once  iron sucrose Injectable 200 milliGRAM(s) IV Push every 24 hours  lithium 300 milliGRAM(s) Oral two times a day  metoprolol succinate ER 12.5 milliGRAM(s) Oral daily  senna 2 Tablet(s) Oral at bedtime  venlafaxine XR. 150 milliGRAM(s) Oral daily    MEDICATIONS  (PRN):  acetaminophen     Tablet .. 650 milliGRAM(s) Oral every 6 hours PRN Temp greater or equal to 38C (100.4F), Mild Pain (1 - 3)  aluminum hydroxide/magnesium hydroxide/simethicone Suspension 30 milliLiter(s) Oral every 4 hours PRN Dyspepsia  dextrose Oral Gel 15 Gram(s) Oral once PRN Blood Glucose LESS THAN 70 milliGRAM(s)/deciliter  melatonin 3 milliGRAM(s) Oral at bedtime PRN Insomnia  OLANZapine Injectable 2.5 milliGRAM(s) IntraMuscular every 6 hours PRN severe agitation  ondansetron Injectable 4 milliGRAM(s) IV Push every 8 hours PRN Nausea and/or Vomiting      Vital Signs Last 24 Hrs  T(C): 36.6 (09 Jan 2024 10:57), Max: 37.2 (09 Jan 2024 06:16)  T(F): 97.9 (09 Jan 2024 10:57), Max: 98.9 (09 Jan 2024 06:16)  HR: 81 (09 Jan 2024 10:57) (81 - 106)  BP: 90/80 (09 Jan 2024 10:57) (90/80 - 117/74)  BP(mean): 82 (09 Jan 2024 06:16) (82 - 82)  RR: 18 (09 Jan 2024 10:57) (17 - 18)  SpO2: 100% (09 Jan 2024 10:57) (100% - 100%)  CAPILLARY BLOOD GLUCOSE      POCT Blood Glucose.: 102 mg/dL (09 Jan 2024 17:18)  POCT Blood Glucose.: 101 mg/dL (09 Jan 2024 12:53)  POCT Blood Glucose.: 82 mg/dL (09 Jan 2024 08:38)  POCT Blood Glucose.: 123 mg/dL (08 Jan 2024 21:42)  POCT Blood Glucose.: 82 mg/dL (08 Jan 2024 18:01)    I&O's Summary    08 Jan 2024 07:01  -  09 Jan 2024 07:00  --------------------------------------------------------  IN: 900 mL / OUT: 0 mL / NET: 900 mL      General: woman lawing down in bed appears comfortable in NAD, awake and alert  Respiratory: No respiratory distress, CTABL, No rales, rhonchi, wheezing.  Cardiovascular: S1,S2; Regular rate and rhythm; No m/g/r.  Gastrointestinal: Soft, Nontender, obese abdomen; +BS.   Extremities: No c/c/e; warm to touch  Neurological: Moving all 4 extremities; Sensation to LT grossly in tact in BLEs.   Skin: No rashes, No erythema   Psych: AAOx3; delayed reposnse, flat affect    LABS:                        9.6    7.47  )-----------( 445      ( 09 Jan 2024 07:06 )             31.4     01-09    146<H>  |  111<H>  |  <2<L>  ----------------------------<  85  3.5   |  25  |  0.76    Ca    8.5      09 Jan 2024 07:06  Phos  2.8     01-09  Mg     2.30     01-09            Urinalysis Basic - ( 09 Jan 2024 07:06 )    Color: x / Appearance: x / SG: x / pH: x  Gluc: 85 mg/dL / Ketone: x  / Bili: x / Urobili: x   Blood: x / Protein: x / Nitrite: x   Leuk Esterase: x / RBC: x / WBC x   Sq Epi: x / Non Sq Epi: x / Bacteria: x        RADIOLOGY & ADDITIONAL TESTS:    Imaging Personally Reviewed:    Consultant(s) Notes Reviewed:      Care Discussed with Consultants/Other Providers: Psych attending

## 2024-01-09 NOTE — PROGRESS NOTE ADULT - PROBLEM SELECTOR PLAN 5
outpt meds: Clozapine 175mg qhs,  lithium 300mg bid, effexor 150mg qd and Depakote 1g qhs, Haldol dec inj 200mg q4 weeks, last received 12/27/23, next injection is due on 1/24/24.   outpt psych: Dr. Whitaker [phone]    -pt denies A/V hallucinations, no evidence of decompensation   -adjusting psych medications as above  -appreciate psych recs outpt meds: Clozapine 175mg qhs,  lithium 300mg bid, effexor 150mg qd and Depakote 1g qhs, Haldol dec inj 200mg q4 weeks, last received 12/27/23, next injection is due on 1/24/24.   outpt psych: Dr. Whitaker [phone]    -pt denies A/V hallucinations, no evidence of decompensation   -adjusting psych medications as above  -appreciate psych recs

## 2024-01-09 NOTE — PROGRESS NOTE ADULT - PROBLEM SELECTOR PLAN 1
currently lethargic, No focal neuro deficits on exam however with intermittent jerking of upper extremities (which per outpt psychiatrist is her chronic baseline dt Haldol.   -previous attending confirmed with Ana Paula that pt often lethargic at night but now is worse.  -bilateral tremor known to outpt psych and is chronic  -cont with lithium 300mg bid, effexor 150mg qd and Depakote 1g qhs (on for seizure)  -c/w clozapine 25mg   -cont to monitor lithium, clozapine level and monitor ANC   -no ss of sepsis, work up neg  -appreciate psych recs  -aspiration, fall, sz precautions

## 2024-01-09 NOTE — BH CONSULTATION LIAISON PROGRESS NOTE - NSBHFUPINTERVALHXFT_PSY_A_CORE
Chart reviewed and case discussed with staff. No acute events over the weekend. No PRN medications required/requested. Adherent with standing medications. Per staff, patient remains sedated and sleeps for most of the day.    Today, patient is initially lethargic and minimally engaging in interview. However, she responds in 1-2 word answers and tracks interviewers with eyes when window blinds are opened and lights are turned on. Patient reports that her mood is fine. She denies any AVH, paranoia, S/IIP, or H/IIP. Denies CAH. She reports sleeping more than her baseline, including both at night and during the day. Reports that her flu symptoms are improving. She states she has been eating meals. Patient asks team when she can return home and smiles at team appropriately. Chart reviewed and case discussed with staff. No acute events overnight. No PRN medications required/requested. Adherent with standing medications. Per staff, patient remains sedated and sleeps for most of the day.    Today, patient is initially lethargic and minimally engaging in interview. However, she responds in 1-2 word answers and tracks interviewers with eyes when window blinds are opened and lights are turned on. Patient reports that her mood is fine. She denies any AVH, CAH, paranoia, S/IIP, or H/IIP. She reports sleeping more than her baseline, including both at night and during the day. Reports that her flu symptoms are improving. Denies any side effects from Clozapine. Pt sat up at EOB and ate meal. Able to express needs and follow simple commands. Patient asks team when she can return home and smiles at team appropriately. Chart reviewed and case discussed with staff. No acute events overnight. No PRN medications required/requested. Adherent with standing medications. Per staff, patient remains sedated and sleeps for most of the day.    Today, patient is initially lethargic and minimally engaging in interview. However, she responds in 1-2 word answers and tracks interviewers with eyes. Patient reports that her mood is fine. She denies any AVH, CAH, paranoia, S/IIP, or H/IIP. She reports sleeping more than her baseline, including both at night and during the day. Reports that her flu symptoms are improving. Denies any side effects from Clozapine. Pt sat up at EOB and ate meal. Able to express needs and follow simple commands. Patient states she likes sweet foods and smiles at team appropriately.    Collateral information obtained from outpatient psychiatrist at [place], Dr. Treviño ([phone]). At baseline, pt is quiet and only begins to speak in longer sentences after establishing rapport with interviewer. She has reported trouble waking up in the morning, although she does not appear sedated during outpatient appts. No recent acute safety concerns. Dr. Treviño has a standing weekly appt with her on Thursdays at 1:30, including on 1/11. She confirms that patient can also be seen by Dr. Whitaker in her clinic on 1/16 at 1:30 pm for hospital follow-up appt.   Chart reviewed and case discussed with staff. No acute events overnight. No PRN medications required/requested. Adherent with standing medications. Per staff, patient remains sedated and sleeps for most of the day.    Today, patient is initially lethargic and minimally engaging in interview. However, she responds in 1-2 word answers and tracks interviewers with eyes. Patient reports that her mood is fine. She denies any AVH, CAH, paranoia, S/IIP, or H/IIP. She reports sleeping more than her baseline, including both at night and during the day. Reports that her flu symptoms are improving. Denies any side effects from Clozapine. Pt sat up at EOB and ate meal. Able to express needs and follow simple commands. Patient states she likes sweet foods and smiles at team appropriately.    Collateral information obtained from outpatient psychiatrist at [place], Dr. Treviño ([phone]). At baseline, pt is quiet and only begins to speak in longer sentences after establishing rapport with interviewer. She has reported trouble waking up in the morning, although she does not appear sedated during outpatient appts. No recent acute safety concerns. Dr. Treviño has a standing weekly appt with her on Thursdays at 1:30, including on 1/11. She confirms that patient can also be seen by Dr. Whitaker in her clinic on 1/16 at 1:30 pm for hospital follow-up appt.

## 2024-01-09 NOTE — BH CONSULTATION LIAISON PROGRESS NOTE - NSBHFUPINTERVALCCFT_PSY_A_CORE
follow-up of clozapine management and schizoaffective d/o  ***in progress follow-up of clozapine management and schizoaffective d/o  ***in progress*** follow-up of clozapine management and schizoaffective d/o

## 2024-01-09 NOTE — BH CONSULTATION LIAISON PROGRESS NOTE - NSBHASSESSMENTFT_PSY_ALL_CORE
***in progress, all recs incomplete    Patient is a 38-year-old woman with past history of schizoaffective disorder, living in group home on [facility] grounds, with multiple past inpatient admissions, and PMH of seizure disorder, HTN, DM?, HLD who was BIBEMS activated by facility due to acute influenza infection. She was medically admitted due to blood cultures growing staph capitis, now thought to be contaminant. Psychiatry was consulted for medication management of home clozapine dose in the setting of increased sedation, decreasing ANC, and missed dose.     1/4: Patient seen, oriented, but lethargic and slow to respond, mild tremors noted in both UE. She firmly denies AVH, denies CAH, denies SI and HI. Denies feeling unsafe in the hospital. Care coordinated with outpt. psychiatrist, at baseline pt. is alert and has chronic tremors in both UE.  Labs: Lithium level 0.8, ANC 1.51, VPA level 49.30, Clozapine level 677  1/5: Patient lethargic, interview limited at this time. No overt catatonic sxs , no stiffness or rigidity. Labs: ANC 1.07 today.   1/6: Patient remains lethargic, not engaged with writer.   1/8: Patient awakes to verbal and tactile stimulation, although still sedated during daytime. Asking appropriate questions about discharge plan. No evidence of acute psychosis or mood symptoms indicating decompensation. ANC improved to 4.34.    PLAN:  1-RESTART clozapine 25 mg qHS as patient's ANC normalized and lethargy has been improving. Monitor ANCs. Ensure QTc < 500 ms.    [] Monitor ANCs closely while at [facility]    [] Clozapine level slightly supratherapeutic at 677 on 1/4, decreased to 183 on 1/6 after stopped  2-Continue Lithium 300 mg BID and Effexor 150mg po daily     [] Monitor BUN/CR, Lithium level 0.8 on 1/4  3- Haldol dec 200mg q4 weeks, last received 12/27/23, next injection is due on 1/24/24.  4- PRN for agitation: Zyprexa 2.5mg PO/IM q6h prn for agitation, may give additional Zyprexa 2.5mg for refractory agitation (ensure QTc <500); avoid IM/IV Ativan within 1 hour of Zyprexa  5- Care coordinated with outpatient psychiatrist Dr. Whitaker [phone] and care coordinator Meagan Mckeon [phone] on 1/4 > team called on 1/8 to schedule outpatient follow-up early next week, office will call back with date  6- Continue routine obs as pt denies SI/HI. Have low threshold to start 1:1 if needed given h/o agitation in the past ***in progress, all recs incomplete    Patient is a 38-year-old woman with past history of schizoaffective disorder, living in group home on [facility] grounds, with multiple past inpatient admissions, and PMH of seizure disorder, HTN, DM?, HLD who was BIBEMS activated by facility due to acute influenza infection. She was medically admitted due to blood cultures growing staph capitis, now thought to be contaminant. Psychiatry was consulted for medication management of home clozapine dose in the setting of increased sedation, decreasing ANC, and missed dose.     1/4: Patient seen, oriented, but lethargic and slow to respond, mild tremors noted in both UE. She firmly denies AVH, denies CAH, denies SI and HI. Denies feeling unsafe in the hospital. Care coordinated with outpt. psychiatrist, at baseline pt. is alert and has chronic tremors in both UE.  Labs: Lithium level 0.8, ANC 1.51, VPA level 49.30, Clozapine level 677  1/5: Patient lethargic, interview limited at this time. No overt catatonic sxs , no stiffness or rigidity. Labs: ANC 1.07 today.   1/6: Patient remains lethargic, not engaged with writer.   1/8: Patient awakes to verbal and tactile stimulation, although still sedated during daytime. Asking appropriate questions about discharge plan. No evidence of acute psychosis or mood symptoms indicating decompensation. ANC improved to 4.34.    PLAN:  1-RESTART clozapine 25 mg qHS as patient's ANC normalized and lethargy has been improving. Monitor ANCs. Ensure QTc < 500 ms.    [] Monitor ANCs closely while at [facility]    [] Clozapine level slightly supratherapeutic at 677 on 1/4, decreased to 183 on 1/6 after stopped  2-Continue Lithium 300 mg BID and Effexor 150mg po daily     [] Monitor BUN/CR, Lithium level 0.8 on 1/4  3- Haldol dec 200mg q4 weeks, last received 12/27/23, next injection is due on 1/24/24.  4- PRN for agitation: Zyprexa 2.5mg PO/IM q6h prn for agitation, may give additional Zyprexa 2.5mg for refractory agitation (ensure QTc <500); avoid IM/IV Ativan within 1 hour of Zyprexa  5- Care coordinated with outpatient psychiatrist Dr. Whitaker [phone] and care coordinator Meagan Mckeon [phone] on 1/4 > team called on 1/8 to schedule outpatient follow-up early next week, office will call back with date  6- Continue routine obs as pt denies SI/HI. Have low threshold to start 1:1 if needed given h/o agitation in the past ***in progress***    Patient is a 38-year-old woman with past history of schizoaffective disorder, living in group home on [facility] grounds, with multiple past inpatient admissions, and PMH of seizure disorder, HTN, DM?, HLD who was BIBEMS activated by facility due to acute influenza infection. She was medically admitted due to blood cultures growing staph capitis, now thought to be contaminant. Psychiatry was consulted for medication management of home clozapine dose in the setting of increased sedation, decreasing ANC, and missed dose.     1/4: Patient seen, oriented, but lethargic and slow to respond, mild tremors noted in both UE. She firmly denies AVH, denies CAH, denies SI and HI. Denies feeling unsafe in the hospital. Care coordinated with outpt. psychiatrist, at baseline pt. is alert and has chronic tremors in both UE.  Labs: Lithium level 0.8, ANC 1.51, VPA level 49.30, Clozapine level 677  1/5: Patient lethargic, interview limited at this time. No overt catatonic sxs , no stiffness or rigidity. Labs: ANC 1.07 today.   1/6: Patient remains lethargic, not engaged with writer.   1/8: Patient awakes to verbal and tactile stimulation, although still sedated during daytime. Asking appropriate questions about discharge plan. No evidence of acute psychosis or mood symptoms indicating decompensation. ANC improved to 4.34.  1/9: Patient seen, awakes to verbal and tactile stimulation, still sedated at this time. No overt catatonic sxs, no stiffness or rigidity. No evidence of mood symptoms indicating decompensation. ANC 1.98.    PLAN:  1-RESTART clozapine 25 mg qHS as patient's ANC normalized and lethargy has been improving. Monitor ANCs. Ensure QTc < 500 ms.    [] Monitor ANCs closely while at [facility]    [] Clozapine level slightly supratherapeutic at 677 on 1/4, decreased to 183 on 1/6 after stopped  2-Continue Lithium 300 mg BID and Effexor 150mg po daily     [] Monitor BUN/CR, Lithium level 0.8 on 1/4  3- Haldol dec 200mg q4 weeks, last received 12/27/23, next injection is due on 1/24/24.  4- PRN for agitation: Zyprexa 2.5mg PO/IM q6h prn for agitation, may give additional Zyprexa 2.5mg for refractory agitation (ensure QTc <500); avoid IM/IV Ativan within 1 hour of Zyprexa  5- Care coordinated with outpatient psychiatrist Dr. Whitaker [phone] and care coordinator Meagan Mckeon [phone] on 1/4 > 1/9 outpatient follow-up scheduled for 1/16 at 1:30 w/ Dr. Whitaker. Pt has standing apt with Dr. Treviño Thursdays at 1:30. Fax Dr. Treviño with any medical information at [phone].  6- Continue routine obs as pt denies SI/HI. Have low threshold to start 1:1 if needed given h/o agitation in the past ***in progress***    Patient is a 38-year-old woman with past history of schizoaffective disorder, living in group home on [facility] grounds, with multiple past inpatient admissions, and PMH of seizure disorder, HTN, DM?, HLD who was BIBEMS activated by facility due to acute influenza infection. She was medically admitted due to blood cultures growing staph capitis, now thought to be contaminant. Psychiatry was consulted for medication management of home clozapine dose in the setting of increased sedation, decreasing ANC, and missed dose.     1/4: Patient seen, oriented, but lethargic and slow to respond, mild tremors noted in both UE. She firmly denies AVH, denies CAH, denies SI and HI. Denies feeling unsafe in the hospital. Care coordinated with outpt. psychiatrist, at baseline pt. is alert and has chronic tremors in both UE.  Labs: Lithium level 0.8, ANC 1.51, VPA level 49.30, Clozapine level 677  1/5: Patient lethargic, interview limited at this time. No overt catatonic sxs , no stiffness or rigidity. Labs: ANC 1.07 today.   1/6: Patient remains lethargic, not engaged with writer.   1/8: Patient awakes to verbal and tactile stimulation, although still sedated during daytime. Asking appropriate questions about discharge plan. No evidence of acute psychosis or mood symptoms indicating decompensation. ANC improved to 4.34.  1/9: Patient seen, awakes to verbal and tactile stimulation, still sedated at this time. No overt catatonic sxs, no stiffness or rigidity. No evidence of mood symptoms indicating decompensation. ANC 1.98.    PLAN:  1-RESTART clozapine 25 mg qHS as patient's ANC normalized and lethargy has been improving. Monitor ANCs. Ensure QTc < 500 ms.    [] Monitor ANCs closely while at [facility]    [] Clozapine level slightly supratherapeutic at 677 on 1/4, decreased to 183 on 1/6 after stopped  2-Continue Lithium 300 mg BID and Effexor 150mg po daily     [] Monitor BUN/CR, Lithium level 0.8 on 1/4  3- Haldol dec 200mg q4 weeks, last received 12/27/23, next injection is due on 1/24/24.  4- PRN for agitation: Zyprexa 2.5mg PO/IM q6h prn for agitation, may give additional Zyprexa 2.5mg for refractory agitation (ensure QTc <500); avoid IM/IV Ativan within 1 hour of Zyprexa  5- Care coordinated with outpatient psychiatrist Dr. Whitaker [phone] and care coordinator Meagan Mckeon [phone] on 1/4 > 1/9 outpatient follow-up scheduled for 1/16 at 1:30 w/ Dr. Whitaker. Pt has standing apt with Dr. Treviño Thursdays at 1:30. Fax Dr. Treviño with any medical information at [phone].  6- Continue routine obs as pt denies SI/HI. Have low threshold to start 1:1 if needed given h/o agitation in the past Patient is a 38-year-old woman with past history of schizoaffective disorder, living in group home on [facility] grounds, with multiple past inpatient admissions, and PMH of seizure disorder, HTN, DM?, HLD who was BIBEMS activated by facility due to acute influenza infection. She was medically admitted due to blood cultures growing staph capitis, now thought to be contaminant. Psychiatry was consulted for medication management of home clozapine dose in the setting of increased sedation, decreasing ANC (1.07), and missed dose.     1/4: Patient seen, oriented, but lethargic and slow to respond, mild tremors noted in both UE. She firmly denies AVH, denies CAH, denies SI and HI. Denies feeling unsafe in the hospital. Care coordinated with outpt. psychiatrist, at baseline pt. is alert and has chronic tremors in both UE.  Labs: Lithium level 0.8, ANC 1.51, VPA level 49.30, Clozapine level 677  1/5: Patient lethargic, interview limited at this time. No overt catatonic sxs , no stiffness or rigidity. Labs: ANC 1.07 today.   1/6: Patient remains lethargic, not engaged with writer.   1/8: Patient awakes to verbal and tactile stimulation, although still sedated during daytime. Asking appropriate questions about discharge plan. No evidence of acute psychosis or mood symptoms indicating decompensation. ANC improved to 4.34.  1/9: Patient seen, awakes to verbal and tactile stimulation, still sedated at this time. No overt catatonic sxs, no stiffness or rigidity. No evidence of acute mood r psychotic symptoms. ANC 1.98.    PLAN:  1-RESTART clozapine 25 mg qHS as patient's ANC normalized and lethargy has been improving. Ensure QTc < 500 ms.    [] Monitor ANCs daily while at [facility]    [] Clozapine level slightly supratherapeutic at 677 on 1/4, decreased to 183 on 1/6 after stopped  2-Continue Lithium 300 mg BID, Depakote 100 mg HS, and Effexor 150 mg po daily    [] Monitor BUN/CR, Lithium level 0.8 on 1/4 and 0.4 on 1/9    [] VPA level 85.40 on 1/1 and 49.30 on 1/3  3- Haldol decanoate 200mg q4 weeks, last received 12/27/23, next injection is due on 1/24/24.  4- PRN for agitation: Zyprexa 2.5mg PO/IM q6h prn for agitation, may give additional Zyprexa 2.5mg for refractory agitation (ensure QTc <500); avoid IM/IV Ativan within 1 hour of Zyprexa  5- Care coordinated with outpatient psychiatrist Dr. Whitaker [phone] and care coordinator Meagan Mckeon [phone] on 1/4     [] Outpatient psych follow-up appt scheduled for 1/16 at 1:30 pm w/ Dr. Whitaker.     [] Pt also has standing apt with Dr. Treviño on Thursdays at 1:30, including 1/11.     [] Please fax DC summary to OP psychiatrist Dr. Treviño at [phone].  6- Continue routine obs as pt denies SI/HI. Have low threshold to start 1:1 if needed given h/o agitation in the past Patient is a 38-year-old woman with past history of schizoaffective disorder, living in group home on [facility] grounds, with multiple past inpatient admissions, and PMH of seizure disorder, HTN, DM?, HLD who was BIBEMS activated by facility due to acute influenza infection. She was medically admitted due to blood cultures growing staph capitis, now thought to be contaminant. Psychiatry was consulted for medication management of home clozapine dose in the setting of increased sedation, decreasing ANC (1.07), and missed dose.     1/4: Patient seen, oriented, but lethargic and slow to respond, mild tremors noted in both UE. She firmly denies AVH, denies CAH, denies SI and HI. Denies feeling unsafe in the hospital. Care coordinated with outpt. psychiatrist, at baseline pt. is alert and has chronic tremors in both UE.  Labs: Lithium level 0.8, ANC 1.51, VPA level 49.30, Clozapine level 677  1/5: Patient lethargic, interview limited at this time. No overt catatonic sxs , no stiffness or rigidity. Labs: ANC 1.07 today.   1/6: Patient remains lethargic, not engaged with writer.   1/8: Patient awakes to verbal and tactile stimulation, although still sedated during daytime. Asking appropriate questions about discharge plan. No evidence of acute psychosis or mood symptoms indicating decompensation. ANC improved to 4.34.  1/9: Patient seen, awakes to verbal and tactile stimulation, still sedated at this time. No overt catatonic sxs, no stiffness or rigidity. No evidence of acute mood r psychotic symptoms. ANC 1.98.    PLAN:  1-RESTART clozapine 25 mg qHS as patient's ANC normalized and lethargy has been improving. Ensure QTc < 500 ms.    [] Monitor ANCs daily while at [facility]    [] Clozapine level slightly supratherapeutic at 677 on 1/4, decreased to 183 on 1/6 after stopped  2-Continue Lithium 300 mg BID, Depakote 100 mg HS, and Effexor 150 mg po daily    [] Monitor BUN/CR, Lithium level 0.8 on 1/4 and 0.4 on 1/9    [] VPA level 85.40 on 1/1 and 49.30 on 1/3  3- Haldol decanoate 200mg q4 weeks, last received 12/27/23, next injection is due on 1/24/24.  4- PRN for agitation: Zyprexa 2.5mg PO/IM q6h prn for agitation, may give additional Zyprexa 2.5mg for refractory agitation (ensure QTc <500); avoid IM/IV Ativan within 1 hour of Zyprexa  5- Care coordinated with outpatient psychiatrist Dr. Whitaker [phone] and care coordinator Meagan Mckeon [phone] on 1/4     [] Outpatient psych follow-up appt scheduled for 1/16 at 1:30 pm w/ Dr. Whitaker.     [] Pt also has standing apt with Dr. Treviño on Thursdays at 1:30, including 1/11.     [] Please fax DC summary to OP psychiatrist Dr. Treviño at [phone].  6- Continue routine obs as pt denies SI/HI. Have low threshold to start 1:1 if needed given h/o agitation in the past Patient is a 38-year-old woman with past history of schizoaffective disorder, living in group home on [facility] grounds, with multiple past inpatient admissions, and PMH of seizure disorder, HTN, DM?, HLD who was BIBEMS activated by facility due to acute influenza infection. She was medically admitted due to blood cultures growing staph capitis, now thought to be contaminant. Psychiatry was consulted for medication management of home clozapine dose in the setting of increased sedation, decreasing ANC (1.07), and missed dose.     1/4: Patient seen, oriented, but lethargic and slow to respond, mild tremors noted in both UE. She firmly denies AVH, denies CAH, denies SI and HI. Denies feeling unsafe in the hospital. Care coordinated with outpt. psychiatrist, at baseline pt. is alert and has chronic tremors in both UE.  Labs: Lithium level 0.8, ANC 1.51, VPA level 49.30, Clozapine level 677  1/5: Patient lethargic, interview limited at this time. No overt catatonic sxs , no stiffness or rigidity. Labs: ANC 1.07 today.   1/6: Patient remains lethargic, not engaged with writer.   1/8: Patient awakes to verbal and tactile stimulation, although still sedated during daytime. Asking appropriate questions about discharge plan. No evidence of acute psychosis or mood symptoms indicating decompensation. ANC improved to 4.34.  1/9: Patient seen, awakes to verbal and tactile stimulation, still sedated at this time. No overt catatonic sxs, no stiffness or rigidity. No evidence of acute mood r psychotic symptoms. ANC 1.98.    PLAN:  1-RESTART clozapine 25 mg qHS as patient's ANC normalized and lethargy has been improving. Ensure QTc < 500 ms.    [] Monitor ANCs daily while at [facility]    [] Clozapine level slightly supratherapeutic at 677 on 1/4, decreased to 183 on 1/6 after stopped  2-Continue Lithium 300 mg BID, Depakote 1000 mg HS, and Effexor 150 mg po daily    [] Monitor BUN/CR, Lithium level 0.8 on 1/4 and 0.4 on 1/9    [] VPA level 85.40 on 1/1 and 49.30 on 1/3  3- Haldol decanoate 200mg q4 weeks, last received 12/27/23, next injection is due on 1/24/24.  4- PRN for agitation: Zyprexa 2.5mg PO/IM q6h prn for agitation, may give additional Zyprexa 2.5mg for refractory agitation (ensure QTc <500); avoid IM/IV Ativan within 1 hour of Zyprexa  5- Care coordinated with outpatient psychiatrist Dr. Whitaker [phone] and care coordinator Meagan Mckeon [phone] on 1/4     [] Outpatient psych follow-up appt scheduled for 1/16 at 1:30 pm w/ Dr. Whitaker.     [] Pt also has standing apt with Dr. Treviño on Thursdays at 1:30, including 1/11.     [] Please fax DC summary to OP psychiatrist Dr. Treviño at [phone].  6- Continue routine obs as pt denies SI/HI. Have low threshold to start 1:1 if needed given h/o agitation in the past Patient is a 38-year-old woman with past history of schizoaffective disorder, living in group home on [facility] grounds, with multiple past inpatient admissions, and PMH of seizure disorder, HTN, DM?, HLD who was BIBEMS activated by facility due to acute influenza infection. She was medically admitted due to blood cultures growing staph capitis, now thought to be contaminant. Psychiatry was consulted for medication management of home clozapine dose in the setting of increased sedation, decreasing ANC (1.07), and missed dose.     1/4: Patient seen, oriented, but lethargic and slow to respond, mild tremors noted in both UE. She firmly denies AVH, denies CAH, denies SI and HI. Denies feeling unsafe in the hospital. Care coordinated with outpt. psychiatrist, at baseline pt. is alert and has chronic tremors in both UE.  Labs: Lithium level 0.8, ANC 1.51, VPA level 49.30, Clozapine level 677  1/5: Patient lethargic, interview limited at this time. No overt catatonic sxs , no stiffness or rigidity. Labs: ANC 1.07 today.   1/6: Patient remains lethargic, not engaged with writer.   1/8: Patient awakes to verbal and tactile stimulation, although still sedated during daytime. Asking appropriate questions about discharge plan. No evidence of acute psychosis or mood symptoms indicating decompensation. ANC improved to 4.34.  1/9: Patient seen, awakes to verbal and tactile stimulation, still sedated at this time. No overt catatonic sxs, no stiffness or rigidity. No evidence of acute mood r psychotic symptoms. ANC 1.98.    PLAN:  1-RESTART clozapine 25 mg qHS as patient's ANC normalized and lethargy has been improving. Ensure QTc < 500 ms.    [] Monitor ANCs daily while at [facility]    [] Clozapine level slightly supratherapeutic at 677 on 1/4, decreased to 183 on 1/6 after stopped  2-Continue Lithium 300 mg BID, Depakote 1000 mg HS, and Effexor 150 mg po daily    [] Monitor BUN/CR, Lithium level 0.8 on 1/4 and 0.4 on 1/9    [] VPA level 85.40 on 1/1 and 49.30 on 1/3  3- Haldol decanoate 200mg q4 weeks, last received 12/27/23, next injection is due on 1/24/24.  4- PRN for agitation: Zyprexa 2.5mg PO/IM q6h prn for agitation, may give additional Zyprexa 2.5mg for refractory agitation (ensure QTc <500); avoid IM/IV Ativan within 1 hour of Zyprexa  5- Care coordinated with outpatient psychiatrist Dr. Whitaker [phone] and care coordinator Meagan Mckeon [phone] on 1/4     [] Outpatient psych follow-up appt scheduled for 1/16 at 1:30 pm w/ Dr. Whitaker.     [] Pt also has standing apt with Dr. Treviño on Thursdays at 1:30, including 1/11.     [] Please fax DC summary to OP psychiatrist Dr. Treviño at [phone].  6- Continue routine obs as pt denies SI/HI. Have low threshold to start 1:1 if needed given h/o agitation in the past Patient is a 38-year-old woman with past history of schizoaffective disorder, living in group home on [facility] grounds, with multiple past inpatient admissions, and PMH of seizure disorder, HTN, DM?, HLD who was BIBEMS activated by facility due to acute influenza infection. She was medically admitted due to blood cultures growing staph capitis, now thought to be contaminant. Psychiatry was consulted for medication management of home clozapine dose in the setting of increased sedation, decreasing ANC (1.07), and missed dose.     1/4: Patient seen, oriented, but lethargic and slow to respond, mild tremors noted in both UE. She firmly denies AVH, denies CAH, denies SI and HI. Denies feeling unsafe in the hospital. Care coordinated with outpt. psychiatrist, at baseline pt. is alert and has chronic tremors in both UE.  Labs: Lithium level 0.8, ANC 1.51, VPA level 49.30, Clozapine level 677  1/5: Patient lethargic, interview limited at this time. No overt catatonic sxs , no stiffness or rigidity. Labs: ANC 1.07 today.   1/6: Patient remains lethargic, not engaged with writer.   1/8: Patient awakes to verbal and tactile stimulation, although still sedated during daytime. Asking appropriate questions about discharge plan. No evidence of acute psychosis or mood symptoms indicating decompensation. ANC improved to 4.34.  1/9: Patient seen, awakes to verbal and tactile stimulation, still sedated at this time. No overt catatonic sxs, no stiffness or rigidity. No evidence of acute mood or psychotic symptoms. ANC 1.98.    PLAN:  1-CONTINUE clozapine 25 mg qHS as patient's ANC normalized and lethargy has been improving. Ensure QTc < 500 ms.    [] Monitor ANCs daily while at [facility]    [] Clozapine level slightly supratherapeutic at 677 on 1/4, decreased to 183 on 1/6 after stopped  2-Continue Lithium 300 mg BID, , and Effexor 150 mg po daily. Depakote dosing as per team as it seems to be for seizures    [] Monitor BUN/CR, Lithium level 0.8 on 1/4 and 0.4 on 1/9    [] VPA level 85.40 on 1/1 and 49.30 on 1/3  3- Haldol decanoate 200mg q4 weeks, last received 12/27/23, next injection is due on 1/24/24.  4- PRN for agitation: Zyprexa 2.5mg PO/IM q6h prn for agitation, may give additional Zyprexa 2.5mg for refractory agitation (ensure QTc <500); avoid IM/IV Ativan within 1 hour of Zyprexa  5- Care coordinated with outpatient psychiatrist Dr. Whitaker [phone] and care coordinator Meagan Mckeon [phone] on 1/4     [] Outpatient psych follow-up appt scheduled for 1/16 at 1:30 pm w/ Dr. Whitaker.     [] Pt also has standing apt with Dr. Treviño on Thursdays at 1:30, including 1/11.     [] Please fax DC summary to OP psychiatrist Dr. Treviño at [phone].  6- Continue routine obs as pt denies SI/HI. Have low threshold to start 1:1 if needed given h/o agitation in the past Patient is a 38-year-old woman with past history of schizoaffective disorder, living in group home on [place] grounds, with multiple past inpatient admissions, and PMH of seizure disorder, HTN, DM?, HLD who was BIBEMS activated by facility due to acute influenza infection. She was medically admitted due to blood cultures growing staph capitis, now thought to be contaminant. Psychiatry was consulted for medication management of home clozapine dose in the setting of increased sedation, decreasing ANC (1.07), and missed dose.     1/4: Patient seen, oriented, but lethargic and slow to respond, mild tremors noted in both UE. She firmly denies AVH, denies CAH, denies SI and HI. Denies feeling unsafe in the hospital. Care coordinated with outpt. psychiatrist, at baseline pt. is alert and has chronic tremors in both UE.  Labs: Lithium level 0.8, ANC 1.51, VPA level 49.30, Clozapine level 677  1/5: Patient lethargic, interview limited at this time. No overt catatonic sxs , no stiffness or rigidity. Labs: ANC 1.07 today.   1/6: Patient remains lethargic, not engaged with writer.   1/8: Patient awakes to verbal and tactile stimulation, although still sedated during daytime. Asking appropriate questions about discharge plan. No evidence of acute psychosis or mood symptoms indicating decompensation. ANC improved to 4.34.  1/9: Patient seen, awakes to verbal and tactile stimulation, still sedated at this time. No overt catatonic sxs, no stiffness or rigidity. No evidence of acute mood or psychotic symptoms. ANC 1.98.    PLAN:  1-CONTINUE clozapine 25 mg qHS as patient's ANC normalized and lethargy has been improving. Ensure QTc < 500 ms.    [] Monitor ANCs daily while at [facility]    [] Clozapine level slightly supratherapeutic at 677 on 1/4, decreased to 183 on 1/6 after stopped  2-Continue Lithium 300 mg BID, , and Effexor 150 mg po daily. Depakote dosing as per team as it seems to be for seizures    [] Monitor BUN/CR, Lithium level 0.8 on 1/4 and 0.4 on 1/9    [] VPA level 85.40 on 1/1 and 49.30 on 1/3  3- Haldol decanoate 200mg q4 weeks, last received 12/27/23, next injection is due on 1/24/24.  4- PRN for agitation: Zyprexa 2.5mg PO/IM q6h prn for agitation, may give additional Zyprexa 2.5mg for refractory agitation (ensure QTc <500); avoid IM/IV Ativan within 1 hour of Zyprexa  5- Care coordinated with outpatient psychiatrist Dr. Whitaker [phone] and care coordinator Meagan Mckeon [phone] on 1/4     [] Outpatient psych follow-up appt scheduled for 1/16 at 1:30 pm w/ Dr. Whitaker.     [] Pt also has standing apt with Dr. Treviño on Thursdays at 1:30, including 1/11.     [] Please fax DC summary to OP psychiatrist Dr. Treviño at [phone].  6- Continue routine obs as pt denies SI/HI. Have low threshold to start 1:1 if needed given h/o agitation in the past Patient is a 38-year-old woman with past history of schizoaffective disorder, living in group home on [facility] grounds, with multiple past inpatient admissions, and PMH of seizure disorder, HTN, DM?, HLD who was BIBEMS activated by facility due to acute influenza infection. She was medically admitted due to blood cultures growing staph capitis, now thought to be contaminant. Psychiatry was consulted for medication management of home clozapine dose in the setting of increased sedation, decreasing ANC (1.07), and missed dose.     1/4: Patient seen, oriented, but lethargic and slow to respond, mild tremors noted in both UE. She firmly denies AVH, denies CAH, denies SI and HI. Denies feeling unsafe in the hospital. Care coordinated with outpt. psychiatrist, at baseline pt. is alert and has chronic tremors in both UE.  Labs: Lithium level 0.8, ANC 1.51, VPA level 49.30, Clozapine level 677  1/5: Patient lethargic, interview limited at this time. No overt catatonic sxs , no stiffness or rigidity. Labs: ANC 1.07 today.   1/6: Patient remains lethargic, not engaged with writer.   1/8: Patient awakes to verbal and tactile stimulation, although still sedated during daytime. Asking appropriate questions about discharge plan. No evidence of acute psychosis or mood symptoms indicating decompensation. ANC improved to 4.34.  1/9: Patient seen, awakes to verbal and tactile stimulation, still sedated at this time. No overt catatonic sxs, no stiffness or rigidity. No evidence of acute mood or psychotic symptoms. ANC 1.98.    PLAN:  1-CONTINUE clozapine 25 mg qHS as patient's ANC normalized and lethargy has been improving. Ensure QTc < 500 ms.    [] Monitor ANCs daily while at [facility]    [] Clozapine level slightly supratherapeutic at 677 on 1/4, decreased to 183 on 1/6 after stopped  2-Continue Lithium 300 mg BID and Effexor 150 mg po daily. Depakote dosing as per primary team as it seems to be for seizures    [] Monitor BUN/CR, Lithium level 0.8 on 1/4 and 0.4 on 1/9    [] VPA level 85.40 on 1/1 and 49.30 on 1/3  3- Haldol decanoate 200mg q4 weeks, last received 12/27/23, next injection is due on 1/24/24.  4- PRN for agitation: Zyprexa 2.5mg PO/IM q6h prn for agitation, may give additional Zyprexa 2.5mg for refractory agitation (ensure QTc <500); avoid IM/IV Ativan within 1 hour of Zyprexa  5- Care coordinated with outpatient psychiatrist Dr. Whitaker [phone] and care coordinator Meagan Mckeon [phone] on 1/4     [] Outpatient psych follow-up appt scheduled for 1/16 at 1:30 pm w/ Dr. Whitaker.     [] Pt also has standing apt with Dr. Treviño on Thursdays at 1:30, including 1/11.     [] Please fax DC summary to OP psychiatrist Dr. Treviño at [phone].  6- Continue routine obs as pt denies SI/HI. Have low threshold to start 1:1 if needed given h/o agitation in the past Patient is a 38-year-old woman with past history of schizoaffective disorder, living in group home on [facility] grounds, with multiple past inpatient admissions, and PMH of seizure disorder, HTN, DM?, HLD who was BIBEMS activated by facility due to acute influenza infection. She was medically admitted due to blood cultures growing staph capitis, now thought to be contaminant. Psychiatry was consulted for medication management of home clozapine dose in the setting of increased sedation, decreasing ANC (1.07), and missed dose.     1/4: Patient seen, oriented, but lethargic and slow to respond, mild tremors noted in both UE. She firmly denies AVH, denies CAH, denies SI and HI. Denies feeling unsafe in the hospital. Care coordinated with outpt. psychiatrist, at baseline pt. is alert and has chronic tremors in both UE.  Labs: Lithium level 0.8, ANC 1.51, VPA level 49.30, Clozapine level 677  1/5: Patient lethargic, interview limited at this time. No overt catatonic sxs , no stiffness or rigidity. Labs: ANC 1.07 today.   1/6: Patient remains lethargic, not engaged with writer.   1/8: Patient awakes to verbal and tactile stimulation, although still sedated during daytime. Asking appropriate questions about discharge plan. No evidence of acute psychosis or mood symptoms indicating decompensation. ANC improved to 4.34.  1/9: Patient seen, awakes to verbal and tactile stimulation, still sedated at this time. No overt catatonic sxs, no stiffness or rigidity. No evidence of acute mood or psychotic symptoms. ANC 1.98.    PLAN:  1-CONTINUE clozapine 25 mg qHS as patient's ANC normalized and lethargy has been improving. Ensure QTc < 500 ms.    [] Monitor ANCs daily while at [facility]    [] Clozapine level slightly supratherapeutic at 677 on 1/4, decreased to 183 on 1/6 after stopped  2-Continue Lithium 300 mg BID and Effexor 150 mg po daily. Depakote dosing as per primary team as it seems to be for seizures    [] Monitor BUN/CR, Lithium level 0.8 on 1/4 and 0.4 on 1/9    [] VPA level 85.40 on 1/1 and 49.30 on 1/3  3- Haldol decanoate 200mg q4 weeks, last received 12/27/23, next injection is due on 1/24/24.  4- PRN for agitation: Zyprexa 2.5mg PO/IM q6h prn for agitation, may give additional Zyprexa 2.5mg for refractory agitation (ensure QTc <500); avoid IM/IV Ativan within 1 hour of Zyprexa  5- Care coordinated with outpatient psychiatrist Dr. Whitaker [phone] and care coordinator Meagan Mckeon [phone] on 1/4     [] Outpatient psych follow-up appt scheduled for 1/16 at 1:30 pm w/ Dr. Whitaker.     [] Pt also has standing apt with Dr. Treviño on Thursdays at 1:30, including 1/11.     [] Please fax DC summary to OP psychiatrist Dr. Treviño at [phone].  6- Continue routine obs as pt denies SI/HI. Have low threshold to start 1:1 if needed given h/o agitation in the past

## 2024-01-10 LAB
ANION GAP SERPL CALC-SCNC: 10 MMOL/L — SIGNIFICANT CHANGE UP (ref 7–14)
ANION GAP SERPL CALC-SCNC: 10 MMOL/L — SIGNIFICANT CHANGE UP (ref 7–14)
BASOPHILS # BLD AUTO: 0.02 K/UL — SIGNIFICANT CHANGE UP (ref 0–0.2)
BASOPHILS # BLD AUTO: 0.02 K/UL — SIGNIFICANT CHANGE UP (ref 0–0.2)
BASOPHILS NFR BLD AUTO: 0.3 % — SIGNIFICANT CHANGE UP (ref 0–2)
BASOPHILS NFR BLD AUTO: 0.3 % — SIGNIFICANT CHANGE UP (ref 0–2)
BUN SERPL-MCNC: 3 MG/DL — LOW (ref 7–23)
BUN SERPL-MCNC: 3 MG/DL — LOW (ref 7–23)
CALCIUM SERPL-MCNC: 8.7 MG/DL — SIGNIFICANT CHANGE UP (ref 8.4–10.5)
CALCIUM SERPL-MCNC: 8.7 MG/DL — SIGNIFICANT CHANGE UP (ref 8.4–10.5)
CHLORIDE SERPL-SCNC: 111 MMOL/L — HIGH (ref 98–107)
CHLORIDE SERPL-SCNC: 111 MMOL/L — HIGH (ref 98–107)
CO2 SERPL-SCNC: 24 MMOL/L — SIGNIFICANT CHANGE UP (ref 22–31)
CO2 SERPL-SCNC: 24 MMOL/L — SIGNIFICANT CHANGE UP (ref 22–31)
CREAT SERPL-MCNC: 0.73 MG/DL — SIGNIFICANT CHANGE UP (ref 0.5–1.3)
CREAT SERPL-MCNC: 0.73 MG/DL — SIGNIFICANT CHANGE UP (ref 0.5–1.3)
EGFR: 107 ML/MIN/1.73M2 — SIGNIFICANT CHANGE UP
EGFR: 107 ML/MIN/1.73M2 — SIGNIFICANT CHANGE UP
EOSINOPHIL # BLD AUTO: 0.19 K/UL — SIGNIFICANT CHANGE UP (ref 0–0.5)
EOSINOPHIL # BLD AUTO: 0.19 K/UL — SIGNIFICANT CHANGE UP (ref 0–0.5)
EOSINOPHIL NFR BLD AUTO: 2.8 % — SIGNIFICANT CHANGE UP (ref 0–6)
EOSINOPHIL NFR BLD AUTO: 2.8 % — SIGNIFICANT CHANGE UP (ref 0–6)
GLUCOSE BLDC GLUCOMTR-MCNC: 75 MG/DL — SIGNIFICANT CHANGE UP (ref 70–99)
GLUCOSE BLDC GLUCOMTR-MCNC: 75 MG/DL — SIGNIFICANT CHANGE UP (ref 70–99)
GLUCOSE BLDC GLUCOMTR-MCNC: 78 MG/DL — SIGNIFICANT CHANGE UP (ref 70–99)
GLUCOSE BLDC GLUCOMTR-MCNC: 78 MG/DL — SIGNIFICANT CHANGE UP (ref 70–99)
GLUCOSE SERPL-MCNC: 77 MG/DL — SIGNIFICANT CHANGE UP (ref 70–99)
GLUCOSE SERPL-MCNC: 77 MG/DL — SIGNIFICANT CHANGE UP (ref 70–99)
HCT VFR BLD CALC: 30.7 % — LOW (ref 34.5–45)
HCT VFR BLD CALC: 30.7 % — LOW (ref 34.5–45)
HGB BLD-MCNC: 9.6 G/DL — LOW (ref 11.5–15.5)
HGB BLD-MCNC: 9.6 G/DL — LOW (ref 11.5–15.5)
IANC: 1.67 K/UL — LOW (ref 1.8–7.4)
IANC: 1.67 K/UL — LOW (ref 1.8–7.4)
IMM GRANULOCYTES NFR BLD AUTO: 0.6 % — SIGNIFICANT CHANGE UP (ref 0–0.9)
IMM GRANULOCYTES NFR BLD AUTO: 0.6 % — SIGNIFICANT CHANGE UP (ref 0–0.9)
LYMPHOCYTES # BLD AUTO: 3.58 K/UL — HIGH (ref 1–3.3)
LYMPHOCYTES # BLD AUTO: 3.58 K/UL — HIGH (ref 1–3.3)
LYMPHOCYTES # BLD AUTO: 52.9 % — HIGH (ref 13–44)
LYMPHOCYTES # BLD AUTO: 52.9 % — HIGH (ref 13–44)
MAGNESIUM SERPL-MCNC: 2.5 MG/DL — SIGNIFICANT CHANGE UP (ref 1.6–2.6)
MAGNESIUM SERPL-MCNC: 2.5 MG/DL — SIGNIFICANT CHANGE UP (ref 1.6–2.6)
MCHC RBC-ENTMCNC: 26.3 PG — LOW (ref 27–34)
MCHC RBC-ENTMCNC: 26.3 PG — LOW (ref 27–34)
MCHC RBC-ENTMCNC: 31.3 GM/DL — LOW (ref 32–36)
MCHC RBC-ENTMCNC: 31.3 GM/DL — LOW (ref 32–36)
MCV RBC AUTO: 84.1 FL — SIGNIFICANT CHANGE UP (ref 80–100)
MCV RBC AUTO: 84.1 FL — SIGNIFICANT CHANGE UP (ref 80–100)
MONOCYTES # BLD AUTO: 1.27 K/UL — HIGH (ref 0–0.9)
MONOCYTES # BLD AUTO: 1.27 K/UL — HIGH (ref 0–0.9)
MONOCYTES NFR BLD AUTO: 18.8 % — HIGH (ref 2–14)
MONOCYTES NFR BLD AUTO: 18.8 % — HIGH (ref 2–14)
NEUTROPHILS # BLD AUTO: 1.67 K/UL — LOW (ref 1.8–7.4)
NEUTROPHILS # BLD AUTO: 1.67 K/UL — LOW (ref 1.8–7.4)
NEUTROPHILS NFR BLD AUTO: 24.6 % — LOW (ref 43–77)
NEUTROPHILS NFR BLD AUTO: 24.6 % — LOW (ref 43–77)
NRBC # BLD: 0 /100 WBCS — SIGNIFICANT CHANGE UP (ref 0–0)
NRBC # BLD: 0 /100 WBCS — SIGNIFICANT CHANGE UP (ref 0–0)
NRBC # FLD: 0.02 K/UL — HIGH (ref 0–0)
NRBC # FLD: 0.02 K/UL — HIGH (ref 0–0)
PHOSPHATE SERPL-MCNC: 3.5 MG/DL — SIGNIFICANT CHANGE UP (ref 2.5–4.5)
PHOSPHATE SERPL-MCNC: 3.5 MG/DL — SIGNIFICANT CHANGE UP (ref 2.5–4.5)
PLATELET # BLD AUTO: 462 K/UL — HIGH (ref 150–400)
PLATELET # BLD AUTO: 462 K/UL — HIGH (ref 150–400)
POTASSIUM SERPL-MCNC: 3.6 MMOL/L — SIGNIFICANT CHANGE UP (ref 3.5–5.3)
POTASSIUM SERPL-MCNC: 3.6 MMOL/L — SIGNIFICANT CHANGE UP (ref 3.5–5.3)
POTASSIUM SERPL-SCNC: 3.6 MMOL/L — SIGNIFICANT CHANGE UP (ref 3.5–5.3)
POTASSIUM SERPL-SCNC: 3.6 MMOL/L — SIGNIFICANT CHANGE UP (ref 3.5–5.3)
RBC # BLD: 3.65 M/UL — LOW (ref 3.8–5.2)
RBC # BLD: 3.65 M/UL — LOW (ref 3.8–5.2)
RBC # FLD: 18.9 % — HIGH (ref 10.3–14.5)
RBC # FLD: 18.9 % — HIGH (ref 10.3–14.5)
SODIUM SERPL-SCNC: 145 MMOL/L — SIGNIFICANT CHANGE UP (ref 135–145)
SODIUM SERPL-SCNC: 145 MMOL/L — SIGNIFICANT CHANGE UP (ref 135–145)
WBC # BLD: 6.77 K/UL — SIGNIFICANT CHANGE UP (ref 3.8–10.5)
WBC # BLD: 6.77 K/UL — SIGNIFICANT CHANGE UP (ref 3.8–10.5)
WBC # FLD AUTO: 6.77 K/UL — SIGNIFICANT CHANGE UP (ref 3.8–10.5)
WBC # FLD AUTO: 6.77 K/UL — SIGNIFICANT CHANGE UP (ref 3.8–10.5)

## 2024-01-10 PROCEDURE — 99232 SBSQ HOSP IP/OBS MODERATE 35: CPT

## 2024-01-10 RX ADMIN — Medication 1 MILLIGRAM(S): at 11:13

## 2024-01-10 RX ADMIN — Medication 150 MILLIGRAM(S): at 11:12

## 2024-01-10 RX ADMIN — SENNA PLUS 2 TABLET(S): 8.6 TABLET ORAL at 22:48

## 2024-01-10 RX ADMIN — IRON SUCROSE 200 MILLIGRAM(S): 20 INJECTION, SOLUTION INTRAVENOUS at 17:43

## 2024-01-10 RX ADMIN — LITHIUM CARBONATE 300 MILLIGRAM(S): 300 TABLET, EXTENDED RELEASE ORAL at 17:43

## 2024-01-10 RX ADMIN — Medication 12.5 MILLIGRAM(S): at 06:44

## 2024-01-10 RX ADMIN — ENOXAPARIN SODIUM 40 MILLIGRAM(S): 100 INJECTION SUBCUTANEOUS at 06:44

## 2024-01-10 RX ADMIN — FAMOTIDINE 20 MILLIGRAM(S): 10 INJECTION INTRAVENOUS at 11:12

## 2024-01-10 RX ADMIN — DIVALPROEX SODIUM 1000 MILLIGRAM(S): 500 TABLET, DELAYED RELEASE ORAL at 22:49

## 2024-01-10 RX ADMIN — ATORVASTATIN CALCIUM 10 MILLIGRAM(S): 80 TABLET, FILM COATED ORAL at 22:48

## 2024-01-10 RX ADMIN — LITHIUM CARBONATE 300 MILLIGRAM(S): 300 TABLET, EXTENDED RELEASE ORAL at 06:44

## 2024-01-10 RX ADMIN — Medication 1000 UNIT(S): at 11:12

## 2024-01-10 RX ADMIN — ENOXAPARIN SODIUM 40 MILLIGRAM(S): 100 INJECTION SUBCUTANEOUS at 17:43

## 2024-01-10 NOTE — BH CONSULTATION LIAISON PROGRESS NOTE - NSBHASSESSMENTFT_PSY_ALL_CORE
Patient is a 38-year-old woman with past history of schizoaffective disorder, living in group home on [facility] grounds, with multiple past inpatient admissions, and PMH of seizure disorder, HTN, DM?, HLD who was BIBEMS activated by facility due to acute influenza infection. She was medically admitted due to blood cultures growing staph capitis, now thought to be contaminant. Psychiatry was consulted for medication management of home clozapine dose in the setting of increased sedation, decreasing ANC (1.07), and missed dose.     1/4: Patient seen, oriented, but lethargic and slow to respond, mild tremors noted in both UE. She firmly denies AVH, denies CAH, denies SI and HI. Denies feeling unsafe in the hospital. Care coordinated with outpt. psychiatrist, at baseline pt. is alert and has chronic tremors in both UE.  Labs: Lithium level 0.8, ANC 1.51, VPA level 49.30, Clozapine level 677  1/5: Patient lethargic, interview limited at this time. No overt catatonic sxs , no stiffness or rigidity. Labs: ANC 1.07 today.   1/6: Patient remains lethargic, not engaged with writer.   1/8: Patient awakes to verbal and tactile stimulation, although still sedated during daytime. Asking appropriate questions about discharge plan. No evidence of acute psychosis or mood symptoms indicating decompensation. ANC improved to 4.34.  1/9: Patient seen, awakes to verbal and tactile stimulation, still sedated at this time. No overt catatonic sxs, no stiffness or rigidity. No evidence of acute mood or psychotic symptoms. ANC 1.98.  1/10: Patient oriented, no psychosis, no si or hi. No catatonic sxs on exam. Plan is to hold Clozapine until ANC normalized. ANC today 1.67      PLAN:  1-HOLD clozapine due to low ANC    [] Monitor ANCs daily while at [facility]    [] Clozapine level slightly supratherapeutic at 677 on 1/4, decreased to 183 on 1/6 after stopped  2-Continue Lithium 300 mg BID and Effexor 150 mg po daily. Depakote dosing as per primary team as it seems to be for seizures    [] Monitor BUN/CR, Lithium level 0.8 on 1/4 and 0.4 on 1/9    [] VPA level 85.40 on 1/1 and 49.30 on 1/3  3- Haldol decanoate 200mg q4 weeks, last received 12/27/23, next injection is due on 1/24/24.  4- PRN for agitation: Zyprexa 2.5mg PO/IM q6h prn for agitation, may give additional Zyprexa 2.5mg for refractory agitation (ensure QTc <500); avoid IM/IV Ativan within 1 hour of Zyprexa  5- Care coordinated with outpatient psychiatrist Dr. Whitaker [phone] and care coordinator Meagan Mckeon [phone] on 1/4     [] Outpatient psych follow-up appt scheduled for 1/16 at 1:30 pm w/ Dr. Whitaker.     [] Pt also has standing apt with Dr. Treviño on Thursdays at 1:30, including 1/11.     [] Please fax DC summary to OP psychiatrist Dr. Treviño at [phone].  6- Continue routine obs as pt denies SI/HI. Have low threshold to start 1:1 if needed given h/o agitation in the past    Case discussed with Dr. Gonzalez Patient is a 38-year-old woman with past history of schizoaffective disorder, living in group home on [facility] grounds, with multiple past inpatient admissions, and PMH of seizure disorder, HTN, DM?, HLD who was BIBEMS activated by facility due to acute influenza infection. She was medically admitted due to blood cultures growing staph capitis, now thought to be contaminant. Psychiatry was consulted for medication management of home clozapine dose in the setting of increased sedation, decreasing ANC (1.07), and missed dose.     1/4: Patient seen, oriented, but lethargic and slow to respond, mild tremors noted in both UE. She firmly denies AVH, denies CAH, denies SI and HI. Denies feeling unsafe in the hospital. Care coordinated with outpt. psychiatrist, at baseline pt. is alert and has chronic tremors in both UE.  Labs: Lithium level 0.8, ANC 1.51, VPA level 49.30, Clozapine level 677  1/5: Patient lethargic, interview limited at this time. No overt catatonic sxs , no stiffness or rigidity. Labs: ANC 1.07 today.   1/6: Patient remains lethargic, not engaged with writer.   1/8: Patient awakes to verbal and tactile stimulation, although still sedated during daytime. Asking appropriate questions about discharge plan. No evidence of acute psychosis or mood symptoms indicating decompensation. ANC improved to 4.34.  1/9: Patient seen, awakes to verbal and tactile stimulation, still sedated at this time. No overt catatonic sxs, no stiffness or rigidity. No evidence of acute mood or psychotic symptoms. ANC 1.98.  1/10: Patient oriented, no psychosis, no si or hi. No catatonic sxs on exam. Plan is to hold Clozapine until ANC normalized. ANC today 1.67      PLAN:  1-HOLD clozapine due to low ANC    [] Monitor ANCs daily while at [facility]    [] Clozapine level slightly supratherapeutic at 677 on 1/4, decreased to 183 on 1/6 after stopped  2-Continue Lithium 300 mg BID and Effexor 150 mg po daily. Depakote dosing as per primary team as it seems to be for seizures    [] Monitor BUN/CR, Lithium level 0.8 on 1/4 and 0.4 on 1/9    [] VPA level 85.40 on 1/1 and 49.30 on 1/3  3- Haldol decanoate 200mg q4 weeks, last received 12/27/23, next injection is due on 1/24/24.  4- PRN for agitation: Zyprexa 2.5mg PO/IM q6h prn for agitation, may give additional Zyprexa 2.5mg for refractory agitation (ensure QTc <500); avoid IM/IV Ativan within 1 hour of Zyprexa  5- Care coordinated with outpatient psychiatrist Dr. Whitaker [phone] and care coordinator Meagan Mckeon [phone] on 1/4     [] Outpatient psych follow-up appt scheduled for 1/16 at 1:30 pm w/ Dr. Whitaker.     [] Pt also has standing apt with Dr. Treviño on Thursdays at 1:30, including 1/11.     [] Please fax DC summary to OP psychiatrist Dr. Treviño at [phone].  6- Continue routine obs as pt denies SI/HI. Have low threshold to start 1:1 if needed given h/o agitation in the past    Case discussed with Dr. Gonzalez

## 2024-01-10 NOTE — PROGRESS NOTE ADULT - ASSESSMENT
38-year-old female with past medical history of schizophrenia, seizure disorder, from [facility], HTN, DM?, HLD sent in for blood cx growing staph capitis and noted to have of influenza + 38-year-old female with past medical history of schizophrenia, seizure disorder, from [facility], HTN, DM?, HLD sent in for blood cx growing staph capitis and noted to have of influenza +

## 2024-01-10 NOTE — BH CONSULTATION LIAISON PROGRESS NOTE - NSBHATTESTBILLING_PSY_A_CORE
22000-[id number] OBS or IP - moderate complexity OR 35-49 mins 63270-[id number] OBS or IP - moderate complexity OR 35-49 mins

## 2024-01-10 NOTE — BH CONSULTATION LIAISON PROGRESS NOTE - NSBHFUPINTERVALHXFT_PSY_A_CORE
Chart reviewed, adherent with medications, pt. still somewhat drowsy but able to engage well in interview, she reports feeling " fine". She denies any AVH, CAH, paranoia, SIIP, or HIIP.

## 2024-01-10 NOTE — PROGRESS NOTE ADULT - PROBLEM SELECTOR PLAN 1
currently lethargic, No focal neuro deficits on exam however with intermittent jerking of upper extremities (which per outpt psychiatrist is her chronic baseline dt Haldol.   -bilateral tremor known to outpt psych and is chronic  -cont with lithium 300mg bid, effexor 150mg qd and Depakote 1g qhs (on for seizure)  -will hold clozapine in setting of decreasing ANC and viral syndrome, consider restarting in ~48h if stable/improving   -cont to monitor lithium, clozapine level and monitor ANC   -no ss of sepsis, work up neg  -appreciate psych recs  -aspiration, fall, sz precautions

## 2024-01-10 NOTE — PROGRESS NOTE ADULT - PROBLEM SELECTOR PLAN 5
outpt meds: Clozapine 175mg qhs,  lithium 300mg bid, effexor 150mg qd and Depakote 1g qhs, Haldol dec inj 200mg q4 weeks, last received 12/27/23, next injection is due on 1/24/24.   outpt psych: Dr. Whitaker [phone]    -pt denies A/V hallucinations, no evidence of decompensation   -adjusting psych medications as above  -appreciate psych recs outpt meds: Clozapine 175mg qhs,  lithium 300mg bid, effexor 150mg qd and Depakote 1g qhs, Haldol dec inj 200mg q4 weeks, last received 12/27/23, next injection is due on 1/24/24.   outpt psych: Dr. Whitaker [phone]    -pt denies A/V hallucinations, no evidence of decompensation   -adjusting psych medications as above  -appreciate psych recs

## 2024-01-10 NOTE — PROGRESS NOTE ADULT - SUBJECTIVE AND OBJECTIVE BOX
Patient is a 38y old  Female who presents with a chief complaint of + blood cultures (09 Jan 2024 17:28)    Duglas Gonzalez MD   [facility] Division of Hospital Medicine   Pager [phone]  Reachable on Microsoft Teams     SUBJECTIVE / OVERNIGHT EVENTS:  Patient seen and examined this morning. No events overnight.  Appears less lethargic today, slightly quicker responses.   No headaches, vision changes. No fevers, chills.       MEDICATIONS  (STANDING):  atorvastatin 10 milliGRAM(s) Oral at bedtime  cholecalciferol 1000 Unit(s) Oral daily  dextrose 5% + lactated ringers. 1000 milliLiter(s) (125 mL/Hr) IV Continuous <Continuous>  dextrose 5%. 1000 milliLiter(s) (100 mL/Hr) IV Continuous <Continuous>  dextrose 5%. 1000 milliLiter(s) (50 mL/Hr) IV Continuous <Continuous>  dextrose 50% Injectable 12.5 Gram(s) IV Push once  dextrose 50% Injectable 25 Gram(s) IV Push once  dextrose 50% Injectable 25 Gram(s) IV Push once  divalproex DR 1000 milliGRAM(s) Oral at bedtime  enoxaparin Injectable 40 milliGRAM(s) SubCutaneous every 12 hours  famotidine    Tablet 20 milliGRAM(s) Oral daily  folic acid 1 milliGRAM(s) Oral daily  glucagon  Injectable 1 milliGRAM(s) IntraMuscular once  iron sucrose Injectable 200 milliGRAM(s) IV Push every 24 hours  lithium 300 milliGRAM(s) Oral two times a day  metoprolol succinate ER 12.5 milliGRAM(s) Oral daily  senna 2 Tablet(s) Oral at bedtime  venlafaxine XR. 150 milliGRAM(s) Oral daily    MEDICATIONS  (PRN):  acetaminophen     Tablet .. 650 milliGRAM(s) Oral every 6 hours PRN Temp greater or equal to 38C (100.4F), Mild Pain (1 - 3)  aluminum hydroxide/magnesium hydroxide/simethicone Suspension 30 milliLiter(s) Oral every 4 hours PRN Dyspepsia  dextrose Oral Gel 15 Gram(s) Oral once PRN Blood Glucose LESS THAN 70 milliGRAM(s)/deciliter  melatonin 3 milliGRAM(s) Oral at bedtime PRN Insomnia  OLANZapine Injectable 2.5 milliGRAM(s) IntraMuscular every 6 hours PRN severe agitation  ondansetron Injectable 4 milliGRAM(s) IV Push every 8 hours PRN Nausea and/or Vomiting      Vital Signs Last 24 Hrs  T(C): 36.7 (10 Junior 2024 10:38), Max: 37.6 (09 Jan 2024 21:18)  T(F): 98 (10 Junior 2024 10:38), Max: 99.7 (09 Jan 2024 21:18)  HR: 69 (10 Junior 2024 10:38) (64 - 71)  BP: 122/79 (10 Junior 2024 10:38) (94/62 - 122/79)  BP(mean): --  RR: 18 (10 Junior 2024 10:38) (18 - 18)  SpO2: 99% (10 Junior 2024 10:38) (97% - 99%)  CAPILLARY BLOOD GLUCOSE      POCT Blood Glucose.: 75 mg/dL (10 Junior 2024 12:24)  POCT Blood Glucose.: 88 mg/dL (09 Jan 2024 20:57)  POCT Blood Glucose.: 102 mg/dL (09 Jan 2024 17:18)    I&O's Summary      General: woman lawing down in bed appears comfortable in NAD, awake and alert  Respiratory: No respiratory distress, CTABL, No rales, rhonchi, wheezing.  Cardiovascular: S1,S2; Regular rate and rhythm; No m/g/r.  Gastrointestinal: Soft, Nontender, obese abdomen; +BS.   Extremities: No c/c/e; warm to touch  Neurological: non-focal. Moving all 4 extremities;   Skin: No rashes, No erythema   Psych:  flat affect    LABS:                        9.6    6.77  )-----------( 462      ( 10 Junior 2024 06:13 )             30.7     01-10    145  |  111<H>  |  3<L>  ----------------------------<  77  3.6   |  24  |  0.73    Ca    8.7      10 Junior 2024 06:13  Phos  3.5     01-10  Mg     2.50     01-10            Urinalysis Basic - ( 10 Junior 2024 06:13 )    Color: x / Appearance: x / SG: x / pH: x  Gluc: 77 mg/dL / Ketone: x  / Bili: x / Urobili: x   Blood: x / Protein: x / Nitrite: x   Leuk Esterase: x / RBC: x / WBC x   Sq Epi: x / Non Sq Epi: x / Bacteria: x        RADIOLOGY & ADDITIONAL TESTS:    Imaging Personally Reviewed:    Consultant(s) Notes Reviewed:      Care Discussed with Consultants/Other Providers: d/w psych attending    Patient is a 38y old  Female who presents with a chief complaint of + blood cultures (09 Jan 2024 17:28)    Duglas Gonzalez MD   [facility] Division of Hospital Medicine   Pager [phone]  Reachable on Microsoft Teams     SUBJECTIVE / OVERNIGHT EVENTS:  Patient seen and examined this morning. No events overnight.  Appears less lethargic today, slightly quicker responses.   No headaches, vision changes. No fevers, chills.       MEDICATIONS  (STANDING):  atorvastatin 10 milliGRAM(s) Oral at bedtime  cholecalciferol 1000 Unit(s) Oral daily  dextrose 5% + lactated ringers. 1000 milliLiter(s) (125 mL/Hr) IV Continuous <Continuous>  dextrose 5%. 1000 milliLiter(s) (100 mL/Hr) IV Continuous <Continuous>  dextrose 5%. 1000 milliLiter(s) (50 mL/Hr) IV Continuous <Continuous>  dextrose 50% Injectable 12.5 Gram(s) IV Push once  dextrose 50% Injectable 25 Gram(s) IV Push once  dextrose 50% Injectable 25 Gram(s) IV Push once  divalproex DR 1000 milliGRAM(s) Oral at bedtime  enoxaparin Injectable 40 milliGRAM(s) SubCutaneous every 12 hours  famotidine    Tablet 20 milliGRAM(s) Oral daily  folic acid 1 milliGRAM(s) Oral daily  glucagon  Injectable 1 milliGRAM(s) IntraMuscular once  iron sucrose Injectable 200 milliGRAM(s) IV Push every 24 hours  lithium 300 milliGRAM(s) Oral two times a day  metoprolol succinate ER 12.5 milliGRAM(s) Oral daily  senna 2 Tablet(s) Oral at bedtime  venlafaxine XR. 150 milliGRAM(s) Oral daily    MEDICATIONS  (PRN):  acetaminophen     Tablet .. 650 milliGRAM(s) Oral every 6 hours PRN Temp greater or equal to 38C (100.4F), Mild Pain (1 - 3)  aluminum hydroxide/magnesium hydroxide/simethicone Suspension 30 milliLiter(s) Oral every 4 hours PRN Dyspepsia  dextrose Oral Gel 15 Gram(s) Oral once PRN Blood Glucose LESS THAN 70 milliGRAM(s)/deciliter  melatonin 3 milliGRAM(s) Oral at bedtime PRN Insomnia  OLANZapine Injectable 2.5 milliGRAM(s) IntraMuscular every 6 hours PRN severe agitation  ondansetron Injectable 4 milliGRAM(s) IV Push every 8 hours PRN Nausea and/or Vomiting      Vital Signs Last 24 Hrs  T(C): 36.7 (10 Junior 2024 10:38), Max: 37.6 (09 Jan 2024 21:18)  T(F): 98 (10 Junior 2024 10:38), Max: 99.7 (09 Jan 2024 21:18)  HR: 69 (10 Junior 2024 10:38) (64 - 71)  BP: 122/79 (10 Junior 2024 10:38) (94/62 - 122/79)  BP(mean): --  RR: 18 (10 Junior 2024 10:38) (18 - 18)  SpO2: 99% (10 Junior 2024 10:38) (97% - 99%)  CAPILLARY BLOOD GLUCOSE      POCT Blood Glucose.: 75 mg/dL (10 Junior 2024 12:24)  POCT Blood Glucose.: 88 mg/dL (09 Jan 2024 20:57)  POCT Blood Glucose.: 102 mg/dL (09 Jan 2024 17:18)    I&O's Summary      General: woman lawing down in bed appears comfortable in NAD, awake and alert  Respiratory: No respiratory distress, CTABL, No rales, rhonchi, wheezing.  Cardiovascular: S1,S2; Regular rate and rhythm; No m/g/r.  Gastrointestinal: Soft, Nontender, obese abdomen; +BS.   Extremities: No c/c/e; warm to touch  Neurological: non-focal. Moving all 4 extremities;   Skin: No rashes, No erythema   Psych:  flat affect    LABS:                        9.6    6.77  )-----------( 462      ( 10 Junior 2024 06:13 )             30.7     01-10    145  |  111<H>  |  3<L>  ----------------------------<  77  3.6   |  24  |  0.73    Ca    8.7      10 Junior 2024 06:13  Phos  3.5     01-10  Mg     2.50     01-10            Urinalysis Basic - ( 10 Junior 2024 06:13 )    Color: x / Appearance: x / SG: x / pH: x  Gluc: 77 mg/dL / Ketone: x  / Bili: x / Urobili: x   Blood: x / Protein: x / Nitrite: x   Leuk Esterase: x / RBC: x / WBC x   Sq Epi: x / Non Sq Epi: x / Bacteria: x        RADIOLOGY & ADDITIONAL TESTS:    Imaging Personally Reviewed:    Consultant(s) Notes Reviewed:      Care Discussed with Consultants/Other Providers: d/w psych attending

## 2024-01-11 LAB
ANION GAP SERPL CALC-SCNC: 11 MMOL/L — SIGNIFICANT CHANGE UP (ref 7–14)
ANION GAP SERPL CALC-SCNC: 11 MMOL/L — SIGNIFICANT CHANGE UP (ref 7–14)
BASOPHILS # BLD AUTO: 0.03 K/UL — SIGNIFICANT CHANGE UP (ref 0–0.2)
BASOPHILS # BLD AUTO: 0.03 K/UL — SIGNIFICANT CHANGE UP (ref 0–0.2)
BASOPHILS NFR BLD AUTO: 0.4 % — SIGNIFICANT CHANGE UP (ref 0–2)
BASOPHILS NFR BLD AUTO: 0.4 % — SIGNIFICANT CHANGE UP (ref 0–2)
BUN SERPL-MCNC: 6 MG/DL — LOW (ref 7–23)
BUN SERPL-MCNC: 6 MG/DL — LOW (ref 7–23)
CALCIUM SERPL-MCNC: 9.1 MG/DL — SIGNIFICANT CHANGE UP (ref 8.4–10.5)
CALCIUM SERPL-MCNC: 9.1 MG/DL — SIGNIFICANT CHANGE UP (ref 8.4–10.5)
CHLORIDE SERPL-SCNC: 109 MMOL/L — HIGH (ref 98–107)
CHLORIDE SERPL-SCNC: 109 MMOL/L — HIGH (ref 98–107)
CO2 SERPL-SCNC: 25 MMOL/L — SIGNIFICANT CHANGE UP (ref 22–31)
CO2 SERPL-SCNC: 25 MMOL/L — SIGNIFICANT CHANGE UP (ref 22–31)
CREAT SERPL-MCNC: 0.86 MG/DL — SIGNIFICANT CHANGE UP (ref 0.5–1.3)
CREAT SERPL-MCNC: 0.86 MG/DL — SIGNIFICANT CHANGE UP (ref 0.5–1.3)
EGFR: 88 ML/MIN/1.73M2 — SIGNIFICANT CHANGE UP
EGFR: 88 ML/MIN/1.73M2 — SIGNIFICANT CHANGE UP
EOSINOPHIL # BLD AUTO: 0.18 K/UL — SIGNIFICANT CHANGE UP (ref 0–0.5)
EOSINOPHIL # BLD AUTO: 0.18 K/UL — SIGNIFICANT CHANGE UP (ref 0–0.5)
EOSINOPHIL NFR BLD AUTO: 2.1 % — SIGNIFICANT CHANGE UP (ref 0–6)
EOSINOPHIL NFR BLD AUTO: 2.1 % — SIGNIFICANT CHANGE UP (ref 0–6)
GLUCOSE BLDC GLUCOMTR-MCNC: 105 MG/DL — HIGH (ref 70–99)
GLUCOSE BLDC GLUCOMTR-MCNC: 105 MG/DL — HIGH (ref 70–99)
GLUCOSE BLDC GLUCOMTR-MCNC: 83 MG/DL — SIGNIFICANT CHANGE UP (ref 70–99)
GLUCOSE BLDC GLUCOMTR-MCNC: 83 MG/DL — SIGNIFICANT CHANGE UP (ref 70–99)
GLUCOSE SERPL-MCNC: 81 MG/DL — SIGNIFICANT CHANGE UP (ref 70–99)
GLUCOSE SERPL-MCNC: 81 MG/DL — SIGNIFICANT CHANGE UP (ref 70–99)
HCT VFR BLD CALC: 32.2 % — LOW (ref 34.5–45)
HCT VFR BLD CALC: 32.2 % — LOW (ref 34.5–45)
HGB BLD-MCNC: 9.9 G/DL — LOW (ref 11.5–15.5)
HGB BLD-MCNC: 9.9 G/DL — LOW (ref 11.5–15.5)
IANC: 2.64 K/UL — SIGNIFICANT CHANGE UP (ref 1.8–7.4)
IANC: 2.64 K/UL — SIGNIFICANT CHANGE UP (ref 1.8–7.4)
IMM GRANULOCYTES NFR BLD AUTO: 0.7 % — SIGNIFICANT CHANGE UP (ref 0–0.9)
IMM GRANULOCYTES NFR BLD AUTO: 0.7 % — SIGNIFICANT CHANGE UP (ref 0–0.9)
LYMPHOCYTES # BLD AUTO: 4.26 K/UL — HIGH (ref 1–3.3)
LYMPHOCYTES # BLD AUTO: 4.26 K/UL — HIGH (ref 1–3.3)
LYMPHOCYTES # BLD AUTO: 49.8 % — HIGH (ref 13–44)
LYMPHOCYTES # BLD AUTO: 49.8 % — HIGH (ref 13–44)
MAGNESIUM SERPL-MCNC: 2.4 MG/DL — SIGNIFICANT CHANGE UP (ref 1.6–2.6)
MAGNESIUM SERPL-MCNC: 2.4 MG/DL — SIGNIFICANT CHANGE UP (ref 1.6–2.6)
MCHC RBC-ENTMCNC: 25.9 PG — LOW (ref 27–34)
MCHC RBC-ENTMCNC: 25.9 PG — LOW (ref 27–34)
MCHC RBC-ENTMCNC: 30.7 GM/DL — LOW (ref 32–36)
MCHC RBC-ENTMCNC: 30.7 GM/DL — LOW (ref 32–36)
MCV RBC AUTO: 84.3 FL — SIGNIFICANT CHANGE UP (ref 80–100)
MCV RBC AUTO: 84.3 FL — SIGNIFICANT CHANGE UP (ref 80–100)
MONOCYTES # BLD AUTO: 1.38 K/UL — HIGH (ref 0–0.9)
MONOCYTES # BLD AUTO: 1.38 K/UL — HIGH (ref 0–0.9)
MONOCYTES NFR BLD AUTO: 16.1 % — HIGH (ref 2–14)
MONOCYTES NFR BLD AUTO: 16.1 % — HIGH (ref 2–14)
NEUTROPHILS # BLD AUTO: 2.64 K/UL — SIGNIFICANT CHANGE UP (ref 1.8–7.4)
NEUTROPHILS # BLD AUTO: 2.64 K/UL — SIGNIFICANT CHANGE UP (ref 1.8–7.4)
NEUTROPHILS NFR BLD AUTO: 30.9 % — LOW (ref 43–77)
NEUTROPHILS NFR BLD AUTO: 30.9 % — LOW (ref 43–77)
NRBC # BLD: 0 /100 WBCS — SIGNIFICANT CHANGE UP (ref 0–0)
NRBC # BLD: 0 /100 WBCS — SIGNIFICANT CHANGE UP (ref 0–0)
NRBC # FLD: 0.08 K/UL — HIGH (ref 0–0)
NRBC # FLD: 0.08 K/UL — HIGH (ref 0–0)
PHOSPHATE SERPL-MCNC: 3 MG/DL — SIGNIFICANT CHANGE UP (ref 2.5–4.5)
PHOSPHATE SERPL-MCNC: 3 MG/DL — SIGNIFICANT CHANGE UP (ref 2.5–4.5)
PLATELET # BLD AUTO: 518 K/UL — HIGH (ref 150–400)
PLATELET # BLD AUTO: 518 K/UL — HIGH (ref 150–400)
POTASSIUM SERPL-MCNC: 3.8 MMOL/L — SIGNIFICANT CHANGE UP (ref 3.5–5.3)
POTASSIUM SERPL-MCNC: 3.8 MMOL/L — SIGNIFICANT CHANGE UP (ref 3.5–5.3)
POTASSIUM SERPL-SCNC: 3.8 MMOL/L — SIGNIFICANT CHANGE UP (ref 3.5–5.3)
POTASSIUM SERPL-SCNC: 3.8 MMOL/L — SIGNIFICANT CHANGE UP (ref 3.5–5.3)
RBC # BLD: 3.82 M/UL — SIGNIFICANT CHANGE UP (ref 3.8–5.2)
RBC # BLD: 3.82 M/UL — SIGNIFICANT CHANGE UP (ref 3.8–5.2)
RBC # FLD: 18.7 % — HIGH (ref 10.3–14.5)
RBC # FLD: 18.7 % — HIGH (ref 10.3–14.5)
SODIUM SERPL-SCNC: 145 MMOL/L — SIGNIFICANT CHANGE UP (ref 135–145)
SODIUM SERPL-SCNC: 145 MMOL/L — SIGNIFICANT CHANGE UP (ref 135–145)
WBC # BLD: 8.55 K/UL — SIGNIFICANT CHANGE UP (ref 3.8–10.5)
WBC # BLD: 8.55 K/UL — SIGNIFICANT CHANGE UP (ref 3.8–10.5)
WBC # FLD AUTO: 8.55 K/UL — SIGNIFICANT CHANGE UP (ref 3.8–10.5)
WBC # FLD AUTO: 8.55 K/UL — SIGNIFICANT CHANGE UP (ref 3.8–10.5)

## 2024-01-11 PROCEDURE — 99232 SBSQ HOSP IP/OBS MODERATE 35: CPT

## 2024-01-11 PROCEDURE — 99233 SBSQ HOSP IP/OBS HIGH 50: CPT

## 2024-01-11 RX ORDER — OLANZAPINE 15 MG/1
5 TABLET, FILM COATED ORAL AT BEDTIME
Refills: 0 | Status: DISCONTINUED | OUTPATIENT
Start: 2024-01-11 | End: 2024-01-14

## 2024-01-11 RX ADMIN — Medication 1 MILLIGRAM(S): at 13:13

## 2024-01-11 RX ADMIN — Medication 1000 UNIT(S): at 13:13

## 2024-01-11 RX ADMIN — Medication 150 MILLIGRAM(S): at 13:13

## 2024-01-11 RX ADMIN — LITHIUM CARBONATE 300 MILLIGRAM(S): 300 TABLET, EXTENDED RELEASE ORAL at 07:00

## 2024-01-11 RX ADMIN — ATORVASTATIN CALCIUM 10 MILLIGRAM(S): 80 TABLET, FILM COATED ORAL at 22:11

## 2024-01-11 RX ADMIN — FAMOTIDINE 20 MILLIGRAM(S): 10 INJECTION INTRAVENOUS at 13:13

## 2024-01-11 RX ADMIN — ENOXAPARIN SODIUM 40 MILLIGRAM(S): 100 INJECTION SUBCUTANEOUS at 18:50

## 2024-01-11 RX ADMIN — Medication 12.5 MILLIGRAM(S): at 06:59

## 2024-01-11 RX ADMIN — Medication 650 MILLIGRAM(S): at 12:08

## 2024-01-11 RX ADMIN — OLANZAPINE 5 MILLIGRAM(S): 15 TABLET, FILM COATED ORAL at 22:11

## 2024-01-11 RX ADMIN — LITHIUM CARBONATE 300 MILLIGRAM(S): 300 TABLET, EXTENDED RELEASE ORAL at 18:50

## 2024-01-11 RX ADMIN — ENOXAPARIN SODIUM 40 MILLIGRAM(S): 100 INJECTION SUBCUTANEOUS at 06:59

## 2024-01-11 RX ADMIN — DIVALPROEX SODIUM 1000 MILLIGRAM(S): 500 TABLET, DELAYED RELEASE ORAL at 22:11

## 2024-01-11 RX ADMIN — IRON SUCROSE 200 MILLIGRAM(S): 20 INJECTION, SOLUTION INTRAVENOUS at 18:50

## 2024-01-11 RX ADMIN — Medication 650 MILLIGRAM(S): at 11:08

## 2024-01-11 NOTE — PROGRESS NOTE ADULT - PROBLEM SELECTOR PLAN 1
currently lethargic, No focal neuro deficits on exam however with intermittent jerking of upper extremities (which per outpt psychiatrist is her chronic baseline dt Haldol.   -bilateral tremor known to outpt psych and is chronic  -cont with lithium 300mg bid, effexor 150mg qd and Depakote 1g qhs (on for seizure)  -will hold clozapine in setting of decreasing ANC and viral syndrome, consider restarting in 24-48h if stable/improving   -cont to monitor lithium, clozapine level and monitor ANC   -no ss of sepsis, work up neg  -appreciate psych recs  -aspiration, fall, sz precautions

## 2024-01-11 NOTE — PROGRESS NOTE ADULT - ASSESSMENT
38-year-old female with past medical history of schizophrenia, seizure disorder, from [facility], HTN, DM?, HLD sent in for blood cx growing staph capitis and noted to have of influenza + 38-year-old female with past medical history of schizophrenia, seizure disorder, from [facility], HTN, DM?, HLD sent in for blood cx growing staph capitis and noted to have of influenza +

## 2024-01-11 NOTE — PROGRESS NOTE ADULT - SUBJECTIVE AND OBJECTIVE BOX
Patient is a 38y old  Female who presents with a chief complaint of + blood cultures (10 Junior 2024 13:50)    Duglas Gonzalez MD   [facility] Division of Hospital Medicine   Pager [phone]  Reachable on [employer] Teams     SUBJECTIVE / OVERNIGHT EVENTS:  Patient seen and examined this afternoon. Much more alert today. Responding to questions without much delay.   Reporting hearing some voices that are telling her to her hurt herself. Denies wanting to her hurself.   No pain, has been eating well. Last BM was today.    MEDICATIONS  (STANDING):  atorvastatin 10 milliGRAM(s) Oral at bedtime  cholecalciferol 1000 Unit(s) Oral daily  dextrose 5% + lactated ringers. 1000 milliLiter(s) (125 mL/Hr) IV Continuous <Continuous>  dextrose 5%. 1000 milliLiter(s) (100 mL/Hr) IV Continuous <Continuous>  dextrose 5%. 1000 milliLiter(s) (50 mL/Hr) IV Continuous <Continuous>  dextrose 50% Injectable 12.5 Gram(s) IV Push once  dextrose 50% Injectable 25 Gram(s) IV Push once  dextrose 50% Injectable 25 Gram(s) IV Push once  divalproex DR 1000 milliGRAM(s) Oral at bedtime  enoxaparin Injectable 40 milliGRAM(s) SubCutaneous every 12 hours  famotidine    Tablet 20 milliGRAM(s) Oral daily  folic acid 1 milliGRAM(s) Oral daily  glucagon  Injectable 1 milliGRAM(s) IntraMuscular once  iron sucrose Injectable 200 milliGRAM(s) IV Push every 24 hours  lithium 300 milliGRAM(s) Oral two times a day  metoprolol succinate ER 12.5 milliGRAM(s) Oral daily  OLANZapine 5 milliGRAM(s) Oral at bedtime  senna 2 Tablet(s) Oral at bedtime  venlafaxine XR. 150 milliGRAM(s) Oral daily    MEDICATIONS  (PRN):  acetaminophen     Tablet .. 650 milliGRAM(s) Oral every 6 hours PRN Temp greater or equal to 38C (100.4F), Mild Pain (1 - 3)  aluminum hydroxide/magnesium hydroxide/simethicone Suspension 30 milliLiter(s) Oral every 4 hours PRN Dyspepsia  dextrose Oral Gel 15 Gram(s) Oral once PRN Blood Glucose LESS THAN 70 milliGRAM(s)/deciliter  melatonin 3 milliGRAM(s) Oral at bedtime PRN Insomnia  OLANZapine Injectable 2.5 milliGRAM(s) IntraMuscular every 6 hours PRN severe agitation  ondansetron Injectable 4 milliGRAM(s) IV Push every 8 hours PRN Nausea and/or Vomiting      Vital Signs Last 24 Hrs  T(C): 36.8 (11 Jan 2024 10:05), Max: 37.3 (10 Junior 2024 20:01)  T(F): 98.2 (11 Jan 2024 10:05), Max: 99.1 (10 Junior 2024 20:01)  HR: 69 (11 Jan 2024 10:05) (69 - 83)  BP: 101/80 (11 Jan 2024 10:05) (100/66 - 133/77)  BP(mean): --  RR: 18 (11 Jan 2024 10:05) (17 - 18)  SpO2: 96% (11 Jan 2024 10:05) (96% - 98%)  CAPILLARY BLOOD GLUCOSE      POCT Blood Glucose.: 105 mg/dL (11 Jan 2024 12:26)  POCT Blood Glucose.: 83 mg/dL (11 Jan 2024 08:37)  POCT Blood Glucose.: 78 mg/dL (10 Junior 2024 17:38)    I&O's Summary      General: woman lawing down in bed appears comfortable in NAD, awake and alert  Respiratory: No respiratory distress, CTABL, No rales, rhonchi, wheezing.  Cardiovascular: S1,S2; Regular rate and rhythm; No m/g/r.  Gastrointestinal: Soft, Nontender, obese abdomen; +BS.   Extremities: No c/c/e; warm to touch  Neurological: non-focal. Moving all 4 extremities;   Skin: No rashes, No erythema   Psych:  flat affect      LABS:                        9.9    8.55  )-----------( 518      ( 11 Jan 2024 05:25 )             32.2     01-11    145  |  109<H>  |  6<L>  ----------------------------<  81  3.8   |  25  |  0.86    Ca    9.1      11 Jan 2024 05:25  Phos  3.0     01-11  Mg     2.40     01-11            Urinalysis Basic - ( 11 Jan 2024 05:25 )    Color: x / Appearance: x / SG: x / pH: x  Gluc: 81 mg/dL / Ketone: x  / Bili: x / Urobili: x   Blood: x / Protein: x / Nitrite: x   Leuk Esterase: x / RBC: x / WBC x   Sq Epi: x / Non Sq Epi: x / Bacteria: x        RADIOLOGY & ADDITIONAL TESTS:    Imaging Personally Reviewed:    Consultant(s) Notes Reviewed:      Care Discussed with Consultants/Other Providers:   Patient is a 38y old  Female who presents with a chief complaint of + blood cultures (10 Junior 2024 13:50)    Duglas Gonzalez MD   [facility] Division of Hospital Medicine   Pager [phone]  Reachable on [employer] Teams     SUBJECTIVE / OVERNIGHT EVENTS:  Patient seen and examined this afternoon. Much more alert today. Responding to questions without much delay.   Reporting hearing some voices that are telling her to her hurt herself. Denies wanting to her hurself.   No pain, has been eating well. Last BM was today.    MEDICATIONS  (STANDING):  atorvastatin 10 milliGRAM(s) Oral at bedtime  cholecalciferol 1000 Unit(s) Oral daily  dextrose 5% + lactated ringers. 1000 milliLiter(s) (125 mL/Hr) IV Continuous <Continuous>  dextrose 5%. 1000 milliLiter(s) (100 mL/Hr) IV Continuous <Continuous>  dextrose 5%. 1000 milliLiter(s) (50 mL/Hr) IV Continuous <Continuous>  dextrose 50% Injectable 12.5 Gram(s) IV Push once  dextrose 50% Injectable 25 Gram(s) IV Push once  dextrose 50% Injectable 25 Gram(s) IV Push once  divalproex DR 1000 milliGRAM(s) Oral at bedtime  enoxaparin Injectable 40 milliGRAM(s) SubCutaneous every 12 hours  famotidine    Tablet 20 milliGRAM(s) Oral daily  folic acid 1 milliGRAM(s) Oral daily  glucagon  Injectable 1 milliGRAM(s) IntraMuscular once  iron sucrose Injectable 200 milliGRAM(s) IV Push every 24 hours  lithium 300 milliGRAM(s) Oral two times a day  metoprolol succinate ER 12.5 milliGRAM(s) Oral daily  OLANZapine 5 milliGRAM(s) Oral at bedtime  senna 2 Tablet(s) Oral at bedtime  venlafaxine XR. 150 milliGRAM(s) Oral daily    MEDICATIONS  (PRN):  acetaminophen     Tablet .. 650 milliGRAM(s) Oral every 6 hours PRN Temp greater or equal to 38C (100.4F), Mild Pain (1 - 3)  aluminum hydroxide/magnesium hydroxide/simethicone Suspension 30 milliLiter(s) Oral every 4 hours PRN Dyspepsia  dextrose Oral Gel 15 Gram(s) Oral once PRN Blood Glucose LESS THAN 70 milliGRAM(s)/deciliter  melatonin 3 milliGRAM(s) Oral at bedtime PRN Insomnia  OLANZapine Injectable 2.5 milliGRAM(s) IntraMuscular every 6 hours PRN severe agitation  ondansetron Injectable 4 milliGRAM(s) IV Push every 8 hours PRN Nausea and/or Vomiting      Vital Signs Last 24 Hrs  T(C): 36.8 (11 Jan 2024 10:05), Max: 37.3 (10 Junior 2024 20:01)  T(F): 98.2 (11 Jan 2024 10:05), Max: 99.1 (10 Junior 2024 20:01)  HR: 69 (11 Jan 2024 10:05) (69 - 83)  BP: 101/80 (11 Jan 2024 10:05) (100/66 - 133/77)  BP(mean): --  RR: 18 (11 Jan 2024 10:05) (17 - 18)  SpO2: 96% (11 Jan 2024 10:05) (96% - 98%)  CAPILLARY BLOOD GLUCOSE      POCT Blood Glucose.: 105 mg/dL (11 Jan 2024 12:26)  POCT Blood Glucose.: 83 mg/dL (11 Jan 2024 08:37)  POCT Blood Glucose.: 78 mg/dL (10 Junior 2024 17:38)    I&O's Summary      General: woman lawing down in bed appears comfortable in NAD, awake and alert  Respiratory: No respiratory distress, CTABL, No rales, rhonchi, wheezing.  Cardiovascular: S1,S2; Regular rate and rhythm; No m/g/r.  Gastrointestinal: Soft, Nontender, obese abdomen; +BS.   Extremities: No c/c/e; warm to touch  Neurological: non-focal. Moving all 4 extremities;   Skin: No rashes, No erythema   Psych:  flat affect      LABS:                        9.9    8.55  )-----------( 518      ( 11 Jan 2024 05:25 )             32.2     01-11    145  |  109<H>  |  6<L>  ----------------------------<  81  3.8   |  25  |  0.86    Ca    9.1      11 Jan 2024 05:25  Phos  3.0     01-11  Mg     2.40     01-11            Urinalysis Basic - ( 11 Jan 2024 05:25 )    Color: x / Appearance: x / SG: x / pH: x  Gluc: 81 mg/dL / Ketone: x  / Bili: x / Urobili: x   Blood: x / Protein: x / Nitrite: x   Leuk Esterase: x / RBC: x / WBC x   Sq Epi: x / Non Sq Epi: x / Bacteria: x        RADIOLOGY & ADDITIONAL TESTS:    Imaging Personally Reviewed:    Consultant(s) Notes Reviewed:      Care Discussed with Consultants/Other Providers:

## 2024-01-11 NOTE — BH CONSULTATION LIAISON PROGRESS NOTE - NSBHATTESTBILLING_PSY_A_CORE
[id number] OBS or IP - high complexity OR 50-79 mins [id number] OBS or IP - high complexity OR 50-79 mins

## 2024-01-11 NOTE — PROGRESS NOTE ADULT - PROBLEM SELECTOR PLAN 5
outpt meds: Clozapine 175mg qhs,  lithium 300mg bid, effexor 150mg qd and Depakote 1g qhs, Haldol dec inj 200mg q4 weeks, last received 12/27/23, next injection is due on 1/24/24.   outpt psych: Dr. Whitaker [phone]    -pt denies A/V hallucinations, no evidence of decompensation   -adjusting psych medications as above  -appreciate psych recs outpt meds: Clozapine 175mg qhs,  lithium 300mg bid, effexor 150mg qd and Depakote 1g qhs, Haldol dec inj 200mg q4 weeks, last received 12/27/23, next injection is due on 1/24/24.   outpt psych: Dr. Whitaker [phone]    -pt denies A/V hallucinations, no evidence of decompensation   -adjusting psych medications as above  -appreciate psych recs

## 2024-01-11 NOTE — BH CONSULTATION LIAISON PROGRESS NOTE - NSBHFUPINTERVALHXFT_PSY_A_CORE
Chart reviewed and case discussed with primary team. No acute overnight events, no PRN medications required. Adherent with standing medications. Per staff, patient has been more alert today, filled out her meal preference sheet, and has been walking around her room.     Today, patient states that she started hearing louder and more frequent AH yesterday night that seem different that her baseline AH. She endorses command AH to go outside and to kill herself. She denies any specific plan or intent to harm herself. She does feel afraid that the voices could hurt her if she does not listen to them, although she is not able to identify what would happen. Patient agrees to approach staff if she feels like she is in acute danger. She last heard a voice this morning saying "Hey Ho" and denies any further CAH today. Patient reports good sleep and energy. No depression, S/IIP outside of CAH, or H/IIP reported. No side effects from medications. She is amenable to starting an antipsychotic to help with AH.    Update provided to OP psychiatrist Dr. Whitaker today about pt's CAH, plan to start Zyprexa, and plan to hold clozapine due to low ANCs.

## 2024-01-11 NOTE — BH CONSULTATION LIAISON PROGRESS NOTE - NSBHASSESSMENTFT_PSY_ALL_CORE
Patient is a 38-year-old woman with past history of schizoaffective disorder, living in group home on [facility] grounds, with multiple past inpatient admissions, and PMH of seizure disorder, HTN, DM?, HLD who was BIBEMS activated by facility due to acute influenza infection. She was medically admitted due to blood cultures growing staph capitis, now thought to be contaminant. Psychiatry was consulted for medication management of home clozapine dose in the setting of increased sedation, decreasing ANC (1.07), and missed dose.     1/4: Patient seen, oriented, but lethargic and slow to respond, mild tremors noted in both UE. She firmly denies AVH, denies CAH, denies SI and HI. Denies feeling unsafe in the hospital. Care coordinated with outpt. psychiatrist, at baseline pt. is alert and has chronic tremors in both UE.  Labs: Lithium level 0.8, ANC 1.51, VPA level 49.30, Clozapine level 677  1/5: Patient lethargic, interview limited at this time. No overt catatonic sxs , no stiffness or rigidity. Labs: ANC 1.07 today.   1/6: Patient remains lethargic, not engaged with writer.   1/8: Patient awakes to verbal and tactile stimulation, although still sedated during daytime. Asking appropriate questions about discharge plan. No evidence of acute psychosis or mood symptoms indicating decompensation. ANC improved to 4.34.  1/9: Patient seen, awakes to verbal and tactile stimulation, still sedated at this time. No overt catatonic sxs, no stiffness or rigidity. No evidence of acute mood or psychotic symptoms. ANC 1.98.  1/10: Patient oriented, no psychosis, no si or hi. No catatonic sxs on exam. Plan is to hold Clozapine until ANC normalized. ANC today 1.67  1/11: Reporting CAH to harm herself. Started on 1:1 CO and zyprexa 5 mg HS. Continue to hold clozapine until ANC normalizes; improved to 2.64 today but continue to trend due to recent fluctuations.    PLAN:  1-START 1:1 CO for CAH to harm herself and concern for psychotic decompensation. Order safety trays (no metal objects).   2-Patient DOES NOT have capacity to leave AMA. Further safety evaluation needed for disposition planning.  3-START zyprexa 5 mg qHS for psychotic decompensation in setting of not receiving clozapine  4-HOLD clozapine due to low ANC until several days of normal values seen    [] Monitor ANCs daily while at [facility]    [] Clozapine level slightly supratherapeutic at 677 on 1/4, decreased to 183 on 1/6 after stopped  5-Continue Lithium 300 mg BID and Effexor 150 mg po daily. Depakote dosing as per primary team as it seems to be for seizures    [] Monitor BUN/CR, Lithium level 0.8 on 1/4 and 0.4 on 1/9    [] VPA level 85.40 on 1/1 and 49.30 on 1/3  6- Haldol decanoate 200mg q4 weeks, last received 12/27/23, next injection is due on 1/24/24.  7- PRN for agitation: Zyprexa 2.5mg PO/IM q6h prn for agitation, may give additional Zyprexa 2.5mg for refractory agitation (ensure QTc <500); avoid IM/IV Ativan within 1 hour of Zyprexa  8- Care coordinated with outpatient psychiatrist Dr. Whitaker [phone], outpatient psychologist Dr. Treviño, and care coordinator Meagan Mckeon [phone] on 1/4. Update provided to Dr. Whitaker on 1/11     [] Outpatient psych follow-up appt scheduled for 1/16 at 1:30 pm w/ Dr. Whitaker.     [] Pt also has standing apt with Dr. Treviño on Thursdays at 1:30, including 1/11.     [] Please fax DC summary to OP psychiatry team at [phone]. 9-Disposition: pending clinical course; may need inpatient psych admission if CAH to harm self continue Patient is a 38-year-old woman with past history of schizoaffective disorder, living in group home on [facility] grounds, with multiple past inpatient admissions, and PMH of seizure disorder, HTN, DM?, HLD who was BIBEMS activated by facility due to acute influenza infection. She was medically admitted due to blood cultures growing staph capitis, now thought to be contaminant. Psychiatry was consulted for medication management of home clozapine dose in the setting of increased sedation, decreasing ANC (1.07), and missed dose.     1/4: Patient seen, oriented, but lethargic and slow to respond, mild tremors noted in both UE. She firmly denies AVH, denies CAH, denies SI and HI. Denies feeling unsafe in the hospital. Care coordinated with outpt. psychiatrist, at baseline pt. is alert and has chronic tremors in both UE.  Labs: Lithium level 0.8, ANC 1.51, VPA level 49.30, Clozapine level 677  1/5: Patient lethargic, interview limited at this time. No overt catatonic sxs , no stiffness or rigidity. Labs: ANC 1.07 today.   1/6: Patient remains lethargic, not engaged with writer.   1/8: Patient awakes to verbal and tactile stimulation, although still sedated during daytime. Asking appropriate questions about discharge plan. No evidence of acute psychosis or mood symptoms indicating decompensation. ANC improved to 4.34.  1/9: Patient seen, awakes to verbal and tactile stimulation, still sedated at this time. No overt catatonic sxs, no stiffness or rigidity. No evidence of acute mood or psychotic symptoms. ANC 1.98.  1/10: Patient oriented, no psychosis, no si or hi. No catatonic sxs on exam. Plan is to hold Clozapine until ANC normalized. ANC today 1.67  1/11: Reporting CAH to harm herself. Started on 1:1 CO and zyprexa 5 mg HS. Continue to hold clozapine until ANC normalizes; improved to 2.64 today but continue to trend due to recent fluctuations.    PLAN:  1-START 1:1 CO for CAH to harm herself and concern for psychotic decompensation. Order safety trays (no metal objects).   2-Patient DOES NOT have capacity to leave AMA. Further safety evaluation needed for disposition planning.  3-START zyprexa 5 mg qHS for psychotic decompensation in setting of not receiving clozapine  4-HOLD clozapine due to low ANC until several days of normal values seen    [] Monitor ANCs daily while at [facility]    [] Clozapine level slightly supratherapeutic at 677 on 1/4, decreased to 183 on 1/6 after stopped  5-Continue Lithium 300 mg BID and Effexor 150 mg po daily. Depakote dosing as per primary team as it seems to be for seizures    [] Monitor BUN/CR, Lithium level 0.8 on 1/4 and 0.4 on 1/9    [] VPA level 85.40 on 1/1 and 49.30 on 1/3  6- Haldol decanoate 200mg q4 weeks, last received 12/27/23, next injection is due on 1/24/24.  7- PRN for agitation: Zyprexa 2.5mg PO/IM q6h prn for agitation, may give additional Zyprexa 2.5mg for refractory agitation (ensure QTc <500); avoid IM/IV Ativan within 1 hour of Zyprexa  8- Care coordinated with outpatient psychiatrist Dr. Whitaker [phone], outpatient psychologist Dr. Treviño, and care coordinator Meagan Mckeon [phone] on 1/4. Update provided to Dr. Whitaker on 1/11     [] Outpatient psych follow-up appt scheduled for 1/16 at 1:30 pm w/ Dr. Whitaker.     [] Pt also has standing apt with Dr. Treviño on Thursdays at 1:30, including 1/11.     [] Please fax DC summary to OP psychiatry team at [phone]. 9-Disposition: pending clinical course; may need inpatient psych admission if CAH to harm self continue

## 2024-01-12 LAB
ANION GAP SERPL CALC-SCNC: 8 MMOL/L — SIGNIFICANT CHANGE UP (ref 7–14)
ANION GAP SERPL CALC-SCNC: 8 MMOL/L — SIGNIFICANT CHANGE UP (ref 7–14)
BASOPHILS # BLD AUTO: 0.03 K/UL — SIGNIFICANT CHANGE UP (ref 0–0.2)
BASOPHILS # BLD AUTO: 0.03 K/UL — SIGNIFICANT CHANGE UP (ref 0–0.2)
BASOPHILS NFR BLD AUTO: 0.4 % — SIGNIFICANT CHANGE UP (ref 0–2)
BASOPHILS NFR BLD AUTO: 0.4 % — SIGNIFICANT CHANGE UP (ref 0–2)
BUN SERPL-MCNC: 5 MG/DL — LOW (ref 7–23)
BUN SERPL-MCNC: 5 MG/DL — LOW (ref 7–23)
CALCIUM SERPL-MCNC: 8.5 MG/DL — SIGNIFICANT CHANGE UP (ref 8.4–10.5)
CALCIUM SERPL-MCNC: 8.5 MG/DL — SIGNIFICANT CHANGE UP (ref 8.4–10.5)
CHLORIDE SERPL-SCNC: 109 MMOL/L — HIGH (ref 98–107)
CHLORIDE SERPL-SCNC: 109 MMOL/L — HIGH (ref 98–107)
CO2 SERPL-SCNC: 25 MMOL/L — SIGNIFICANT CHANGE UP (ref 22–31)
CO2 SERPL-SCNC: 25 MMOL/L — SIGNIFICANT CHANGE UP (ref 22–31)
CREAT SERPL-MCNC: 0.69 MG/DL — SIGNIFICANT CHANGE UP (ref 0.5–1.3)
CREAT SERPL-MCNC: 0.69 MG/DL — SIGNIFICANT CHANGE UP (ref 0.5–1.3)
EGFR: 113 ML/MIN/1.73M2 — SIGNIFICANT CHANGE UP
EGFR: 113 ML/MIN/1.73M2 — SIGNIFICANT CHANGE UP
EOSINOPHIL # BLD AUTO: 0.16 K/UL — SIGNIFICANT CHANGE UP (ref 0–0.5)
EOSINOPHIL # BLD AUTO: 0.16 K/UL — SIGNIFICANT CHANGE UP (ref 0–0.5)
EOSINOPHIL NFR BLD AUTO: 2 % — SIGNIFICANT CHANGE UP (ref 0–6)
EOSINOPHIL NFR BLD AUTO: 2 % — SIGNIFICANT CHANGE UP (ref 0–6)
GLUCOSE SERPL-MCNC: 78 MG/DL — SIGNIFICANT CHANGE UP (ref 70–99)
GLUCOSE SERPL-MCNC: 78 MG/DL — SIGNIFICANT CHANGE UP (ref 70–99)
HCT VFR BLD CALC: 33.1 % — LOW (ref 34.5–45)
HCT VFR BLD CALC: 33.1 % — LOW (ref 34.5–45)
HGB BLD-MCNC: 10 G/DL — LOW (ref 11.5–15.5)
HGB BLD-MCNC: 10 G/DL — LOW (ref 11.5–15.5)
IANC: 2.2 K/UL — SIGNIFICANT CHANGE UP (ref 1.8–7.4)
IANC: 2.2 K/UL — SIGNIFICANT CHANGE UP (ref 1.8–7.4)
IMM GRANULOCYTES NFR BLD AUTO: 0.6 % — SIGNIFICANT CHANGE UP (ref 0–0.9)
IMM GRANULOCYTES NFR BLD AUTO: 0.6 % — SIGNIFICANT CHANGE UP (ref 0–0.9)
LYMPHOCYTES # BLD AUTO: 4.69 K/UL — HIGH (ref 1–3.3)
LYMPHOCYTES # BLD AUTO: 4.69 K/UL — HIGH (ref 1–3.3)
LYMPHOCYTES # BLD AUTO: 57.8 % — HIGH (ref 13–44)
LYMPHOCYTES # BLD AUTO: 57.8 % — HIGH (ref 13–44)
MCHC RBC-ENTMCNC: 25.7 PG — LOW (ref 27–34)
MCHC RBC-ENTMCNC: 25.7 PG — LOW (ref 27–34)
MCHC RBC-ENTMCNC: 30.2 GM/DL — LOW (ref 32–36)
MCHC RBC-ENTMCNC: 30.2 GM/DL — LOW (ref 32–36)
MCV RBC AUTO: 85.1 FL — SIGNIFICANT CHANGE UP (ref 80–100)
MCV RBC AUTO: 85.1 FL — SIGNIFICANT CHANGE UP (ref 80–100)
MONOCYTES # BLD AUTO: 0.99 K/UL — HIGH (ref 0–0.9)
MONOCYTES # BLD AUTO: 0.99 K/UL — HIGH (ref 0–0.9)
MONOCYTES NFR BLD AUTO: 12.2 % — SIGNIFICANT CHANGE UP (ref 2–14)
MONOCYTES NFR BLD AUTO: 12.2 % — SIGNIFICANT CHANGE UP (ref 2–14)
NEUTROPHILS # BLD AUTO: 2.2 K/UL — SIGNIFICANT CHANGE UP (ref 1.8–7.4)
NEUTROPHILS # BLD AUTO: 2.2 K/UL — SIGNIFICANT CHANGE UP (ref 1.8–7.4)
NEUTROPHILS NFR BLD AUTO: 27 % — LOW (ref 43–77)
NEUTROPHILS NFR BLD AUTO: 27 % — LOW (ref 43–77)
NRBC # BLD: 2 /100 WBCS — HIGH (ref 0–0)
NRBC # BLD: 2 /100 WBCS — HIGH (ref 0–0)
NRBC # FLD: 0.12 K/UL — HIGH (ref 0–0)
NRBC # FLD: 0.12 K/UL — HIGH (ref 0–0)
PLATELET # BLD AUTO: 513 K/UL — HIGH (ref 150–400)
PLATELET # BLD AUTO: 513 K/UL — HIGH (ref 150–400)
POTASSIUM SERPL-MCNC: 3.9 MMOL/L — SIGNIFICANT CHANGE UP (ref 3.5–5.3)
POTASSIUM SERPL-MCNC: 3.9 MMOL/L — SIGNIFICANT CHANGE UP (ref 3.5–5.3)
POTASSIUM SERPL-SCNC: 3.9 MMOL/L — SIGNIFICANT CHANGE UP (ref 3.5–5.3)
POTASSIUM SERPL-SCNC: 3.9 MMOL/L — SIGNIFICANT CHANGE UP (ref 3.5–5.3)
RBC # BLD: 3.89 M/UL — SIGNIFICANT CHANGE UP (ref 3.8–5.2)
RBC # BLD: 3.89 M/UL — SIGNIFICANT CHANGE UP (ref 3.8–5.2)
RBC # FLD: 19.3 % — HIGH (ref 10.3–14.5)
RBC # FLD: 19.3 % — HIGH (ref 10.3–14.5)
SODIUM SERPL-SCNC: 142 MMOL/L — SIGNIFICANT CHANGE UP (ref 135–145)
SODIUM SERPL-SCNC: 142 MMOL/L — SIGNIFICANT CHANGE UP (ref 135–145)
WBC # BLD: 8.12 K/UL — SIGNIFICANT CHANGE UP (ref 3.8–10.5)
WBC # BLD: 8.12 K/UL — SIGNIFICANT CHANGE UP (ref 3.8–10.5)
WBC # FLD AUTO: 8.12 K/UL — SIGNIFICANT CHANGE UP (ref 3.8–10.5)
WBC # FLD AUTO: 8.12 K/UL — SIGNIFICANT CHANGE UP (ref 3.8–10.5)

## 2024-01-12 PROCEDURE — 99232 SBSQ HOSP IP/OBS MODERATE 35: CPT

## 2024-01-12 PROCEDURE — 99233 SBSQ HOSP IP/OBS HIGH 50: CPT

## 2024-01-12 RX ADMIN — OLANZAPINE 2.5 MILLIGRAM(S): 15 TABLET, FILM COATED ORAL at 12:02

## 2024-01-12 RX ADMIN — ATORVASTATIN CALCIUM 10 MILLIGRAM(S): 80 TABLET, FILM COATED ORAL at 22:09

## 2024-01-12 RX ADMIN — OLANZAPINE 5 MILLIGRAM(S): 15 TABLET, FILM COATED ORAL at 22:09

## 2024-01-12 RX ADMIN — LITHIUM CARBONATE 300 MILLIGRAM(S): 300 TABLET, EXTENDED RELEASE ORAL at 17:54

## 2024-01-12 RX ADMIN — ENOXAPARIN SODIUM 40 MILLIGRAM(S): 100 INJECTION SUBCUTANEOUS at 17:54

## 2024-01-12 RX ADMIN — Medication 1 MILLIGRAM(S): at 12:05

## 2024-01-12 RX ADMIN — DIVALPROEX SODIUM 1000 MILLIGRAM(S): 500 TABLET, DELAYED RELEASE ORAL at 22:09

## 2024-01-12 RX ADMIN — Medication 1000 UNIT(S): at 12:02

## 2024-01-12 RX ADMIN — ENOXAPARIN SODIUM 40 MILLIGRAM(S): 100 INJECTION SUBCUTANEOUS at 05:13

## 2024-01-12 RX ADMIN — FAMOTIDINE 20 MILLIGRAM(S): 10 INJECTION INTRAVENOUS at 12:02

## 2024-01-12 RX ADMIN — IRON SUCROSE 200 MILLIGRAM(S): 20 INJECTION, SOLUTION INTRAVENOUS at 17:57

## 2024-01-12 RX ADMIN — SENNA PLUS 2 TABLET(S): 8.6 TABLET ORAL at 22:09

## 2024-01-12 RX ADMIN — Medication 150 MILLIGRAM(S): at 12:02

## 2024-01-12 RX ADMIN — LITHIUM CARBONATE 300 MILLIGRAM(S): 300 TABLET, EXTENDED RELEASE ORAL at 05:13

## 2024-01-12 NOTE — PROGRESS NOTE ADULT - PROBLEM SELECTOR PLAN 1
currently lethargic, No focal neuro deficits on exam however with intermittent jerking of upper extremities (which per outpt psychiatrist is her chronic baseline dt Haldol.   -bilateral tremor known to outpt psych and is chronic  -cont with lithium 300mg bid, effexor 150mg qd and Depakote 1g qhs (on for seizure)  -will hold clozapine in setting of decreasing ANC and viral syndrome, consider restarting in several days if stable/improving   - started on zyprexa 5mg   -cont to monitor lithium, clozapine level and monitor ANC   -no ss of sepsis, work up neg  -appreciate psych recs  -aspiration, fall, sz precautions

## 2024-01-12 NOTE — BH CONSULTATION LIAISON PROGRESS NOTE - NSBHMSESPABN_PSY_A_CORE
Soft volume/Decreased productivity/Increased latency significant speech latency and slowing/Soft volume/Decreased productivity/Increased latency

## 2024-01-12 NOTE — PROGRESS NOTE ADULT - SUBJECTIVE AND OBJECTIVE BOX
Patient is a 38y old  Female who presents with a chief complaint of + blood cultures (11 Jan 2024 14:20)    Duglas Gonzalez MD   [facility] Division of Hospital Medicine   Pager [phone]  Reachable on [employer] Teams     SUBJECTIVE / OVERNIGHT EVENTS:  Patient seen and examined this afternoon. Feeling well, asking when she can leave.   Continues to endorse hearing voices. Denies wanting to hurt herself.     MEDICATIONS  (STANDING):  atorvastatin 10 milliGRAM(s) Oral at bedtime  cholecalciferol 1000 Unit(s) Oral daily  divalproex DR 1000 milliGRAM(s) Oral at bedtime  enoxaparin Injectable 40 milliGRAM(s) SubCutaneous every 12 hours  famotidine    Tablet 20 milliGRAM(s) Oral daily  folic acid 1 milliGRAM(s) Oral daily  iron sucrose Injectable 200 milliGRAM(s) IV Push every 24 hours  lithium 300 milliGRAM(s) Oral two times a day  metoprolol succinate ER 12.5 milliGRAM(s) Oral daily  OLANZapine 5 milliGRAM(s) Oral at bedtime  senna 2 Tablet(s) Oral at bedtime  venlafaxine XR. 150 milliGRAM(s) Oral daily    MEDICATIONS  (PRN):  acetaminophen     Tablet .. 650 milliGRAM(s) Oral every 6 hours PRN Temp greater or equal to 38C (100.4F), Mild Pain (1 - 3)  aluminum hydroxide/magnesium hydroxide/simethicone Suspension 30 milliLiter(s) Oral every 4 hours PRN Dyspepsia  melatonin 3 milliGRAM(s) Oral at bedtime PRN Insomnia  OLANZapine Injectable 2.5 milliGRAM(s) IntraMuscular every 6 hours PRN severe agitation  ondansetron Injectable 4 milliGRAM(s) IV Push every 8 hours PRN Nausea and/or Vomiting      Vital Signs Last 24 Hrs  T(C): 37 (12 Jan 2024 14:55), Max: 37.1 (12 Jan 2024 10:28)  T(F): 98.6 (12 Jan 2024 14:55), Max: 98.8 (12 Jan 2024 10:28)  HR: 86 (12 Jan 2024 14:55) (63 - 88)  BP: 96/75 (12 Jan 2024 14:55) (95/59 - 121/98)  BP(mean): 73 (12 Jan 2024 10:28) (73 - 73)  RR: 18 (12 Jan 2024 14:55) (18 - 19)  SpO2: 99% (12 Jan 2024 14:55) (97% - 100%)  CAPILLARY BLOOD GLUCOSE        I&O's Summary    11 Jan 2024 07:01  -  12 Jan 2024 07:00  --------------------------------------------------------  IN: 360 mL / OUT: 0 mL / NET: 360 mL        General: woman lawing down in bed appears comfortable in NAD, awake and alert  Respiratory: No respiratory distress, CTABL, No rales, rhonchi, wheezing.  Cardiovascular: S1,S2; Regular rate and rhythm; No m/g/r.  Gastrointestinal: Soft, Nontender, obese abdomen; +BS.   Extremities: No c/c/e; warm to touch  Neurological: non-focal. Moving all 4 extremities;   Skin: No rashes, No erythema   Psych:  flat affect      LABS:                        10.0   8.12  )-----------( 513      ( 12 Jan 2024 05:36 )             33.1     01-12    142  |  109<H>  |  5<L>  ----------------------------<  78  3.9   |  25  |  0.69    Ca    8.5      12 Jan 2024 05:36  Phos  3.0     01-11  Mg     2.40     01-11            Urinalysis Basic - ( 12 Jan 2024 05:36 )    Color: x / Appearance: x / SG: x / pH: x  Gluc: 78 mg/dL / Ketone: x  / Bili: x / Urobili: x   Blood: x / Protein: x / Nitrite: x   Leuk Esterase: x / RBC: x / WBC x   Sq Epi: x / Non Sq Epi: x / Bacteria: x        RADIOLOGY & ADDITIONAL TESTS:    Imaging Personally Reviewed:    Consultant(s) Notes Reviewed:      Care Discussed with Consultants/Other Providers:   Patient is a 38y old  Female who presents with a chief complaint of + blood cultures (11 Jan 2024 14:20)    Duglas Gonzalez MD   [facility] Division of Hospital Medicine   Pager [phone]  Reachable on [employer] Teams     SUBJECTIVE / OVERNIGHT EVENTS:  Patient seen and examined this afternoon. Feeling well, asking when she can leave.   Continues to endorse hearing voices. Denies wanting to hurt herself.     MEDICATIONS  (STANDING):  atorvastatin 10 milliGRAM(s) Oral at bedtime  cholecalciferol 1000 Unit(s) Oral daily  divalproex DR 1000 milliGRAM(s) Oral at bedtime  enoxaparin Injectable 40 milliGRAM(s) SubCutaneous every 12 hours  famotidine    Tablet 20 milliGRAM(s) Oral daily  folic acid 1 milliGRAM(s) Oral daily  iron sucrose Injectable 200 milliGRAM(s) IV Push every 24 hours  lithium 300 milliGRAM(s) Oral two times a day  metoprolol succinate ER 12.5 milliGRAM(s) Oral daily  OLANZapine 5 milliGRAM(s) Oral at bedtime  senna 2 Tablet(s) Oral at bedtime  venlafaxine XR. 150 milliGRAM(s) Oral daily    MEDICATIONS  (PRN):  acetaminophen     Tablet .. 650 milliGRAM(s) Oral every 6 hours PRN Temp greater or equal to 38C (100.4F), Mild Pain (1 - 3)  aluminum hydroxide/magnesium hydroxide/simethicone Suspension 30 milliLiter(s) Oral every 4 hours PRN Dyspepsia  melatonin 3 milliGRAM(s) Oral at bedtime PRN Insomnia  OLANZapine Injectable 2.5 milliGRAM(s) IntraMuscular every 6 hours PRN severe agitation  ondansetron Injectable 4 milliGRAM(s) IV Push every 8 hours PRN Nausea and/or Vomiting      Vital Signs Last 24 Hrs  T(C): 37 (12 Jan 2024 14:55), Max: 37.1 (12 Jan 2024 10:28)  T(F): 98.6 (12 Jan 2024 14:55), Max: 98.8 (12 Jan 2024 10:28)  HR: 86 (12 Jan 2024 14:55) (63 - 88)  BP: 96/75 (12 Jan 2024 14:55) (95/59 - 121/98)  BP(mean): 73 (12 Jan 2024 10:28) (73 - 73)  RR: 18 (12 Jan 2024 14:55) (18 - 19)  SpO2: 99% (12 Jan 2024 14:55) (97% - 100%)  CAPILLARY BLOOD GLUCOSE        I&O's Summary    11 Jan 2024 07:01  -  12 Jan 2024 07:00  --------------------------------------------------------  IN: 360 mL / OUT: 0 mL / NET: 360 mL        General: woman lawing down in bed appears comfortable in NAD, awake and alert  Respiratory: No respiratory distress, CTABL, No rales, rhonchi, wheezing.  Cardiovascular: S1,S2; Regular rate and rhythm; No m/g/r.  Gastrointestinal: Soft, Nontender, obese abdomen; +BS.   Extremities: No c/c/e; warm to touch  Neurological: non-focal. Moving all 4 extremities;   Skin: No rashes, No erythema   Psych:  flat affect      LABS:                        10.0   8.12  )-----------( 513      ( 12 Jan 2024 05:36 )             33.1     01-12    142  |  109<H>  |  5<L>  ----------------------------<  78  3.9   |  25  |  0.69    Ca    8.5      12 Jan 2024 05:36  Phos  3.0     01-11  Mg     2.40     01-11            Urinalysis Basic - ( 12 Jan 2024 05:36 )    Color: x / Appearance: x / SG: x / pH: x  Gluc: 78 mg/dL / Ketone: x  / Bili: x / Urobili: x   Blood: x / Protein: x / Nitrite: x   Leuk Esterase: x / RBC: x / WBC x   Sq Epi: x / Non Sq Epi: x / Bacteria: x        RADIOLOGY & ADDITIONAL TESTS:    Imaging Personally Reviewed:    Consultant(s) Notes Reviewed:      Care Discussed with Consultants/Other Providers:

## 2024-01-12 NOTE — BH CONSULTATION LIAISON PROGRESS NOTE - NSBHASSESSMENTFT_PSY_ALL_CORE
***in progress    Patient is a 38-year-old woman with past history of schizoaffective disorder, living in group home on [facility] grounds, with multiple past inpatient admissions, and PMH of seizure disorder, HTN, DM?, HLD who was BIBEMS activated by facility due to acute influenza infection. She was medically admitted due to blood cultures growing staph capitis, now thought to be contaminant. Psychiatry was consulted for medication management of home clozapine dose in the setting of increased sedation, decreasing ANC (1.07), and missed dose.     1/4: Patient seen, oriented, but lethargic and slow to respond, mild tremors noted in both UE. She firmly denies AVH, denies CAH, denies SI and HI. Denies feeling unsafe in the hospital. Care coordinated with outpt. psychiatrist, at baseline pt. is alert and has chronic tremors in both UE.  Labs: Lithium level 0.8, ANC 1.51, VPA level 49.30, Clozapine level 677  1/5: Patient lethargic, interview limited at this time. No overt catatonic sxs , no stiffness or rigidity. Labs: ANC 1.07 today.   1/6: Patient remains lethargic, not engaged with writer.   1/8: Patient awakes to verbal and tactile stimulation, although still sedated during daytime. Asking appropriate questions about discharge plan. No evidence of acute psychosis or mood symptoms indicating decompensation. ANC improved to 4.34.  1/9: Patient seen, awakes to verbal and tactile stimulation, still sedated at this time. No overt catatonic sxs, no stiffness or rigidity. No evidence of acute mood or psychotic symptoms. ANC 1.98.  1/10: Patient oriented, no psychosis, no si or hi. No catatonic sxs on exam. Plan is to hold Clozapine until ANC normalized. ANC today 1.67  1/11: Reporting CAH to harm herself. Started on 1:1 CO and zyprexa 5 mg HS. Continue to hold clozapine until ANC normalizes; improved to 2.64 today but continue to trend due to recent fluctuations.    PLAN:  1-START 1:1 CO for CAH to harm herself and concern for psychotic decompensation. Order safety trays (no metal objects).   2-Patient DOES NOT have capacity to leave AMA. Further safety evaluation needed for disposition planning.  3-START zyprexa 5 mg qHS for psychotic decompensation in setting of not receiving clozapine  4-HOLD clozapine due to low ANC until several days of normal values seen    [] Monitor ANCs daily while at [facility]    [] Clozapine level slightly supratherapeutic at 677 on 1/4, decreased to 183 on 1/6 after stopped  5-Continue Lithium 300 mg BID and Effexor 150 mg po daily. Depakote dosing as per primary team as it seems to be for seizures    [] Monitor BUN/CR, Lithium level 0.8 on 1/4 and 0.4 on 1/9    [] VPA level 85.40 on 1/1 and 49.30 on 1/3  6- Haldol decanoate 200mg q4 weeks, last received 12/27/23, next injection is due on 1/24/24.  7- PRN for agitation: Zyprexa 2.5mg PO/IM q6h prn for agitation, may give additional Zyprexa 2.5mg for refractory agitation (ensure QTc <500); avoid IM/IV Ativan within 1 hour of Zyprexa  8- Care coordinated with outpatient psychiatrist Dr. Whitaker [phone], outpatient psychologist Dr. Treviño, and care coordinator Meagan Mckeon [phone] on 1/4. Update provided to Dr. Whitaker on 1/11     [] Outpatient psych follow-up appt scheduled for 1/16 at 1:30 pm w/ Dr. Whitaker.     [] Pt also has standing apt with Dr. Treviño on Thursdays at 1:30, including 1/11.     [] Please fax DC summary to OP psychiatry team at [phone].  2-Disposition: pending clinical course; may need inpatient psych admission if CAH to harm self continue ***in progress    Patient is a 38-year-old woman with past history of schizoaffective disorder, living in group home on [facility] grounds, with multiple past inpatient admissions, and PMH of seizure disorder, HTN, DM?, HLD who was BIBEMS activated by facility due to acute influenza infection. She was medically admitted due to blood cultures growing staph capitis, now thought to be contaminant. Psychiatry was consulted for medication management of home clozapine dose in the setting of increased sedation, decreasing ANC (1.07), and missed dose.     1/4: Patient seen, oriented, but lethargic and slow to respond, mild tremors noted in both UE. She firmly denies AVH, denies CAH, denies SI and HI. Denies feeling unsafe in the hospital. Care coordinated with outpt. psychiatrist, at baseline pt. is alert and has chronic tremors in both UE.  Labs: Lithium level 0.8, ANC 1.51, VPA level 49.30, Clozapine level 677  1/5: Patient lethargic, interview limited at this time. No overt catatonic sxs , no stiffness or rigidity. Labs: ANC 1.07 today.   1/6: Patient remains lethargic, not engaged with writer.   1/8: Patient awakes to verbal and tactile stimulation, although still sedated during daytime. Asking appropriate questions about discharge plan. No evidence of acute psychosis or mood symptoms indicating decompensation. ANC improved to 4.34.  1/9: Patient seen, awakes to verbal and tactile stimulation, still sedated at this time. No overt catatonic sxs, no stiffness or rigidity. No evidence of acute mood or psychotic symptoms. ANC 1.98.  1/10: Patient oriented, no psychosis, no si or hi. No catatonic sxs on exam. Plan is to hold Clozapine until ANC normalized. ANC today 1.67  1/11: Reporting CAH to harm herself. Started on 1:1 CO and zyprexa 5 mg HS. Continue to hold clozapine until ANC normalizes; improved to 2.64 today but continue to trend due to recent fluctuations.    PLAN:  1-START 1:1 CO for CAH to harm herself and concern for psychotic decompensation. Order safety trays (no metal objects).   2-Patient DOES NOT have capacity to leave AMA. Further safety evaluation needed for disposition planning.  3-START zyprexa 5 mg qHS for psychotic decompensation in setting of not receiving clozapine  4-HOLD clozapine due to low ANC until several days of normal values seen    [] Monitor ANCs daily while at [facility]    [] Clozapine level slightly supratherapeutic at 677 on 1/4, decreased to 183 on 1/6 after stopped  5-Continue Lithium 300 mg BID and Effexor 150 mg po daily. Depakote dosing as per primary team as it seems to be for seizures    [] Monitor BUN/CR, Lithium level 0.8 on 1/4 and 0.4 on 1/9    [] VPA level 85.40 on 1/1 and 49.30 on 1/3  6- Haldol decanoate 200mg q4 weeks, last received 12/27/23, next injection is due on 1/24/24.  7- PRN for agitation: Zyprexa 2.5mg PO/IM q6h prn for agitation, may give additional Zyprexa 2.5mg for refractory agitation (ensure QTc <500); avoid IM/IV Ativan within 1 hour of Zyprexa  8- Care coordinated with outpatient psychiatrist Dr. Whitaker [phone], outpatient psychologist Dr. Treviño, and care coordinator Meagan Mckeon [phone] on 1/4. Update provided to Dr. Whitaker on 1/11     [] Outpatient psych follow-up appt scheduled for 1/16 at 1:30 pm w/ Dr. Whitaker.     [] Pt also has standing apt with Dr. Treviño on Thursdays at 1:30, including 1/11.     [] Please fax DC summary to OP psychiatry team at [phone].  1-Disposition: pending clinical course; may need inpatient psych admission if CAH to harm self continue Patient is a 38-year-old woman with past history of schizoaffective disorder, living in group home on [facility] grounds, with multiple past inpatient admissions, and PMH of seizure disorder, HTN, DM?, HLD who was BIBEMS activated by facility due to acute influenza infection. She was medically admitted due to blood cultures growing staph capitis, now thought to be contaminant. Psychiatry was consulted for medication management of home clozapine dose in the setting of increased sedation, decreasing ANC (1.07), and missed dose.     1/4: Patient seen, oriented, but lethargic and slow to respond, mild tremors noted in both UE. She firmly denies AVH, denies CAH, denies SI and HI. Denies feeling unsafe in the hospital. Care coordinated with outpt. psychiatrist, at baseline pt. is alert and has chronic tremors in both UE.  Labs: Lithium level 0.8, ANC 1.51, VPA level 49.30, Clozapine level 677  1/5: Patient lethargic, interview limited at this time. No overt catatonic sxs , no stiffness or rigidity. Labs: ANC 1.07 today.   1/6: Patient remains lethargic, not engaged with writer.   1/8: Patient awakes to verbal and tactile stimulation, although still sedated during daytime. Asking appropriate questions about discharge plan. No evidence of acute psychosis or mood symptoms indicating decompensation. ANC improved to 4.34.  1/9: Patient seen, awakes to verbal and tactile stimulation, still sedated at this time. No overt catatonic sxs, no stiffness or rigidity. No evidence of acute mood or psychotic symptoms. ANC 1.98.  1/10: Patient oriented, no psychosis, no si or hi. No catatonic sxs on exam. Plan is to hold Clozapine until ANC normalized. ANC today 1.67  1/11: Reporting CAH to harm herself. Started on 1:1 CO and zyprexa 5 mg HS. Continue to hold clozapine until ANC normalizes; improved to 2.64 today but continue to trend due to recent fluctuations.  1/12: Significant thought blocking on exam. Pt still endorsing CAH to harm herself with no specific plan, although she denies intent to act on them. ANC 2.20 today. No side effects reported from olanzapine.    PLAN:  1-Continue 1:1 CO for CAH to harm herself and psychotic decompensation. Order safety trays (no metal objects).   2-Patient DOES NOT have capacity to leave AMA. Further safety evaluation needed for disposition planning.  3-Continue zyprexa 5 mg qHS for psychotic decompensation in setting of not receiving clozapine, will likely increase dose over weekend if tolerating  4-HOLD clozapine due to low ANC until several days of normal values seen    [] Monitor ANCs daily while at [facility]    [] Clozapine level slightly supratherapeutic at 677 on 1/4, decreased to 183 on 1/6 after stopped  5-Continue Lithium 300 mg BID and Effexor 150 mg po daily. Depakote dosing as per primary team as it seems to be for seizures    [] Monitor BUN/CR, Lithium level 0.8 on 1/4 and 0.4 on 1/9    [] VPA level 85.40 on 1/1 and 49.30 on 1/3  6- Haldol decanoate 200mg q4 weeks, last received 12/27/23, next injection is due on 1/24/24.  7- PRN for agitation: Zyprexa 2.5mg PO/IM q6h prn for agitation, may give additional Zyprexa 2.5mg for refractory agitation (ensure QTc <500); avoid IM/IV Ativan within 1 hour of Zyprexa  8- Care coordinated with outpatient psychiatrist Dr. Whitaker [phone], outpatient psychologist Dr. Treviño, and care coordinator Meagan Mckeon [phone] on 1/4. Update provided to Dr. Whitaker on 1/11     [] Outpatient psych follow-up appt scheduled for 1/16 at 1:30 pm w/ Dr. Whitaker.     [] Pt also has standing apt with Dr. Treviño on Thursdays at 1:30, including 1/11.     [] Please fax DC summary to OP psychiatry team at [phone].  9-Disposition: pending clinical course; may need inpatient psych admission if CAH to harm self continue Patient is a 38-year-old woman with past history of schizoaffective disorder, living in group home on [facility] grounds, with multiple past inpatient admissions, and PMH of seizure disorder, HTN, DM?, HLD who was BIBEMS activated by facility due to acute influenza infection. She was medically admitted due to blood cultures growing staph capitis, now thought to be contaminant. Psychiatry was consulted for medication management of home clozapine dose in the setting of increased sedation, decreasing ANC (1.07), and missed dose.     1/4: Patient seen, oriented, but lethargic and slow to respond, mild tremors noted in both UE. She firmly denies AVH, denies CAH, denies SI and HI. Denies feeling unsafe in the hospital. Care coordinated with outpt. psychiatrist, at baseline pt. is alert and has chronic tremors in both UE.  Labs: Lithium level 0.8, ANC 1.51, VPA level 49.30, Clozapine level 677  1/5: Patient lethargic, interview limited at this time. No overt catatonic sxs , no stiffness or rigidity. Labs: ANC 1.07 today.   1/6: Patient remains lethargic, not engaged with writer.   1/8: Patient awakes to verbal and tactile stimulation, although still sedated during daytime. Asking appropriate questions about discharge plan. No evidence of acute psychosis or mood symptoms indicating decompensation. ANC improved to 4.34.  1/9: Patient seen, awakes to verbal and tactile stimulation, still sedated at this time. No overt catatonic sxs, no stiffness or rigidity. No evidence of acute mood or psychotic symptoms. ANC 1.98.  1/10: Patient oriented, no psychosis, no si or hi. No catatonic sxs on exam. Plan is to hold Clozapine until ANC normalized. ANC today 1.67  1/11: Reporting CAH to harm herself. Started on 1:1 CO and zyprexa 5 mg HS. Continue to hold clozapine until ANC normalizes; improved to 2.64 today but continue to trend due to recent fluctuations.  1/12: Significant thought blocking on exam. Pt still endorsing CAH to harm herself with no specific plan, although she denies intent to act on them. ANC 2.20 today. No side effects reported from olanzapine.    PLAN:  1-Continue 1:1 CO for CAH to harm herself and psychotic decompensation. Order safety trays (no metal objects).   2-Patient DOES NOT have capacity to leave AMA. Further safety evaluation needed for disposition planning.  3-Continue zyprexa 5 mg qHS for psychotic decompensation in setting of not receiving clozapine, will likely increase dose over weekend if tolerating  4-HOLD clozapine due to low ANC until several days of normal values seen    [] Monitor ANCs daily while at [facility]    [] Clozapine level slightly supratherapeutic at 677 on 1/4, decreased to 183 on 1/6 after stopped  5-Continue Lithium 300 mg BID and Effexor 150 mg po daily. Depakote dosing as per primary team as it seems to be for seizures    [] Monitor BUN/CR, Lithium level 0.8 on 1/4 and 0.4 on 1/9    [] VPA level 85.40 on 1/1 and 49.30 on 1/3  6- Haldol decanoate 200mg q4 weeks, last received 12/27/23, next injection is due on 1/24/24.  7- PRN for agitation: Zyprexa 2.5mg PO/IM q6h prn for agitation, may give additional Zyprexa 2.5mg for refractory agitation (ensure QTc <500); avoid IM/IV Ativan within 1 hour of Zyprexa  8- Care coordinated with outpatient psychiatrist Dr. Whitaker [phone], outpatient psychologist Dr. Treviño, and care coordinator Meagan Mckeon [phone] on 1/4. Update provided to Dr. Whitaker on 1/11     [] Outpatient psych follow-up appt scheduled for 1/16 at 1:30 pm w/ Dr. Whitaker.     [] Pt also has standing apt with Dr. Treviño on Thursdays at 1:30, including 1/11.     [] Please fax DC summary to OP psychiatry team at [phone].  9-Disposition: pending clinical course; may need inpatient psych admission if CAH to harm self continue Patient is a 38-year-old woman with past history of schizoaffective disorder, living in group home on [facility] grounds, with multiple past inpatient admissions, and PMH of seizure disorder, HTN, DM?, HLD who was BIBEMS activated by facility due to acute influenza infection. She was medically admitted due to blood cultures growing staph capitis, now thought to be contaminant. Psychiatry was consulted for medication management of home clozapine dose in the setting of increased sedation, decreasing ANC (1.07), and missed dose.     1/4: Patient seen, oriented, but lethargic and slow to respond, mild tremors noted in both UE. She firmly denies AVH, denies CAH, denies SI and HI. Denies feeling unsafe in the hospital. Care coordinated with outpt. psychiatrist, at baseline pt. is alert and has chronic tremors in both UE.  Labs: Lithium level 0.8, ANC 1.51, VPA level 49.30, Clozapine level 677  1/5: Patient lethargic, interview limited at this time. No overt catatonic sxs , no stiffness or rigidity. Labs: ANC 1.07 today.   1/6: Patient remains lethargic, not engaged with writer.   1/8: Patient awakes to verbal and tactile stimulation, although still sedated during daytime. Asking appropriate questions about discharge plan. No evidence of acute psychosis or mood symptoms indicating decompensation. ANC improved to 4.34.  1/9: Patient seen, awakes to verbal and tactile stimulation, still sedated at this time. No overt catatonic sxs, no stiffness or rigidity. No evidence of acute mood or psychotic symptoms. ANC 1.98.  1/10: Patient oriented, no psychosis, no si or hi. No catatonic sxs on exam. Plan is to hold Clozapine until ANC normalized. ANC today 1.67  1/11: Reporting CAH to harm herself. Started on 1:1 CO and zyprexa 5 mg HS. Continue to hold clozapine until ANC normalizes; improved to 2.64 today but continue to trend due to recent fluctuations.  1/12: Significant thought blocking on exam. Pt still endorsing CAH to harm herself with no specific plan, although she denies intent to act on them. ANC 2.20 today. No side effects reported from olanzapine. No catatonic sxs on exam.     PLAN:  1-Continue 1:1 CO for CAH to harm herself and psychotic decompensation. Order safety trays (no metal objects).   2-Patient DOES NOT have capacity to leave AMA. Further safety evaluation needed for disposition planning.  3-Continue zyprexa 5 mg qHS for psychotic decompensation in setting of not receiving clozapine, will likely increase dose over weekend if tolerating  4-HOLD clozapine due to low ANC until several days of normal values seen    [] Monitor ANCs daily while at [facility]    [] Clozapine level slightly supratherapeutic at 677 on 1/4, decreased to 183 on 1/6 after stopped  5-Continue Lithium 300 mg BID and Effexor 150 mg po daily. Depakote dosing as per primary team as it seems to be for seizures    [] Monitor BUN/CR, Lithium level 0.8 on 1/4 and 0.4 on 1/9    [] VPA level 85.40 on 1/1 and 49.30 on 1/3  6- Haldol decanoate 200mg q4 weeks, last received 12/27/23, next injection is due on 1/24/24.  7- PRN for agitation: Zyprexa 2.5mg PO/IM q6h prn for agitation, may give additional Zyprexa 2.5mg for refractory agitation (ensure QTc <500); avoid IM/IV Ativan within 1 hour of Zyprexa  8- Care coordinated with outpatient psychiatrist Dr. Whitaker [phone], outpatient psychologist Dr. Treviño, and care coordinator Meagan Mckeon [phone] on 1/4. Update provided to Dr. Whitaker on 1/11     [] Outpatient psych follow-up appt scheduled for 1/16 at 1:30 pm w/ Dr. Whitaker.     [] Pt also has standing apt with Dr. Treviño on Thursdays at 1:30, including 1/11.     [] Please fax DC summary to OP psychiatry team at [phone].  9-Disposition: pending clinical course; may need inpatient psych admission if CAH to harm self continue Patient is a 38-year-old woman with past history of schizoaffective disorder, living in group home on [facility] grounds, with multiple past inpatient admissions, and PMH of seizure disorder, HTN, DM?, HLD who was BIBEMS activated by facility due to acute influenza infection. She was medically admitted due to blood cultures growing staph capitis, now thought to be contaminant. Psychiatry was consulted for medication management of home clozapine dose in the setting of increased sedation, decreasing ANC (1.07), and missed dose.     1/4: Patient seen, oriented, but lethargic and slow to respond, mild tremors noted in both UE. She firmly denies AVH, denies CAH, denies SI and HI. Denies feeling unsafe in the hospital. Care coordinated with outpt. psychiatrist, at baseline pt. is alert and has chronic tremors in both UE.  Labs: Lithium level 0.8, ANC 1.51, VPA level 49.30, Clozapine level 677  1/5: Patient lethargic, interview limited at this time. No overt catatonic sxs , no stiffness or rigidity. Labs: ANC 1.07 today.   1/6: Patient remains lethargic, not engaged with writer.   1/8: Patient awakes to verbal and tactile stimulation, although still sedated during daytime. Asking appropriate questions about discharge plan. No evidence of acute psychosis or mood symptoms indicating decompensation. ANC improved to 4.34.  1/9: Patient seen, awakes to verbal and tactile stimulation, still sedated at this time. No overt catatonic sxs, no stiffness or rigidity. No evidence of acute mood or psychotic symptoms. ANC 1.98.  1/10: Patient oriented, no psychosis, no si or hi. No catatonic sxs on exam. Plan is to hold Clozapine until ANC normalized. ANC today 1.67  1/11: Reporting CAH to harm herself. Started on 1:1 CO and zyprexa 5 mg HS. Continue to hold clozapine until ANC normalizes; improved to 2.64 today but continue to trend due to recent fluctuations.  1/12: Significant thought blocking on exam. Pt still endorsing CAH to harm herself with no specific plan, although she denies intent to act on them. ANC 2.20 today. No side effects reported from olanzapine. No catatonic sxs on exam.     PLAN:  1-Continue 1:1 CO for CAH to harm herself and psychotic decompensation. Order safety trays (no metal objects).   2-Patient DOES NOT have capacity to leave AMA. Further safety evaluation needed for disposition planning.  3-Continue zyprexa 5 mg qHS for psychotic decompensation in setting of not receiving clozapine, will likely increase dose over weekend if tolerating  4-HOLD clozapine due to low ANC until several days of normal values seen    [] Monitor ANCs daily while at [facility]    [] Clozapine level slightly supratherapeutic at 677 on 1/4, decreased to 183 on 1/6 after stopped  5-Continue Lithium 300 mg BID and Effexor 150 mg po daily. Depakote dosing as per primary team as it seems to be for seizures    [] Monitor BUN/CR, Lithium level 0.8 on 1/4 and 0.4 on 1/9    [] VPA level 85.40 on 1/1 and 49.30 on 1/3  6- Haldol decanoate 200mg q4 weeks, last received 12/27/23, next injection is due on 1/24/24.  7- PRN for agitation: Zyprexa 2.5mg PO/IM q6h prn for agitation, may give additional Zyprexa 2.5mg for refractory agitation (ensure QTc <500); avoid IM/IV Ativan within 1 hour of Zyprexa  8- Care coordinated with outpatient psychiatrist Dr. Whitaker [phone], outpatient psychologist Dr. Treviño, and care coordinator Meagan Mckeon [phone] on 1/4. Update provided to Dr. Whitaker on 1/11     [] Outpatient psych follow-up appt scheduled for 1/16 at 1:30 pm w/ Dr. Whitaker.     [] Pt also has standing apt with Dr. Treviño on Thursdays at 1:30, including 1/11.     [] Please fax DC summary to OP psychiatry team at [phone].  9-Disposition: pending clinical course; may need inpatient psych admission if CAH to harm self continue

## 2024-01-12 NOTE — BH CONSULTATION LIAISON PROGRESS NOTE - NSBHFUPINTERVALHXFT_PSY_A_CORE
Chart reviewed and case discussed with primary team. No acute overnight events, no PRN medications required. Adherent with standing medications. Per staff, patient has been more alert today, filled out her meal preference sheet, and has been walking around her room.     Today, patient states that she started hearing louder and more frequent AH yesterday night that seem different that her baseline AH. She endorses command AH to go outside and to kill herself. She denies any specific plan or intent to harm herself. She does feel afraid that the voices could hurt her if she does not listen to them, although she is not able to identify what would happen. Patient agrees to approach staff if she feels like she is in acute danger. She last heard a voice this morning saying "Hey Ho" and denies any further CAH today. Patient reports good sleep and energy. No depression, S/IIP outside of CAH, or H/IIP reported. No side effects from medications. She is amenable to starting an antipsychotic to help with AH.    Update provided to OP psychiatrist Dr. Whitaker today about pt's CAH, plan to start Zyprexa, and plan to hold clozapine due to low ANCs. Chart reviewed and case discussed with primary team. No acute overnight events, no PRN medications required. Adherent with standing medications. 1:1 CO present at bedside. Per staff, patient has been more alert but appears to be responding to internal stimuli at time.     Today, patient continues to endorse unintelligible AH. She last heard command AH to "kill herself" yesterday, although she is unable to describe any further details about a specific plan to harm herself. She denies intent to act on CAH and states she has been able to ignore the voices. Pt agrees to tell staff immediately if any acute safety concerns arise. She denies any S/IIP or H/IIP outside of CAH. She describes her mood as "okay." Reports fair sleep and appetite. Denies any side effects from olanzapine started yesterday.

## 2024-01-12 NOTE — PROGRESS NOTE ADULT - PROBLEM SELECTOR PLAN 5
outpt meds: Clozapine 175mg qhs,  lithium 300mg bid, effexor 150mg qd and Depakote 1g qhs, Haldol dec inj 200mg q4 weeks, last received 12/27/23, next injection is due on 1/24/24.   outpt psych: Dr. Whitaker [phone]    -pt denies A/V hallucinations, no evidence of decompensation   -adjusting psych medications as above  -appreciate psych recs outpt meds: Clozapine 175mg qhs,  lithium 300mg bid, effexor 150mg qd and Depakote 1g qhs, Haldol dec inj 200mg q4 weeks, last received 12/27/23, next injection is due on 1/24/24.   outpt psych: Dr. Whitaker [phone]    -pt denies A/V hallucinations, no evidence of decompensation   -adjusting psych medications as above  -appreciate psych recs

## 2024-01-12 NOTE — PROGRESS NOTE ADULT - ASSESSMENT
38-year-old female with past medical history of schizophrenia, seizure disorder, from [facility], HTN, DM?, HLD sent in for blood cx growing staph capitis and noted to have of influenza + 38-year-old female with past medical history of schizophrenia, seizure disorder, from [facility], HTN, DM?, HLD sent in for blood cx growing staph capitis and noted to have of influenza +

## 2024-01-12 NOTE — PROGRESS NOTE ADULT - TIME BILLING
review of laboratory data, radiology results, discussion with patient/family, discussing care with interdisciplinary staff, and documenting in [place]. Additional interventions were performed as documented above. review of laboratory data, radiology results, discussion with patient/family, discussing care with interdisciplinary staff, and documenting in [place]. Additional interventions were performed as documented above.

## 2024-01-12 NOTE — BH CONSULTATION LIAISON PROGRESS NOTE - NSBHFUPINTERVALCCFT_PSY_A_CORE
follow-up of clozapine management and schizoaffective d/o  ***in progress follow-up of clozapine management and schizoaffective d/o

## 2024-01-12 NOTE — BH CONSULTATION LIAISON PROGRESS NOTE - NSBHATTESTBILLING_PSY_A_CORE
[id number] OBS or IP - high complexity OR 50-79 mins [id number]-Gbjntydast OBS or IP - high complexity OR 50-79 mins

## 2024-01-13 LAB
ANION GAP SERPL CALC-SCNC: 12 MMOL/L — SIGNIFICANT CHANGE UP (ref 7–14)
ANION GAP SERPL CALC-SCNC: 12 MMOL/L — SIGNIFICANT CHANGE UP (ref 7–14)
BUN SERPL-MCNC: 6 MG/DL — LOW (ref 7–23)
BUN SERPL-MCNC: 6 MG/DL — LOW (ref 7–23)
CALCIUM SERPL-MCNC: 9.3 MG/DL — SIGNIFICANT CHANGE UP (ref 8.4–10.5)
CALCIUM SERPL-MCNC: 9.3 MG/DL — SIGNIFICANT CHANGE UP (ref 8.4–10.5)
CHLORIDE SERPL-SCNC: 106 MMOL/L — SIGNIFICANT CHANGE UP (ref 98–107)
CHLORIDE SERPL-SCNC: 106 MMOL/L — SIGNIFICANT CHANGE UP (ref 98–107)
CO2 SERPL-SCNC: 23 MMOL/L — SIGNIFICANT CHANGE UP (ref 22–31)
CO2 SERPL-SCNC: 23 MMOL/L — SIGNIFICANT CHANGE UP (ref 22–31)
CREAT SERPL-MCNC: 0.69 MG/DL — SIGNIFICANT CHANGE UP (ref 0.5–1.3)
CREAT SERPL-MCNC: 0.69 MG/DL — SIGNIFICANT CHANGE UP (ref 0.5–1.3)
EGFR: 113 ML/MIN/1.73M2 — SIGNIFICANT CHANGE UP
EGFR: 113 ML/MIN/1.73M2 — SIGNIFICANT CHANGE UP
GLUCOSE SERPL-MCNC: 79 MG/DL — SIGNIFICANT CHANGE UP (ref 70–99)
GLUCOSE SERPL-MCNC: 79 MG/DL — SIGNIFICANT CHANGE UP (ref 70–99)
MAGNESIUM SERPL-MCNC: 2.7 MG/DL — HIGH (ref 1.6–2.6)
MAGNESIUM SERPL-MCNC: 2.7 MG/DL — HIGH (ref 1.6–2.6)
PHOSPHATE SERPL-MCNC: 2.5 MG/DL — SIGNIFICANT CHANGE UP (ref 2.5–4.5)
PHOSPHATE SERPL-MCNC: 2.5 MG/DL — SIGNIFICANT CHANGE UP (ref 2.5–4.5)
POTASSIUM SERPL-MCNC: 4.5 MMOL/L — SIGNIFICANT CHANGE UP (ref 3.5–5.3)
POTASSIUM SERPL-MCNC: 4.5 MMOL/L — SIGNIFICANT CHANGE UP (ref 3.5–5.3)
POTASSIUM SERPL-SCNC: 4.5 MMOL/L — SIGNIFICANT CHANGE UP (ref 3.5–5.3)
POTASSIUM SERPL-SCNC: 4.5 MMOL/L — SIGNIFICANT CHANGE UP (ref 3.5–5.3)
SODIUM SERPL-SCNC: 141 MMOL/L — SIGNIFICANT CHANGE UP (ref 135–145)
SODIUM SERPL-SCNC: 141 MMOL/L — SIGNIFICANT CHANGE UP (ref 135–145)

## 2024-01-13 PROCEDURE — 99232 SBSQ HOSP IP/OBS MODERATE 35: CPT

## 2024-01-13 RX ORDER — LIDOCAINE 4 G/100G
1 CREAM TOPICAL DAILY
Refills: 0 | Status: DISCONTINUED | OUTPATIENT
Start: 2024-01-13 | End: 2024-01-17

## 2024-01-13 RX ADMIN — Medication 1 MILLIGRAM(S): at 12:00

## 2024-01-13 RX ADMIN — SENNA PLUS 2 TABLET(S): 8.6 TABLET ORAL at 22:03

## 2024-01-13 RX ADMIN — Medication 650 MILLIGRAM(S): at 10:16

## 2024-01-13 RX ADMIN — OLANZAPINE 5 MILLIGRAM(S): 15 TABLET, FILM COATED ORAL at 22:04

## 2024-01-13 RX ADMIN — DIVALPROEX SODIUM 1000 MILLIGRAM(S): 500 TABLET, DELAYED RELEASE ORAL at 22:03

## 2024-01-13 RX ADMIN — FAMOTIDINE 20 MILLIGRAM(S): 10 INJECTION INTRAVENOUS at 12:01

## 2024-01-13 RX ADMIN — Medication 12.5 MILLIGRAM(S): at 05:35

## 2024-01-13 RX ADMIN — ATORVASTATIN CALCIUM 10 MILLIGRAM(S): 80 TABLET, FILM COATED ORAL at 22:03

## 2024-01-13 RX ADMIN — LITHIUM CARBONATE 300 MILLIGRAM(S): 300 TABLET, EXTENDED RELEASE ORAL at 05:35

## 2024-01-13 RX ADMIN — Medication 650 MILLIGRAM(S): at 00:37

## 2024-01-13 RX ADMIN — ENOXAPARIN SODIUM 40 MILLIGRAM(S): 100 INJECTION SUBCUTANEOUS at 17:19

## 2024-01-13 RX ADMIN — Medication 150 MILLIGRAM(S): at 12:00

## 2024-01-13 RX ADMIN — Medication 650 MILLIGRAM(S): at 23:56

## 2024-01-13 RX ADMIN — ENOXAPARIN SODIUM 40 MILLIGRAM(S): 100 INJECTION SUBCUTANEOUS at 05:36

## 2024-01-13 RX ADMIN — Medication 1000 UNIT(S): at 12:00

## 2024-01-13 RX ADMIN — LITHIUM CARBONATE 300 MILLIGRAM(S): 300 TABLET, EXTENDED RELEASE ORAL at 17:18

## 2024-01-13 RX ADMIN — Medication 650 MILLIGRAM(S): at 10:46

## 2024-01-13 RX ADMIN — LIDOCAINE 1 PATCH: 4 CREAM TOPICAL at 12:00

## 2024-01-13 RX ADMIN — LIDOCAINE 1 PATCH: 4 CREAM TOPICAL at 19:00

## 2024-01-13 NOTE — PROGRESS NOTE ADULT - ASSESSMENT
38-year-old female with past medical history of schizophrenia, seizure disorder, from [facility], HTN, DM?, HLD sent in for blood cx growing staph capitis and noted to have of influenza + 38-year-old female with past medical history of schizophrenia, seizure disorder, from [facility], HTN, DM?, HLD sent in for blood cx growing staph capitis and noted to have of influenza +

## 2024-01-13 NOTE — PROGRESS NOTE ADULT - SUBJECTIVE AND OBJECTIVE BOX
Dr. Gabriela Valente  Pager [phone]    PROGRESS NOTE:     Patient is a 38y old  Female who presents with a chief complaint of + blood cultures (12 Jan 2024 15:08)      SUBJECTIVE / OVERNIGHT EVENTS: denies chest pain or sob, denies hearing voices  ADDITIONAL REVIEW OF SYSTEMS: c/o back pain for couple of days    MEDICATIONS  (STANDING):  atorvastatin 10 milliGRAM(s) Oral at bedtime  cholecalciferol 1000 Unit(s) Oral daily  divalproex DR 1000 milliGRAM(s) Oral at bedtime  enoxaparin Injectable 40 milliGRAM(s) SubCutaneous every 12 hours  famotidine    Tablet 20 milliGRAM(s) Oral daily  folic acid 1 milliGRAM(s) Oral daily  lidocaine   4% Patch 1 Patch Transdermal daily  lithium 300 milliGRAM(s) Oral two times a day  metoprolol succinate ER 12.5 milliGRAM(s) Oral daily  OLANZapine 5 milliGRAM(s) Oral at bedtime  senna 2 Tablet(s) Oral at bedtime  venlafaxine XR. 150 milliGRAM(s) Oral daily    MEDICATIONS  (PRN):  acetaminophen     Tablet .. 650 milliGRAM(s) Oral every 6 hours PRN Temp greater or equal to 38C (100.4F), Mild Pain (1 - 3)  aluminum hydroxide/magnesium hydroxide/simethicone Suspension 30 milliLiter(s) Oral every 4 hours PRN Dyspepsia  melatonin 3 milliGRAM(s) Oral at bedtime PRN Insomnia  OLANZapine Injectable 2.5 milliGRAM(s) IntraMuscular every 6 hours PRN severe agitation  ondansetron Injectable 4 milliGRAM(s) IV Push every 8 hours PRN Nausea and/or Vomiting      CAPILLARY BLOOD GLUCOSE        I&O's Summary    13 Jan 2024 07:01  -  13 Jan 2024 13:35  --------------------------------------------------------  IN: 720 mL / OUT: 0 mL / NET: 720 mL        PHYSICAL EXAM:  Vital Signs Last 24 Hrs  T(C): 36.2 (13 Jan 2024 12:08), Max: 37 (12 Jan 2024 14:55)  T(F): 97.2 (13 Jan 2024 12:08), Max: 98.6 (12 Jan 2024 14:55)  HR: 72 (13 Jan 2024 12:08) (72 - 86)  BP: 104/83 (13 Jan 2024 12:08) (96/75 - 106/66)  BP(mean): 81 (12 Jan 2024 22:03) (79 - 81)  RR: 18 (13 Jan 2024 12:08) (18 - 18)  SpO2: 97% (13 Jan 2024 12:08) (97% - 100%)    Parameters below as of 13 Jan 2024 12:08  Patient On (Oxygen Delivery Method): room air      CONSTITUTIONAL: NAD, well-developed, obese  RESPIRATORY: Normal respiratory effort; lungs are clear to auscultation bilaterally  CARDIOVASCULAR: Regular rate and rhythm, normal S1 and S2, no murmur/rub/gallop; No lower extremity edema; Peripheral pulses are 2+ bilaterally  ABDOMEN: Nontender to palpation, normoactive bowel sounds, no rebound/guarding; No hepatosplenomegaly  MUSCULOSKELETAL: no clubbing or cyanosis of digits; no joint swelling or tenderness to palpation  PSYCH: A+O to person and place, flat affect                          10.0   8.12  )-----------( 513      ( 12 Jan 2024 05:36 )             33.1     01-13    141  |  106  |  6<L>  ----------------------------<  79  4.5   |  23  |  0.69    Ca    9.3      13 Jan 2024 05:25  Phos  2.5     01-13  Mg     2.70     01-13            Urinalysis Basic - ( 13 Jan 2024 05:25 )    Color: x / Appearance: x / SG: x / pH: x  Gluc: 79 mg/dL / Ketone: x  / Bili: x / Urobili: x   Blood: x / Protein: x / Nitrite: x   Leuk Esterase: x / RBC: x / WBC x   Sq Epi: x / Non Sq Epi: x / Bacteria: x          RADIOLOGY & ADDITIONAL TESTS:  Results Reviewed:   Imaging Personally Reviewed:  Electrocardiogram Personally Reviewed:    COORDINATION OF CARE:  Care Discussed with Consultants/Other Providers [Y/N]:  Prior or Outpatient Records Reviewed [Y/N]:   Dr. Gabriela Valente  Pager [phone]    PROGRESS NOTE:     Patient is a 38y old  Female who presents with a chief complaint of + blood cultures (12 Jan 2024 15:08)      SUBJECTIVE / OVERNIGHT EVENTS: denies chest pain or sob, denies hearing voices  ADDITIONAL REVIEW OF SYSTEMS: c/o back pain for couple of days    MEDICATIONS  (STANDING):  atorvastatin 10 milliGRAM(s) Oral at bedtime  cholecalciferol 1000 Unit(s) Oral daily  divalproex DR 1000 milliGRAM(s) Oral at bedtime  enoxaparin Injectable 40 milliGRAM(s) SubCutaneous every 12 hours  famotidine    Tablet 20 milliGRAM(s) Oral daily  folic acid 1 milliGRAM(s) Oral daily  lidocaine   4% Patch 1 Patch Transdermal daily  lithium 300 milliGRAM(s) Oral two times a day  metoprolol succinate ER 12.5 milliGRAM(s) Oral daily  OLANZapine 5 milliGRAM(s) Oral at bedtime  senna 2 Tablet(s) Oral at bedtime  venlafaxine XR. 150 milliGRAM(s) Oral daily    MEDICATIONS  (PRN):  acetaminophen     Tablet .. 650 milliGRAM(s) Oral every 6 hours PRN Temp greater or equal to 38C (100.4F), Mild Pain (1 - 3)  aluminum hydroxide/magnesium hydroxide/simethicone Suspension 30 milliLiter(s) Oral every 4 hours PRN Dyspepsia  melatonin 3 milliGRAM(s) Oral at bedtime PRN Insomnia  OLANZapine Injectable 2.5 milliGRAM(s) IntraMuscular every 6 hours PRN severe agitation  ondansetron Injectable 4 milliGRAM(s) IV Push every 8 hours PRN Nausea and/or Vomiting      CAPILLARY BLOOD GLUCOSE        I&O's Summary    13 Jan 2024 07:01  -  13 Jan 2024 13:35  --------------------------------------------------------  IN: 720 mL / OUT: 0 mL / NET: 720 mL        PHYSICAL EXAM:  Vital Signs Last 24 Hrs  T(C): 36.2 (13 Jan 2024 12:08), Max: 37 (12 Jan 2024 14:55)  T(F): 97.2 (13 Jan 2024 12:08), Max: 98.6 (12 Jan 2024 14:55)  HR: 72 (13 Jan 2024 12:08) (72 - 86)  BP: 104/83 (13 Jan 2024 12:08) (96/75 - 106/66)  BP(mean): 81 (12 Jan 2024 22:03) (79 - 81)  RR: 18 (13 Jan 2024 12:08) (18 - 18)  SpO2: 97% (13 Jan 2024 12:08) (97% - 100%)    Parameters below as of 13 Jan 2024 12:08  Patient On (Oxygen Delivery Method): room air      CONSTITUTIONAL: NAD, well-developed, obese  RESPIRATORY: Normal respiratory effort; lungs are clear to auscultation bilaterally  CARDIOVASCULAR: Regular rate and rhythm, normal S1 and S2, no murmur/rub/gallop; No lower extremity edema; Peripheral pulses are 2+ bilaterally  ABDOMEN: Nontender to palpation, normoactive bowel sounds, no rebound/guarding; No hepatosplenomegaly  MUSCULOSKELETAL: no clubbing or cyanosis of digits; no joint swelling or tenderness to palpation  PSYCH: A+O to person and place, flat affect                          10.0   8.12  )-----------( 513      ( 12 Jan 2024 05:36 )             33.1     01-13    141  |  106  |  6<L>  ----------------------------<  79  4.5   |  23  |  0.69    Ca    9.3      13 Jan 2024 05:25  Phos  2.5     01-13  Mg     2.70     01-13            Urinalysis Basic - ( 13 Jan 2024 05:25 )    Color: x / Appearance: x / SG: x / pH: x  Gluc: 79 mg/dL / Ketone: x  / Bili: x / Urobili: x   Blood: x / Protein: x / Nitrite: x   Leuk Esterase: x / RBC: x / WBC x   Sq Epi: x / Non Sq Epi: x / Bacteria: x          RADIOLOGY & ADDITIONAL TESTS:  Results Reviewed:   Imaging Personally Reviewed:  Electrocardiogram Personally Reviewed:    COORDINATION OF CARE:  Care Discussed with Consultants/Other Providers [Y/N]:  Prior or Outpatient Records Reviewed [Y/N]:

## 2024-01-13 NOTE — PROGRESS NOTE ADULT - PROBLEM SELECTOR PLAN 1
currently lethargic, No focal neuro deficits on exam however with intermittent jerking of upper extremities (which per outpt psychiatrist is her chronic baseline dt Haldol.   -bilateral tremor known to outpt psych and is chronic  -cont with lithium 300mg bid, effexor 150mg qd and Depakote 1000mg qhs (on for seizure)  -will hold clozapine in setting of decreasing ANC and viral syndrome, consider restarting in several days if stable/improving   - started on zyprexa 5mg hs and zyprexa 2.5mg q6h prn severe agitation  -cont to monitor lithium, clozapine level and monitor ANC   -no ss of sepsis, work up neg  -appreciate psych recs  -aspiration, fall, seizure precautions

## 2024-01-14 LAB
ANION GAP SERPL CALC-SCNC: 10 MMOL/L — SIGNIFICANT CHANGE UP (ref 7–14)
ANION GAP SERPL CALC-SCNC: 10 MMOL/L — SIGNIFICANT CHANGE UP (ref 7–14)
ANISOCYTOSIS BLD QL: SLIGHT — SIGNIFICANT CHANGE UP
ANISOCYTOSIS BLD QL: SLIGHT — SIGNIFICANT CHANGE UP
BASOPHILS # BLD AUTO: 0.09 K/UL — SIGNIFICANT CHANGE UP (ref 0–0.2)
BASOPHILS # BLD AUTO: 0.09 K/UL — SIGNIFICANT CHANGE UP (ref 0–0.2)
BASOPHILS NFR BLD AUTO: 0.8 % — SIGNIFICANT CHANGE UP (ref 0–2)
BASOPHILS NFR BLD AUTO: 0.8 % — SIGNIFICANT CHANGE UP (ref 0–2)
BUN SERPL-MCNC: 6 MG/DL — LOW (ref 7–23)
BUN SERPL-MCNC: 6 MG/DL — LOW (ref 7–23)
BURR CELLS BLD QL SMEAR: SLIGHT — SIGNIFICANT CHANGE UP
BURR CELLS BLD QL SMEAR: SLIGHT — SIGNIFICANT CHANGE UP
CALCIUM SERPL-MCNC: 9.2 MG/DL — SIGNIFICANT CHANGE UP (ref 8.4–10.5)
CALCIUM SERPL-MCNC: 9.2 MG/DL — SIGNIFICANT CHANGE UP (ref 8.4–10.5)
CHLORIDE SERPL-SCNC: 108 MMOL/L — HIGH (ref 98–107)
CHLORIDE SERPL-SCNC: 108 MMOL/L — HIGH (ref 98–107)
CO2 SERPL-SCNC: 24 MMOL/L — SIGNIFICANT CHANGE UP (ref 22–31)
CO2 SERPL-SCNC: 24 MMOL/L — SIGNIFICANT CHANGE UP (ref 22–31)
CREAT SERPL-MCNC: 0.71 MG/DL — SIGNIFICANT CHANGE UP (ref 0.5–1.3)
CREAT SERPL-MCNC: 0.71 MG/DL — SIGNIFICANT CHANGE UP (ref 0.5–1.3)
DACRYOCYTES BLD QL SMEAR: SLIGHT — SIGNIFICANT CHANGE UP
DACRYOCYTES BLD QL SMEAR: SLIGHT — SIGNIFICANT CHANGE UP
EGFR: 111 ML/MIN/1.73M2 — SIGNIFICANT CHANGE UP
EGFR: 111 ML/MIN/1.73M2 — SIGNIFICANT CHANGE UP
ELLIPTOCYTES BLD QL SMEAR: SLIGHT — SIGNIFICANT CHANGE UP
ELLIPTOCYTES BLD QL SMEAR: SLIGHT — SIGNIFICANT CHANGE UP
EOSINOPHIL # BLD AUTO: 0.28 K/UL — SIGNIFICANT CHANGE UP (ref 0–0.5)
EOSINOPHIL # BLD AUTO: 0.28 K/UL — SIGNIFICANT CHANGE UP (ref 0–0.5)
EOSINOPHIL NFR BLD AUTO: 2.5 % — SIGNIFICANT CHANGE UP (ref 0–6)
EOSINOPHIL NFR BLD AUTO: 2.5 % — SIGNIFICANT CHANGE UP (ref 0–6)
GIANT PLATELETS BLD QL SMEAR: PRESENT — SIGNIFICANT CHANGE UP
GIANT PLATELETS BLD QL SMEAR: PRESENT — SIGNIFICANT CHANGE UP
GLUCOSE SERPL-MCNC: 72 MG/DL — SIGNIFICANT CHANGE UP (ref 70–99)
GLUCOSE SERPL-MCNC: 72 MG/DL — SIGNIFICANT CHANGE UP (ref 70–99)
HCT VFR BLD CALC: 32.6 % — LOW (ref 34.5–45)
HCT VFR BLD CALC: 32.6 % — LOW (ref 34.5–45)
HGB BLD-MCNC: 10.1 G/DL — LOW (ref 11.5–15.5)
HGB BLD-MCNC: 10.1 G/DL — LOW (ref 11.5–15.5)
HYPOCHROMIA BLD QL: SLIGHT — SIGNIFICANT CHANGE UP
HYPOCHROMIA BLD QL: SLIGHT — SIGNIFICANT CHANGE UP
IANC: 3.01 K/UL — SIGNIFICANT CHANGE UP (ref 1.8–7.4)
IANC: 3.01 K/UL — SIGNIFICANT CHANGE UP (ref 1.8–7.4)
LYMPHOCYTES # BLD AUTO: 27.3 % — SIGNIFICANT CHANGE UP (ref 13–44)
LYMPHOCYTES # BLD AUTO: 27.3 % — SIGNIFICANT CHANGE UP (ref 13–44)
LYMPHOCYTES # BLD AUTO: 3.05 K/UL — SIGNIFICANT CHANGE UP (ref 1–3.3)
LYMPHOCYTES # BLD AUTO: 3.05 K/UL — SIGNIFICANT CHANGE UP (ref 1–3.3)
MAGNESIUM SERPL-MCNC: 2.6 MG/DL — SIGNIFICANT CHANGE UP (ref 1.6–2.6)
MAGNESIUM SERPL-MCNC: 2.6 MG/DL — SIGNIFICANT CHANGE UP (ref 1.6–2.6)
MANUAL SMEAR VERIFICATION: SIGNIFICANT CHANGE UP
MANUAL SMEAR VERIFICATION: SIGNIFICANT CHANGE UP
MCHC RBC-ENTMCNC: 26.3 PG — LOW (ref 27–34)
MCHC RBC-ENTMCNC: 26.3 PG — LOW (ref 27–34)
MCHC RBC-ENTMCNC: 31 GM/DL — LOW (ref 32–36)
MCHC RBC-ENTMCNC: 31 GM/DL — LOW (ref 32–36)
MCV RBC AUTO: 84.9 FL — SIGNIFICANT CHANGE UP (ref 80–100)
MCV RBC AUTO: 84.9 FL — SIGNIFICANT CHANGE UP (ref 80–100)
METAMYELOCYTES # FLD: 0.8 % — SIGNIFICANT CHANGE UP (ref 0–1)
METAMYELOCYTES # FLD: 0.8 % — SIGNIFICANT CHANGE UP (ref 0–1)
MONOCYTES # BLD AUTO: 1.11 K/UL — HIGH (ref 0–0.9)
MONOCYTES # BLD AUTO: 1.11 K/UL — HIGH (ref 0–0.9)
MONOCYTES NFR BLD AUTO: 9.9 % — SIGNIFICANT CHANGE UP (ref 2–14)
MONOCYTES NFR BLD AUTO: 9.9 % — SIGNIFICANT CHANGE UP (ref 2–14)
NEUTROPHILS # BLD AUTO: 3.97 K/UL — SIGNIFICANT CHANGE UP (ref 1.8–7.4)
NEUTROPHILS # BLD AUTO: 3.97 K/UL — SIGNIFICANT CHANGE UP (ref 1.8–7.4)
NEUTROPHILS NFR BLD AUTO: 35.5 % — LOW (ref 43–77)
NEUTROPHILS NFR BLD AUTO: 35.5 % — LOW (ref 43–77)
NRBC # BLD: 2 /100 WBCS — HIGH (ref 0–0)
NRBC # BLD: 2 /100 WBCS — HIGH (ref 0–0)
OVALOCYTES BLD QL SMEAR: SLIGHT — SIGNIFICANT CHANGE UP
OVALOCYTES BLD QL SMEAR: SLIGHT — SIGNIFICANT CHANGE UP
PHOSPHATE SERPL-MCNC: 3.1 MG/DL — SIGNIFICANT CHANGE UP (ref 2.5–4.5)
PHOSPHATE SERPL-MCNC: 3.1 MG/DL — SIGNIFICANT CHANGE UP (ref 2.5–4.5)
PLAT MORPH BLD: ABNORMAL
PLAT MORPH BLD: ABNORMAL
PLATELET # BLD AUTO: 523 K/UL — HIGH (ref 150–400)
PLATELET # BLD AUTO: 523 K/UL — HIGH (ref 150–400)
PLATELET COUNT - ESTIMATE: ABNORMAL
PLATELET COUNT - ESTIMATE: ABNORMAL
POIKILOCYTOSIS BLD QL AUTO: SLIGHT — SIGNIFICANT CHANGE UP
POIKILOCYTOSIS BLD QL AUTO: SLIGHT — SIGNIFICANT CHANGE UP
POLYCHROMASIA BLD QL SMEAR: SLIGHT — SIGNIFICANT CHANGE UP
POLYCHROMASIA BLD QL SMEAR: SLIGHT — SIGNIFICANT CHANGE UP
POTASSIUM SERPL-MCNC: 4.3 MMOL/L — SIGNIFICANT CHANGE UP (ref 3.5–5.3)
POTASSIUM SERPL-MCNC: 4.3 MMOL/L — SIGNIFICANT CHANGE UP (ref 3.5–5.3)
POTASSIUM SERPL-SCNC: 4.3 MMOL/L — SIGNIFICANT CHANGE UP (ref 3.5–5.3)
POTASSIUM SERPL-SCNC: 4.3 MMOL/L — SIGNIFICANT CHANGE UP (ref 3.5–5.3)
RBC # BLD: 3.84 M/UL — SIGNIFICANT CHANGE UP (ref 3.8–5.2)
RBC # BLD: 3.84 M/UL — SIGNIFICANT CHANGE UP (ref 3.8–5.2)
RBC # FLD: 20.2 % — HIGH (ref 10.3–14.5)
RBC # FLD: 20.2 % — HIGH (ref 10.3–14.5)
RBC BLD AUTO: ABNORMAL
RBC BLD AUTO: ABNORMAL
SODIUM SERPL-SCNC: 142 MMOL/L — SIGNIFICANT CHANGE UP (ref 135–145)
SODIUM SERPL-SCNC: 142 MMOL/L — SIGNIFICANT CHANGE UP (ref 135–145)
TARGETS BLD QL SMEAR: SLIGHT — SIGNIFICANT CHANGE UP
TARGETS BLD QL SMEAR: SLIGHT — SIGNIFICANT CHANGE UP
VARIANT LYMPHS # BLD: 23.2 % — HIGH (ref 0–6)
VARIANT LYMPHS # BLD: 23.2 % — HIGH (ref 0–6)
WBC # BLD: 11.17 K/UL — HIGH (ref 3.8–10.5)
WBC # BLD: 11.17 K/UL — HIGH (ref 3.8–10.5)
WBC # FLD AUTO: 11.17 K/UL — HIGH (ref 3.8–10.5)
WBC # FLD AUTO: 11.17 K/UL — HIGH (ref 3.8–10.5)

## 2024-01-14 PROCEDURE — 99232 SBSQ HOSP IP/OBS MODERATE 35: CPT

## 2024-01-14 RX ORDER — IBUPROFEN 200 MG
600 TABLET ORAL EVERY 6 HOURS
Refills: 0 | Status: DISCONTINUED | OUTPATIENT
Start: 2024-01-14 | End: 2024-01-17

## 2024-01-14 RX ORDER — OLANZAPINE 15 MG/1
10 TABLET, FILM COATED ORAL AT BEDTIME
Refills: 0 | Status: DISCONTINUED | OUTPATIENT
Start: 2024-01-14 | End: 2024-01-16

## 2024-01-14 RX ORDER — SODIUM CHLORIDE 9 MG/ML
1000 INJECTION INTRAMUSCULAR; INTRAVENOUS; SUBCUTANEOUS ONCE
Refills: 0 | Status: COMPLETED | OUTPATIENT
Start: 2024-01-14 | End: 2024-01-14

## 2024-01-14 RX ADMIN — Medication 650 MILLIGRAM(S): at 19:36

## 2024-01-14 RX ADMIN — LITHIUM CARBONATE 300 MILLIGRAM(S): 300 TABLET, EXTENDED RELEASE ORAL at 05:36

## 2024-01-14 RX ADMIN — LIDOCAINE 1 PATCH: 4 CREAM TOPICAL at 12:26

## 2024-01-14 RX ADMIN — ATORVASTATIN CALCIUM 10 MILLIGRAM(S): 80 TABLET, FILM COATED ORAL at 23:04

## 2024-01-14 RX ADMIN — DIVALPROEX SODIUM 1000 MILLIGRAM(S): 500 TABLET, DELAYED RELEASE ORAL at 23:05

## 2024-01-14 RX ADMIN — LIDOCAINE 1 PATCH: 4 CREAM TOPICAL at 00:00

## 2024-01-14 RX ADMIN — Medication 1000 UNIT(S): at 12:26

## 2024-01-14 RX ADMIN — ENOXAPARIN SODIUM 40 MILLIGRAM(S): 100 INJECTION SUBCUTANEOUS at 05:36

## 2024-01-14 RX ADMIN — SENNA PLUS 2 TABLET(S): 8.6 TABLET ORAL at 23:04

## 2024-01-14 RX ADMIN — Medication 650 MILLIGRAM(S): at 00:00

## 2024-01-14 RX ADMIN — FAMOTIDINE 20 MILLIGRAM(S): 10 INJECTION INTRAVENOUS at 12:26

## 2024-01-14 RX ADMIN — ENOXAPARIN SODIUM 40 MILLIGRAM(S): 100 INJECTION SUBCUTANEOUS at 19:37

## 2024-01-14 RX ADMIN — SODIUM CHLORIDE 1000 MILLILITER(S): 9 INJECTION INTRAMUSCULAR; INTRAVENOUS; SUBCUTANEOUS at 12:25

## 2024-01-14 RX ADMIN — OLANZAPINE 10 MILLIGRAM(S): 15 TABLET, FILM COATED ORAL at 23:04

## 2024-01-14 RX ADMIN — LIDOCAINE 1 PATCH: 4 CREAM TOPICAL at 19:00

## 2024-01-14 RX ADMIN — LITHIUM CARBONATE 300 MILLIGRAM(S): 300 TABLET, EXTENDED RELEASE ORAL at 19:36

## 2024-01-14 RX ADMIN — Medication 650 MILLIGRAM(S): at 00:56

## 2024-01-14 RX ADMIN — Medication 1 MILLIGRAM(S): at 12:26

## 2024-01-14 RX ADMIN — Medication 150 MILLIGRAM(S): at 12:26

## 2024-01-14 NOTE — PROGRESS NOTE ADULT - SUBJECTIVE AND OBJECTIVE BOX
Dr. Gabriela Valente  Pager [phone]    PROGRESS NOTE:     Patient is a 38y old  Female who presents with a chief complaint of + blood cultures (13 Jan 2024 13:34)      SUBJECTIVE / OVERNIGHT EVENTS:  denies chest pain or sob   ADDITIONAL REVIEW OF SYSTEMS: afebrile     MEDICATIONS  (STANDING):  atorvastatin 10 milliGRAM(s) Oral at bedtime  cholecalciferol 1000 Unit(s) Oral daily  divalproex DR 1000 milliGRAM(s) Oral at bedtime  enoxaparin Injectable 40 milliGRAM(s) SubCutaneous every 12 hours  famotidine    Tablet 20 milliGRAM(s) Oral daily  folic acid 1 milliGRAM(s) Oral daily  lidocaine   4% Patch 1 Patch Transdermal daily  lithium 300 milliGRAM(s) Oral two times a day  metoprolol succinate ER 12.5 milliGRAM(s) Oral daily  OLANZapine 5 milliGRAM(s) Oral at bedtime  senna 2 Tablet(s) Oral at bedtime  venlafaxine XR. 150 milliGRAM(s) Oral daily    MEDICATIONS  (PRN):  acetaminophen     Tablet .. 650 milliGRAM(s) Oral every 6 hours PRN Temp greater or equal to 38C (100.4F), Mild Pain (1 - 3)  aluminum hydroxide/magnesium hydroxide/simethicone Suspension 30 milliLiter(s) Oral every 4 hours PRN Dyspepsia  ibuprofen  Tablet. 600 milliGRAM(s) Oral every 6 hours PRN Moderate Pain (4 - 6)  melatonin 3 milliGRAM(s) Oral at bedtime PRN Insomnia  OLANZapine Injectable 2.5 milliGRAM(s) IntraMuscular every 6 hours PRN severe agitation  ondansetron Injectable 4 milliGRAM(s) IV Push every 8 hours PRN Nausea and/or Vomiting      CAPILLARY BLOOD GLUCOSE        I&O's Summary    13 Jan 2024 07:01  -  14 Jan 2024 07:00  --------------------------------------------------------  IN: 1900 mL / OUT: 0 mL / NET: 1900 mL    14 Jan 2024 07:01  -  14 Jan 2024 12:43  --------------------------------------------------------  IN: 240 mL / OUT: 0 mL / NET: 240 mL        PHYSICAL EXAM:  Vital Signs Last 24 Hrs  T(C): 36.4 (14 Jan 2024 10:04), Max: 36.8 (14 Jan 2024 05:29)  T(F): 97.6 (14 Jan 2024 10:04), Max: 98.3 (14 Jan 2024 05:29)  HR: 70 (14 Jan 2024 10:04) (63 - 73)  BP: 95/60 (14 Jan 2024 10:04) (94/57 - 95/60)  BP(mean): 71 (14 Jan 2024 10:04) (71 - 71)  RR: 18 (14 Jan 2024 10:04) (18 - 18)  SpO2: 100% (14 Jan 2024 10:04) (98% - 100%)    Parameters below as of 14 Jan 2024 10:04  Patient On (Oxygen Delivery Method): room air    CONSTITUTIONAL: NAD, well-developed, obese  RESPIRATORY: Normal respiratory effort; lungs are clear to auscultation bilaterally  CARDIOVASCULAR: Regular rate and rhythm, normal S1 and S2, no murmur/rub/gallop; No lower extremity edema; Peripheral pulses are 2+ bilaterally  ABDOMEN: Nontender to palpation, normoactive bowel sounds, no rebound/guarding; No hepatosplenomegaly  MUSCULOSKELETAL: no clubbing or cyanosis of digits; no joint swelling or tenderness to palpation  PSYCH: A+O to person and place, flat affect      LABS:                        10.1   11.17 )-----------( 523      ( 14 Jan 2024 06:04 )             32.6     01-14    142  |  108<H>  |  6<L>  ----------------------------<  72  4.3   |  24  |  0.71    Ca    9.2      14 Jan 2024 06:04  Phos  3.1     01-14  Mg     2.60     01-14            Urinalysis Basic - ( 14 Jan 2024 06:04 )    Color: x / Appearance: x / SG: x / pH: x  Gluc: 72 mg/dL / Ketone: x  / Bili: x / Urobili: x   Blood: x / Protein: x / Nitrite: x   Leuk Esterase: x / RBC: x / WBC x   Sq Epi: x / Non Sq Epi: x / Bacteria: x          RADIOLOGY & ADDITIONAL TESTS:  Results Reviewed:   Imaging Personally Reviewed:  Electrocardiogram Personally Reviewed:    COORDINATION OF CARE:  Care Discussed with Consultants/Other Providers [Y/N]:  Prior or Outpatient Records Reviewed [Y/N]:   Dr. Gabriela Valente  Pager [phone]    PROGRESS NOTE:     Patient is a 38y old  Female who presents with a chief complaint of + blood cultures (13 Jan 2024 13:34)      SUBJECTIVE / OVERNIGHT EVENTS:  denies chest pain or sob   ADDITIONAL REVIEW OF SYSTEMS: afebrile     MEDICATIONS  (STANDING):  atorvastatin 10 milliGRAM(s) Oral at bedtime  cholecalciferol 1000 Unit(s) Oral daily  divalproex DR 1000 milliGRAM(s) Oral at bedtime  enoxaparin Injectable 40 milliGRAM(s) SubCutaneous every 12 hours  famotidine    Tablet 20 milliGRAM(s) Oral daily  folic acid 1 milliGRAM(s) Oral daily  lidocaine   4% Patch 1 Patch Transdermal daily  lithium 300 milliGRAM(s) Oral two times a day  metoprolol succinate ER 12.5 milliGRAM(s) Oral daily  OLANZapine 5 milliGRAM(s) Oral at bedtime  senna 2 Tablet(s) Oral at bedtime  venlafaxine XR. 150 milliGRAM(s) Oral daily    MEDICATIONS  (PRN):  acetaminophen     Tablet .. 650 milliGRAM(s) Oral every 6 hours PRN Temp greater or equal to 38C (100.4F), Mild Pain (1 - 3)  aluminum hydroxide/magnesium hydroxide/simethicone Suspension 30 milliLiter(s) Oral every 4 hours PRN Dyspepsia  ibuprofen  Tablet. 600 milliGRAM(s) Oral every 6 hours PRN Moderate Pain (4 - 6)  melatonin 3 milliGRAM(s) Oral at bedtime PRN Insomnia  OLANZapine Injectable 2.5 milliGRAM(s) IntraMuscular every 6 hours PRN severe agitation  ondansetron Injectable 4 milliGRAM(s) IV Push every 8 hours PRN Nausea and/or Vomiting      CAPILLARY BLOOD GLUCOSE        I&O's Summary    13 Jan 2024 07:01  -  14 Jan 2024 07:00  --------------------------------------------------------  IN: 1900 mL / OUT: 0 mL / NET: 1900 mL    14 Jan 2024 07:01  -  14 Jan 2024 12:43  --------------------------------------------------------  IN: 240 mL / OUT: 0 mL / NET: 240 mL        PHYSICAL EXAM:  Vital Signs Last 24 Hrs  T(C): 36.4 (14 Jan 2024 10:04), Max: 36.8 (14 Jan 2024 05:29)  T(F): 97.6 (14 Jan 2024 10:04), Max: 98.3 (14 Jan 2024 05:29)  HR: 70 (14 Jan 2024 10:04) (63 - 73)  BP: 95/60 (14 Jan 2024 10:04) (94/57 - 95/60)  BP(mean): 71 (14 Jan 2024 10:04) (71 - 71)  RR: 18 (14 Jan 2024 10:04) (18 - 18)  SpO2: 100% (14 Jan 2024 10:04) (98% - 100%)    Parameters below as of 14 Jan 2024 10:04  Patient On (Oxygen Delivery Method): room air    CONSTITUTIONAL: NAD, well-developed, obese  RESPIRATORY: Normal respiratory effort; lungs are clear to auscultation bilaterally  CARDIOVASCULAR: Regular rate and rhythm, normal S1 and S2, no murmur/rub/gallop; No lower extremity edema; Peripheral pulses are 2+ bilaterally  ABDOMEN: Nontender to palpation, normoactive bowel sounds, no rebound/guarding; No hepatosplenomegaly  MUSCULOSKELETAL: no clubbing or cyanosis of digits; no joint swelling or tenderness to palpation  PSYCH: A+O to person and place, flat affect      LABS:                        10.1   11.17 )-----------( 523      ( 14 Jan 2024 06:04 )             32.6     01-14    142  |  108<H>  |  6<L>  ----------------------------<  72  4.3   |  24  |  0.71    Ca    9.2      14 Jan 2024 06:04  Phos  3.1     01-14  Mg     2.60     01-14            Urinalysis Basic - ( 14 Jan 2024 06:04 )    Color: x / Appearance: x / SG: x / pH: x  Gluc: 72 mg/dL / Ketone: x  / Bili: x / Urobili: x   Blood: x / Protein: x / Nitrite: x   Leuk Esterase: x / RBC: x / WBC x   Sq Epi: x / Non Sq Epi: x / Bacteria: x          RADIOLOGY & ADDITIONAL TESTS:  Results Reviewed:   Imaging Personally Reviewed:  Electrocardiogram Personally Reviewed:    COORDINATION OF CARE:  Care Discussed with Consultants/Other Providers [Y/N]:  Prior or Outpatient Records Reviewed [Y/N]:

## 2024-01-14 NOTE — BH CONSULTATION LIAISON PROGRESS NOTE - NSBHASSESSMENTFT_PSY_ALL_CORE
Patient is a 38-year-old woman with past history of schizoaffective disorder, living in group home on [facility] grounds, with multiple past inpatient admissions, and PMH of seizure disorder, HTN, DM?, HLD who was BIBEMS activated by facility due to acute influenza infection. She was medically admitted due to blood cultures growing staph capitis, now thought to be contaminant. Psychiatry was consulted for medication management of home clozapine dose in the setting of increased sedation, decreasing ANC (1.07), and missed dose.     1/4: Patient seen, oriented, but lethargic and slow to respond, mild tremors noted in both UE. She firmly denies AVH, denies CAH, denies SI and HI. Denies feeling unsafe in the hospital. Care coordinated with outpt. psychiatrist, at baseline pt. is alert and has chronic tremors in both UE.  Labs: Lithium level 0.8, ANC 1.51, VPA level 49.30, Clozapine level 677  1/5: Patient lethargic, interview limited at this time. No overt catatonic sxs , no stiffness or rigidity. Labs: ANC 1.07 today.   1/6: Patient remains lethargic, not engaged with writer.   1/8: Patient awakes to verbal and tactile stimulation, although still sedated during daytime. Asking appropriate questions about discharge plan. No evidence of acute psychosis or mood symptoms indicating decompensation. ANC improved to 4.34.  1/9: Patient seen, awakes to verbal and tactile stimulation, still sedated at this time. No overt catatonic sxs, no stiffness or rigidity. No evidence of acute mood or psychotic symptoms. ANC 1.98.  1/10: Patient oriented, no psychosis, no si or hi. No catatonic sxs on exam. Plan is to hold Clozapine until ANC normalized. ANC today 1.67  1/11: Reporting CAH to harm herself. Started on 1:1 CO and zyprexa 5 mg HS. Continue to hold clozapine until ANC normalizes; improved to 2.64 today but continue to trend due to recent fluctuations.  1/12: Significant thought blocking on exam. Pt still endorsing CAH to harm herself with no specific plan, although she denies intent to act on them. ANC 2.20 today. No side effects reported from olanzapine. No catatonic sxs on exam.  1/14: Patient continues to have AH and CAH, but pt reports they are less intense. Staff present and informed of CAH of ending her life and hurting others. ANC 3.01, improving. Tolerating Zyprexa and adherent, plan to increase Zyprexa to 10 mg qHs.     PLAN:  1-Continue 1:1 CO for CAH to harm herself and psychotic decompensation. Order safety trays (no metal objects).   2-Patient DOES NOT have capacity to leave AMA. Further safety evaluation needed for disposition planning.  3-INCREASE zyprexa to 10 mg qHS for psychotic decompensation in setting of not receiving clozapine.  4-HOLD clozapine due to low ANC until several days of normal values seen    [] Monitor ANCs daily while at [facility]    [] Clozapine level slightly supratherapeutic at 677 on 1/4, decreased to 183 on 1/6 after stopped  5-Continue Lithium 300 mg BID and Effexor 150 mg po daily. Depakote dosing as per primary team as it seems to be for seizures    [] Monitor BUN/CR, Lithium level 0.8 on 1/4 and 0.4 on 1/9    [] VPA level 85.40 on 1/1 and 49.30 on 1/3  6- Haldol decanoate 200mg q4 weeks, last received 12/27/23, next injection is due on 1/24/24.  7- PRN for agitation: Zyprexa 2.5mg PO/IM q6h prn for agitation, may give additional Zyprexa 2.5mg for refractory agitation (ensure QTc <500); avoid IM/IV Ativan within 1 hour of Zyprexa  8- Care coordinated with outpatient psychiatrist Dr. Whitaker [phone], outpatient psychologist Dr. Treviño, and care coordinator Meagan Mckeon [phone] on 1/4. Update provided to Dr. Whitaker on 1/11     [] Outpatient psych follow-up appt scheduled for 1/16 at 1:30 pm w/ Dr. Whitaker.     [] Pt also has standing apt with Dr. Treviño on Thursdays at 1:30, including 1/11.     [] Please fax DC summary to OP psychiatry team at [phone].  9-Disposition: pending clinical course; may need inpatient psych admission if CAH to harm self continue Patient is a 38-year-old woman with past history of schizoaffective disorder, living in group home on [facility] grounds, with multiple past inpatient admissions, and PMH of seizure disorder, HTN, DM?, HLD who was BIBEMS activated by facility due to acute influenza infection. She was medically admitted due to blood cultures growing staph capitis, now thought to be contaminant. Psychiatry was consulted for medication management of home clozapine dose in the setting of increased sedation, decreasing ANC (1.07), and missed dose.     1/4: Patient seen, oriented, but lethargic and slow to respond, mild tremors noted in both UE. She firmly denies AVH, denies CAH, denies SI and HI. Denies feeling unsafe in the hospital. Care coordinated with outpt. psychiatrist, at baseline pt. is alert and has chronic tremors in both UE.  Labs: Lithium level 0.8, ANC 1.51, VPA level 49.30, Clozapine level 677  1/5: Patient lethargic, interview limited at this time. No overt catatonic sxs , no stiffness or rigidity. Labs: ANC 1.07 today.   1/6: Patient remains lethargic, not engaged with writer.   1/8: Patient awakes to verbal and tactile stimulation, although still sedated during daytime. Asking appropriate questions about discharge plan. No evidence of acute psychosis or mood symptoms indicating decompensation. ANC improved to 4.34.  1/9: Patient seen, awakes to verbal and tactile stimulation, still sedated at this time. No overt catatonic sxs, no stiffness or rigidity. No evidence of acute mood or psychotic symptoms. ANC 1.98.  1/10: Patient oriented, no psychosis, no si or hi. No catatonic sxs on exam. Plan is to hold Clozapine until ANC normalized. ANC today 1.67  1/11: Reporting CAH to harm herself. Started on 1:1 CO and zyprexa 5 mg HS. Continue to hold clozapine until ANC normalizes; improved to 2.64 today but continue to trend due to recent fluctuations.  1/12: Significant thought blocking on exam. Pt still endorsing CAH to harm herself with no specific plan, although she denies intent to act on them. ANC 2.20 today. No side effects reported from olanzapine. No catatonic sxs on exam.  1/14: Patient continues to have AH and CAH, but pt reports they are less intense. Staff present and informed of CAH of ending her life and hurting others. ANC 3.01, improving. Tolerating Zyprexa and adherent, plan to increase Zyprexa to 10 mg qHs.     PLAN:  1-Continue 1:1 CO for CAH to harm herself and psychotic decompensation. Order safety trays (no metal objects).   2-Patient DOES NOT have capacity to leave AMA. Further safety evaluation needed for disposition planning.  3-INCREASE zyprexa to 10 mg qHS for psychotic decompensation in setting of not receiving clozapine.  4-HOLD clozapine due to low ANC until several days of normal values seen    [] Monitor ANCs daily while at [facility]    [] Clozapine level slightly supratherapeutic at 677 on 1/4, decreased to 183 on 1/6 after stopped  5-Continue Lithium 300 mg BID and Effexor 150 mg po daily. Depakote dosing as per primary team as it seems to be for seizures    [] Monitor BUN/CR, Lithium level 0.8 on 1/4 and 0.4 on 1/9    [] VPA level 85.40 on 1/1 and 49.30 on 1/3  6- Haldol decanoate 200mg q4 weeks, last received 12/27/23, next injection is due on 1/24/24.  7- PRN for agitation: Zyprexa 2.5mg PO/IM q6h prn for agitation, may give additional Zyprexa 2.5mg for refractory agitation (ensure QTc <500); avoid IM/IV Ativan within 1 hour of Zyprexa  8- Care coordinated with outpatient psychiatrist Dr. Whitaker [phone], outpatient psychologist Dr. Treviño, and care coordinator Meagan Mckeon [phone] on 1/4. Update provided to Dr. Whitaker on 1/11     [] Outpatient psych follow-up appt scheduled for 1/16 at 1:30 pm w/ Dr. Whitaker.     [] Pt also has standing apt with Dr. Treviño on Thursdays at 1:30, including 1/11.     [] Please fax DC summary to OP psychiatry team at [phone].  9-Disposition: pending clinical course; may need inpatient psych admission if CAH to harm self continue

## 2024-01-14 NOTE — BH CONSULTATION LIAISON PROGRESS NOTE - NSBHFUPINTERVALHXFT_PSY_A_CORE
Chart reviewed and case discussed with primary team. No acute overnight events, no PRN medications required. Adherent with standing medications. 1:1 CO present at bedside. Per staff, patient has been sleeping since start of shift.     Today, patient continues to endorse  that mumbles but has heard [facility] this morning telling her to kill herself. States it told her to use a knife to end her life. She denies any intent or need to listen to voice and wants to continue living. She also says voice will also tell her to hurt people, but no one in partcular. Asked if it tells her to hurt staff sitting by her and she denies. She denies wanting to hurt anyone. Pt agrees to tell staff immediately if any acute safety concerns arise. Denies any paranoia, feels safe in hospital. She denies any S/IIP or H/IIP outside of CAH. She describes her mood as "okay." Reports poor sleep. Good appetite. Denies any side effects from olanzapine. Chart reviewed and case discussed with primary team. No acute overnight events, no PRN medications required. Adherent with standing medications. 1:1 CO present at bedside. Per staff, patient has been sleeping since start of shift.     Today, patient continues to endorse  that mumbles but has heard [facility] this morning telling her to kill herself. States it told her to use a knife to end her life. She denies any intent or need to listen to voice and wants to continue living. She also says voice will also tell her to hurt people, but no one in partcular. Asked if it tells her to hurt staff sitting by her and she denies. She denies wanting to hurt anyone. Pt agrees to tell staff immediately if any acute safety concerns arise. Denies any paranoia, feels safe in hospital. She denies any S/IIP or H/IIP outside of CAH. She describes her mood as "okay." Reports poor sleep. Good appetite. Denies any side effects from olanzapine.

## 2024-01-14 NOTE — PROGRESS NOTE ADULT - PROBLEM SELECTOR PLAN 1
currently lethargic, No focal neuro deficits on exam however with intermittent jerking of upper extremities (which per outpt psychiatrist is her chronic baseline dt Haldol.   -bilateral tremor known to outpt psych and is chronic  -cont with lithium 300mg bid, effexor 150mg qd and Depakote 1000mg qhs (on for seizure)  -will hold clozapine in setting of decreasing ANC and viral syndrome, consider restarting in several days if stable/improving   - started on zyprexa 5mg hs and zyprexa 2.5mg q6h prn severe agitation  -cont to monitor lithium, clozapine level and monitor ANC   -no ss of sepsis, work up neg  -appreciate psych recs  -aspiration, fall, seizure precautions currently lethargic, No focal neuro deficits on exam however with intermittent jerking of upper extremities (which per outpt psychiatrist is her chronic baseline dt Haldol.   -bilateral tremor known to outpt psych and is chronic  -cont with lithium 300mg bid, effexor 150mg qd and Depakote 1000mg qhs (on for seizure)  -will hold clozapine in setting of decreasing ANC and viral syndrome, consider restarting in several days if stable/improving   - Inc Zyprexa to 10 mg hs and c/w Zyprexa 2.5mg q6h prn severe agitation per BH  -cont to monitor lithium, clozapine level and monitor ANC   -no ss of sepsis, work up neg  -appreciate psych recs  -aspiration, fall, seizure precautions

## 2024-01-14 NOTE — PROGRESS NOTE ADULT - ASSESSMENT
38-year-old female with past medical history of schizophrenia, seizure disorder, from [facility], HTN, DM?, HLD sent in for blood cx growing staph capitis and noted to have of influenza + 38-year-old female with past medical history of schizophrenia, seizure disorder, from [facility], HTN, DM?, HLD sent in for blood cx growing staph capitis and noted to have of influenza +

## 2024-01-14 NOTE — PROGRESS NOTE ADULT - PROBLEM SELECTOR PLAN 5
outpt meds: Clozapine 175mg qhs,  lithium 300mg bid, effexor 150mg qd and Depakote 1g qhs, Haldol dec inj 200mg q4 weeks, last received 12/27/23, next injection is due on 1/24/24.   outpt psych: Dr. Whitaker [phone]    -pt denies A/V hallucinations, no evidence of decompensation   -adjusting psych medications as above  -appreciate psych recs outpt meds: Clozapine 175mg qhs,  lithium 300mg bid, effexor 150mg qd and Depakote 1g qhs, Haldol dec inj 200mg q4 weeks, last received 12/27/23, next injection is due on 1/24/24.   outpt psych: Dr. Whitaker [phone]    -pt denies A/V hallucinations, no evidence of decompensation   -adjusting psych medications as above  -appreciate psych recs

## 2024-01-15 LAB
ANION GAP SERPL CALC-SCNC: 8 MMOL/L — SIGNIFICANT CHANGE UP (ref 7–14)
ANION GAP SERPL CALC-SCNC: 8 MMOL/L — SIGNIFICANT CHANGE UP (ref 7–14)
BASOPHILS # BLD AUTO: 0.05 K/UL — SIGNIFICANT CHANGE UP (ref 0–0.2)
BASOPHILS # BLD AUTO: 0.05 K/UL — SIGNIFICANT CHANGE UP (ref 0–0.2)
BASOPHILS NFR BLD AUTO: 0.5 % — SIGNIFICANT CHANGE UP (ref 0–2)
BASOPHILS NFR BLD AUTO: 0.5 % — SIGNIFICANT CHANGE UP (ref 0–2)
BUN SERPL-MCNC: 6 MG/DL — LOW (ref 7–23)
BUN SERPL-MCNC: 6 MG/DL — LOW (ref 7–23)
CALCIUM SERPL-MCNC: 8.9 MG/DL — SIGNIFICANT CHANGE UP (ref 8.4–10.5)
CALCIUM SERPL-MCNC: 8.9 MG/DL — SIGNIFICANT CHANGE UP (ref 8.4–10.5)
CHLORIDE SERPL-SCNC: 110 MMOL/L — HIGH (ref 98–107)
CHLORIDE SERPL-SCNC: 110 MMOL/L — HIGH (ref 98–107)
CO2 SERPL-SCNC: 25 MMOL/L — SIGNIFICANT CHANGE UP (ref 22–31)
CO2 SERPL-SCNC: 25 MMOL/L — SIGNIFICANT CHANGE UP (ref 22–31)
CREAT SERPL-MCNC: 0.77 MG/DL — SIGNIFICANT CHANGE UP (ref 0.5–1.3)
CREAT SERPL-MCNC: 0.77 MG/DL — SIGNIFICANT CHANGE UP (ref 0.5–1.3)
EGFR: 101 ML/MIN/1.73M2 — SIGNIFICANT CHANGE UP
EGFR: 101 ML/MIN/1.73M2 — SIGNIFICANT CHANGE UP
EOSINOPHIL # BLD AUTO: 0.18 K/UL — SIGNIFICANT CHANGE UP (ref 0–0.5)
EOSINOPHIL # BLD AUTO: 0.18 K/UL — SIGNIFICANT CHANGE UP (ref 0–0.5)
EOSINOPHIL NFR BLD AUTO: 1.7 % — SIGNIFICANT CHANGE UP (ref 0–6)
EOSINOPHIL NFR BLD AUTO: 1.7 % — SIGNIFICANT CHANGE UP (ref 0–6)
GLUCOSE SERPL-MCNC: 80 MG/DL — SIGNIFICANT CHANGE UP (ref 70–99)
GLUCOSE SERPL-MCNC: 80 MG/DL — SIGNIFICANT CHANGE UP (ref 70–99)
HCT VFR BLD CALC: 31.5 % — LOW (ref 34.5–45)
HCT VFR BLD CALC: 31.5 % — LOW (ref 34.5–45)
HGB BLD-MCNC: 9.7 G/DL — LOW (ref 11.5–15.5)
HGB BLD-MCNC: 9.7 G/DL — LOW (ref 11.5–15.5)
IANC: 3.45 K/UL — SIGNIFICANT CHANGE UP (ref 1.8–7.4)
IANC: 3.45 K/UL — SIGNIFICANT CHANGE UP (ref 1.8–7.4)
IMM GRANULOCYTES NFR BLD AUTO: 0.6 % — SIGNIFICANT CHANGE UP (ref 0–0.9)
IMM GRANULOCYTES NFR BLD AUTO: 0.6 % — SIGNIFICANT CHANGE UP (ref 0–0.9)
LYMPHOCYTES # BLD AUTO: 5.65 K/UL — HIGH (ref 1–3.3)
LYMPHOCYTES # BLD AUTO: 5.65 K/UL — HIGH (ref 1–3.3)
LYMPHOCYTES # BLD AUTO: 53.9 % — HIGH (ref 13–44)
LYMPHOCYTES # BLD AUTO: 53.9 % — HIGH (ref 13–44)
MAGNESIUM SERPL-MCNC: 2.3 MG/DL — SIGNIFICANT CHANGE UP (ref 1.6–2.6)
MAGNESIUM SERPL-MCNC: 2.3 MG/DL — SIGNIFICANT CHANGE UP (ref 1.6–2.6)
MCHC RBC-ENTMCNC: 26.4 PG — LOW (ref 27–34)
MCHC RBC-ENTMCNC: 26.4 PG — LOW (ref 27–34)
MCHC RBC-ENTMCNC: 30.8 GM/DL — LOW (ref 32–36)
MCHC RBC-ENTMCNC: 30.8 GM/DL — LOW (ref 32–36)
MCV RBC AUTO: 85.6 FL — SIGNIFICANT CHANGE UP (ref 80–100)
MCV RBC AUTO: 85.6 FL — SIGNIFICANT CHANGE UP (ref 80–100)
MONOCYTES # BLD AUTO: 1.1 K/UL — HIGH (ref 0–0.9)
MONOCYTES # BLD AUTO: 1.1 K/UL — HIGH (ref 0–0.9)
MONOCYTES NFR BLD AUTO: 10.5 % — SIGNIFICANT CHANGE UP (ref 2–14)
MONOCYTES NFR BLD AUTO: 10.5 % — SIGNIFICANT CHANGE UP (ref 2–14)
NEUTROPHILS # BLD AUTO: 3.45 K/UL — SIGNIFICANT CHANGE UP (ref 1.8–7.4)
NEUTROPHILS # BLD AUTO: 3.45 K/UL — SIGNIFICANT CHANGE UP (ref 1.8–7.4)
NEUTROPHILS NFR BLD AUTO: 32.8 % — LOW (ref 43–77)
NEUTROPHILS NFR BLD AUTO: 32.8 % — LOW (ref 43–77)
NRBC # BLD: 0 /100 WBCS — SIGNIFICANT CHANGE UP (ref 0–0)
NRBC # BLD: 0 /100 WBCS — SIGNIFICANT CHANGE UP (ref 0–0)
NRBC # FLD: 0 K/UL — SIGNIFICANT CHANGE UP (ref 0–0)
NRBC # FLD: 0 K/UL — SIGNIFICANT CHANGE UP (ref 0–0)
PHOSPHATE SERPL-MCNC: 3.1 MG/DL — SIGNIFICANT CHANGE UP (ref 2.5–4.5)
PHOSPHATE SERPL-MCNC: 3.1 MG/DL — SIGNIFICANT CHANGE UP (ref 2.5–4.5)
PLATELET # BLD AUTO: 490 K/UL — HIGH (ref 150–400)
PLATELET # BLD AUTO: 490 K/UL — HIGH (ref 150–400)
POTASSIUM SERPL-MCNC: 3.8 MMOL/L — SIGNIFICANT CHANGE UP (ref 3.5–5.3)
POTASSIUM SERPL-MCNC: 3.8 MMOL/L — SIGNIFICANT CHANGE UP (ref 3.5–5.3)
POTASSIUM SERPL-SCNC: 3.8 MMOL/L — SIGNIFICANT CHANGE UP (ref 3.5–5.3)
POTASSIUM SERPL-SCNC: 3.8 MMOL/L — SIGNIFICANT CHANGE UP (ref 3.5–5.3)
RBC # BLD: 3.68 M/UL — LOW (ref 3.8–5.2)
RBC # BLD: 3.68 M/UL — LOW (ref 3.8–5.2)
RBC # FLD: 20.4 % — HIGH (ref 10.3–14.5)
RBC # FLD: 20.4 % — HIGH (ref 10.3–14.5)
SODIUM SERPL-SCNC: 143 MMOL/L — SIGNIFICANT CHANGE UP (ref 135–145)
SODIUM SERPL-SCNC: 143 MMOL/L — SIGNIFICANT CHANGE UP (ref 135–145)
WBC # BLD: 10.49 K/UL — SIGNIFICANT CHANGE UP (ref 3.8–10.5)
WBC # BLD: 10.49 K/UL — SIGNIFICANT CHANGE UP (ref 3.8–10.5)
WBC # FLD AUTO: 10.49 K/UL — SIGNIFICANT CHANGE UP (ref 3.8–10.5)
WBC # FLD AUTO: 10.49 K/UL — SIGNIFICANT CHANGE UP (ref 3.8–10.5)

## 2024-01-15 PROCEDURE — 99232 SBSQ HOSP IP/OBS MODERATE 35: CPT

## 2024-01-15 RX ORDER — SODIUM CHLORIDE 9 MG/ML
1000 INJECTION INTRAMUSCULAR; INTRAVENOUS; SUBCUTANEOUS
Refills: 0 | Status: DISCONTINUED | OUTPATIENT
Start: 2024-01-15 | End: 2024-01-17

## 2024-01-15 RX ADMIN — LITHIUM CARBONATE 300 MILLIGRAM(S): 300 TABLET, EXTENDED RELEASE ORAL at 06:31

## 2024-01-15 RX ADMIN — DIVALPROEX SODIUM 1000 MILLIGRAM(S): 500 TABLET, DELAYED RELEASE ORAL at 22:48

## 2024-01-15 RX ADMIN — FAMOTIDINE 20 MILLIGRAM(S): 10 INJECTION INTRAVENOUS at 11:55

## 2024-01-15 RX ADMIN — LIDOCAINE 1 PATCH: 4 CREAM TOPICAL at 23:20

## 2024-01-15 RX ADMIN — Medication 1000 UNIT(S): at 11:55

## 2024-01-15 RX ADMIN — Medication 650 MILLIGRAM(S): at 10:50

## 2024-01-15 RX ADMIN — SODIUM CHLORIDE 100 MILLILITER(S): 9 INJECTION INTRAMUSCULAR; INTRAVENOUS; SUBCUTANEOUS at 10:49

## 2024-01-15 RX ADMIN — OLANZAPINE 10 MILLIGRAM(S): 15 TABLET, FILM COATED ORAL at 22:49

## 2024-01-15 RX ADMIN — ENOXAPARIN SODIUM 40 MILLIGRAM(S): 100 INJECTION SUBCUTANEOUS at 06:31

## 2024-01-15 RX ADMIN — Medication 1 MILLIGRAM(S): at 11:55

## 2024-01-15 RX ADMIN — ENOXAPARIN SODIUM 40 MILLIGRAM(S): 100 INJECTION SUBCUTANEOUS at 18:52

## 2024-01-15 RX ADMIN — ATORVASTATIN CALCIUM 10 MILLIGRAM(S): 80 TABLET, FILM COATED ORAL at 22:49

## 2024-01-15 RX ADMIN — SENNA PLUS 2 TABLET(S): 8.6 TABLET ORAL at 22:48

## 2024-01-15 RX ADMIN — LIDOCAINE 1 PATCH: 4 CREAM TOPICAL at 11:55

## 2024-01-15 RX ADMIN — LIDOCAINE 1 PATCH: 4 CREAM TOPICAL at 12:25

## 2024-01-15 RX ADMIN — Medication 650 MILLIGRAM(S): at 11:50

## 2024-01-15 RX ADMIN — Medication 150 MILLIGRAM(S): at 11:55

## 2024-01-15 RX ADMIN — LIDOCAINE 1 PATCH: 4 CREAM TOPICAL at 19:15

## 2024-01-15 RX ADMIN — LITHIUM CARBONATE 300 MILLIGRAM(S): 300 TABLET, EXTENDED RELEASE ORAL at 18:51

## 2024-01-15 NOTE — PROGRESS NOTE ADULT - ASSESSMENT
38-year-old female with past medical history of schizophrenia, seizure disorder, from [facility], HTN, DM?, HLD sent in for blood cx growing staph capitis and noted to have of influenza + 38-year-old female with past medical history of schizophrenia, seizure disorder, from [facility], HTN, DM?, HLD sent in for blood cx growing staph capitis and noted to have of influenza + 38-year-old female with past medical history of schizophrenia, seizure disorder, from [facility], HTN, DM?, HLD sent in for blood cx growing staph capitis and noted to have of influenza +

## 2024-01-15 NOTE — PROGRESS NOTE ADULT - PROBLEM SELECTOR PLAN 5
outpt meds: Clozapine 175mg qhs,  lithium 300mg bid, effexor 150mg qd and Depakote 1g qhs, Haldol dec inj 200mg q4 weeks, last received 12/27/23, next injection is due on 1/24/24.   outpt psych: Dr. Whitaker [phone]    -pt denies A/V hallucinations, no evidence of decompensation   - still reports hearing voices  -adjusting psych medications as above  -appreciate psych recs outpt meds: Clozapine 175mg qhs,  lithium 300mg bid, effexor 150mg qd and Depakote 1g qhs, Haldol dec inj 200mg q4 weeks, last received 12/27/23, next injection is due on 1/24/24.   outpt psych: Dr. Whitaker [phone]    -pt denies A/V hallucinations, no evidence of decompensation   - still reports hearing voices  -adjusting psych medications as above  -appreciate psych recs outpt meds: Clozapine 175mg qhs,  lithium 300mg bid, effexor 150mg qd and Depakote 1g qhs, Haldol dec inj 200mg q4 weeks, last received 12/27/23, next injection is due on 1/24/24.   outpt psych: Dr. Whitaker [phone]    -pt denies A/V hallucinations, no evidence of decompensation   - still reports hearing voices  -adjusting psych medications as above  -appreciate psych recs

## 2024-01-15 NOTE — BH CONSULTATION LIAISON PROGRESS NOTE - NSBHFUPINTERVALHXFT_PSY_A_CORE
Chart reviewed. No PRNs overnight. Pt adherent to medications. Pt seen at bedside. Upon approach, pt looks at writer with intense stare. She states that she has AH that is intermittent. She states that they decreased upon increase of Zyprexa, but have increased again. She reports sleeping well last night, reports good appetite, denies depressed mood, VH, SI. Discussed possible further titration of meds upon further assessments if AH persists on this dose of Zyprexa.

## 2024-01-15 NOTE — BH CONSULTATION LIAISON PROGRESS NOTE - ATTENDING COMMENTS
discussed case with resident md impression and plan discussed and agreed upon 
Chart reviewed. Discussed with resident. Agree with above assessment/recs. Will continue to follow
Chart reviewed, case d/w trainees, I agree with above assessment/plan. Care coordinated in person with Dr. Gonzalez. Will follow
Chart reviewed. Discussed with resident. Agree with above assessment/recs. Will continue to follow.

## 2024-01-15 NOTE — BH CONSULTATION LIAISON PROGRESS NOTE - NSBHASSESSMENTFT_PSY_ALL_CORE
Patient is a 38-year-old woman with past history of schizoaffective disorder, living in group home on [facility] grounds, with multiple past inpatient admissions, and PMH of seizure disorder, HTN, DM?, HLD who was BIBEMS activated by facility due to acute influenza infection. She was medically admitted due to blood cultures growing staph capitis, now thought to be contaminant. Psychiatry was consulted for medication management of home clozapine dose in the setting of increased sedation, decreasing ANC (1.07), and missed dose.     1/4: Patient seen, oriented, but lethargic and slow to respond, mild tremors noted in both UE. She firmly denies AVH, denies CAH, denies SI and HI. Denies feeling unsafe in the hospital. Care coordinated with outpt. psychiatrist, at baseline pt. is alert and has chronic tremors in both UE.  Labs: Lithium level 0.8, ANC 1.51, VPA level 49.30, Clozapine level 677  1/5: Patient lethargic, interview limited at this time. No overt catatonic sxs , no stiffness or rigidity. Labs: ANC 1.07 today.   1/6: Patient remains lethargic, not engaged with writer.   1/8: Patient awakes to verbal and tactile stimulation, although still sedated during daytime. Asking appropriate questions about discharge plan. No evidence of acute psychosis or mood symptoms indicating decompensation. ANC improved to 4.34.  1/9: Patient seen, awakes to verbal and tactile stimulation, still sedated at this time. No overt catatonic sxs, no stiffness or rigidity. No evidence of acute mood or psychotic symptoms. ANC 1.98.  1/10: Patient oriented, no psychosis, no si or hi. No catatonic sxs on exam. Plan is to hold Clozapine until ANC normalized. ANC today 1.67  1/11: Reporting CAH to harm herself. Started on 1:1 CO and zyprexa 5 mg HS. Continue to hold clozapine until ANC normalizes; improved to 2.64 today but continue to trend due to recent fluctuations.  1/12: Significant thought blocking on exam. Pt still endorsing CAH to harm herself with no specific plan, although she denies intent to act on them. ANC 2.20 today. No side effects reported from olanzapine. No catatonic sxs on exam.  1/14: Patient continues to have AH and CAH, but pt reports they are less intense. Staff present and informed of CAH of ending her life and hurting others. ANC 3.01, improving. Tolerating Zyprexa and adherent, plan to increase Zyprexa to 10 mg qHs.   1/15: Pt continues to have AH, though intermittent. She is tolerating Zyprexa increase. C/w same meds for now.     PLAN:  1-Continue 1:1 CO for CAH to harm herself and psychotic decompensation. Order safety trays (no metal objects).   2-Patient DOES NOT have capacity to leave AMA. Further safety evaluation needed for disposition planning.  3-C/w zyprexa to 10 mg qHS for psychotic decompensation in setting of not receiving clozapine.  4-HOLD clozapine due to low ANC until several days of normal values seen    [] Monitor ANCs daily while at [facility]    [] Clozapine level slightly supratherapeutic at 677 on 1/4, decreased to 183 on 1/6 after stopped  5-Continue Lithium 300 mg BID and Effexor 150 mg po daily. Depakote dosing as per primary team as it seems to be for seizures    [] Monitor BUN/CR, Lithium level 0.8 on 1/4 and 0.4 on 1/9    [] VPA level 85.40 on 1/1 and 49.30 on 1/3  6- Haldol decanoate 200mg q4 weeks, last received 12/27/23, next injection is due on 1/24/24.  7- PRN for agitation: Zyprexa 2.5mg PO/IM q6h prn for agitation, may give additional Zyprexa 2.5mg for refractory agitation (ensure QTc <500); avoid IM/IV Ativan within 1 hour of Zyprexa  8- Care coordinated with outpatient psychiatrist Dr. Whitaker [phone], outpatient psychologist Dr. Treviño, and care coordinator Meagan Mckeon [phone] on 1/4. Update provided to Dr. Whitaker on 1/11     [] Outpatient psych follow-up appt scheduled for 1/16 at 1:30 pm w/ Dr. Whitaker.     [] Pt also has standing apt with Dr. Treviño on Thursdays at 1:30, including 1/11.     [] Please fax DC summary to OP psychiatry team at [phone].  1-Disposition: pending clinical course; may need inpatient psych admission if CAH to harm self continue Patient is a 38-year-old woman with past history of schizoaffective disorder, living in group home on [facility] grounds, with multiple past inpatient admissions, and PMH of seizure disorder, HTN, DM?, HLD who was BIBEMS activated by facility due to acute influenza infection. She was medically admitted due to blood cultures growing staph capitis, now thought to be contaminant. Psychiatry was consulted for medication management of home clozapine dose in the setting of increased sedation, decreasing ANC (1.07), and missed dose.     1/4: Patient seen, oriented, but lethargic and slow to respond, mild tremors noted in both UE. She firmly denies AVH, denies CAH, denies SI and HI. Denies feeling unsafe in the hospital. Care coordinated with outpt. psychiatrist, at baseline pt. is alert and has chronic tremors in both UE.  Labs: Lithium level 0.8, ANC 1.51, VPA level 49.30, Clozapine level 677  1/5: Patient lethargic, interview limited at this time. No overt catatonic sxs , no stiffness or rigidity. Labs: ANC 1.07 today.   1/6: Patient remains lethargic, not engaged with writer.   1/8: Patient awakes to verbal and tactile stimulation, although still sedated during daytime. Asking appropriate questions about discharge plan. No evidence of acute psychosis or mood symptoms indicating decompensation. ANC improved to 4.34.  1/9: Patient seen, awakes to verbal and tactile stimulation, still sedated at this time. No overt catatonic sxs, no stiffness or rigidity. No evidence of acute mood or psychotic symptoms. ANC 1.98.  1/10: Patient oriented, no psychosis, no si or hi. No catatonic sxs on exam. Plan is to hold Clozapine until ANC normalized. ANC today 1.67  1/11: Reporting CAH to harm herself. Started on 1:1 CO and zyprexa 5 mg HS. Continue to hold clozapine until ANC normalizes; improved to 2.64 today but continue to trend due to recent fluctuations.  1/12: Significant thought blocking on exam. Pt still endorsing CAH to harm herself with no specific plan, although she denies intent to act on them. ANC 2.20 today. No side effects reported from olanzapine. No catatonic sxs on exam.  1/14: Patient continues to have AH and CAH, but pt reports they are less intense. Staff present and informed of CAH of ending her life and hurting others. ANC 3.01, improving. Tolerating Zyprexa and adherent, plan to increase Zyprexa to 10 mg qHs.   1/15: Pt continues to have AH, though intermittent. She is tolerating Zyprexa increase. C/w same meds for now.     PLAN:  1-Continue 1:1 CO for CAH to harm herself and psychotic decompensation. Order safety trays (no metal objects).   2-Patient DOES NOT have capacity to leave AMA. Further safety evaluation needed for disposition planning.  3-C/w zyprexa to 10 mg qHS for psychotic decompensation in setting of not receiving clozapine.  4-HOLD clozapine due to low ANC until several days of normal values seen    [] Monitor ANCs daily while at [facility]    [] Clozapine level slightly supratherapeutic at 677 on 1/4, decreased to 183 on 1/6 after stopped  5-Continue Lithium 300 mg BID and Effexor 150 mg po daily. Depakote dosing as per primary team as it seems to be for seizures    [] Monitor BUN/CR, Lithium level 0.8 on 1/4 and 0.4 on 1/9    [] VPA level 85.40 on 1/1 and 49.30 on 1/3  6- Haldol decanoate 200mg q4 weeks, last received 12/27/23, next injection is due on 1/24/24.  7- PRN for agitation: Zyprexa 2.5mg PO/IM q6h prn for agitation, may give additional Zyprexa 2.5mg for refractory agitation (ensure QTc <500); avoid IM/IV Ativan within 1 hour of Zyprexa  8- Care coordinated with outpatient psychiatrist Dr. Whitaker [phone], outpatient psychologist Dr. Treviño, and care coordinator Meagan Mckeon [phone] on 1/4. Update provided to Dr. Whitaker on 1/11     [] Outpatient psych follow-up appt scheduled for 1/16 at 1:30 pm w/ Dr. Whitaker.     [] Pt also has standing apt with Dr. Treviño on Thursdays at 1:30, including 1/11.     [] Please fax DC summary to OP psychiatry team at [phone].  3-Disposition: pending clinical course; may need inpatient psych admission if CAH to harm self continue Patient is a 38-year-old woman with past history of schizoaffective disorder, living in group home on [facility] grounds, with multiple past inpatient admissions, and PMH of seizure disorder, HTN, DM?, HLD who was BIBEMS activated by facility due to acute influenza infection. She was medically admitted due to blood cultures growing staph capitis, now thought to be contaminant. Psychiatry was consulted for medication management of home clozapine dose in the setting of increased sedation, decreasing ANC (1.07), and missed dose.     1/4: Patient seen, oriented, but lethargic and slow to respond, mild tremors noted in both UE. She firmly denies AVH, denies CAH, denies SI and HI. Denies feeling unsafe in the hospital. Care coordinated with outpt. psychiatrist, at baseline pt. is alert and has chronic tremors in both UE.  Labs: Lithium level 0.8, ANC 1.51, VPA level 49.30, Clozapine level 677  1/5: Patient lethargic, interview limited at this time. No overt catatonic sxs , no stiffness or rigidity. Labs: ANC 1.07 today.   1/6: Patient remains lethargic, not engaged with writer.   1/8: Patient awakes to verbal and tactile stimulation, although still sedated during daytime. Asking appropriate questions about discharge plan. No evidence of acute psychosis or mood symptoms indicating decompensation. ANC improved to 4.34.  1/9: Patient seen, awakes to verbal and tactile stimulation, still sedated at this time. No overt catatonic sxs, no stiffness or rigidity. No evidence of acute mood or psychotic symptoms. ANC 1.98.  1/10: Patient oriented, no psychosis, no si or hi. No catatonic sxs on exam. Plan is to hold Clozapine until ANC normalized. ANC today 1.67  1/11: Reporting CAH to harm herself. Started on 1:1 CO and zyprexa 5 mg HS. Continue to hold clozapine until ANC normalizes; improved to 2.64 today but continue to trend due to recent fluctuations.  1/12: Significant thought blocking on exam. Pt still endorsing CAH to harm herself with no specific plan, although she denies intent to act on them. ANC 2.20 today. No side effects reported from olanzapine. No catatonic sxs on exam.  1/14: Patient continues to have AH and CAH, but pt reports they are less intense. Staff present and informed of CAH of ending her life and hurting others. ANC 3.01, improving. Tolerating Zyprexa and adherent, plan to increase Zyprexa to 10 mg qHs.   1/15: Pt continues to have AH, though intermittent. She is tolerating Zyprexa increase. C/w same meds for now.     PLAN:  1-Continue 1:1 CO for CAH to harm herself and psychotic decompensation. Order safety trays (no metal objects).   2-Patient DOES NOT have capacity to leave AMA. Further safety evaluation needed for disposition planning.  3-C/w zyprexa to 10 mg qHS for psychotic decompensation in setting of not receiving clozapine.  4-HOLD clozapine due to low ANC until several days of normal values seen    [] Monitor ANCs daily while at [facility]    [] Clozapine level slightly supratherapeutic at 677 on 1/4, decreased to 183 on 1/6 after stopped  5-Continue Lithium 300 mg BID and Effexor 150 mg po daily. Depakote dosing as per primary team as it seems to be for seizures    [] Monitor BUN/CR, Lithium level 0.8 on 1/4 and 0.4 on 1/9    [] VPA level 85.40 on 1/1 and 49.30 on 1/3  6- Haldol decanoate 200mg q4 weeks, last received 12/27/23, next injection is due on 1/24/24.  7- PRN for agitation: Zyprexa 2.5mg PO/IM q6h prn for agitation, may give additional Zyprexa 2.5mg for refractory agitation (ensure QTc <500); avoid IM/IV Ativan within 1 hour of Zyprexa  8- Care coordinated with outpatient psychiatrist Dr. Whitaker [phone], outpatient psychologist Dr. Treviño, and care coordinator Meagan Mckeon [phone] on 1/4. Update provided to Dr. Whitaker on 1/11     [] Outpatient psych follow-up appt scheduled for 1/16 at 1:30 pm w/ Dr. Whitaker.     [] Pt also has standing apt with Dr. Treviño on Thursdays at 1:30, including 1/11.     [] Please fax DC summary to OP psychiatry team at [phone].  -Disposition: pending clinical course; may need inpatient psych admission if CAH to harm self continue Patient is a 38-year-old woman with past history of schizoaffective disorder, living in group home on [facility] grounds, with multiple past inpatient admissions, and PMH of seizure disorder, HTN, DM?, HLD who was BIBEMS activated by facility due to acute influenza infection. She was medically admitted due to blood cultures growing staph capitis, now thought to be contaminant. Psychiatry was consulted for medication management of home clozapine dose in the setting of increased sedation, decreasing ANC (1.07), and missed dose.     1/4: Patient seen, oriented, but lethargic and slow to respond, mild tremors noted in both UE. She firmly denies AVH, denies CAH, denies SI and HI. Denies feeling unsafe in the hospital. Care coordinated with outpt. psychiatrist, at baseline pt. is alert and has chronic tremors in both UE.  Labs: Lithium level 0.8, ANC 1.51, VPA level 49.30, Clozapine level 677  1/5: Patient lethargic, interview limited at this time. No overt catatonic sxs , no stiffness or rigidity. Labs: ANC 1.07 today.   1/6: Patient remains lethargic, not engaged with writer.   1/8: Patient awakes to verbal and tactile stimulation, although still sedated during daytime. Asking appropriate questions about discharge plan. No evidence of acute psychosis or mood symptoms indicating decompensation. ANC improved to 4.34.  1/9: Patient seen, awakes to verbal and tactile stimulation, still sedated at this time. No overt catatonic sxs, no stiffness or rigidity. No evidence of acute mood or psychotic symptoms. ANC 1.98.  1/10: Patient oriented, no psychosis, no si or hi. No catatonic sxs on exam. Plan is to hold Clozapine until ANC normalized. ANC today 1.67  1/11: Reporting CAH to harm herself. Started on 1:1 CO and zyprexa 5 mg HS. Continue to hold clozapine until ANC normalizes; improved to 2.64 today but continue to trend due to recent fluctuations.  1/12: Significant thought blocking on exam. Pt still endorsing CAH to harm herself with no specific plan, although she denies intent to act on them. ANC 2.20 today. No side effects reported from olanzapine. No catatonic sxs on exam.  1/14: Patient continues to have AH and CAH, but pt reports they are less intense. Staff present and informed of CAH of ending her life and hurting others. ANC 3.01, improving. Tolerating Zyprexa and adherent, plan to increase Zyprexa to 10 mg qHs.   1/15: Pt continues to have AH, though intermittent. She is tolerating Zyprexa increase. C/w same meds for now.     PLAN:  1-Continue 1:1 CO for CAH to harm herself and psychotic decompensation. Order safety trays (no metal objects).   2-Patient DOES NOT have capacity to leave AMA. Further safety evaluation needed for disposition planning.  3-C/w zyprexa 10 mg qHS for psychotic decompensation in setting of not receiving clozapine.  4-HOLD clozapine due to low ANC until several days of normal values seen    [] Monitor ANCs daily while at [facility]    [] Clozapine level slightly supratherapeutic at 677 on 1/4, decreased to 183 on 1/6 after stopped  5-Continue Lithium 300 mg BID and Effexor 150 mg po daily. Depakote dosing as per primary team as it seems to be for seizures    [] Monitor BUN/CR, Lithium level 0.8 on 1/4 and 0.4 on 1/9    [] VPA level 85.40 on 1/1 and 49.30 on 1/3  6- Haldol decanoate 200mg q4 weeks, last received 12/27/23, next injection is due on 1/24/24.  7- PRN for agitation: Zyprexa 2.5mg PO/IM q6h prn for agitation, may give additional Zyprexa 2.5mg for refractory agitation (ensure QTc <500); avoid IM/IV Ativan within 1 hour of Zyprexa  8- Care coordinated with outpatient psychiatrist Dr. Whitaker [phone], outpatient psychologist Dr. Treviño, and care coordinator Meagan Mckeon [phone] on 1/4. Update provided to Dr. Whitaker on 1/11     [] Outpatient psych follow-up appt scheduled for 1/16 at 1:30 pm w/ Dr. Whitaker.     [] Pt also has standing apt with Dr. Treviño on Thursdays at 1:30, including 1/11.     [] Please fax DC summary to OP psychiatry team at [phone]. 9-Disposition: pending clinical course; may need inpatient psych admission if CAH to harm self continue Patient is a 38-year-old woman with past history of schizoaffective disorder, living in group home on [facility] grounds, with multiple past inpatient admissions, and PMH of seizure disorder, HTN, DM?, HLD who was BIBEMS activated by facility due to acute influenza infection. She was medically admitted due to blood cultures growing staph capitis, now thought to be contaminant. Psychiatry was consulted for medication management of home clozapine dose in the setting of increased sedation, decreasing ANC (1.07), and missed dose.     1/4: Patient seen, oriented, but lethargic and slow to respond, mild tremors noted in both UE. She firmly denies AVH, denies CAH, denies SI and HI. Denies feeling unsafe in the hospital. Care coordinated with outpt. psychiatrist, at baseline pt. is alert and has chronic tremors in both UE.  Labs: Lithium level 0.8, ANC 1.51, VPA level 49.30, Clozapine level 677  1/5: Patient lethargic, interview limited at this time. No overt catatonic sxs , no stiffness or rigidity. Labs: ANC 1.07 today.   1/6: Patient remains lethargic, not engaged with writer.   1/8: Patient awakes to verbal and tactile stimulation, although still sedated during daytime. Asking appropriate questions about discharge plan. No evidence of acute psychosis or mood symptoms indicating decompensation. ANC improved to 4.34.  1/9: Patient seen, awakes to verbal and tactile stimulation, still sedated at this time. No overt catatonic sxs, no stiffness or rigidity. No evidence of acute mood or psychotic symptoms. ANC 1.98.  1/10: Patient oriented, no psychosis, no si or hi. No catatonic sxs on exam. Plan is to hold Clozapine until ANC normalized. ANC today 1.67  1/11: Reporting CAH to harm herself. Started on 1:1 CO and zyprexa 5 mg HS. Continue to hold clozapine until ANC normalizes; improved to 2.64 today but continue to trend due to recent fluctuations.  1/12: Significant thought blocking on exam. Pt still endorsing CAH to harm herself with no specific plan, although she denies intent to act on them. ANC 2.20 today. No side effects reported from olanzapine. No catatonic sxs on exam.  1/14: Patient continues to have AH and CAH, but pt reports they are less intense. Staff present and informed of CAH of ending her life and hurting others. ANC 3.01, improving. Tolerating Zyprexa and adherent, plan to increase Zyprexa to 10 mg qHs.   1/15: Pt continues to have AH, though intermittent. She is tolerating Zyprexa increase. C/w same meds for now.     PLAN:  1-Continue 1:1 CO for CAH to harm herself and psychotic decompensation. Order safety trays (no metal objects).   2-Patient DOES NOT have capacity to leave AMA. Further safety evaluation needed for disposition planning.  3-C/w zyprexa 10 mg qHS for psychotic decompensation in setting of not receiving clozapine.  4-HOLD clozapine due to low ANC until several days of normal values seen    [] Monitor ANCs daily while at [facility]    [] Clozapine level slightly supratherapeutic at 677 on 1/4, decreased to 183 on 1/6 after stopped  5-Continue Lithium 300 mg BID and Effexor 150 mg po daily. Depakote dosing as per primary team as it seems to be for seizures    [] Monitor BUN/CR, Lithium level 0.8 on 1/4 and 0.4 on 1/9    [] VPA level 85.40 on 1/1 and 49.30 on 1/3  6- Haldol decanoate 200mg q4 weeks, last received 12/27/23, next injection is due on 1/24/24.  7- PRN for agitation: Zyprexa 2.5mg PO/IM q6h prn for agitation, may give additional Zyprexa 2.5mg for refractory agitation (ensure QTc <500); avoid IM/IV Ativan within 1 hour of Zyprexa  8- Care coordinated with outpatient psychiatrist Dr. Whitaker [phone], outpatient psychologist Dr. Treviño, and care coordinator Meagan Mckeon [phone] on 1/4. Update provided to Dr. Whitaker on 1/11     [] Outpatient psych follow-up appt scheduled for 1/16 at 1:30 pm w/ Dr. Whitaker.     [] Pt also has standing apt with Dr. Treviño on Thursdays at 1:30, including 1/11.     [] Please fax DC summary to OP psychiatry team at [phone]. 9-Disposition: pending clinical course; may need inpatient psych admission if CAH to harm self continue Patient is a 38-year-old woman with past history of schizoaffective disorder, living in group home on [facility] grounds, with multiple past inpatient admissions, and PMH of seizure disorder, HTN, DM?, HLD who was BIBEMS activated by facility due to acute influenza infection. She was medically admitted due to blood cultures growing staph capitis, now thought to be contaminant. Psychiatry was consulted for medication management of home clozapine dose in the setting of increased sedation, decreasing ANC (1.07), and missed dose.     1/4: Patient seen, oriented, but lethargic and slow to respond, mild tremors noted in both UE. She firmly denies AVH, denies CAH, denies SI and HI. Denies feeling unsafe in the hospital. Care coordinated with outpt. psychiatrist, at baseline pt. is alert and has chronic tremors in both UE.  Labs: Lithium level 0.8, ANC 1.51, VPA level 49.30, Clozapine level 677  1/5: Patient lethargic, interview limited at this time. No overt catatonic sxs , no stiffness or rigidity. Labs: ANC 1.07 today.   1/6: Patient remains lethargic, not engaged with writer.   1/8: Patient awakes to verbal and tactile stimulation, although still sedated during daytime. Asking appropriate questions about discharge plan. No evidence of acute psychosis or mood symptoms indicating decompensation. ANC improved to 4.34.  1/9: Patient seen, awakes to verbal and tactile stimulation, still sedated at this time. No overt catatonic sxs, no stiffness or rigidity. No evidence of acute mood or psychotic symptoms. ANC 1.98.  1/10: Patient oriented, no psychosis, no si or hi. No catatonic sxs on exam. Plan is to hold Clozapine until ANC normalized. ANC today 1.67  1/11: Reporting CAH to harm herself. Started on 1:1 CO and zyprexa 5 mg HS. Continue to hold clozapine until ANC normalizes; improved to 2.64 today but continue to trend due to recent fluctuations.  1/12: Significant thought blocking on exam. Pt still endorsing CAH to harm herself with no specific plan, although she denies intent to act on them. ANC 2.20 today. No side effects reported from olanzapine. No catatonic sxs on exam.  1/14: Patient continues to have AH and CAH, but pt reports they are less intense. Staff present and informed of CAH of ending her life and hurting others. ANC 3.01, improving. Tolerating Zyprexa and adherent, plan to increase Zyprexa to 10 mg qHs.   1/15: Pt continues to have AH, though intermittent. She is tolerating Zyprexa increase. C/w same meds for now.     PLAN:  1-Continue 1:1 CO for CAH to harm herself and psychotic decompensation. Order safety trays (no metal objects).   2-Patient DOES NOT have capacity to leave AMA. Further safety evaluation needed for disposition planning.  3-C/w zyprexa 10 mg qHS for psychotic decompensation in setting of not receiving clozapine.  4-HOLD clozapine due to low ANC until several days of normal values seen    [] Monitor ANCs daily while at [facility]    [] Clozapine level slightly supratherapeutic at 677 on 1/4, decreased to 183 on 1/6 after stopped  5-Continue Lithium 300 mg BID and Effexor 150 mg po daily. Depakote dosing as per primary team as it seems to be for seizures    [] Monitor BUN/CR, Lithium level 0.8 on 1/4 and 0.4 on 1/9    [] VPA level 85.40 on 1/1 and 49.30 on 1/3  6- Haldol decanoate 200mg q4 weeks, last received 12/27/23, next injection is due on 1/24/24.  7- PRN for agitation: Zyprexa 2.5mg PO/IM q6h prn for agitation, may give additional Zyprexa 2.5mg for refractory agitation (ensure QTc <500); avoid IM/IV Ativan within 1 hour of Zyprexa  8- Care coordinated with outpatient psychiatrist Dr. Whitaker [phone], outpatient psychologist Dr. Treviño, and care coordinator Meagan Mckeon [phone] on 1/4. Update provided to Dr. Whitaker on 1/11     [] Outpatient psych follow-up appt scheduled for 1/16 at 1:30 pm w/ Dr. Whitaker.     [] Pt also has standing apt with Dr. Treviño on Thursdays at 1:30, including 1/11.     [] Please fax DC summary to OP psychiatry team at [phone]. 9-Disposition: pending clinical course; may need inpatient psych admission if CAH to harm self continue

## 2024-01-15 NOTE — PROGRESS NOTE ADULT - PROBLEM SELECTOR PLAN 1
currently lethargic, No focal neuro deficits on exam however with intermittent jerking of upper extremities (which per outpt psychiatrist is her chronic baseline dt Haldol.   -bilateral tremor known to outpt psych and is chronic  -cont with lithium 300mg bid, effexor 150mg qd and Depakote 1000mg qhs (on for seizure)  -will hold clozapine in setting of decreasing ANC and viral syndrome, consider restarting in several days if stable/improving   - Inc Zyprexa to 10 mg hs and c/w Zyprexa 2.5mg q6h prn severe agitation per [facility]  -cont to monitor lithium, clozapine level and monitor ANC   -no ss of sepsis, work up neg  -appreciate psych recs  -aspiration, fall, seizure precautions

## 2024-01-15 NOTE — PROGRESS NOTE ADULT - SUBJECTIVE AND OBJECTIVE BOX
Dr. Gabriela Valente  Pager [phone]    PROGRESS NOTE:     Patient is a 38y old  Female who presents with a chief complaint of + blood cultures (14 Jan 2024 12:43)      SUBJECTIVE / OVERNIGHT EVENTS: denies chest pain or sob   ADDITIONAL REVIEW OF SYSTEMS: afebrile , still c/o back pain and hearing gibberish voices     MEDICATIONS  (STANDING):  atorvastatin 10 milliGRAM(s) Oral at bedtime  cholecalciferol 1000 Unit(s) Oral daily  divalproex DR 1000 milliGRAM(s) Oral at bedtime  enoxaparin Injectable 40 milliGRAM(s) SubCutaneous every 12 hours  famotidine    Tablet 20 milliGRAM(s) Oral daily  folic acid 1 milliGRAM(s) Oral daily  lidocaine   4% Patch 1 Patch Transdermal daily  lithium 300 milliGRAM(s) Oral two times a day  OLANZapine 10 milliGRAM(s) Oral at bedtime  senna 2 Tablet(s) Oral at bedtime  sodium chloride 0.9%. 1000 milliLiter(s) (100 mL/Hr) IV Continuous <Continuous>  venlafaxine XR. 150 milliGRAM(s) Oral daily    MEDICATIONS  (PRN):  acetaminophen     Tablet .. 650 milliGRAM(s) Oral every 6 hours PRN Temp greater or equal to 38C (100.4F), Mild Pain (1 - 3)  aluminum hydroxide/magnesium hydroxide/simethicone Suspension 30 milliLiter(s) Oral every 4 hours PRN Dyspepsia  ibuprofen  Tablet. 600 milliGRAM(s) Oral every 6 hours PRN Moderate Pain (4 - 6)  melatonin 3 milliGRAM(s) Oral at bedtime PRN Insomnia  OLANZapine Injectable 2.5 milliGRAM(s) IntraMuscular every 6 hours PRN severe agitation  ondansetron Injectable 4 milliGRAM(s) IV Push every 8 hours PRN Nausea and/or Vomiting      CAPILLARY BLOOD GLUCOSE        I&O's Summary    14 Jan 2024 07:01  -  15 Junior 2024 07:00  --------------------------------------------------------  IN: 480 mL / OUT: 0 mL / NET: 480 mL        PHYSICAL EXAM:  Vital Signs Last 24 Hrs  T(C): 36.9 (15 Junior 2024 06:24), Max: 36.9 (15 Junior 2024 06:24)  T(F): 98.4 (15 Junior 2024 06:24), Max: 98.4 (15 Junior 2024 06:24)  HR: 72 (15 Junior 2024 06:24) (72 - 83)  BP: 101/83 (15 Junior 2024 06:24) (88/55 - 104/80)  BP(mean): --  RR: 18 (15 Junior 2024 06:24) (18 - 18)  SpO2: 100% (15 Junior 2024 06:24) (98% - 100%)    Parameters below as of 15 Junior 2024 06:24  Patient On (Oxygen Delivery Method): room air        CONSTITUTIONAL: NAD, well-developed, obese  RESPIRATORY: Normal respiratory effort; lungs are clear to auscultation bilaterally  CARDIOVASCULAR: Regular rate and rhythm, normal S1 and S2, no murmur/rub/gallop; No lower extremity edema; Peripheral pulses are 2+ bilaterally  ABDOMEN: Nontender to palpation, normoactive bowel sounds, no rebound/guarding; No hepatosplenomegaly  MUSCULOSKELETAL: no clubbing or cyanosis of digits; no joint swelling or tenderness to palpation  PSYCH: A+O to person and place, flat affect      LABS:                        9.7    10.49 )-----------( 490      ( 15 Junior 2024 06:09 )             31.5     01-15    143  |  110<H>  |  6<L>  ----------------------------<  80  3.8   |  25  |  0.77    Ca    8.9      15 Junior 2024 06:09  Phos  3.1     01-15  Mg     2.30     01-15            Urinalysis Basic - ( 15 Junior 2024 06:09 )    Color: x / Appearance: x / SG: x / pH: x  Gluc: 80 mg/dL / Ketone: x  / Bili: x / Urobili: x   Blood: x / Protein: x / Nitrite: x   Leuk Esterase: x / RBC: x / WBC x   Sq Epi: x / Non Sq Epi: x / Bacteria: x          RADIOLOGY & ADDITIONAL TESTS:  Results Reviewed:   Imaging Personally Reviewed:  Electrocardiogram Personally Reviewed:    COORDINATION OF CARE:  Care Discussed with Consultants/Other Providers [Y/N]:  Prior or Outpatient Records Reviewed [Y/N]:   Dr. Gabriela Valente  Pager [phone]    PROGRESS NOTE:     Patient is a 38y old  Female who presents with a chief complaint of + blood cultures (14 Jan 2024 12:43)      SUBJECTIVE / OVERNIGHT EVENTS: denies chest pain or sob   ADDITIONAL REVIEW OF SYSTEMS: afebrile , still c/o back pain and hearing gibberish voices     MEDICATIONS  (STANDING):  atorvastatin 10 milliGRAM(s) Oral at bedtime  cholecalciferol 1000 Unit(s) Oral daily  divalproex DR 1000 milliGRAM(s) Oral at bedtime  enoxaparin Injectable 40 milliGRAM(s) SubCutaneous every 12 hours  famotidine    Tablet 20 milliGRAM(s) Oral daily  folic acid 1 milliGRAM(s) Oral daily  lidocaine   4% Patch 1 Patch Transdermal daily  lithium 300 milliGRAM(s) Oral two times a day  OLANZapine 10 milliGRAM(s) Oral at bedtime  senna 2 Tablet(s) Oral at bedtime  sodium chloride 0.9%. 1000 milliLiter(s) (100 mL/Hr) IV Continuous <Continuous>  venlafaxine XR. 150 milliGRAM(s) Oral daily    MEDICATIONS  (PRN):  acetaminophen     Tablet .. 650 milliGRAM(s) Oral every 6 hours PRN Temp greater or equal to 38C (100.4F), Mild Pain (1 - 3)  aluminum hydroxide/magnesium hydroxide/simethicone Suspension 30 milliLiter(s) Oral every 4 hours PRN Dyspepsia  ibuprofen  Tablet. 600 milliGRAM(s) Oral every 6 hours PRN Moderate Pain (4 - 6)  melatonin 3 milliGRAM(s) Oral at bedtime PRN Insomnia  OLANZapine Injectable 2.5 milliGRAM(s) IntraMuscular every 6 hours PRN severe agitation  ondansetron Injectable 4 milliGRAM(s) IV Push every 8 hours PRN Nausea and/or Vomiting      CAPILLARY BLOOD GLUCOSE        I&O's Summary    14 Jan 2024 07:01  -  15 Junior 2024 07:00  --------------------------------------------------------  IN: 480 mL / OUT: 0 mL / NET: 480 mL        PHYSICAL EXAM:  Vital Signs Last 24 Hrs  T(C): 36.9 (15 Junior 2024 06:24), Max: 36.9 (15 Junior 2024 06:24)  T(F): 98.4 (15 Junior 2024 06:24), Max: 98.4 (15 Junior 2024 06:24)  HR: 72 (15 Junior 2024 06:24) (72 - 83)  BP: 101/83 (15 Junior 2024 06:24) (88/55 - 104/80)  BP(mean): --  RR: 18 (15 Junior 2024 06:24) (18 - 18)  SpO2: 100% (15 Junior 2024 06:24) (98% - 100%)    Parameters below as of 15 Junior 2024 06:24  Patient On (Oxygen Delivery Method): room air        CONSTITUTIONAL: NAD, well-developed, obese  RESPIRATORY: Normal respiratory effort; lungs are clear to auscultation bilaterally  CARDIOVASCULAR: Regular rate and rhythm, normal S1 and S2, no murmur/rub/gallop; No lower extremity edema; Peripheral pulses are 2+ bilaterally  ABDOMEN: Nontender to palpation, normoactive bowel sounds, no rebound/guarding; No hepatosplenomegaly  MUSCULOSKELETAL: no clubbing or cyanosis of digits; no joint swelling or tenderness to palpation  PSYCH: A+O to person and place, flat affect      LABS:                        9.7    10.49 )-----------( 490      ( 15 Junior 2024 06:09 )             31.5     01-15    143  |  110<H>  |  6<L>  ----------------------------<  80  3.8   |  25  |  0.77    Ca    8.9      15 Junior 2024 06:09  Phos  3.1     01-15  Mg     2.30     01-15            Urinalysis Basic - ( 15 Junior 2024 06:09 )    Color: x / Appearance: x / SG: x / pH: x  Gluc: 80 mg/dL / Ketone: x  / Bili: x / Urobili: x   Blood: x / Protein: x / Nitrite: x   Leuk Esterase: x / RBC: x / WBC x   Sq Epi: x / Non Sq Epi: x / Bacteria: x          RADIOLOGY & ADDITIONAL TESTS:  Results Reviewed:   Imaging Personally Reviewed:  Electrocardiogram Personally Reviewed:    COORDINATION OF CARE:  Care Discussed with Consultants/Other Providers [Y/N]:  Prior or Outpatient Records Reviewed [Y/N]:   Dr. Gabriela Valente  Pager [phone]    PROGRESS NOTE:     Patient is a 38y old  Female who presents with a chief complaint of + blood cultures (14 Jan 2024 12:43)      SUBJECTIVE / OVERNIGHT EVENTS: denies chest pain or sob   ADDITIONAL REVIEW OF SYSTEMS: afebrile , still c/o back pain and hearing gibberish voices     MEDICATIONS  (STANDING):  atorvastatin 10 milliGRAM(s) Oral at bedtime  cholecalciferol 1000 Unit(s) Oral daily  divalproex DR 1000 milliGRAM(s) Oral at bedtime  enoxaparin Injectable 40 milliGRAM(s) SubCutaneous every 12 hours  famotidine    Tablet 20 milliGRAM(s) Oral daily  folic acid 1 milliGRAM(s) Oral daily  lidocaine   4% Patch 1 Patch Transdermal daily  lithium 300 milliGRAM(s) Oral two times a day  OLANZapine 10 milliGRAM(s) Oral at bedtime  senna 2 Tablet(s) Oral at bedtime  sodium chloride 0.9%. 1000 milliLiter(s) (100 mL/Hr) IV Continuous <Continuous>  venlafaxine XR. 150 milliGRAM(s) Oral daily    MEDICATIONS  (PRN):  acetaminophen     Tablet .. 650 milliGRAM(s) Oral every 6 hours PRN Temp greater or equal to 38C (100.4F), Mild Pain (1 - 3)  aluminum hydroxide/magnesium hydroxide/simethicone Suspension 30 milliLiter(s) Oral every 4 hours PRN Dyspepsia  ibuprofen  Tablet. 600 milliGRAM(s) Oral every 6 hours PRN Moderate Pain (4 - 6)  melatonin 3 milliGRAM(s) Oral at bedtime PRN Insomnia  OLANZapine Injectable 2.5 milliGRAM(s) IntraMuscular every 6 hours PRN severe agitation  ondansetron Injectable 4 milliGRAM(s) IV Push every 8 hours PRN Nausea and/or Vomiting      CAPILLARY BLOOD GLUCOSE        I&O's Summary    14 Jan 2024 07:01  -  15 Junior 2024 07:00  --------------------------------------------------------  IN: 480 mL / OUT: 0 mL / NET: 480 mL        PHYSICAL EXAM:  Vital Signs Last 24 Hrs  T(C): 36.9 (15 Junior 2024 06:24), Max: 36.9 (15 Junior 2024 06:24)  T(F): 98.4 (15 Junior 2024 06:24), Max: 98.4 (15 Junior 2024 06:24)  HR: 72 (15 Junior 2024 06:24) (72 - 83)  BP: 101/83 (15 Junior 2024 06:24) (88/55 - 104/80)  BP(mean): --  RR: 18 (15 Junior 2024 06:24) (18 - 18)  SpO2: 100% (15 Junior 2024 06:24) (98% - 100%)    Parameters below as of 15 Junior 2024 06:24  Patient On (Oxygen Delivery Method): room air        CONSTITUTIONAL: NAD, well-developed, obese  RESPIRATORY: Normal respiratory effort; lungs are clear to auscultation bilaterally  CARDIOVASCULAR: Regular rate and rhythm, normal S1 and S2, no murmur/rub/gallop; No lower extremity edema; Peripheral pulses are 2+ bilaterally  ABDOMEN: Nontender to palpation, normoactive bowel sounds, no rebound/guarding; No hepatosplenomegaly  MUSCULOSKELETAL: no clubbing or cyanosis of digits; no joint swelling or tenderness to palpation  PSYCH: A+O to person and place, flat affect      LABS:                        9.7    10.49 )-----------( 490      ( 15 Junior 2024 06:09 )             31.5     01-15    143  |  110<H>  |  6<L>  ----------------------------<  80  3.8   |  25  |  0.77    Ca    8.9      15 Junior 2024 06:09  Phos  3.1     01-15  Mg     2.30     01-15            Urinalysis Basic - ( 15 Junior 2024 06:09 )    Color: x / Appearance: x / SG: x / pH: x  Gluc: 80 mg/dL / Ketone: x  / Bili: x / Urobili: x   Blood: x / Protein: x / Nitrite: x   Leuk Esterase: x / RBC: x / WBC x   Sq Epi: x / Non Sq Epi: x / Bacteria: x          RADIOLOGY & ADDITIONAL TESTS:  Results Reviewed:   Imaging Personally Reviewed:  Electrocardiogram Personally Reviewed:    COORDINATION OF CARE:  Care Discussed with Consultants/Other Providers [Y/N]:  Prior or Outpatient Records Reviewed [Y/N]:

## 2024-01-16 VITALS
RESPIRATION RATE: 17 BRPM | TEMPERATURE: 98 F | DIASTOLIC BLOOD PRESSURE: 73 MMHG | SYSTOLIC BLOOD PRESSURE: 121 MMHG | HEART RATE: 84 BPM | OXYGEN SATURATION: 100 %

## 2024-01-16 LAB
B PERT DNA SPEC QL NAA+PROBE: SIGNIFICANT CHANGE UP
B PERT+PARAPERT DNA PNL SPEC NAA+PROBE: SIGNIFICANT CHANGE UP
BASOPHILS # BLD AUTO: 0.05 K/UL — SIGNIFICANT CHANGE UP (ref 0–0.2)
BASOPHILS NFR BLD AUTO: 0.4 % — SIGNIFICANT CHANGE UP (ref 0–2)
BORDETELLA PARAPERTUSSIS (RAPRVP): SIGNIFICANT CHANGE UP
C PNEUM DNA SPEC QL NAA+PROBE: SIGNIFICANT CHANGE UP
EOSINOPHIL # BLD AUTO: 0.01 K/UL — SIGNIFICANT CHANGE UP (ref 0–0.5)
EOSINOPHIL NFR BLD AUTO: 0.1 % — SIGNIFICANT CHANGE UP (ref 0–6)
FLUAV SUBTYP SPEC NAA+PROBE: SIGNIFICANT CHANGE UP
FLUBV RNA SPEC QL NAA+PROBE: SIGNIFICANT CHANGE UP
HADV DNA SPEC QL NAA+PROBE: SIGNIFICANT CHANGE UP
HCOV 229E RNA SPEC QL NAA+PROBE: SIGNIFICANT CHANGE UP
HCOV HKU1 RNA SPEC QL NAA+PROBE: SIGNIFICANT CHANGE UP
HCOV NL63 RNA SPEC QL NAA+PROBE: SIGNIFICANT CHANGE UP
HCOV OC43 RNA SPEC QL NAA+PROBE: SIGNIFICANT CHANGE UP
HCT VFR BLD CALC: 33.7 % — LOW (ref 34.5–45)
HGB BLD-MCNC: 10.3 G/DL — LOW (ref 11.5–15.5)
HMPV RNA SPEC QL NAA+PROBE: SIGNIFICANT CHANGE UP
HPIV1 RNA SPEC QL NAA+PROBE: SIGNIFICANT CHANGE UP
HPIV2 RNA SPEC QL NAA+PROBE: SIGNIFICANT CHANGE UP
HPIV3 RNA SPEC QL NAA+PROBE: SIGNIFICANT CHANGE UP
HPIV4 RNA SPEC QL NAA+PROBE: SIGNIFICANT CHANGE UP
IANC: 4.24 K/UL — SIGNIFICANT CHANGE UP (ref 1.8–7.4)
IMM GRANULOCYTES NFR BLD AUTO: 0.5 % — SIGNIFICANT CHANGE UP (ref 0–0.9)
LYMPHOCYTES # BLD AUTO: 5.9 K/UL — HIGH (ref 1–3.3)
LYMPHOCYTES # BLD AUTO: 52.1 % — HIGH (ref 13–44)
M PNEUMO DNA SPEC QL NAA+PROBE: SIGNIFICANT CHANGE UP
MCHC RBC-ENTMCNC: 26.5 PG — LOW (ref 27–34)
MCHC RBC-ENTMCNC: 30.6 GM/DL — LOW (ref 32–36)
MCV RBC AUTO: 86.9 FL — SIGNIFICANT CHANGE UP (ref 80–100)
MONOCYTES # BLD AUTO: 1.07 K/UL — HIGH (ref 0–0.9)
MONOCYTES NFR BLD AUTO: 9.4 % — SIGNIFICANT CHANGE UP (ref 2–14)
NEUTROPHILS # BLD AUTO: 4.24 K/UL — SIGNIFICANT CHANGE UP (ref 1.8–7.4)
NEUTROPHILS NFR BLD AUTO: 37.5 % — LOW (ref 43–77)
NRBC # BLD: 0 /100 WBCS — SIGNIFICANT CHANGE UP (ref 0–0)
NRBC # FLD: 0.02 K/UL — HIGH (ref 0–0)
PLATELET # BLD AUTO: 485 K/UL — HIGH (ref 150–400)
RAPID RVP RESULT: SIGNIFICANT CHANGE UP
RBC # BLD: 3.88 M/UL — SIGNIFICANT CHANGE UP (ref 3.8–5.2)
RBC # FLD: 20.5 % — HIGH (ref 10.3–14.5)
RSV RNA SPEC QL NAA+PROBE: SIGNIFICANT CHANGE UP
RV+EV RNA SPEC QL NAA+PROBE: SIGNIFICANT CHANGE UP
SARS-COV-2 RNA SPEC QL NAA+PROBE: SIGNIFICANT CHANGE UP
WBC # BLD: 11.33 K/UL — HIGH (ref 3.8–10.5)
WBC # FLD AUTO: 11.33 K/UL — HIGH (ref 3.8–10.5)

## 2024-01-16 PROCEDURE — 99239 HOSP IP/OBS DSCHRG MGMT >30: CPT

## 2024-01-16 PROCEDURE — 99233 SBSQ HOSP IP/OBS HIGH 50: CPT

## 2024-01-16 RX ORDER — OLANZAPINE 15 MG/1
2.5 TABLET, FILM COATED ORAL ONCE
Refills: 0 | Status: DISCONTINUED | OUTPATIENT
Start: 2024-01-17 | End: 2024-01-17

## 2024-01-16 RX ORDER — SENNA PLUS 8.6 MG/1
2 TABLET ORAL AT BEDTIME
Refills: 0 | Status: DISCONTINUED | OUTPATIENT
Start: 2024-01-17 | End: 2024-01-29

## 2024-01-16 RX ORDER — VENLAFAXINE HCL 75 MG
150 CAPSULE, EXT RELEASE 24 HR ORAL DAILY
Refills: 0 | Status: DISCONTINUED | OUTPATIENT
Start: 2024-01-17 | End: 2024-01-26

## 2024-01-16 RX ORDER — OLANZAPINE 15 MG/1
15 TABLET, FILM COATED ORAL AT BEDTIME
Refills: 0 | Status: DISCONTINUED | OUTPATIENT
Start: 2024-01-16 | End: 2024-01-17

## 2024-01-16 RX ORDER — LITHIUM CARBONATE 300 MG/1
300 TABLET, EXTENDED RELEASE ORAL
Refills: 0 | Status: DISCONTINUED | OUTPATIENT
Start: 2024-01-17 | End: 2024-01-18

## 2024-01-16 RX ORDER — OLANZAPINE 15 MG/1
15 TABLET, FILM COATED ORAL AT BEDTIME
Refills: 0 | Status: DISCONTINUED | OUTPATIENT
Start: 2024-01-17 | End: 2024-01-19

## 2024-01-16 RX ORDER — CLOZAPINE 150 MG/1
1 TABLET, ORALLY DISINTEGRATING ORAL
Refills: 0 | DISCHARGE

## 2024-01-16 RX ORDER — OLANZAPINE 15 MG/1
2.5 TABLET, FILM COATED ORAL EVERY 6 HOURS
Refills: 0 | Status: DISCONTINUED | OUTPATIENT
Start: 2024-01-17 | End: 2024-01-17

## 2024-01-16 RX ORDER — CHOLECALCIFEROL (VITAMIN D3) 125 MCG
1000 CAPSULE ORAL
Qty: 0 | Refills: 0 | DISCHARGE
Start: 2024-01-16

## 2024-01-16 RX ORDER — DIVALPROEX SODIUM 500 MG/1
1000 TABLET, DELAYED RELEASE ORAL AT BEDTIME
Refills: 0 | Status: DISCONTINUED | OUTPATIENT
Start: 2024-01-17 | End: 2024-01-17

## 2024-01-16 RX ORDER — OLANZAPINE 15 MG/1
1 TABLET, FILM COATED ORAL
Qty: 0 | Refills: 0 | DISCHARGE
Start: 2024-01-16

## 2024-01-16 RX ORDER — LANOLIN ALCOHOL/MO/W.PET/CERES
3 CREAM (GRAM) TOPICAL AT BEDTIME
Refills: 0 | Status: DISCONTINUED | OUTPATIENT
Start: 2024-01-17 | End: 2024-01-29

## 2024-01-16 RX ORDER — FAMOTIDINE 10 MG/ML
20 INJECTION INTRAVENOUS DAILY
Refills: 0 | Status: DISCONTINUED | OUTPATIENT
Start: 2024-01-17 | End: 2024-01-22

## 2024-01-16 RX ORDER — CHOLECALCIFEROL (VITAMIN D3) 125 MCG
1000 CAPSULE ORAL DAILY
Refills: 0 | Status: DISCONTINUED | OUTPATIENT
Start: 2024-01-17 | End: 2024-01-26

## 2024-01-16 RX ORDER — ATORVASTATIN CALCIUM 80 MG/1
10 TABLET, FILM COATED ORAL AT BEDTIME
Refills: 0 | Status: DISCONTINUED | OUTPATIENT
Start: 2024-01-17 | End: 2024-01-30

## 2024-01-16 RX ORDER — FOLIC ACID 0.8 MG
1 TABLET ORAL
Qty: 0 | Refills: 0 | DISCHARGE
Start: 2024-01-16

## 2024-01-16 RX ORDER — FOLIC ACID 0.8 MG
1 TABLET ORAL DAILY
Refills: 0 | Status: DISCONTINUED | OUTPATIENT
Start: 2024-01-17 | End: 2024-01-29

## 2024-01-16 RX ORDER — POLYETHYLENE GLYCOL 3350 17 G/17G
17 POWDER, FOR SOLUTION ORAL DAILY
Refills: 0 | Status: DISCONTINUED | OUTPATIENT
Start: 2024-01-17 | End: 2024-02-13

## 2024-01-16 RX ADMIN — FAMOTIDINE 20 MILLIGRAM(S): 10 INJECTION INTRAVENOUS at 12:04

## 2024-01-16 RX ADMIN — LIDOCAINE 1 PATCH: 4 CREAM TOPICAL at 19:58

## 2024-01-16 RX ADMIN — LITHIUM CARBONATE 300 MILLIGRAM(S): 300 TABLET, EXTENDED RELEASE ORAL at 06:33

## 2024-01-16 RX ADMIN — Medication 1000 UNIT(S): at 12:05

## 2024-01-16 RX ADMIN — Medication 1 MILLIGRAM(S): at 12:04

## 2024-01-16 RX ADMIN — OLANZAPINE 15 MILLIGRAM(S): 15 TABLET, FILM COATED ORAL at 22:15

## 2024-01-16 RX ADMIN — ENOXAPARIN SODIUM 40 MILLIGRAM(S): 100 INJECTION SUBCUTANEOUS at 06:34

## 2024-01-16 RX ADMIN — ATORVASTATIN CALCIUM 10 MILLIGRAM(S): 80 TABLET, FILM COATED ORAL at 22:14

## 2024-01-16 RX ADMIN — ENOXAPARIN SODIUM 40 MILLIGRAM(S): 100 INJECTION SUBCUTANEOUS at 17:55

## 2024-01-16 RX ADMIN — Medication 150 MILLIGRAM(S): at 12:04

## 2024-01-16 RX ADMIN — LIDOCAINE 1 PATCH: 4 CREAM TOPICAL at 12:05

## 2024-01-16 RX ADMIN — LITHIUM CARBONATE 300 MILLIGRAM(S): 300 TABLET, EXTENDED RELEASE ORAL at 17:54

## 2024-01-16 RX ADMIN — DIVALPROEX SODIUM 1000 MILLIGRAM(S): 500 TABLET, DELAYED RELEASE ORAL at 22:14

## 2024-01-16 NOTE — BH CONSULTATION LIAISON PROGRESS NOTE - NSBHCONSULTWORKUPLABSFT_PSY_ALL_CORE
CBC with differential

## 2024-01-16 NOTE — BH CONSULTATION LIAISON PROGRESS NOTE - NSBHASSESSMENTFT_PSY_ALL_CORE
Patient is a 38-year-old woman with past history of schizoaffective disorder, living in group home on [facility] grounds, with multiple past inpatient admissions, and PMH of seizure disorder, HTN, DM?, HLD who was BIBEMS activated by facility due to acute influenza infection. She was medically admitted due to blood cultures growing staph capitis, now thought to be contaminant. Psychiatry was consulted for medication management of home clozapine dose in the setting of increased sedation, decreasing ANC (1.07), and missed dose.     1/4: Patient seen, oriented, but lethargic and slow to respond, mild tremors noted in both UE. She firmly denies AVH, denies CAH, denies SI and HI. Denies feeling unsafe in the hospital. Care coordinated with outpt. psychiatrist, at baseline pt. is alert and has chronic tremors in both UE.  Labs: Lithium level 0.8, ANC 1.51, VPA level 49.30, Clozapine level 677  1/5: Patient lethargic, interview limited at this time. No overt catatonic sxs , no stiffness or rigidity. Labs: ANC 1.07 today.   1/6: Patient remains lethargic, not engaged with writer.   1/8: Patient awakes to verbal and tactile stimulation, although still sedated during daytime. Asking appropriate questions about discharge plan. No evidence of acute psychosis or mood symptoms indicating decompensation. ANC improved to 4.34.  1/9: Patient seen, awakes to verbal and tactile stimulation, still sedated at this time. No overt catatonic sxs, no stiffness or rigidity. No evidence of acute mood or psychotic symptoms. ANC 1.98.  1/10: Patient oriented, no psychosis, no si or hi. No catatonic sxs on exam. Plan is to hold Clozapine until ANC normalized. ANC today 1.67  1/11: Reporting CAH to harm herself. Started on 1:1 CO and zyprexa 5 mg HS. Continue to hold clozapine until ANC normalizes; improved to 2.64 today but continue to trend due to recent fluctuations.  1/12: Significant thought blocking on exam. Pt still endorsing CAH to harm herself with no specific plan, although she denies intent to act on them. ANC 2.20 today. No side effects reported from olanzapine. No catatonic sxs on exam.  1/14: Patient continues to have AH and CAH, but pt reports they are less intense. Staff present and informed of CAH of ending her life and hurting others. ANC 3.01, improving. Tolerating Zyprexa and adherent, plan to increase Zyprexa to 10 mg qHs.   1/15: Pt continues to have AH, though intermittent. She is tolerating Zyprexa increase. C/w same meds for now.   1/16: Pt continues to have AH, no CAH today. Tolerating Zyprexa increase, AH decrease after administered. ANC improving, 4.24. Inpt psych discussed with pt.     PLAN:  1-Continue 1:1 CO for CAH to harm herself and psychotic decompensation. Order safety trays (no metal objects).   2-Patient DOES NOT have capacity to leave AMA. Further safety evaluation needed for disposition planning.  3-C/w zyprexa 10 mg qHS for psychotic decompensation in setting of not receiving clozapine.  4-HOLD clozapine due to low ANC until several days of normal values seen    [] Monitor ANCs daily while at [facility]    [] Clozapine level slightly supratherapeutic at 677 on 1/4, decreased to 183 on 1/6 after stopped  5-Continue Lithium 300 mg BID and Effexor 150 mg po daily. Depakote dosing as per primary team as it seems to be for seizures    [] Monitor BUN/CR, Lithium level 0.8 on 1/4 and 0.4 on 1/9    [] VPA level 85.40 on 1/1 and 49.30 on 1/3  6- Haldol decanoate 200mg q4 weeks, last received 12/27/23, next injection is due on 1/24/24.  7- PRN for agitation: Zyprexa 2.5mg PO/IM q6h prn for agitation, may give additional Zyprexa 2.5mg for refractory agitation (ensure QTc <500); avoid IM/IV Ativan within 1 hour of Zyprexa  8- Care coordinated with outpatient psychiatrist Dr. Whitaker [phone], outpatient psychologist Dr. Treviño, and care coordinator Meagan Mckeon [phone] on 1/4. Update provided to Dr. Whitaker on 1/11     [] Outpatient psych follow-up appt scheduled for 1/16 at 1:30 pm w/ Dr. Whitaker.     [] Pt also has standing apt with Dr. Treviño on Thursdays at 1:30, including 1/11.     [] Please fax DC summary to OP psychiatry team at [phone].  1-Disposition: inpatient psych admission dt continued AH Patient is a 38-year-old woman with past history of schizoaffective disorder, living in group home on [facility] grounds, with multiple past inpatient admissions, and PMH of seizure disorder, HTN, DM?, HLD who was BIBEMS activated by facility due to acute influenza infection. She was medically admitted due to blood cultures growing staph capitis, now thought to be contaminant. Psychiatry was consulted for medication management of home clozapine dose in the setting of increased sedation, decreasing ANC (1.07), and missed dose.     1/4: Patient seen, oriented, but lethargic and slow to respond, mild tremors noted in both UE. She firmly denies AVH, denies CAH, denies SI and HI. Denies feeling unsafe in the hospital. Care coordinated with outpt. psychiatrist, at baseline pt. is alert and has chronic tremors in both UE.  Labs: Lithium level 0.8, ANC 1.51, VPA level 49.30, Clozapine level 677  1/5: Patient lethargic, interview limited at this time. No overt catatonic sxs , no stiffness or rigidity. Labs: ANC 1.07 today.   1/6: Patient remains lethargic, not engaged with writer.   1/8: Patient awakes to verbal and tactile stimulation, although still sedated during daytime. Asking appropriate questions about discharge plan. No evidence of acute psychosis or mood symptoms indicating decompensation. ANC improved to 4.34.  1/9: Patient seen, awakes to verbal and tactile stimulation, still sedated at this time. No overt catatonic sxs, no stiffness or rigidity. No evidence of acute mood or psychotic symptoms. ANC 1.98.  1/10: Patient oriented, no psychosis, no si or hi. No catatonic sxs on exam. Plan is to hold Clozapine until ANC normalized. ANC today 1.67  1/11: Reporting CAH to harm herself. Started on 1:1 CO and zyprexa 5 mg HS. Continue to hold clozapine until ANC normalizes; improved to 2.64 today but continue to trend due to recent fluctuations.  1/12: Significant thought blocking on exam. Pt still endorsing CAH to harm herself with no specific plan, although she denies intent to act on them. ANC 2.20 today. No side effects reported from olanzapine. No catatonic sxs on exam.  1/14: Patient continues to have AH and CAH, but pt reports they are less intense. Staff present and informed of CAH of ending her life and hurting others. ANC 3.01, improving. Tolerating Zyprexa and adherent, plan to increase Zyprexa to 10 mg qHs.   1/15: Pt continues to have AH, though intermittent. She is tolerating Zyprexa increase. C/w same meds for now.   1/16: Pt continues to have AH, no CAH today. Tolerating Zyprexa increase, AH decrease after administered. ANC improving, 4.24. Inpt psych discussed with pt.     PLAN:  1-Continue 1:1 CO for CAH to harm herself and psychotic decompensation. Order safety trays (no metal objects).   2-Patient DOES NOT have capacity to leave AMA. Further safety evaluation needed for disposition planning.  3-C/w zyprexa 10 mg qHS for psychotic decompensation in setting of not receiving clozapine.  4-HOLD clozapine due to low ANC until several days of normal values seen    [] Monitor ANCs daily while at [facility]    [] Clozapine level slightly supratherapeutic at 677 on 1/4, decreased to 183 on 1/6 after stopped  5-Continue Lithium 300 mg BID and Effexor 150 mg po daily. Depakote dosing as per primary team as it seems to be for seizures    [] Monitor BUN/CR, Lithium level 0.8 on 1/4 and 0.4 on 1/9    [] VPA level 85.40 on 1/1 and 49.30 on 1/3  6- Haldol decanoate 200mg q4 weeks, last received 12/27/23, next injection is due on 1/24/24.  7- PRN for agitation: Zyprexa 2.5mg PO/IM q6h prn for agitation, may give additional Zyprexa 2.5mg for refractory agitation (ensure QTc <500); avoid IM/IV Ativan within 1 hour of Zyprexa  8- Care coordinated with outpatient psychiatrist Dr. Whitaker [phone], outpatient psychologist Dr. Treviño, and care coordinator Meagan Mckeon [phone] on 1/4. Update provided to Dr. Whitaker on 1/11     [] Outpatient psych follow-up appt scheduled for 1/16 at 1:30 pm w/ Dr. Whitaker.     [] Pt also has standing apt with Dr. Treviño on Thursdays at 1:30, including 1/11.     [] Please fax DC summary to OP psychiatry team at [phone].  7-Disposition: inpatient psych admission dt continued AH Patient is a 38-year-old woman with past history of schizoaffective disorder, living in group home on [facility] grounds, with multiple past inpatient admissions, and PMH of seizure disorder, HTN, DM?, HLD who was BIBEMS activated by facility due to acute influenza infection. She was medically admitted due to blood cultures growing staph capitis, now thought to be contaminant. Psychiatry was consulted for medication management of home clozapine dose in the setting of increased sedation, decreasing ANC (1.07), and missed dose.     1/4: Patient seen, oriented, but lethargic and slow to respond, mild tremors noted in both UE. She firmly denies AVH, denies CAH, denies SI and HI. Denies feeling unsafe in the hospital. Care coordinated with outpt. psychiatrist, at baseline pt. is alert and has chronic tremors in both UE.  Labs: Lithium level 0.8, ANC 1.51, VPA level 49.30, Clozapine level 677  1/5: Patient lethargic, interview limited at this time. No overt catatonic sxs , no stiffness or rigidity. Labs: ANC 1.07 today.   1/6: Patient remains lethargic, not engaged with writer.   1/8: Patient awakes to verbal and tactile stimulation, although still sedated during daytime. Asking appropriate questions about discharge plan. No evidence of acute psychosis or mood symptoms indicating decompensation. ANC improved to 4.34.  1/9: Patient seen, awakes to verbal and tactile stimulation, still sedated at this time. No overt catatonic sxs, no stiffness or rigidity. No evidence of acute mood or psychotic symptoms. ANC 1.98.  1/10: Patient oriented, no psychosis, no si or hi. No catatonic sxs on exam. Plan is to hold Clozapine until ANC normalized. ANC today 1.67  1/11: Reporting CAH to harm herself. Started on 1:1 CO and zyprexa 5 mg HS. Continue to hold clozapine until ANC normalizes; improved to 2.64 today but continue to trend due to recent fluctuations.  1/12: Significant thought blocking on exam. Pt still endorsing CAH to harm herself with no specific plan, although she denies intent to act on them. ANC 2.20 today. No side effects reported from olanzapine. No catatonic sxs on exam.  1/14: Patient continues to have AH and CAH, but pt reports they are less intense. Staff present and informed of CAH of ending her life and hurting others. ANC 3.01, improving. Tolerating Zyprexa and adherent, plan to increase Zyprexa to 10 mg qHs.   1/15: Pt continues to have AH, though intermittent. She is tolerating Zyprexa increase. C/w same meds for now.   1/16: Pt continues to have AH, no CAH today. Tolerating Zyprexa increase, AH decrease after administered. ANC improving, 4.24. Inpt psych discussed with pt. 2PC papers completed.    PLAN:  1-Continue 1:1 CO for CAH to harm herself and psychotic decompensation. Order safety trays (no metal objects).   2-Patient DOES NOT have capacity to leave AMA. Further safety evaluation needed for disposition planning.  3-INCREASE zyprexa to 15 mg qHS for psychotic decompensation in setting of not receiving clozapine.  4-HOLD clozapine due to low ANC until several days of normal values seen    [] Monitor ANCs daily while at [facility]    [] Clozapine level slightly supratherapeutic at 677 on 1/4, decreased to 183 on 1/6 after stopped  5-Continue Lithium 300 mg BID and Effexor 150 mg po daily. Depakote dosing as per primary team as it seems to be for seizures    [] Monitor BUN/CR, Lithium level 0.8 on 1/4 and 0.4 on 1/9    [] VPA level 85.40 on 1/1 and 49.30 on 1/3  6- Haldol decanoate 200mg q4 weeks, last received 12/27/23, next injection is due on 1/24/24.  7- PRN for agitation: Zyprexa 2.5mg PO/IM q6h prn for agitation, may give additional Zyprexa 2.5mg for refractory agitation (ensure QTc <500); avoid IM/IV Ativan within 1 hour of Zyprexa  8- Care coordinated with outpatient psychiatrist Dr. Whitaker [phone], outpatient psychologist Dr. Treviño (weekly appt [facility] at 1:30 pm), and care coordinator Meagan Mckeon [phone] on 1/4. Update provided to Dr. Whitaker on 1/11     [] Please fax DC summary to OP psychiatry team at [phone].  9-Disposition: Requires 9.27 inpatient psych admission d/t continued AH with recent commands to harm self + others. Please contact [facility] [phone] to start bed search. Patient is a 38-year-old woman with past history of schizoaffective disorder, living in group home on [facility] grounds, with multiple past inpatient admissions, and PMH of seizure disorder, HTN, DM?, HLD who was BIBEMS activated by facility due to acute influenza infection. She was medically admitted due to blood cultures growing staph capitis, now thought to be contaminant. Psychiatry was consulted for medication management of home clozapine dose in the setting of increased sedation, decreasing ANC (1.07), and missed dose.     1/4: Patient seen, oriented, but lethargic and slow to respond, mild tremors noted in both UE. She firmly denies AVH, denies CAH, denies SI and HI. Denies feeling unsafe in the hospital. Care coordinated with outpt. psychiatrist, at baseline pt. is alert and has chronic tremors in both UE.  Labs: Lithium level 0.8, ANC 1.51, VPA level 49.30, Clozapine level 677  1/5: Patient lethargic, interview limited at this time. No overt catatonic sxs , no stiffness or rigidity. Labs: ANC 1.07 today.   1/6: Patient remains lethargic, not engaged with writer.   1/8: Patient awakes to verbal and tactile stimulation, although still sedated during daytime. Asking appropriate questions about discharge plan. No evidence of acute psychosis or mood symptoms indicating decompensation. ANC improved to 4.34.  1/9: Patient seen, awakes to verbal and tactile stimulation, still sedated at this time. No overt catatonic sxs, no stiffness or rigidity. No evidence of acute mood or psychotic symptoms. ANC 1.98.  1/10: Patient oriented, no psychosis, no si or hi. No catatonic sxs on exam. Plan is to hold Clozapine until ANC normalized. ANC today 1.67  1/11: Reporting CAH to harm herself. Started on 1:1 CO and zyprexa 5 mg HS. Continue to hold clozapine until ANC normalizes; improved to 2.64 today but continue to trend due to recent fluctuations.  1/12: Significant thought blocking on exam. Pt still endorsing CAH to harm herself with no specific plan, although she denies intent to act on them. ANC 2.20 today. No side effects reported from olanzapine. No catatonic sxs on exam.  1/14: Patient continues to have AH and CAH, but pt reports they are less intense. Staff present and informed of CAH of ending her life and hurting others. ANC 3.01, improving. Tolerating Zyprexa and adherent, plan to increase Zyprexa to 10 mg qHs.   1/15: Pt continues to have AH, though intermittent. She is tolerating Zyprexa increase. C/w same meds for now.   1/16: Pt continues to have AH, no CAH today. Tolerating Zyprexa increase, AH decrease after administered. ANC improving, 4.24. Inpt psych discussed with pt. 2PC papers completed.    PLAN:  1-Continue 1:1 CO for CAH to harm herself and psychotic decompensation. Order safety trays (no metal objects).   2-Patient DOES NOT have capacity to leave AMA. Further safety evaluation needed for disposition planning.  3-INCREASE zyprexa to 15 mg qHS for psychotic decompensation in setting of not receiving clozapine.  4-HOLD clozapine due to low ANC until several days of normal values seen    [] Monitor ANCs daily while at [facility]    [] Clozapine level slightly supratherapeutic at 677 on 1/4, decreased to 183 on 1/6 after stopped  5-Continue Lithium 300 mg BID and Effexor 150 mg po daily. Depakote dosing as per primary team as it seems to be for seizures    [] Monitor BUN/CR, Lithium level 0.8 on 1/4 and 0.4 on 1/9    [] VPA level 85.40 on 1/1 and 49.30 on 1/3  6- Haldol decanoate 200mg q4 weeks, last received 12/27/23, next injection is due on 1/24/24.  7- PRN for agitation: Zyprexa 2.5mg PO/IM q6h prn for agitation, may give additional Zyprexa 2.5mg for refractory agitation (ensure QTc <500); avoid IM/IV Ativan within 1 hour of Zyprexa  8- Care coordinated with outpatient psychiatrist Dr. Whitaker [phone], outpatient psychologist Dr. Treviño (weekly appt [facility] at 1:30 pm), and care coordinator Meagan Mckeon [phone] on 1/4. Update provided to Dr. Whitaker on 1/11     [] Please fax DC summary to OP psychiatry team at [phone].  9-Disposition: Requires 9.27 inpatient psych admission d/t continued AH with recent commands to harm self + others. Please contact [facility] [phone] to start bed search. Patient is a 38-year-old woman with past history of schizoaffective disorder, living in group home on [facility] grounds, with multiple past inpatient admissions, and PMH of seizure disorder, HTN, DM?, HLD who was BIBEMS activated by facility due to acute influenza infection. She was medically admitted due to blood cultures growing staph capitis, now thought to be contaminant. Psychiatry was consulted for medication management of home clozapine dose in the setting of increased sedation, decreasing ANC (1.07), and missed dose.     1/4: Patient seen, oriented, but lethargic and slow to respond, mild tremors noted in both UE. She firmly denies AVH, denies CAH, denies SI and HI. Denies feeling unsafe in the hospital. Care coordinated with outpt. psychiatrist, at baseline pt. is alert and has chronic tremors in both UE.  Labs: Lithium level 0.8, ANC 1.51, VPA level 49.30, Clozapine level 677  1/5: Patient lethargic, interview limited at this time. No overt catatonic sxs , no stiffness or rigidity. Labs: ANC 1.07 today.   1/6: Patient remains lethargic, not engaged with writer.   1/8: Patient awakes to verbal and tactile stimulation, although still sedated during daytime. Asking appropriate questions about discharge plan. No evidence of acute psychosis or mood symptoms indicating decompensation. ANC improved to 4.34.  1/9: Patient seen, awakes to verbal and tactile stimulation, still sedated at this time. No overt catatonic sxs, no stiffness or rigidity. No evidence of acute mood or psychotic symptoms. ANC 1.98.  1/10: Patient oriented, no psychosis, no si or hi. No catatonic sxs on exam. Plan is to hold Clozapine until ANC normalized. ANC today 1.67  1/11: Reporting CAH to harm herself. Started on 1:1 CO and zyprexa 5 mg HS. Continue to hold clozapine until ANC normalizes; improved to 2.64 today but continue to trend due to recent fluctuations.  1/12: Significant thought blocking on exam. Pt still endorsing CAH to harm herself with no specific plan, although she denies intent to act on them. ANC 2.20 today. No side effects reported from olanzapine. No catatonic sxs on exam.  1/14: Patient continues to have AH and CAH, but pt reports they are less intense. Staff present and informed of CAH of ending her life and hurting others. ANC 3.01, improving. Tolerating Zyprexa and adherent, plan to increase Zyprexa to 10 mg qHs.   1/15: Pt continues to have AH, though intermittent. She is tolerating Zyprexa increase. C/w same meds for now.   1/16: Pt continues to have AH, no CAH today. Tolerating Zyprexa increase, AH decrease after administered. ANC improving, 4.24. Inpt psych discussed with pt. 2PC papers completed.    PLAN:  1-Continue 1:1 CO for CAH to harm herself and psychotic decompensation. Order safety trays (no metal objects).   2-Patient DOES NOT have capacity to leave AMA. Further safety evaluation needed for disposition planning.  3-INCREASE zyprexa to 15 mg qHS for psychotic decompensation in setting of not receiving clozapine.  4-HOLD clozapine due to low ANC until several days of normal values seen    [] Monitor ANCs daily while at [facility]    [] Clozapine level slightly supratherapeutic at 677 on 1/4, decreased to 183 on 1/6 after stopped  5-Continue Lithium 300 mg BID and Effexor 150 mg po daily. Depakote dosing as per primary team as it seems to be for seizures    [] Monitor BUN/CR, Lithium level 0.8 on 1/4 and 0.4 on 1/9    [] VPA level 85.40 on 1/1 and 49.30 on 1/3  6- Haldol decanoate 200mg q4 weeks, last received 12/27/23, next injection is due on 1/24/24.  7- PRN for agitation: Zyprexa 2.5mg PO/IM q6h prn for agitation, may give additional Zyprexa 2.5mg for refractory agitation (ensure QTc <500); avoid IM/IV Ativan within 1 hour of Zyprexa  8- Care coordinated with outpatient psychiatrist Dr. Whitaker [phone], outpatient psychologist Dr. Treviño (weekly appt Thurs at 1:30 pm), and care coordinator Meagan Mckeon [phone] on 1/4. Update provided to Dr. Whitaker on 1/11 and Meagan on 1/16    [] Please fax DC summary to OP psychiatry team at [phone]. 9-Disposition: Requires 9.27 inpatient psych admission d/t continued AH with recent commands to harm self + others. Please contact [facility] Gutier[facility] [phone] to start bed search. Patient is a 38-year-old woman with past history of schizoaffective disorder, living in group home on [facility] grounds, with multiple past inpatient admissions, and PMH of seizure disorder, HTN, DM?, HLD who was BIBEMS activated by facility due to acute influenza infection. She was medically admitted due to blood cultures growing staph capitis, now thought to be contaminant. Psychiatry was consulted for medication management of home clozapine dose in the setting of increased sedation, decreasing ANC (1.07), and missed dose.     1/4: Patient seen, oriented, but lethargic and slow to respond, mild tremors noted in both UE. She firmly denies AVH, denies CAH, denies SI and HI. Denies feeling unsafe in the hospital. Care coordinated with outpt. psychiatrist, at baseline pt. is alert and has chronic tremors in both UE.  Labs: Lithium level 0.8, ANC 1.51, VPA level 49.30, Clozapine level 677  1/5: Patient lethargic, interview limited at this time. No overt catatonic sxs , no stiffness or rigidity. Labs: ANC 1.07 today.   1/6: Patient remains lethargic, not engaged with writer.   1/8: Patient awakes to verbal and tactile stimulation, although still sedated during daytime. Asking appropriate questions about discharge plan. No evidence of acute psychosis or mood symptoms indicating decompensation. ANC improved to 4.34.  1/9: Patient seen, awakes to verbal and tactile stimulation, still sedated at this time. No overt catatonic sxs, no stiffness or rigidity. No evidence of acute mood or psychotic symptoms. ANC 1.98.  1/10: Patient oriented, no psychosis, no si or hi. No catatonic sxs on exam. Plan is to hold Clozapine until ANC normalized. ANC today 1.67  1/11: Reporting CAH to harm herself. Started on 1:1 CO and zyprexa 5 mg HS. Continue to hold clozapine until ANC normalizes; improved to 2.64 today but continue to trend due to recent fluctuations.  1/12: Significant thought blocking on exam. Pt still endorsing CAH to harm herself with no specific plan, although she denies intent to act on them. ANC 2.20 today. No side effects reported from olanzapine. No catatonic sxs on exam.  1/14: Patient continues to have AH and CAH, but pt reports they are less intense. Staff present and informed of CAH of ending her life and hurting others. ANC 3.01, improving. Tolerating Zyprexa and adherent, plan to increase Zyprexa to 10 mg qHs.   1/15: Pt continues to have AH, though intermittent. She is tolerating Zyprexa increase. C/w same meds for now.   1/16: Pt continues to have AH, no CAH today. Tolerating Zyprexa increase, AH decrease after administered. ANC improving, 4.24. Inpt psych discussed with pt. 2PC papers completed.    PLAN:  1-Continue 1:1 CO for CAH to harm herself and psychotic decompensation. Order safety trays (no metal objects).   2-Patient DOES NOT have capacity to leave AMA. Further safety evaluation needed for disposition planning.  3-INCREASE zyprexa to 15 mg qHS for psychotic decompensation in setting of not receiving clozapine.  4-HOLD clozapine due to low ANC until several days of normal values seen    [] Monitor ANCs daily while at [facility]    [] Clozapine level slightly supratherapeutic at 677 on 1/4, decreased to 183 on 1/6 after stopped  5-Continue Lithium 300 mg BID and Effexor 150 mg po daily. Depakote dosing as per primary team as it seems to be for seizures    [] Monitor BUN/CR, Lithium level 0.8 on 1/4 and 0.4 on 1/9    [] VPA level 85.40 on 1/1 and 49.30 on 1/3  6- Haldol decanoate 200mg q4 weeks, last received 12/27/23, next injection is due on 1/24/24.  7- PRN for agitation: Zyprexa 2.5mg PO/IM q6h prn for agitation, may give additional Zyprexa 2.5mg for refractory agitation (ensure QTc <500); avoid IM/IV Ativan within 1 hour of Zyprexa  8- Care coordinated with outpatient psychiatrist Dr. Whitaker [phone], outpatient psychologist Dr. Treviño (weekly appt Thurs at 1:30 pm), and care coordinator Meagan Mckeon [phone] on 1/4. Update provided to Dr. Whitaker on 1/11 and Meagan on 1/16    [] Please fax DC summary to OP psychiatry team at [phone]. 9-Disposition: Requires 9.27 inpatient psych admission d/t continued AH with recent commands to harm self + others. Please contact HENRIETTA Gutierrez [phone] to start bed search.

## 2024-01-16 NOTE — BH CONSULTATION LIAISON PROGRESS NOTE - NSBHASSESSMENTFT_PSY_ALL_CORE
***in progress    Patient is a 38-year-old woman with past history of schizoaffective disorder, living in group home on [facility] grounds, with multiple past inpatient admissions, and PMH of seizure disorder, HTN, DM?, HLD who was BIBEMS activated by facility due to acute influenza infection. She was medically admitted due to blood cultures growing staph capitis, now thought to be contaminant. Psychiatry was consulted for medication management of home clozapine dose in the setting of increased sedation, decreasing ANC (1.07), and missed dose.     1/4: Patient seen, oriented, but lethargic and slow to respond, mild tremors noted in both UE. She firmly denies AVH, denies CAH, denies SI and HI. Denies feeling unsafe in the hospital. Care coordinated with outpt. psychiatrist, at baseline pt. is alert and has chronic tremors in both UE.  Labs: Lithium level 0.8, ANC 1.51, VPA level 49.30, Clozapine level 677  1/5: Patient lethargic, interview limited at this time. No overt catatonic sxs , no stiffness or rigidity. Labs: ANC 1.07 today.   1/6: Patient remains lethargic, not engaged with writer.   1/8: Patient awakes to verbal and tactile stimulation, although still sedated during daytime. Asking appropriate questions about discharge plan. No evidence of acute psychosis or mood symptoms indicating decompensation. ANC improved to 4.34.  1/9: Patient seen, awakes to verbal and tactile stimulation, still sedated at this time. No overt catatonic sxs, no stiffness or rigidity. No evidence of acute mood or psychotic symptoms. ANC 1.98.  1/10: Patient oriented, no psychosis, no si or hi. No catatonic sxs on exam. Plan is to hold Clozapine until ANC normalized. ANC today 1.67  1/11: Reporting CAH to harm herself. Started on 1:1 CO and zyprexa 5 mg HS. Continue to hold clozapine until ANC normalizes; improved to 2.64 today but continue to trend due to recent fluctuations.  1/12: Significant thought blocking on exam. Pt still endorsing CAH to harm herself with no specific plan, although she denies intent to act on them. ANC 2.20 today. No side effects reported from olanzapine. No catatonic sxs on exam.  1/14: Patient continues to have AH and CAH, but pt reports they are less intense. Staff present and informed of CAH of ending her life and hurting others. ANC 3.01, improving. Tolerating Zyprexa and adherent, plan to increase Zyprexa to 10 mg qHs.   1/15: Pt continues to have AH, though intermittent. She is tolerating Zyprexa increase. C/w same meds for now.     PLAN:  1-Continue 1:1 CO for CAH to harm herself and psychotic decompensation. Order safety trays (no metal objects).   2-Patient DOES NOT have capacity to leave AMA. Further safety evaluation needed for disposition planning.  3-C/w zyprexa 10 mg qHS for psychotic decompensation in setting of not receiving clozapine.  4-HOLD clozapine due to low ANC until several days of normal values seen    [] Monitor ANCs daily while at [facility]    [] Clozapine level slightly supratherapeutic at 677 on 1/4, decreased to 183 on 1/6 after stopped  5-Continue Lithium 300 mg BID and Effexor 150 mg po daily. Depakote dosing as per primary team as it seems to be for seizures    [] Monitor BUN/CR, Lithium level 0.8 on 1/4 and 0.4 on 1/9    [] VPA level 85.40 on 1/1 and 49.30 on 1/3  6- Haldol decanoate 200mg q4 weeks, last received 12/27/23, next injection is due on 1/24/24.  7- PRN for agitation: Zyprexa 2.5mg PO/IM q6h prn for agitation, may give additional Zyprexa 2.5mg for refractory agitation (ensure QTc <500); avoid IM/IV Ativan within 1 hour of Zyprexa  8- Care coordinated with outpatient psychiatrist Dr. Whitaker [phone], outpatient psychologist Dr. Treviño, and care coordinator Meagan Mckeon [phone] on 1/4. Update provided to Dr. Whitaker on 1/11     [] Outpatient psych follow-up appt scheduled for 1/16 at 1:30 pm w/ Dr. Whitaker.     [] Pt also has standing apt with Dr. Treviño on Thursdays at 1:30, including 1/11.     [] Please fax DC summary to OP psychiatry team at [phone]. 9-Disposition: pending clinical course; may need inpatient psych admission if CAH to harm self continue ***in progress    Patient is a 38-year-old woman with past history of schizoaffective disorder, living in group home on [facility] grounds, with multiple past inpatient admissions, and PMH of seizure disorder, HTN, DM?, HLD who was BIBEMS activated by facility due to acute influenza infection. She was medically admitted due to blood cultures growing staph capitis, now thought to be contaminant. Psychiatry was consulted for medication management of home clozapine dose in the setting of increased sedation, decreasing ANC (1.07), and missed dose.     1/4: Patient seen, oriented, but lethargic and slow to respond, mild tremors noted in both UE. She firmly denies AVH, denies CAH, denies SI and HI. Denies feeling unsafe in the hospital. Care coordinated with outpt. psychiatrist, at baseline pt. is alert and has chronic tremors in both UE.  Labs: Lithium level 0.8, ANC 1.51, VPA level 49.30, Clozapine level 677  1/5: Patient lethargic, interview limited at this time. No overt catatonic sxs , no stiffness or rigidity. Labs: ANC 1.07 today.   1/6: Patient remains lethargic, not engaged with writer.   1/8: Patient awakes to verbal and tactile stimulation, although still sedated during daytime. Asking appropriate questions about discharge plan. No evidence of acute psychosis or mood symptoms indicating decompensation. ANC improved to 4.34.  1/9: Patient seen, awakes to verbal and tactile stimulation, still sedated at this time. No overt catatonic sxs, no stiffness or rigidity. No evidence of acute mood or psychotic symptoms. ANC 1.98.  1/10: Patient oriented, no psychosis, no si or hi. No catatonic sxs on exam. Plan is to hold Clozapine until ANC normalized. ANC today 1.67  1/11: Reporting CAH to harm herself. Started on 1:1 CO and zyprexa 5 mg HS. Continue to hold clozapine until ANC normalizes; improved to 2.64 today but continue to trend due to recent fluctuations.  1/12: Significant thought blocking on exam. Pt still endorsing CAH to harm herself with no specific plan, although she denies intent to act on them. ANC 2.20 today. No side effects reported from olanzapine. No catatonic sxs on exam.  1/14: Patient continues to have AH and CAH, but pt reports they are less intense. Staff present and informed of CAH of ending her life and hurting others. ANC 3.01, improving. Tolerating Zyprexa and adherent, plan to increase Zyprexa to 10 mg qHs.   1/15: Pt continues to have AH, though intermittent. She is tolerating Zyprexa increase. C/w same meds for now.     PLAN:  1-Continue 1:1 CO for CAH to harm herself and psychotic decompensation. Order safety trays (no metal objects).   2-Patient DOES NOT have capacity to leave AMA. Further safety evaluation needed for disposition planning.  3-C/w zyprexa 10 mg qHS for psychotic decompensation in setting of not receiving clozapine.  4-HOLD clozapine due to low ANC until several days of normal values seen    [] Monitor ANCs daily while at [facility]    [] Clozapine level slightly supratherapeutic at 677 on 1/4, decreased to 183 on 1/6 after stopped  5-Continue Lithium 300 mg BID and Effexor 150 mg po daily. Depakote dosing as per primary team as it seems to be for seizures    [] Monitor BUN/CR, Lithium level 0.8 on 1/4 and 0.4 on 1/9    [] VPA level 85.40 on 1/1 and 49.30 on 1/3  6- Haldol decanoate 200mg q4 weeks, last received 12/27/23, next injection is due on 1/24/24.  7- PRN for agitation: Zyprexa 2.5mg PO/IM q6h prn for agitation, may give additional Zyprexa 2.5mg for refractory agitation (ensure QTc <500); avoid IM/IV Ativan within 1 hour of Zyprexa  8- Care coordinated with outpatient psychiatrist Dr. Whitaker [phone], outpatient psychologist Dr. Treviño, and care coordinator Meagan Mckeon [phone] on 1/4. Update provided to Dr. Whitaker on 1/11     [] Outpatient psych follow-up appt scheduled for 1/16 at 1:30 pm w/ Dr. Whitaker.     [] Pt also has standing apt with Dr. Treviño on Thursdays at 1:30, including 1/11.     [] Please fax DC summary to OP psychiatry team at [phone]. 9-Disposition: pending clinical course; may need inpatient psych admission if CAH to harm self continue .

## 2024-01-16 NOTE — BH CONSULTATION LIAISON PROGRESS NOTE - NSBHATTESTTYPEVISIT_PSY_A_CORE
Attending Only
Attending Only
Resident/Fellow with telephonic supervision
Resident/Fellow with telephonic supervision
Attending with Resident/Fellow/Student
Attending with Resident/Fellow/Student
Resident/Fellow with telephonic supervision
Attending with Resident/Fellow/Student
Attending with Resident/Fellow/Student
Resident/Fellow with telephonic supervision

## 2024-01-16 NOTE — PROGRESS NOTE ADULT - PROBLEM SELECTOR PROBLEM 7
Diabetes mellitus
Need for prophylactic measure

## 2024-01-16 NOTE — PROGRESS NOTE ADULT - ASSESSMENT
38-year-old female with past medical history of schizophrenia, seizure disorder, from [facility], HTN, DM?, HLD sent in for blood cx growing staph capitis and noted to have of influenza +

## 2024-01-16 NOTE — BH CONSULTATION LIAISON PROGRESS NOTE - CURRENT MEDICATION
MEDICATIONS  (STANDING):  atorvastatin 10 milliGRAM(s) Oral at bedtime  cholecalciferol 1000 Unit(s) Oral daily  cloZAPine 25 milliGRAM(s) Oral at bedtime  dextrose 5% + lactated ringers. 1000 milliLiter(s) (125 mL/Hr) IV Continuous <Continuous>  dextrose 5%. 1000 milliLiter(s) (100 mL/Hr) IV Continuous <Continuous>  dextrose 5%. 1000 milliLiter(s) (50 mL/Hr) IV Continuous <Continuous>  dextrose 50% Injectable 12.5 Gram(s) IV Push once  dextrose 50% Injectable 25 Gram(s) IV Push once  dextrose 50% Injectable 25 Gram(s) IV Push once  divalproex DR 1000 milliGRAM(s) Oral at bedtime  enoxaparin Injectable 40 milliGRAM(s) SubCutaneous every 12 hours  famotidine    Tablet 20 milliGRAM(s) Oral daily  folic acid 1 milliGRAM(s) Oral daily  glucagon  Injectable 1 milliGRAM(s) IntraMuscular once  iron sucrose Injectable 200 milliGRAM(s) IV Push every 24 hours  lithium 300 milliGRAM(s) Oral two times a day  metoprolol succinate ER 12.5 milliGRAM(s) Oral daily  senna 2 Tablet(s) Oral at bedtime  venlafaxine XR. 150 milliGRAM(s) Oral daily    MEDICATIONS  (PRN):  acetaminophen     Tablet .. 650 milliGRAM(s) Oral every 6 hours PRN Temp greater or equal to 38C (100.4F), Mild Pain (1 - 3)  aluminum hydroxide/magnesium hydroxide/simethicone Suspension 30 milliLiter(s) Oral every 4 hours PRN Dyspepsia  dextrose Oral Gel 15 Gram(s) Oral once PRN Blood Glucose LESS THAN 70 milliGRAM(s)/deciliter  melatonin 3 milliGRAM(s) Oral at bedtime PRN Insomnia  OLANZapine Injectable 2.5 milliGRAM(s) IntraMuscular every 6 hours PRN severe agitation  ondansetron Injectable 4 milliGRAM(s) IV Push every 8 hours PRN Nausea and/or Vomiting  
MEDICATIONS  (STANDING):  atorvastatin 10 milliGRAM(s) Oral at bedtime  cholecalciferol 1000 Unit(s) Oral daily  dextrose 5% + lactated ringers. 1000 milliLiter(s) (125 mL/Hr) IV Continuous <Continuous>  dextrose 5%. 1000 milliLiter(s) (50 mL/Hr) IV Continuous <Continuous>  dextrose 5%. 1000 milliLiter(s) (100 mL/Hr) IV Continuous <Continuous>  dextrose 50% Injectable 12.5 Gram(s) IV Push once  dextrose 50% Injectable 25 Gram(s) IV Push once  dextrose 50% Injectable 25 Gram(s) IV Push once  divalproex DR 1000 milliGRAM(s) Oral at bedtime  enoxaparin Injectable 40 milliGRAM(s) SubCutaneous every 12 hours  famotidine    Tablet 20 milliGRAM(s) Oral daily  folic acid 1 milliGRAM(s) Oral daily  glucagon  Injectable 1 milliGRAM(s) IntraMuscular once  iron sucrose Injectable 200 milliGRAM(s) IV Push every 24 hours  lithium 300 milliGRAM(s) Oral two times a day  metoprolol succinate ER 12.5 milliGRAM(s) Oral daily  senna 2 Tablet(s) Oral at bedtime  venlafaxine XR. 150 milliGRAM(s) Oral daily    MEDICATIONS  (PRN):  acetaminophen     Tablet .. 650 milliGRAM(s) Oral every 6 hours PRN Temp greater or equal to 38C (100.4F), Mild Pain (1 - 3)  aluminum hydroxide/magnesium hydroxide/simethicone Suspension 30 milliLiter(s) Oral every 4 hours PRN Dyspepsia  dextrose Oral Gel 15 Gram(s) Oral once PRN Blood Glucose LESS THAN 70 milliGRAM(s)/deciliter  melatonin 3 milliGRAM(s) Oral at bedtime PRN Insomnia  OLANZapine Injectable 2.5 milliGRAM(s) IntraMuscular every 6 hours PRN severe agitation  ondansetron Injectable 4 milliGRAM(s) IV Push every 8 hours PRN Nausea and/or Vomiting  
MEDICATIONS  (STANDING):  atorvastatin 10 milliGRAM(s) Oral at bedtime  cholecalciferol 1000 Unit(s) Oral daily  divalproex DR 1000 milliGRAM(s) Oral at bedtime  enoxaparin Injectable 40 milliGRAM(s) SubCutaneous every 12 hours  famotidine    Tablet 20 milliGRAM(s) Oral daily  folic acid 1 milliGRAM(s) Oral daily  lidocaine   4% Patch 1 Patch Transdermal daily  lithium 300 milliGRAM(s) Oral two times a day  metoprolol succinate ER 12.5 milliGRAM(s) Oral daily  OLANZapine 5 milliGRAM(s) Oral at bedtime  senna 2 Tablet(s) Oral at bedtime  sodium chloride 0.9% Bolus 1000 milliLiter(s) IV Bolus once  venlafaxine XR. 150 milliGRAM(s) Oral daily    MEDICATIONS  (PRN):  acetaminophen     Tablet .. 650 milliGRAM(s) Oral every 6 hours PRN Temp greater or equal to 38C (100.4F), Mild Pain (1 - 3)  aluminum hydroxide/magnesium hydroxide/simethicone Suspension 30 milliLiter(s) Oral every 4 hours PRN Dyspepsia  ibuprofen  Tablet. 600 milliGRAM(s) Oral every 6 hours PRN Moderate Pain (4 - 6)  melatonin 3 milliGRAM(s) Oral at bedtime PRN Insomnia  OLANZapine Injectable 2.5 milliGRAM(s) IntraMuscular every 6 hours PRN severe agitation  ondansetron Injectable 4 milliGRAM(s) IV Push every 8 hours PRN Nausea and/or Vomiting  
MEDICATIONS  (STANDING):  atorvastatin 10 milliGRAM(s) Oral at bedtime  dextrose 5%. 1000 milliLiter(s) (50 mL/Hr) IV Continuous <Continuous>  dextrose 5%. 1000 milliLiter(s) (100 mL/Hr) IV Continuous <Continuous>  dextrose 50% Injectable 12.5 Gram(s) IV Push once  dextrose 50% Injectable 25 Gram(s) IV Push once  dextrose 50% Injectable 25 Gram(s) IV Push once  divalproex DR 1000 milliGRAM(s) Oral at bedtime  enoxaparin Injectable 40 milliGRAM(s) SubCutaneous every 12 hours  famotidine    Tablet 20 milliGRAM(s) Oral daily  glucagon  Injectable 1 milliGRAM(s) IntraMuscular once  insulin lispro (ADMELOG) corrective regimen sliding scale   SubCutaneous three times a day before meals  insulin lispro (ADMELOG) corrective regimen sliding scale   SubCutaneous at bedtime  lithium 300 milliGRAM(s) Oral two times a day  metoprolol succinate ER 12.5 milliGRAM(s) Oral daily  oseltamivir 75 milliGRAM(s) Oral two times a day  senna 2 Tablet(s) Oral at bedtime  venlafaxine XR. 150 milliGRAM(s) Oral daily    MEDICATIONS  (PRN):  acetaminophen     Tablet .. 650 milliGRAM(s) Oral every 6 hours PRN Temp greater or equal to 38C (100.4F), Mild Pain (1 - 3)  aluminum hydroxide/magnesium hydroxide/simethicone Suspension 30 milliLiter(s) Oral every 4 hours PRN Dyspepsia  dextrose Oral Gel 15 Gram(s) Oral once PRN Blood Glucose LESS THAN 70 milliGRAM(s)/deciliter  melatonin 3 milliGRAM(s) Oral at bedtime PRN Insomnia  ondansetron Injectable 4 milliGRAM(s) IV Push every 8 hours PRN Nausea and/or Vomiting  
MEDICATIONS  (STANDING):  atorvastatin 10 milliGRAM(s) Oral at bedtime  dextrose 5% + lactated ringers. 1000 milliLiter(s) (125 mL/Hr) IV Continuous <Continuous>  dextrose 5%. 1000 milliLiter(s) (50 mL/Hr) IV Continuous <Continuous>  dextrose 5%. 1000 milliLiter(s) (100 mL/Hr) IV Continuous <Continuous>  dextrose 50% Injectable 12.5 Gram(s) IV Push once  dextrose 50% Injectable 25 Gram(s) IV Push once  dextrose 50% Injectable 25 Gram(s) IV Push once  divalproex DR 1000 milliGRAM(s) Oral at bedtime  enoxaparin Injectable 40 milliGRAM(s) SubCutaneous every 12 hours  famotidine    Tablet 20 milliGRAM(s) Oral daily  glucagon  Injectable 1 milliGRAM(s) IntraMuscular once  lithium 300 milliGRAM(s) Oral two times a day  metoprolol succinate ER 12.5 milliGRAM(s) Oral daily  oseltamivir 75 milliGRAM(s) Oral two times a day  senna 2 Tablet(s) Oral at bedtime  venlafaxine XR. 150 milliGRAM(s) Oral daily    MEDICATIONS  (PRN):  acetaminophen     Tablet .. 650 milliGRAM(s) Oral every 6 hours PRN Temp greater or equal to 38C (100.4F), Mild Pain (1 - 3)  aluminum hydroxide/magnesium hydroxide/simethicone Suspension 30 milliLiter(s) Oral every 4 hours PRN Dyspepsia  dextrose Oral Gel 15 Gram(s) Oral once PRN Blood Glucose LESS THAN 70 milliGRAM(s)/deciliter  melatonin 3 milliGRAM(s) Oral at bedtime PRN Insomnia  ondansetron Injectable 4 milliGRAM(s) IV Push every 8 hours PRN Nausea and/or Vomiting  
MEDICATIONS  (STANDING):  atorvastatin 10 milliGRAM(s) Oral at bedtime  cholecalciferol 1000 Unit(s) Oral daily  divalproex DR 1000 milliGRAM(s) Oral at bedtime  enoxaparin Injectable 40 milliGRAM(s) SubCutaneous every 12 hours  famotidine    Tablet 20 milliGRAM(s) Oral daily  folic acid 1 milliGRAM(s) Oral daily  iron sucrose Injectable 200 milliGRAM(s) IV Push every 24 hours  lithium 300 milliGRAM(s) Oral two times a day  metoprolol succinate ER 12.5 milliGRAM(s) Oral daily  OLANZapine 5 milliGRAM(s) Oral at bedtime  senna 2 Tablet(s) Oral at bedtime  venlafaxine XR. 150 milliGRAM(s) Oral daily    MEDICATIONS  (PRN):  acetaminophen     Tablet .. 650 milliGRAM(s) Oral every 6 hours PRN Temp greater or equal to 38C (100.4F), Mild Pain (1 - 3)  aluminum hydroxide/magnesium hydroxide/simethicone Suspension 30 milliLiter(s) Oral every 4 hours PRN Dyspepsia  melatonin 3 milliGRAM(s) Oral at bedtime PRN Insomnia  OLANZapine Injectable 2.5 milliGRAM(s) IntraMuscular every 6 hours PRN severe agitation  ondansetron Injectable 4 milliGRAM(s) IV Push every 8 hours PRN Nausea and/or Vomiting  
MEDICATIONS  (STANDING):  atorvastatin 10 milliGRAM(s) Oral at bedtime  dextrose 5% + lactated ringers. 1000 milliLiter(s) (50 mL/Hr) IV Continuous <Continuous>  dextrose 5%. 1000 milliLiter(s) (50 mL/Hr) IV Continuous <Continuous>  dextrose 5%. 1000 milliLiter(s) (100 mL/Hr) IV Continuous <Continuous>  dextrose 50% Injectable 12.5 Gram(s) IV Push once  dextrose 50% Injectable 25 Gram(s) IV Push once  dextrose 50% Injectable 25 Gram(s) IV Push once  divalproex DR 1000 milliGRAM(s) Oral at bedtime  enoxaparin Injectable 40 milliGRAM(s) SubCutaneous every 12 hours  famotidine    Tablet 20 milliGRAM(s) Oral daily  glucagon  Injectable 1 milliGRAM(s) IntraMuscular once  lithium 300 milliGRAM(s) Oral two times a day  metoprolol succinate ER 12.5 milliGRAM(s) Oral daily  oseltamivir 75 milliGRAM(s) Oral two times a day  senna 2 Tablet(s) Oral at bedtime  venlafaxine XR. 150 milliGRAM(s) Oral daily    MEDICATIONS  (PRN):  acetaminophen     Tablet .. 650 milliGRAM(s) Oral every 6 hours PRN Temp greater or equal to 38C (100.4F), Mild Pain (1 - 3)  aluminum hydroxide/magnesium hydroxide/simethicone Suspension 30 milliLiter(s) Oral every 4 hours PRN Dyspepsia  dextrose Oral Gel 15 Gram(s) Oral once PRN Blood Glucose LESS THAN 70 milliGRAM(s)/deciliter  melatonin 3 milliGRAM(s) Oral at bedtime PRN Insomnia  ondansetron Injectable 4 milliGRAM(s) IV Push every 8 hours PRN Nausea and/or Vomiting  
MEDICATIONS  (STANDING):  atorvastatin 10 milliGRAM(s) Oral at bedtime  cloZAPine 25 milliGRAM(s) Oral at bedtime  dextrose 5% + lactated ringers. 1000 milliLiter(s) (125 mL/Hr) IV Continuous <Continuous>  dextrose 5%. 1000 milliLiter(s) (50 mL/Hr) IV Continuous <Continuous>  dextrose 5%. 1000 milliLiter(s) (100 mL/Hr) IV Continuous <Continuous>  dextrose 50% Injectable 12.5 Gram(s) IV Push once  dextrose 50% Injectable 25 Gram(s) IV Push once  dextrose 50% Injectable 25 Gram(s) IV Push once  divalproex DR 1000 milliGRAM(s) Oral at bedtime  enoxaparin Injectable 40 milliGRAM(s) SubCutaneous every 12 hours  famotidine    Tablet 20 milliGRAM(s) Oral daily  folic acid 1 milliGRAM(s) Oral daily  glucagon  Injectable 1 milliGRAM(s) IntraMuscular once  iron sucrose Injectable 200 milliGRAM(s) IV Push every 24 hours  lithium 300 milliGRAM(s) Oral two times a day  metoprolol succinate ER 12.5 milliGRAM(s) Oral daily  senna 2 Tablet(s) Oral at bedtime  venlafaxine XR. 150 milliGRAM(s) Oral daily    MEDICATIONS  (PRN):  acetaminophen     Tablet .. 650 milliGRAM(s) Oral every 6 hours PRN Temp greater or equal to 38C (100.4F), Mild Pain (1 - 3)  aluminum hydroxide/magnesium hydroxide/simethicone Suspension 30 milliLiter(s) Oral every 4 hours PRN Dyspepsia  dextrose Oral Gel 15 Gram(s) Oral once PRN Blood Glucose LESS THAN 70 milliGRAM(s)/deciliter  melatonin 3 milliGRAM(s) Oral at bedtime PRN Insomnia  OLANZapine Injectable 2.5 milliGRAM(s) IntraMuscular every 6 hours PRN severe agitation  ondansetron Injectable 4 milliGRAM(s) IV Push every 8 hours PRN Nausea and/or Vomiting  
MEDICATIONS  (STANDING):  atorvastatin 10 milliGRAM(s) Oral at bedtime  cholecalciferol 1000 Unit(s) Oral daily  divalproex DR 1000 milliGRAM(s) Oral at bedtime  enoxaparin Injectable 40 milliGRAM(s) SubCutaneous every 12 hours  famotidine    Tablet 20 milliGRAM(s) Oral daily  folic acid 1 milliGRAM(s) Oral daily  lidocaine   4% Patch 1 Patch Transdermal daily  lithium 300 milliGRAM(s) Oral two times a day  OLANZapine 10 milliGRAM(s) Oral at bedtime  senna 2 Tablet(s) Oral at bedtime  sodium chloride 0.9%. 1000 milliLiter(s) (100 mL/Hr) IV Continuous <Continuous>  venlafaxine XR. 150 milliGRAM(s) Oral daily    MEDICATIONS  (PRN):  acetaminophen     Tablet .. 650 milliGRAM(s) Oral every 6 hours PRN Temp greater or equal to 38C (100.4F), Mild Pain (1 - 3)  aluminum hydroxide/magnesium hydroxide/simethicone Suspension 30 milliLiter(s) Oral every 4 hours PRN Dyspepsia  ibuprofen  Tablet. 600 milliGRAM(s) Oral every 6 hours PRN Moderate Pain (4 - 6)  melatonin 3 milliGRAM(s) Oral at bedtime PRN Insomnia  OLANZapine Injectable 2.5 milliGRAM(s) IntraMuscular every 6 hours PRN severe agitation  ondansetron Injectable 4 milliGRAM(s) IV Push every 8 hours PRN Nausea and/or Vomiting  

## 2024-01-16 NOTE — PROGRESS NOTE ADULT - PROBLEM SELECTOR PLAN 1
currently lethargic, No focal neuro deficits on exam however with intermittent jerking of upper extremities (which per outpt psychiatrist is her chronic baseline dt Haldol.   -bilateral tremor known to outpt psych and is chronic  -cont with lithium 300mg bid, effexor 150mg qd and Depakote 1000mg qhs (on for seizure)  -will hold clozapine in setting of decreasing ANC and viral syndrome, consider restarting in several days if stable/improving   - Inc Zyprexa to 15 mg hs and c/w Zyprexa 2.5mg q6h prn severe agitation per BH  -cont to monitor lithium, clozapine level and monitor ANC   -no ss of sepsis, work up neg  -appreciate psych recs  -aspiration, fall, seizure precautions

## 2024-01-16 NOTE — BH CONSULTATION LIAISON PROGRESS NOTE - NSBHMSETHTPROC_PSY_A_CORE
Linear/Normal reasoning
one episode of thought blocking, appeared to be RIS/Linear
one episode of thought blocking, appeared to be RIS/Linear
one episode of thought blocking, appeared to be RIS/Disorganized/Thought blocking/Impaired reasoning
one episode of thought blocking, appeared to be RIS/Disorganized/Thought blocking/Impaired reasoning
Linear/Normal reasoning
one episode of thought blocking, appeared to be RIS/Linear
Linear/Normal reasoning
one episode of thought blocking, appeared to be RIS/Linear

## 2024-01-16 NOTE — BH CONSULTATION LIAISON PROGRESS NOTE - NSBHATTESTCOMMENTATTENDFT_PSY_A_CORE
Chart reviewed, pt. seen/evaluated, I agree with above assessment/plan. Patient more alert, well engaged, she seems internally preoccupied, smiling inappropriately, and reporting CAH telling her to kill herself. She firmly denies SI and HI, stating " I am trying to ignore the voices, I don't want to hurt myself". Denies feeling unsafe. Plan as above, will follow
Chart reviewed, pt. seen/evaluated with trainee, I agree with above assessment/plan. Patient initially sedated, became more alert later in interview, well engaged,  oriented, denies AVH, denies si and hi, no catatonic sxs on exam. Plan as above, will follow
Chart reviewed, pt. seen/evaluated with the resident, I agree with above assessment/plan. Patient oriented, well engaged, continues to report CAH telling her to kill herself, though firmly denies SI and HI. Plan as above, case d/w Dr. Gonzalez. Will follow
met with the patient along with resident md and pa-s impression and plan discussed and agreed upon

## 2024-01-16 NOTE — PROGRESS NOTE ADULT - PROBLEM SELECTOR PLAN 2
-blood cultures positive for staph capitis in 1 bottle. Staph capitis is a common skin nisreen. Given no white count, fevers, suspect more likely represents contamination. Procal neg  - hold on further antibiotics. Low threshold to restart if recurrent fevers or worsening WBC  - repeat blood culture has been neg
-blood cultures positive for staph capitis in 1 bottle. Staph capitis is a common skin nisreen. Given no white count, fevers, suspect more likely represents contamination. Procal neg  - hold on further antibiotics. Low threshold to restart if recurrent fevers or worsening WBC  -f/u repeat blood cultures- currently pending
-blood cultures positive for staph capitis in 1 bottle. Staph capitis is a common skin nisreen. Given no white count, fevers, suspect more likely represents contamination. Procal neg  - hold on further antibiotics. Low threshold to restart if recurrent fevers or worsening WBC  - repeat blood culture has been neg

## 2024-01-16 NOTE — BH CONSULTATION LIAISON PROGRESS NOTE - NSBHTIMEACTIVITIESPERFORMED_PSY_A_CORE
Care coordinated with medical team and outpt. provider
chart review, coordinated with case coordinator, inpatient psychiatrist
Care coordinated with medical attending

## 2024-01-16 NOTE — PROGRESS NOTE ADULT - PROBLEM SELECTOR PLAN 3
mild hypernatremia, likely dt poor po. signs of dehydration.   - increase D5LR to 100CC/hr for now given poor po intake in s.o lethargy  - ctm
mild hypernatremia, likely dt poor po. signs of dehydration.   - now off IVF, encorage PO intake  - ctm
mild hypernatremia, likely dt poor po. signs of dehydration.   - now off IVF, encorage PO intake  - ctm
mild hypernatremia, likely dt poor po. signs of dehydration.   - increase D5LR to 100CC/hr for now given poor po intake in s.o lethargy  - ctm
mild hypernatremia, likely dt poor po. signs of dehydration.   -  BP soft, dc metoprolol, IVF   - encourage po intake  - ctm
mild hypernatremia, likely dt poor po. signs of dehydration.   - will do D5LR AR 50CC/hr for now given poor po intake in s.o lethargy  - ctm
-c/w tamiflu  -supportive care
mild hypernatremia, likely dt poor po. signs of dehydration.   -  BP soft, dc metoprolol, IVF   - encourage po intake  - ctm
mild hypernatremia, likely dt poor po. signs of dehydration.   - increase D5LR to 100CC/hr for now given poor po intake in s.o lethargy  - ctm
mild hypernatremia, likely dt poor po. signs of dehydration.   - now off IVF, encorage PO intake  - ctm

## 2024-01-16 NOTE — DISCHARGE NOTE NURSING/CASE MANAGEMENT/SOCIAL WORK - HAS THE PATIENT USED TOBACCO IN THE PAST 30 DAYS?
Unable to assess due to patient's cognitive impairment
Constitution: No Fever or chills, No Weight Loss,   Eyes: No visual changes  HEENT: No cough, No Discharge, No Rhinorrhea, No URI symptoms  Cardio: No Chest pain, No Palpitations, No Dyspnea  Resp: No SOB, No Wheezing  GI: (+) abdominal pain, (+) Nausea, No Vomiting, No Constipation, No Diarrhea  : No burning upon urination, trouble urinating, no foul odor from urine  MSK: (+) Knee Pain; (+) Left Calf Pain; No Back pain, No Numbness, No Tingling, No Weakness  Neuro: (+) Weakness; (+) Dizziness; (+) Dysarthria; No Headache, No changes to Vision, No changes to Hearing  Skin: No rashes, No Bruising, No Swelling

## 2024-01-16 NOTE — BH CONSULTATION LIAISON PROGRESS NOTE - LEVEL OF CONSCIOUSNESS
sitting up and eating  conversing in full sentences/Alert
sitting up and eating  conversing in full sentences/Lethargic, arousable to verbal stimulus
sitting up and eating  conversing in full sentences/Alert
more alert later in interview, tracked interviewer with eyes and followed simple commands (turn, look at me)/Lethargic, arousable to verbal stimulus
more alert later in interview, tracked interviewer with eyes and followed simple commands (turn, look at me)/Other
more alert later in interview, tracked interviewer with eyes and followed simple commands (turn, look at me)/Lethargic, arousable to verbal stimulus
sitting up and eating  conversing in full sentences/Alert

## 2024-01-16 NOTE — PROGRESS NOTE ADULT - PROBLEM SELECTOR PLAN 6
-c/w VPA  -seizure precautions
-c/w low ISS before meals and at bedtime pending [id number]  -CC diet
-c/w VPA  -seizure precautions

## 2024-01-16 NOTE — BH CONSULTATION LIAISON PROGRESS NOTE - NSBHPSYCHOLCOGORIENT_PSY_A_CORE
states 1/9/24 for time/Oriented to time, place, person, situation

## 2024-01-16 NOTE — BH CONSULTATION LIAISON PROGRESS NOTE - NSBHMSEBEHAV_PSY_A_CORE
initially minimally cooperative 2/2 lethargy  awakes to verbal and tactile stimuli  cooperative afterwards/Cooperative

## 2024-01-16 NOTE — PROGRESS NOTE ADULT - SUBJECTIVE AND OBJECTIVE BOX
PROGRESS NOTE:     Patient is a 38y old  Female who presents with a chief complaint of + blood cultures (15 Junior 2024 13:40)      SUBJECTIVE / OVERNIGHT EVENTS: no acute event overnight. Reports feeling fine. Still has AH but as much as before.     ADDITIONAL REVIEW OF SYSTEMS:    MEDICATIONS  (STANDING):  atorvastatin 10 milliGRAM(s) Oral at bedtime  cholecalciferol 1000 Unit(s) Oral daily  divalproex DR 1000 milliGRAM(s) Oral at bedtime  enoxaparin Injectable 40 milliGRAM(s) SubCutaneous every 12 hours  famotidine    Tablet 20 milliGRAM(s) Oral daily  folic acid 1 milliGRAM(s) Oral daily  lidocaine   4% Patch 1 Patch Transdermal daily  lithium 300 milliGRAM(s) Oral two times a day  OLANZapine 15 milliGRAM(s) Oral at bedtime  senna 2 Tablet(s) Oral at bedtime  sodium chloride 0.9%. 1000 milliLiter(s) (100 mL/Hr) IV Continuous <Continuous>  venlafaxine XR. 150 milliGRAM(s) Oral daily    MEDICATIONS  (PRN):  acetaminophen     Tablet .. 650 milliGRAM(s) Oral every 6 hours PRN Temp greater or equal to 38C (100.4F), Mild Pain (1 - 3)  aluminum hydroxide/magnesium hydroxide/simethicone Suspension 30 milliLiter(s) Oral every 4 hours PRN Dyspepsia  ibuprofen  Tablet. 600 milliGRAM(s) Oral every 6 hours PRN Moderate Pain (4 - 6)  melatonin 3 milliGRAM(s) Oral at bedtime PRN Insomnia  OLANZapine Injectable 2.5 milliGRAM(s) IntraMuscular every 6 hours PRN severe agitation  ondansetron Injectable 4 milliGRAM(s) IV Push every 8 hours PRN Nausea and/or Vomiting      CAPILLARY BLOOD GLUCOSE        I&O's Summary    15 Junior 2024 07:01  -  16 Jan 2024 07:00  --------------------------------------------------------  IN: 790 mL / OUT: 300 mL / NET: 490 mL        PHYSICAL EXAM:  Vital Signs Last 24 Hrs  T(C): 36.8 (16 Jan 2024 17:21), Max: 37 (15 Junior 2024 19:29)  T(F): 98.3 (16 Jan 2024 17:21), Max: 98.6 (15 Junior 2024 19:29)  HR: 84 (16 Jan 2024 17:21) (70 - 85)  BP: 121/73 (16 Jan 2024 17:21) (109/77 - 123/84)  BP(mean): --  RR: 17 (16 Jan 2024 17:21) (17 - 18)  SpO2: 100% (16 Jan 2024 17:21) (98% - 100%)    Parameters below as of 16 Jan 2024 17:21  Patient On (Oxygen Delivery Method): room air        CONSTITUTIONAL: NAD, laying in bed comfortably  RESPIRATORY: Normal respiratory effort; lungs are clear to auscultation bilaterally  CARDIOVASCULAR: Regular rate and rhythm, normal S1 and S2, no murmur/rub/gallop; No lower extremity edema  ABDOMEN: Nontender to palpation, normoactive bowel sounds, no rebound/guarding  MUSCLOSKELETAL: no clubbing or cyanosis of digits  SKIN: no rash, erythema, lesion  PSYCH: calm; cooperative    LABS:                        10.3   11.33 )-----------( 485      ( 16 Jan 2024 07:10 )             33.7     01-15    143  |  110<H>  |  6<L>  ----------------------------<  80  3.8   |  25  |  0.77    Ca    8.9      15 Junior 2024 06:09  Phos  3.1     01-15  Mg     2.30     01-15            Urinalysis Basic - ( 15 Junior 2024 06:09 )    Color: x / Appearance: x / SG: x / pH: x  Gluc: 80 mg/dL / Ketone: x  / Bili: x / Urobili: x   Blood: x / Protein: x / Nitrite: x   Leuk Esterase: x / RBC: x / WBC x   Sq Epi: x / Non Sq Epi: x / Bacteria: x          RADIOLOGY & ADDITIONAL TESTS:  Results Reviewed:   Imaging Personally Reviewed:  Electrocardiogram Personally Reviewed:    COORDINATION OF CARE:  Care Discussed with Consultants/Other Providers [Y/N]:  Prior or Outpatient Records Reviewed [Y/N]:

## 2024-01-16 NOTE — PROGRESS NOTE ADULT - PROBLEM SELECTOR PROBLEM 4
Influenza A
Schizophrenia
Influenza A

## 2024-01-16 NOTE — BH CONSULTATION LIAISON PROGRESS NOTE - NSBHMSEMUSCLE_PSY_A_CORE
Normal muscle tone/strength
Unable to assess
Normal muscle tone/strength
Unable to assess
Normal muscle tone/strength
Unable to assess

## 2024-01-16 NOTE — PROGRESS NOTE ADULT - NUTRITIONAL ASSESSMENT
This patient has been assessed with a concern for Malnutrition and has been determined to have a diagnosis/diagnoses of Morbid obesity (BMI > 40).    This patient is being managed with:   Safety Tray-    Time/Priority:  Routine  Entered: Jan 11 2024 12:08PM    Diet Regular-  Entered: Jan 7 2024  3:22PM  
This patient has been assessed with a concern for Malnutrition and has been determined to have a diagnosis/diagnoses of Morbid obesity (BMI > 40).    This patient is being managed with:   Diet Regular-  Entered: Jan 7 2024  3:22PM  
This patient has been assessed with a concern for Malnutrition and has been determined to have a diagnosis/diagnoses of Morbid obesity (BMI > 40).    This patient is being managed with:   Safety Tray-    Time/Priority:  Routine  Entered: Jan 14 2024  7:03AM    Diet Regular-  Entered: Jan 7 2024  3:22PM  
This patient has been assessed with a concern for Malnutrition and has been determined to have a diagnosis/diagnoses of Morbid obesity (BMI > 40).    This patient is being managed with:   Diet Regular-  Entered: Jan 7 2024  3:22PM  
This patient has been assessed with a concern for Malnutrition and has been determined to have a diagnosis/diagnoses of Morbid obesity (BMI > 40).    This patient is being managed with:   Safety Tray-    Time/Priority:  Routine  Entered: Jan 14 2024  7:03AM    Diet Regular-  Entered: Jan 7 2024  3:22PM  
This patient has been assessed with a concern for Malnutrition and has been determined to have a diagnosis/diagnoses of Morbid obesity (BMI > 40).    This patient is being managed with:   Diet Regular-  Entered: Jan 7 2024  3:22PM  
This patient has been assessed with a concern for Malnutrition and has been determined to have a diagnosis/diagnoses of Morbid obesity (BMI > 40).    This patient is being managed with:   Safety Tray-    Time/Priority:  Routine  Entered: Jan 11 2024 12:08PM    Diet Regular-  Entered: Jan 7 2024  3:22PM  
This patient has been assessed with a concern for Malnutrition and has been determined to have a diagnosis/diagnoses of Morbid obesity (BMI > 40).    This patient is being managed with:   Diet Regular-  Entered: Jan 7 2024  3:22PM  
This patient has been assessed with a concern for Malnutrition and has been determined to have a diagnosis/diagnoses of Morbid obesity (BMI > 40).    This patient is being managed with:   Safety Tray-    Time/Priority:  Routine  Entered: Jan 14 2024  7:03AM    Diet Regular-  Entered: Jan 7 2024  3:22PM  
This patient has been assessed with a concern for Malnutrition and has been determined to have a diagnosis/diagnoses of Morbid obesity (BMI > 40).    This patient is being managed with:   Safety Tray-    Time/Priority:  Routine  Entered: Jan 11 2024 12:08PM    Diet Regular-  Entered: Jan 7 2024  3:22PM

## 2024-01-16 NOTE — BH CONSULTATION LIAISON PROGRESS NOTE - NSBHCHARTREVIEWLAB_PSY_A_CORE FT
9.1    7.73  )-----------( 237      ( 06 Jan 2024 06:18 )             29.1     01-06    146<H>  |  110<H>  |  3<L>  ----------------------------<  82  3.7   |  27  |  0.67    Ca    8.2<L>      06 Jan 2024 06:18  Phos  3.4     01-06  Mg     2.60     01-06    TPro  5.7<L>  /  Alb  2.8<L>  /  TBili  <0.2  /  DBili  x   /  AST  31  /  ALT  26  /  AlkPhos  29<L>  01-05  

## 2024-01-16 NOTE — DISCHARGE NOTE NURSING/CASE MANAGEMENT/SOCIAL WORK - NSDCPEFALRISK_GEN_ALL_CORE
For information on Fall & Injury Prevention, visit: [email or web address] OR  [email or web address] OR  https://www.cdc.gov/steadi/patient.html For information on Fall & Injury Prevention, visit: [email or web address] OR  [email or web address]-injury OR  https://www.cdc.gov/steadi/patient.html

## 2024-01-16 NOTE — PROGRESS NOTE ADULT - PROBLEM SELECTOR PROBLEM 5
Schizophrenia
Seizure disorder
Schizophrenia

## 2024-01-16 NOTE — DISCHARGE NOTE NURSING/CASE MANAGEMENT/SOCIAL WORK - NSDCCRNAME_GEN_ALL_CORE_FT
[facility]- [phone] [place] [address], [place] [address] [facility]- [phone] [place] [address], [place] [address]

## 2024-01-16 NOTE — BH CONSULTATION LIAISON PROGRESS NOTE - NSBHFUPINTERVALHXFT_PSY_A_CORE
Chart reviewed and case discussed with primary team. No acute overnight events, no PRN medications required. Pt adherent to medications w/ no side effects noted.    Today, pt seen sitting up in chair. She reports sleeping well last night and maintains a good appetite. Discussed possible titration of meds upon further assessments if AH persists on this dose of Zyprexa. States that she is still hearing AH but they decrease when she is given the Zyprexa. Reports that the AH are numerous voices "mumbling" and "saying nonsense" however they are not as loud as previously. Pt having a difficult time focusing on the interview dt the voices. Appears distracted. She denies any specific plan or intent to harm herself. She does not feel afraid of the voices. Patient agrees to approach staff if she feels like she is in acute danger. No depression, S/IIP, or H/IIP reported. She is amenable to going to inpt psychiatry until the voices are better managed with medication. Pt expresses desire to go back on Clozapine.  Chart reviewed and case discussed with primary team. No acute overnight events, no PRN medications required. Pt adherent to medications w/ no side effects noted.    Today, pt seen sitting up in chair. She reports sleeping well last night and maintains a good appetite. Discussed possible titration of meds upon further assessments if AH persists on this dose of Zyprexa. States that she is still hearing AH but they decrease when she is given the Zyprexa. Reports that the AH are numerous voices "mumbling" and "saying nonsense" however they are not as loud as previously. Pt having a difficult time focusing on the interview dt the voices. Appears distracted. She denies any specific plan or intent to harm herself. She does not feel afraid of the voices. Patient agrees to approach staff if she feels like she is in acute danger. No depression, S/IIP, or H/IIP reported. She is amenable to going to inpt psychiatry until the voices are better managed with medication. Pt expresses desire to go back on Clozapine.     Update provided to case coordinator Meagan today about expected transfer to [facility]. Handoff provided via email to Dr. Estrada at [facility] unit ML6. Chart reviewed and case discussed with primary team. No acute overnight events, no PRN medications required. Pt adherent to medications w/ no side effects noted.    Today, pt seen sitting up in chair. She reports sleeping well last night and maintains a good appetite. Discussed possible titration of meds upon further assessments if AH persists on this dose of Zyprexa. States that she is still hearing AH but they decrease when she is given the Zyprexa. Reports that the AH are numerous voices "mumbling" and "saying nonsense" however they are not as loud as previously. Pt having a difficult time focusing on the interview dt the voices. Appears distracted. She denies any specific plan or intent to harm herself. She does not feel afraid of the voices. Patient agrees to approach staff if she feels like she is in acute danger. No depression, S/IIP, or H/IIP reported. She is amenable to going to inpt psychiatry until the voices are better managed with medication. Pt expresses desire to go back on Clozapine.     Update provided to case coordinator Meagan today about expected transfer to [facility]. Handoff provided via email to Dr. Estrada at [facility] unit ML6. Chart reviewed and case discussed with primary team. No acute overnight events, no PRN medications required. Pt adherent to medications w/ no side effects noted.    Today, pt seen sitting up in chair. She reports sleeping well last night and maintains a good appetite. Discussed possible titration of meds upon further assessments if AH persists on this dose of Zyprexa. States that she is still hearing AH but they decrease when she is given the Zyprexa. Reports that the AH are numerous voices "mumbling" and "saying nonsense" however they are not as loud as previously. Pt having a difficult time focusing on the interview dt the voices and the voices distress her and affect her sleep. Appears distracted. She denies any specific plan or intent to harm herself. Patient agrees to approach staff if she feels like she is in acute danger. No depression, S/IIP, or H/IIP reported. She is amenable to going to inpt psychiatry until the voices are better managed with medication. Pt expresses desire to go back on Clozapine.     Update provided to case coordinator Meagan today about expected transfer to [facility]-- discussed below. Handoff provided via email to Dr. Estrada at [facility] unit ML6. Chart reviewed and case discussed with primary team. No acute overnight events, no PRN medications required. Pt adherent to medications w/ no side effects noted.    Today, pt seen sitting up in chair. She reports sleeping well last night and maintains a good appetite. Discussed possible titration of meds upon further assessments if AH persists on this dose of Zyprexa. States that she is still hearing AH but they decrease when she is given the Zyprexa. Reports that the AH are numerous voices "mumbling" and "saying nonsense" however they are not as loud as previously. Pt having a difficult time focusing on the interview dt the voices and the voices distress her and affect her sleep. Appears distracted. She denies any specific plan or intent to harm herself. Patient agrees to approach staff if she feels like she is in acute danger. No depression, S/IIP, or H/IIP reported. She is amenable to going to inpt psychiatry until the voices are better managed with medication. Pt expresses desire to go back on Clozapine.     Update provided to case coordinator Meagan today about expected transfer to [facility]-- discussed below. Handoff provided via email to Dr. Estrada at [facility] unit ML6.

## 2024-01-16 NOTE — BH CONSULTATION LIAISON PROGRESS NOTE - NSBHATTESTBILLING_PSY_A_CORE
16824-Wdrvbphpiy OBS or IP - high complexity OR 50-79 mins 47419-Lcfypmhcca OBS or IP - high complexity OR 50-79 mins

## 2024-01-16 NOTE — BH CONSULTATION LIAISON PROGRESS NOTE - NSBHMSEGAIT_PSY_A_CORE
Unable to assess
Normal gait / station
Normal gait / station
Unable to assess
Normal gait / station
Unable to assess
Normal gait / station
Normal gait / station
Unable to assess

## 2024-01-16 NOTE — PROGRESS NOTE ADULT - PROBLEM SELECTOR PLAN 7
a1c 4.8  not on dm meds  - monitor poc glucose
a1c 4.8  not on dm meds, FS have been stable, will stop FS
DVT ppx: lovenox
a1c 4.8  not on dm meds  - monitor poc glucose
a1c 4.8  not on dm meds  - monitor poc glucose
a1c 4.8  not on dm meds, FS have been stable, will stop FS
a1c 4.8  not on dm meds  - monitor poc glucose
a1c 4.8  not on dm meds, FS have been stable, will stop FS
a1c 4.8  not on dm meds, FS have been stable, will stop FS
a1c 4.8  not on dm meds  - monitor poc glucose
a1c 4.8  not on dm meds, FS have been stable, will stop FS
a1c 4.8  not on dm meds, FS have been stable, will stop FS
a1c 4.8  not on dm meds  - monitor poc glucose

## 2024-01-16 NOTE — PROGRESS NOTE ADULT - PROBLEM SELECTOR PROBLEM 3
Dehydration with hypernatremia
Influenza A
Dehydration with hypernatremia

## 2024-01-16 NOTE — BH CONSULTATION LIAISON PROGRESS NOTE - NSBHFUPINTERVALHXFT_PSY_A_CORE
Chart reviewed. No PRNs overnight. Pt adherent to medications. Pt seen at bedside. Upon approach, pt looks at writer with intense stare. She states that she has AH that is intermittent. She states that they decreased upon increase of Zyprexa, but have increased again. She reports sleeping well last night, reports good appetite, denies depressed mood, VH, SI. Discussed possible further titration of meds upon further assessments if AH persists on this dose of Zyprexa.  .

## 2024-01-16 NOTE — BH CONSULTATION LIAISON PROGRESS NOTE - NSBHPTASSESSDT_PSY_A_CORE
05-Jan-2024 10:03
11-Jan-2024 11:28
06-Jan-2024 11:18
16-Jan-2024 12:08
15-Junior-2024 14:18
14-Jan-2024 11:45
16-Jan-2024 08:36
08-Jan-2024 13:36
09-Jan-2024 09:07
10-Junior-2024 13:36
12-Jan-2024 08:51

## 2024-01-16 NOTE — DISCHARGE NOTE NURSING/CASE MANAGEMENT/SOCIAL WORK - PATIENT PORTAL LINK FT
You can access the FollowMyHealth Patient Portal offered by [facility] by registering at the following website: [email or web address]. By joining [employer]’s FollowMyHealth portal, you will also be able to view your health information using other applications (apps) compatible with our system. You can access the FollowMyHealth Patient Portal offered by [facility] by registering at the following website: [email or web address]. By joining [employer]’s FollowMyHealth portal, you will also be able to view your health information using other applications (apps) compatible with our system.

## 2024-01-16 NOTE — BH CONSULTATION LIAISON PROGRESS NOTE - NSBHCHARTREVIEWVS_PSY_A_CORE FT
Vital Signs Last 24 Hrs  T(C): 36.4 (14 Jan 2024 10:04), Max: 36.8 (14 Jan 2024 05:29)  T(F): 97.6 (14 Jan 2024 10:04), Max: 98.3 (14 Jan 2024 05:29)  HR: 70 (14 Jan 2024 10:04) (63 - 73)  BP: 95/60 (14 Jan 2024 10:04) (94/57 - 104/83)  BP(mean): 71 (14 Jan 2024 10:04) (71 - 71)  RR: 18 (14 Jan 2024 10:04) (18 - 18)  SpO2: 100% (14 Jan 2024 10:04) (97% - 100%)    Parameters below as of 14 Jan 2024 10:04  Patient On (Oxygen Delivery Method): room air    
Vital Signs Last 24 Hrs  T(C): 36.6 (06 Jan 2024 10:19), Max: 36.9 (05 Jan 2024 19:10)  T(F): 97.8 (06 Jan 2024 10:19), Max: 98.4 (05 Jan 2024 19:10)  HR: 97 (06 Jan 2024 10:19) (58 - 97)  BP: 99/67 (06 Jan 2024 10:19) (94/76 - 99/69)  BP(mean): --  RR: 17 (06 Jan 2024 10:19) (17 - 18)  SpO2: 100% (06 Jan 2024 10:19) (100% - 100%)    Parameters below as of 06 Jan 2024 10:19  Patient On (Oxygen Delivery Method): room air    
Vital Signs Last 24 Hrs  T(C): 36.9 (05 Jan 2024 02:08), Max: 37.1 (04 Jan 2024 17:45)  T(F): 98.5 (05 Jan 2024 02:08), Max: 98.7 (04 Jan 2024 17:45)  HR: 85 (05 Jan 2024 06:30) (85 - 91)  BP: 94/55 (05 Jan 2024 06:30) (90/62 - 118/84)  BP(mean): --  RR: 18 (05 Jan 2024 02:08) (18 - 19)  SpO2: 96% (05 Jan 2024 02:08) (96% - 99%)    Parameters below as of 05 Jan 2024 02:08  Patient On (Oxygen Delivery Method): room air    
Vital Signs Last 24 Hrs  T(C): 37.1 (08 Jan 2024 10:38), Max: 37.1 (08 Jan 2024 10:38)  T(F): 98.7 (08 Jan 2024 10:38), Max: 98.7 (08 Jan 2024 10:38)  HR: 87 (08 Jan 2024 10:38) (75 - 88)  BP: 108/87 (08 Jan 2024 10:38) (95/67 - 108/87)  BP(mean): --  RR: 16 (08 Jan 2024 10:38) (16 - 17)  SpO2: 100% (08 Jan 2024 10:38) (97% - 100%)    Parameters below as of 08 Jan 2024 10:38  Patient On (Oxygen Delivery Method): room air    
Vital Signs Last 24 Hrs  T(C): 36.8 (12 Jan 2024 02:05), Max: 37 (11 Jan 2024 19:06)  T(F): 98.2 (12 Jan 2024 02:05), Max: 98.6 (11 Jan 2024 19:06)  HR: 63 (12 Jan 2024 05:30) (63 - 88)  BP: 95/59 (12 Jan 2024 05:30) (95/59 - 121/98)  BP(mean): --  RR: 19 (12 Jan 2024 02:05) (18 - 19)  SpO2: 97% (12 Jan 2024 02:05) (96% - 100%)    Parameters below as of 11 Jan 2024 19:06  Patient On (Oxygen Delivery Method): room air    
Vital Signs Last 24 Hrs  T(C): 36.9 (15 Junior 2024 06:24), Max: 36.9 (15 Junior 2024 06:24)  T(F): 98.4 (15 Junior 2024 06:24), Max: 98.4 (15 Junior 2024 06:24)  HR: 72 (15 Junior 2024 06:24) (72 - 83)  BP: 101/83 (15 Junior 2024 06:24) (88/55 - 104/80)  BP(mean): --  RR: 18 (15 Junior 2024 06:24) (18 - 18)  SpO2: 100% (15 Junior 2024 06:24) (98% - 100%)    Parameters below as of 15 Junior 2024 06:24  Patient On (Oxygen Delivery Method): room air    
Vital Signs Last 24 Hrs  T(C): 36.7 (10 Junior 2024 10:38), Max: 37.6 (09 Jan 2024 21:18)  T(F): 98 (10 Junior 2024 10:38), Max: 99.7 (09 Jan 2024 21:18)  HR: 69 (10 Junior 2024 10:38) (64 - 71)  BP: 122/79 (10 Junior 2024 10:38) (94/62 - 122/79)  BP(mean): --  RR: 18 (10 Junior 2024 10:38) (18 - 18)  SpO2: 99% (10 Junior 2024 10:38) (97% - 99%)    Parameters below as of 10 Junior 2024 10:38  Patient On (Oxygen Delivery Method): room air    
Vital Signs Last 24 Hrs  T(C): 36.7 (16 Jan 2024 06:00), Max: 37 (15 Junior 2024 19:29)  T(F): 98 (16 Jan 2024 06:00), Max: 98.6 (15 Junior 2024 19:29)  HR: 70 (16 Jan 2024 06:00) (70 - 84)  BP: 109/77 (16 Jan 2024 06:00) (100/62 - 123/84)  BP(mean): --  RR: 18 (16 Jan 2024 06:00) (16 - 18)  SpO2: 100% (16 Jan 2024 06:00) (100% - 100%)    Parameters below as of 16 Jan 2024 06:00  Patient On (Oxygen Delivery Method): room air    
Vital Signs Last 24 Hrs  T(C): 37.2 (09 Jan 2024 06:16), Max: 37.2 (09 Jan 2024 06:16)  T(F): 98.9 (09 Jan 2024 06:16), Max: 98.9 (09 Jan 2024 06:16)  HR: 106 (09 Jan 2024 06:16) (86 - 106)  BP: 117/74 (09 Jan 2024 06:16) (108/68 - 117/74)  BP(mean): 82 (09 Jan 2024 06:16) (82 - 82)  RR: 17 (09 Jan 2024 06:16) (16 - 17)  SpO2: 100% (09 Jan 2024 06:16) (100% - 100%)    Parameters below as of 09 Jan 2024 06:16  Patient On (Oxygen Delivery Method): room air    
Vital Signs Last 24 Hrs  T(C): 36.8 (11 Jan 2024 10:05), Max: 37.3 (10 Junior 2024 20:01)  T(F): 98.2 (11 Jan 2024 10:05), Max: 99.1 (10 Junior 2024 20:01)  HR: 69 (11 Jan 2024 10:05) (69 - 83)  BP: 101/80 (11 Jan 2024 10:05) (100/66 - 133/77)  BP(mean): --  RR: 18 (11 Jan 2024 10:05) (17 - 18)  SpO2: 96% (11 Jan 2024 10:05) (96% - 98%)    Parameters below as of 11 Jan 2024 10:05  Patient On (Oxygen Delivery Method): room air    
Vital Signs Last 24 Hrs  T(C): 37 (16 Jan 2024 11:03), Max: 37 (15 Junior 2024 19:29)  T(F): 98.6 (16 Jan 2024 11:03), Max: 98.6 (15 Junior 2024 19:29)  HR: 85 (16 Jan 2024 11:03) (70 - 85)  BP: 116/70 (16 Jan 2024 11:03) (100/62 - 123/84)  BP(mean): --  RR: 18 (16 Jan 2024 11:03) (16 - 18)  SpO2: 98% (16 Jan 2024 11:03) (98% - 100%)    Parameters below as of 16 Jan 2024 11:03  Patient On (Oxygen Delivery Method): room air

## 2024-01-16 NOTE — BH CONSULTATION LIAISON PROGRESS NOTE - NSBHCONSFOLLOWNEEDS_PSY_ALL_CORE
Needs further psych safety assessment prior to discharge

## 2024-01-16 NOTE — PROGRESS NOTE ADULT - PROBLEM SELECTOR PROBLEM 6
Seizure disorder
Diabetes mellitus
Seizure disorder

## 2024-01-16 NOTE — PROGRESS NOTE ADULT - PROBLEM SELECTOR PLAN 8
DVT ppx: lovenox    # low back pain: c/w Lidocaine patch, tylenol/ibuprofen prn, Xray lumbar spine
DVT ppx: lovenox
DVT ppx: lovenox    # back pain: add lidocaine patch, tylenol/ibuprofen prn
DVT ppx: lovenox  Dispo: [facility]    # low back pain: c/w Lidocaine patch, tylenol/ibuprofen prn, Xray lumbar spine
DVT ppx: lovenox
DVT ppx: lovenox    # back pain: add lidocaine patch, tylenol/ibuprofen prn
DVT ppx: lovenox

## 2024-01-16 NOTE — BH CONSULTATION LIAISON PROGRESS NOTE - NSBHMSEPERCEPT_PSY_A_CORE
No abnormalities
No abnormalities
Auditory hallucinations
No abnormalities
Auditory hallucinations

## 2024-01-16 NOTE — PROGRESS NOTE ADULT - REASON FOR ADMISSION
+ blood cultures

## 2024-01-16 NOTE — PROGRESS NOTE ADULT - PROBLEM SELECTOR PLAN 5
outpt meds: Clozapine 175mg qhs,  lithium 300mg bid, effexor 150mg qd and Depakote 1g qhs, Haldol dec inj 200mg q4 weeks, last received 12/27/23, next injection is due on 1/24/24.   outpt psych: Dr. Whitaker [phone]    - still reports hearing voices  -adjusting psych medications as above  -appreciate psych recs

## 2024-01-16 NOTE — PROGRESS NOTE ADULT - PROBLEM SELECTOR PROBLEM 2
Positive blood culture

## 2024-01-16 NOTE — BH CONSULTATION LIAISON PROGRESS NOTE - OTHER
Intense recently: to kill herself with knife and to hurt other people (no one specifically), denies today 1/16

## 2024-01-17 ENCOUNTER — INPATIENT (INPATIENT)
Facility: HOSPITAL | Age: 39
LOS: 26 days | Discharge: HOME | End: 2024-02-13
Attending: PSYCHIATRY & NEUROLOGY | Admitting: PSYCHIATRY & NEUROLOGY
Payer: MEDICAID

## 2024-01-17 VITALS — TEMPERATURE: 98 F | HEIGHT: 64 IN | OXYGEN SATURATION: 99 % | RESPIRATION RATE: 18 BRPM | WEIGHT: 259.93 LBS

## 2024-01-17 DIAGNOSIS — F25.9 SCHIZOAFFECTIVE DISORDER, UNSPECIFIED: ICD-10-CM

## 2024-01-17 PROCEDURE — 99223 1ST HOSP IP/OBS HIGH 75: CPT

## 2024-01-17 PROCEDURE — 99222 1ST HOSP IP/OBS MODERATE 55: CPT

## 2024-01-17 RX ORDER — DIVALPROEX SODIUM 500 MG/1
1000 TABLET, DELAYED RELEASE ORAL AT BEDTIME
Refills: 0 | Status: DISCONTINUED | OUTPATIENT
Start: 2024-01-17 | End: 2024-02-05

## 2024-01-17 RX ORDER — OLANZAPINE 15 MG/1
5 TABLET, FILM COATED ORAL ONCE
Refills: 0 | Status: DISCONTINUED | OUTPATIENT
Start: 2024-01-17 | End: 2024-02-13

## 2024-01-17 RX ORDER — CLOZAPINE 150 MG/1
50 TABLET, ORALLY DISINTEGRATING ORAL AT BEDTIME
Refills: 0 | Status: DISCONTINUED | OUTPATIENT
Start: 2024-01-17 | End: 2024-01-18

## 2024-01-17 RX ORDER — INFLUENZA VIRUS VACCINE 15; 15; 15; 15 UG/.5ML; UG/.5ML; UG/.5ML; UG/.5ML
0.5 SUSPENSION INTRAMUSCULAR ONCE
Refills: 0 | Status: COMPLETED | OUTPATIENT
Start: 2024-01-17 | End: 2024-01-17

## 2024-01-17 RX ORDER — OLANZAPINE 15 MG/1
5 TABLET, FILM COATED ORAL EVERY 4 HOURS
Refills: 0 | Status: DISCONTINUED | OUTPATIENT
Start: 2024-01-17 | End: 2024-02-13

## 2024-01-17 RX ADMIN — LITHIUM CARBONATE 300 MILLIGRAM(S): 300 TABLET, EXTENDED RELEASE ORAL at 10:01

## 2024-01-17 RX ADMIN — OLANZAPINE 15 MILLIGRAM(S): 15 TABLET, FILM COATED ORAL at 20:55

## 2024-01-17 RX ADMIN — Medication 1000 UNIT(S): at 10:00

## 2024-01-17 RX ADMIN — CLOZAPINE 50 MILLIGRAM(S): 150 TABLET, ORALLY DISINTEGRATING ORAL at 20:56

## 2024-01-17 RX ADMIN — Medication 150 MILLIGRAM(S): at 10:15

## 2024-01-17 RX ADMIN — ATORVASTATIN CALCIUM 10 MILLIGRAM(S): 80 TABLET, FILM COATED ORAL at 20:54

## 2024-01-17 RX ADMIN — DIVALPROEX SODIUM 1000 MILLIGRAM(S): 500 TABLET, DELAYED RELEASE ORAL at 20:55

## 2024-01-17 RX ADMIN — LITHIUM CARBONATE 300 MILLIGRAM(S): 300 TABLET, EXTENDED RELEASE ORAL at 20:55

## 2024-01-17 RX ADMIN — Medication 1 MILLIGRAM(S): at 10:00

## 2024-01-17 NOTE — BH PATIENT PROFILE - HOME MEDICATIONS
cholecalciferol oral tablet , 1000 unit(s) orally once a day  folic acid 1 mg oral tablet , 1 tab(s) orally once a day  OLANZapine 15 mg oral tablet , 1 tab(s) orally once a day (at bedtime)  lithium 300 mg oral tablet , 1 tab(s) orally 2 times a day  famotidine 20 mg oral tablet , 1 tab(s) orally once a day  metFORMIN 500 mg oral tablet , 1 tab(s) orally 2 times a day  atorvastatin 10 mg oral tablet , 1 tab(s) orally once a day  Haldol Decanoate 100 mg/mL intramuscular solution , 200 milligram(s) intramuscularly every 4 weeks  Depakote 500 mg oral delayed release tablet , 2 tab(s) orally once a day (at bedtime)  Effexor  mg oral capsule, extended release , 1 cap(s) orally once a day  Metoprolol Succinate ER 25 mg oral tablet, extended release , 0.5 tab(s) orally once a day  Senna 8.6 mg oral tablet , 2 tab(s) orally once a day
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
## 2024-01-17 NOTE — CONSULT NOTE ADULT - SUBJECTIVE AND OBJECTIVE BOX
38-year-old female with past medical history of schizophrenia, seizure disorder, from [facility], HTN, DM?, HLD sent in to [facility] for blood cx growing staph capitis and noted to have Influenza. Repeat blood cultures performed which were all negative. Evaluated by psych for hx of schizophrenia on clozapine and possible acute change in mental status. initially recommended holding clozapine given sedation. She was found to be Flu+ and were treated with course of tamiflu. We monitored clozapine and ANC level and had to hold several doses due to drop in ANC. Unfortunately due to holding clozapine she started to hear voices telling her to kill herself. She was started on olanzapine. Pt transferred to inpt psych [facility]. no events at [facility] overnight - c/o mild HA which she had wh8ile at [facility] bu has improved since admission     Allergies: NKDA    PMHx: schizo, HTN, HLD, ? DM2  Social: denies  FHx: NC as relates to this encounter       ROS:  No DZ  No Vision changes   No CP, SOB  No N/V/D  No Edema  No Rash  NO weakness, numbness    MEDICATIONS  (STANDING):  atorvastatin 10 milliGRAM(s) Oral at bedtime  cholecalciferol 1000 Unit(s) Oral daily  divalproex DR 1000 milliGRAM(s) Oral at bedtime  folic acid 1 milliGRAM(s) Oral daily  influenza   Vaccine 0.5 milliLiter(s) IntraMuscular once  lithium SR (LITHOBID) 300 milliGRAM(s) Oral two times a day  OLANZapine 15 milliGRAM(s) Oral at bedtime  venlafaxine  milliGRAM(s) Oral daily    MEDICATIONS  (PRN):  famotidine    Tablet 20 milliGRAM(s) Oral daily PRN Acid reflux  melatonin. 3 milliGRAM(s) Oral at bedtime PRN Insomnia  OLANZapine 2.5 milliGRAM(s) Oral every 6 hours PRN Agitation secondary to psychosis  OLANZapine Injectable 2.5 milliGRAM(s) IntraMuscular once PRN Severe agitation secondary to psychosis  polyethylene glycol 3350 17 Gram(s) Oral daily PRN Constipation  senna 2 Tablet(s) Oral at bedtime PRN Constipation      T(C): 37.1 (01-17-24 @ 07:30)  HR: 60 (01-17-24 @ 07:30)  BP: 93/49 (01-17-24 @ 07:30)  RR: 18 (01-17-24 @ 00:46)  SpO2: 99% (01-17-24 @ 00:46)  CAPILLARY BLOOD GLUCOSE        I&O's Summary      PHYSICAL EXAM:  GENERAL: NAD  NECK: Supple, No JVD  CHEST/LUNG: Clear to auscultation bilaterally  HEART: Regular rate and rhythm; No murmurs, rubs, or gallops, No Edema  ABDOMEN: Soft, Nontender, Nondistended; Bowel sounds present  EXTREMITIES:  2+ Peripheral Pulses, No clubbing, cyanosis  PSYCH: Flate affect   NEUROLOGY: non-focal  SKIN: No rashes or lesions    LABS:                        10.3   11.33 )-----------( 485      ( 16 Jan 2024 07:10 )             33.7                         RADIOLOGY & ADDITIONAL TESTS:    Imaging Personally Reviewed: CXR - [facility]    Consultant(s) Notes Reviewed:      Care Discussed with Consultants/Other Providers:

## 2024-01-17 NOTE — BH INPATIENT PSYCHIATRY ASSESSMENT NOTE - DETAILS
Family Hx: denies    Social Hx: Living on [facility] x 1.5 years. No contact with mother. Originally lived in [place], [place].
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
## 2024-01-17 NOTE — BH INPATIENT PSYCHIATRY ASSESSMENT NOTE - CURRENT MEDICATION
MEDICATIONS  (STANDING):  atorvastatin 10 milliGRAM(s) Oral at bedtime  cholecalciferol 1000 Unit(s) Oral daily  cloZAPine 50 milliGRAM(s) Oral at bedtime  divalproex ER 1000 milliGRAM(s) Oral at bedtime  folic acid 1 milliGRAM(s) Oral daily  influenza   Vaccine 0.5 milliLiter(s) IntraMuscular once  lithium SR (LITHOBID) 300 milliGRAM(s) Oral two times a day  OLANZapine 15 milliGRAM(s) Oral at bedtime  venlafaxine  milliGRAM(s) Oral daily    MEDICATIONS  (PRN):  famotidine    Tablet 20 milliGRAM(s) Oral daily PRN Acid reflux  melatonin. 3 milliGRAM(s) Oral at bedtime PRN Insomnia  OLANZapine 5 milliGRAM(s) Oral every 4 hours PRN Agitation secondary to psychosis  OLANZapine Injectable 5 milliGRAM(s) IntraMuscular once PRN Severe agitation secondary to psychosis  polyethylene glycol 3350 17 Gram(s) Oral daily PRN Constipation  senna 2 Tablet(s) Oral at bedtime PRN Constipation   MEDICATIONS  (STANDING):  atorvastatin 10 milliGRAM(s) Oral at bedtime  cholecalciferol 1000 Unit(s) Oral daily  cloZAPine 50 milliGRAM(s) Oral at bedtime  divalproex ER 1000 milliGRAM(s) Oral at bedtime  folic acid 1 milliGRAM(s) Oral daily  lithium SR (LITHOBID) 300 milliGRAM(s) Oral two times a day  OLANZapine 15 milliGRAM(s) Oral at bedtime  venlafaxine  milliGRAM(s) Oral daily    MEDICATIONS  (PRN):  famotidine    Tablet 20 milliGRAM(s) Oral daily PRN Acid reflux  melatonin. 3 milliGRAM(s) Oral at bedtime PRN Insomnia  OLANZapine 5 milliGRAM(s) Oral every 4 hours PRN Agitation secondary to psychosis  OLANZapine Injectable 5 milliGRAM(s) IntraMuscular once PRN Severe agitation secondary to psychosis  polyethylene glycol 3350 17 Gram(s) Oral daily PRN Constipation  senna 2 Tablet(s) Oral at bedtime PRN Constipation

## 2024-01-17 NOTE — CONSULT NOTE ADULT - ASSESSMENT
38-year-old female with past medical history of schizophrenia, seizure disorder, from [facility], HTN, DM?, HLD sent in for blood cx growing staph capitis and noted to have of influenza +       Nutritional Assessment:  · Nutritional Assessment	This patient has been assessed with a concern for Malnutrition and has been determined to have a diagnosis/diagnoses of Morbid obesity (BMI > 40).    This patient is being managed with:   Safety Tray-    Time/Priority:  Routine  Entered: Jan 14 2024  7:03AM    Diet Regular-  Entered: Jan 7 2024  3:22PM     Problem/Plan - 1:  ·  Problem: Acute encephalopathy.   ·  Plan: resolved   -bilateral tremor known to outpt psych and is chronic  - defer psychotropics to primary team   - no ss of sepsis, work up neg, no neuro deficits        Problem/Plan - 2:  ·  Problem: Positive blood culture.   ·  Plan: -blood cultures positive for staph capitis in 1 bottle. Staph capitis is a common skin nisreen. Given no white count, fevers, suspect more likely represents contamination. Procal neg  - hold on further antibiotics. Low threshold to restart if recurrent fevers or worsening WBC  - repeat blood culture has been neg.     Problem/Plan - 3:  ·  Problem: Dehydration with hypernatremia.   ·  resolved post IVF - encourage po      Problem/Plan - 4:  ·  Problem: Influenza A.   ·  Plan: - completed course of Tamiflu on 1/8  -supportive care.     Problem/Plan - 5:  ·  Problem: Schizophrenia.   ·  Plan: per primary team - monitor QTC on psychotropics      Problem/Plan - 6:  ·  Problem: Seizure disorder.   ·  Plan: -c/w VPA  -seizure precautions.     Problem/Plan - 7:  ·  Problem: Diabetes mellitus.   ·  Plan: a1c 4.8  not on dm meds, FS have been stable           38-year-old female with past medical history of schizophrenia, seizure disorder, from [facility], HTN, DM?, HLD sent in for blood cx growing staph capitis and noted to have of influenza +       Nutritional Assessment:  · Nutritional Assessment	This patient has been assessed with a concern for Malnutrition and has been determined to have a diagnosis/diagnoses of Morbid obesity (BMI > 40).    This patient is being managed with:   Safety Tray-    Time/Priority:  Routine  Entered: Jan 14 2024  7:03AM    Diet Regular-  Entered: Jan 7 2024  3:22PM     Problem/Plan - 1:  ·  Problem: Acute encephalopathy.   ·  Plan: resolved   -bilateral tremor known to outpt psych and is chronic  - defer psychotropics to primary team   - no ss of sepsis, work up neg, no neuro deficits        Problem/Plan - 2:  ·  Problem: Positive blood culture.   ·  Plan: -blood cultures positive for staph capitis in 1 bottle. Staph capitis is a common skin nisreen. Given no white count, fevers, suspect more likely represents contamination. Procal neg  - hold on further antibiotics. Low threshold to restart if recurrent fevers or worsening WBC  - repeat blood culture has been neg.     Problem/Plan - 3:  ·  Problem: Dehydration with hypernatremia.   ·  resolved post IVF - encourage po      Problem/Plan - 4:  ·  Problem: Influenza A.   ·  Plan: - completed course of Tamiflu on 1/8  -supportive care.     Problem/Plan - 5:  ·  Problem: Schizophrenia.   ·  Plan: per primary team - monitor QTC on psychotropics      Problem/Plan - 6:  ·  Problem: Seizure disorder.   ·  Plan: -c/w VPA  -seizure precautions.     Problem/Plan - 7:  ·  Problem: Diabetes mellitus.   ·  Plan: a1c 4.8  not on dm meds, FS have been stable    HTN - tightly controlled off meds - home BB was discontinued at [facility] - monitor off BP meds and trend BP
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
## 2024-01-17 NOTE — BH INPATIENT PSYCHIATRY ASSESSMENT NOTE - NSBHCHARTREVIEWVS_PSY_A_CORE FT
Vital Signs Last 24 Hrs  T(C): 37.1 (01-17-24 @ 07:30), Max: 37.1 (01-17-24 @ 07:30)  T(F): 98.7 (01-17-24 @ 07:30), Max: 98.7 (01-17-24 @ 07:30)  HR: 60 (01-17-24 @ 07:30) (60 - 60)  BP: 93/49 (01-17-24 @ 07:30) (93/49 - 93/49)  BP(mean): --  RR: 18 (01-17-24 @ 00:46) (18 - 18)  SpO2: 99% (01-17-24 @ 00:46) (99% - 99%)    Orthostatic VS  01-17-24 @ 00:46  Lying BP: --/-- HR: --  Sitting BP: 119/79 HR: 82  Standing BP: 121/81 HR: 84  Site: --  Mode: --   Vital Signs Last 24 Hrs  T(C): --  T(F): --  HR: --  BP: 113/82 (01-17-24 @ 20:49) (113/82 - 113/82)  BP(mean): 100 (01-17-24 @ 20:49) (100 - 100)  RR: --  SpO2: --    Orthostatic VS  01-17-24 @ 00:46  Lying BP: --/-- HR: --  Sitting BP: 119/79 HR: 82  Standing BP: 121/81 HR: 84  Site: --  Mode: --

## 2024-01-17 NOTE — BH INPATIENT PSYCHIATRY ASSESSMENT NOTE - RISK ASSESSMENT
Risk Assessment: Immediate risk is minimized by inpatient admission to safe environment with appropriate supervision and limited access to lethal means. Future risk to be minimized by treatment of acute psychotic symptoms, maximizing outpatient supports, providing patient education, discussing emergency procedures, and ensuring close follow-up.    Acute risk factors include: single, active psychosis, active mood episode, acute psychosocial stressors, poor reactivity to stressors, difficulty expressing emotions, unable to care for self secondary to psychiatric illness.     Chronic risk factors for suicide include: h/o prior psychiatric admissions, diagnosis of psychotic d/o, prior suicide attempts, h/o NSSIB, h/o aggression towards family, h/o OOP against pt.    Protective factors include: denies SI/I/P, identifies reasons for living, fear of death/dying due to pain/suffering, future oriented, no active substance use, stable housing, positive therapeutic relationship, engaged in treatment, medication/follow up compliance, help-seeking behaviors.     Based on risk assessment evaluation, patient IS NOT at acute risk of harm to self or others at this time, DOES NOT require 1:1 CO.
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
## 2024-01-17 NOTE — BH INPATIENT PSYCHIATRY ASSESSMENT NOTE - MSE UNSTRUCTURED FT
Appearance: appears stated age, dressed in hospital gown, fair grooming with hypervigilant exophthalmic stare. Behavior: cooperative, intense eye contact, in good behavioral control. Motor: no PMR/PMA. Mild tremor in bilateral hands. Speech: increased latency, slow rate, soft volume. TP: thought blocking, disorganized, concrete. TC: denies current, endorses recent +AH (gibberish), CAH or VH. Denies SI/HI/I/P, no urges for self-harm. No apparent delusions. Mood: "Okay." Affect: Flat, constricted range, mood incongruent. Cognition: alert, oriented to person, place, month and year. Fund of knowledge: intact. Attention/Concentration: intact. Insight: fair. Judgment: fair. Impulse control: fair. Reliability: limited

## 2024-01-18 LAB
CK SERPL-CCNC: 41 U/L — SIGNIFICANT CHANGE UP (ref 25–170)
LITHIUM SERPL-MCNC: 0.5 MMOL/L — LOW (ref 0.6–1.2)
VALPROATE SERPL-MCNC: 37 UG/ML — LOW (ref 50–100)

## 2024-01-18 RX ORDER — LITHIUM CARBONATE 300 MG/1
900 TABLET, EXTENDED RELEASE ORAL AT BEDTIME
Refills: 0 | Status: DISCONTINUED | OUTPATIENT
Start: 2024-01-19 | End: 2024-01-23

## 2024-01-18 RX ORDER — CLOZAPINE 150 MG/1
100 TABLET, ORALLY DISINTEGRATING ORAL AT BEDTIME
Refills: 0 | Status: DISCONTINUED | OUTPATIENT
Start: 2024-01-19 | End: 2024-01-19

## 2024-01-18 RX ORDER — LITHIUM CARBONATE 300 MG/1
450 TABLET, EXTENDED RELEASE ORAL AT BEDTIME
Refills: 0 | Status: DISCONTINUED | OUTPATIENT
Start: 2024-01-18 | End: 2024-01-18

## 2024-01-18 RX ORDER — CLOZAPINE 150 MG/1
75 TABLET, ORALLY DISINTEGRATING ORAL AT BEDTIME
Refills: 0 | Status: DISCONTINUED | OUTPATIENT
Start: 2024-01-18 | End: 2024-01-18

## 2024-01-18 RX ADMIN — LITHIUM CARBONATE 300 MILLIGRAM(S): 300 TABLET, EXTENDED RELEASE ORAL at 08:40

## 2024-01-18 RX ADMIN — LITHIUM CARBONATE 450 MILLIGRAM(S): 300 TABLET, EXTENDED RELEASE ORAL at 20:25

## 2024-01-18 RX ADMIN — Medication 3 MILLIGRAM(S): at 20:25

## 2024-01-18 RX ADMIN — Medication 150 MILLIGRAM(S): at 08:40

## 2024-01-18 RX ADMIN — ATORVASTATIN CALCIUM 10 MILLIGRAM(S): 80 TABLET, FILM COATED ORAL at 20:25

## 2024-01-18 RX ADMIN — DIVALPROEX SODIUM 1000 MILLIGRAM(S): 500 TABLET, DELAYED RELEASE ORAL at 20:25

## 2024-01-18 RX ADMIN — Medication 1 MILLIGRAM(S): at 08:40

## 2024-01-18 RX ADMIN — OLANZAPINE 15 MILLIGRAM(S): 15 TABLET, FILM COATED ORAL at 20:25

## 2024-01-18 RX ADMIN — Medication 1000 UNIT(S): at 08:40

## 2024-01-18 RX ADMIN — CLOZAPINE 75 MILLIGRAM(S): 150 TABLET, ORALLY DISINTEGRATING ORAL at 20:25

## 2024-01-18 NOTE — BH INPATIENT PSYCHIATRY PROGRESS NOTE - NSBHFUPINTERVALHXFT_PSY_A_CORE
RN report received, case reviewed at AM team, pt seen, MSE done.  No acute events o/n.  Remains in quiet room as roommate was disruptive in shared room.  Feels clozapine restart is helping.  Engaging some groups, and feels she enjoys the isolation of the quiet room.  Adequate behavioral control, apathetic, blunted, paucity of ideas and affect.  No new SEs reported or observed.  Reviewed need for levels of lithium+depakote and will attempt troughs in evening tonight, continue clozapine increase.  Pt in agreement with plan.
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
## 2024-01-18 NOTE — BH INPATIENT PSYCHIATRY PROGRESS NOTE - CURRENT MEDICATION
MEDICATIONS  (STANDING):  atorvastatin 10 milliGRAM(s) Oral at bedtime  cholecalciferol 1000 Unit(s) Oral daily  divalproex ER 1000 milliGRAM(s) Oral at bedtime  folic acid 1 milliGRAM(s) Oral daily  lithium CR (ESKALITH-CR) 900 milliGRAM(s) Oral at bedtime  OLANZapine 10 milliGRAM(s) Oral at bedtime  venlafaxine  milliGRAM(s) Oral daily    MEDICATIONS  (PRN):  famotidine    Tablet 20 milliGRAM(s) Oral daily PRN Acid reflux  melatonin. 3 milliGRAM(s) Oral at bedtime PRN Insomnia  OLANZapine 5 milliGRAM(s) Oral every 4 hours PRN Agitation secondary to psychosis  OLANZapine Injectable 5 milliGRAM(s) IntraMuscular once PRN Severe agitation secondary to psychosis  polyethylene glycol 3350 17 Gram(s) Oral daily PRN Constipation  senna 2 Tablet(s) Oral at bedtime PRN Constipation

## 2024-01-19 RX ORDER — CLOZAPINE 150 MG/1
125 TABLET, ORALLY DISINTEGRATING ORAL AT BEDTIME
Refills: 0 | Status: COMPLETED | OUTPATIENT
Start: 2024-01-20 | End: 2024-01-20

## 2024-01-19 RX ORDER — CLOZAPINE 150 MG/1
150 TABLET, ORALLY DISINTEGRATING ORAL AT BEDTIME
Refills: 0 | Status: DISCONTINUED | OUTPATIENT
Start: 2024-01-21 | End: 2024-01-23

## 2024-01-19 RX ORDER — CLOZAPINE 150 MG/1
100 TABLET, ORALLY DISINTEGRATING ORAL AT BEDTIME
Refills: 0 | Status: COMPLETED | OUTPATIENT
Start: 2024-01-19 | End: 2024-01-19

## 2024-01-19 RX ORDER — OLANZAPINE 15 MG/1
10 TABLET, FILM COATED ORAL AT BEDTIME
Refills: 0 | Status: DISCONTINUED | OUTPATIENT
Start: 2024-01-19 | End: 2024-01-23

## 2024-01-19 RX ADMIN — DIVALPROEX SODIUM 1000 MILLIGRAM(S): 500 TABLET, DELAYED RELEASE ORAL at 20:42

## 2024-01-19 RX ADMIN — Medication 150 MILLIGRAM(S): at 08:08

## 2024-01-19 RX ADMIN — LITHIUM CARBONATE 900 MILLIGRAM(S): 300 TABLET, EXTENDED RELEASE ORAL at 20:42

## 2024-01-19 RX ADMIN — Medication 1 MILLIGRAM(S): at 08:08

## 2024-01-19 RX ADMIN — CLOZAPINE 100 MILLIGRAM(S): 150 TABLET, ORALLY DISINTEGRATING ORAL at 20:42

## 2024-01-19 RX ADMIN — ATORVASTATIN CALCIUM 10 MILLIGRAM(S): 80 TABLET, FILM COATED ORAL at 20:41

## 2024-01-19 RX ADMIN — Medication 1000 UNIT(S): at 08:08

## 2024-01-19 RX ADMIN — OLANZAPINE 10 MILLIGRAM(S): 15 TABLET, FILM COATED ORAL at 20:42

## 2024-01-19 NOTE — BH INPATIENT PSYCHIATRY PROGRESS NOTE - CURRENT MEDICATION
MEDICATIONS  (STANDING):  atorvastatin 10 milliGRAM(s) Oral at bedtime  cholecalciferol 1000 Unit(s) Oral daily  cloZAPine 150 milliGRAM(s) Oral at bedtime  divalproex ER 1000 milliGRAM(s) Oral at bedtime  folic acid 1 milliGRAM(s) Oral daily  lithium CR (ESKALITH-CR) 900 milliGRAM(s) Oral at bedtime  OLANZapine 10 milliGRAM(s) Oral at bedtime  venlafaxine  milliGRAM(s) Oral daily    MEDICATIONS  (PRN):  famotidine    Tablet 20 milliGRAM(s) Oral daily PRN Acid reflux  melatonin. 3 milliGRAM(s) Oral at bedtime PRN Insomnia  OLANZapine 5 milliGRAM(s) Oral every 4 hours PRN Agitation secondary to psychosis  OLANZapine Injectable 5 milliGRAM(s) IntraMuscular once PRN Severe agitation secondary to psychosis  polyethylene glycol 3350 17 Gram(s) Oral daily PRN Constipation  senna 2 Tablet(s) Oral at bedtime PRN Constipation
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
## 2024-01-19 NOTE — BH INPATIENT PSYCHIATRY PROGRESS NOTE - NSBHFUPINTERVALHXFT_PSY_A_CORE
RN report received, case reviewed at AM team, pt seen, MSE done.  No acute events o/n.  Remains in quiet room as roommate was disruptive in shared room.  Feels clozapine restart is helping.  Engaging some groups, and feels she enjoys the isolation of the quiet room.  Adequate behavioral control, apathetic, blunted, paucity of ideas and affect but feels better with clozapine reinstated.  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  No new SEs reported or observed.  Reviewed need for levels of lithium+depakote and  troughs in evening THURS show depakote+lithium levels low, will continue to monitor over weekend, continue clozapine increase and increase lithium.  Pt in agreement with plan.

## 2024-01-19 NOTE — BH INPATIENT PSYCHIATRY PROGRESS NOTE - MSE UNSTRUCTURED FT
Appearance: appears stated age, dressed in hospital gown, fair grooming with hypervigilant exophthalmic stare continued, remains most of the day in quiet room. Behavior: cooperative, intense eye contact, in good behavioral control, often sleeping. Motor: no PMR/PMA. Mild tremor in bilateral hands. Speech: increased latency, slow rate, soft volume. TP: thought blocking, disorganized, concrete. TC: denies current, endorses recent +AH (gibberish), CAH or VH. Denies SI/HI/I/P, no urges for self-harm. No apparent delusions. Mood: "Okay." Affect: blunted, constricted range, mood congruent. Cognition: alert, oriented to person, place, month and year. Fund of knowledge: intact. Attention/Concentration: intact. Insight: fair. Judgment: fair. Impulse control: fair. Reliability: limited    
Appearance: appears stated age, dressed in hospital gown, fair grooming with hypervigilant exophthalmic stare continued in quiet room. Behavior: cooperative, intense eye contact, in good behavioral control, often sleeping. Motor: no PMR/PMA. Mild tremor in bilateral hands. Speech: increased latency, slow rate, soft volume. TP: thought blocking, disorganized, concrete. TC: denies current, endorses recent +AH (gibberish), CAH or VH. Denies SI/HI/I/P, no urges for self-harm. No apparent delusions. Mood: "Okay." Affect: blunted, constricted range, mood congruent. Cognition: alert, oriented to person, place, month and year. Fund of knowledge: intact. Attention/Concentration: intact. Insight: fair. Judgment: fair. Impulse control: fair. Reliability: limited

## 2024-01-20 PROCEDURE — 99232 SBSQ HOSP IP/OBS MODERATE 35: CPT

## 2024-01-20 RX ADMIN — Medication 1 MILLIGRAM(S): at 09:09

## 2024-01-20 RX ADMIN — Medication 1000 UNIT(S): at 09:09

## 2024-01-20 RX ADMIN — LITHIUM CARBONATE 900 MILLIGRAM(S): 300 TABLET, EXTENDED RELEASE ORAL at 21:04

## 2024-01-20 RX ADMIN — OLANZAPINE 10 MILLIGRAM(S): 15 TABLET, FILM COATED ORAL at 21:04

## 2024-01-20 RX ADMIN — CLOZAPINE 125 MILLIGRAM(S): 150 TABLET, ORALLY DISINTEGRATING ORAL at 21:06

## 2024-01-20 RX ADMIN — DIVALPROEX SODIUM 1000 MILLIGRAM(S): 500 TABLET, DELAYED RELEASE ORAL at 21:04

## 2024-01-20 RX ADMIN — Medication 150 MILLIGRAM(S): at 09:09

## 2024-01-20 RX ADMIN — ATORVASTATIN CALCIUM 10 MILLIGRAM(S): 80 TABLET, FILM COATED ORAL at 21:04

## 2024-01-20 NOTE — BH INPATIENT PSYCHIATRY PROGRESS NOTE - CURRENT MEDICATION
MEDICATIONS  (STANDING):  atorvastatin 10 milliGRAM(s) Oral at bedtime  cholecalciferol 1000 Unit(s) Oral daily  cloZAPine 125 milliGRAM(s) Oral at bedtime  divalproex ER 1000 milliGRAM(s) Oral at bedtime  folic acid 1 milliGRAM(s) Oral daily  lithium CR (ESKALITH-CR) 900 milliGRAM(s) Oral at bedtime  OLANZapine 10 milliGRAM(s) Oral at bedtime  venlafaxine  milliGRAM(s) Oral daily    MEDICATIONS  (PRN):  famotidine    Tablet 20 milliGRAM(s) Oral daily PRN Acid reflux  melatonin. 3 milliGRAM(s) Oral at bedtime PRN Insomnia  OLANZapine 5 milliGRAM(s) Oral every 4 hours PRN Agitation secondary to psychosis  OLANZapine Injectable 5 milliGRAM(s) IntraMuscular once PRN Severe agitation secondary to psychosis  polyethylene glycol 3350 17 Gram(s) Oral daily PRN Constipation  senna 2 Tablet(s) Oral at bedtime PRN Constipation
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
## 2024-01-20 NOTE — BH INPATIENT PSYCHIATRY PROGRESS NOTE - NSBHFUPINTERVALHXFT_PSY_A_CORE
Pt compliant with medication and tolerating it well.  Chart reviewed, no events reported overnight.  Pt denies SI/HI/I/P or AH/VH or paranoia.  Pt reports eating and sleeping well.  Pt reports she has not heard voices since Thursday.

## 2024-01-21 PROCEDURE — 99232 SBSQ HOSP IP/OBS MODERATE 35: CPT

## 2024-01-21 RX ADMIN — LITHIUM CARBONATE 900 MILLIGRAM(S): 300 TABLET, EXTENDED RELEASE ORAL at 20:33

## 2024-01-21 RX ADMIN — Medication 1000 UNIT(S): at 08:35

## 2024-01-21 RX ADMIN — CLOZAPINE 150 MILLIGRAM(S): 150 TABLET, ORALLY DISINTEGRATING ORAL at 20:33

## 2024-01-21 RX ADMIN — ATORVASTATIN CALCIUM 10 MILLIGRAM(S): 80 TABLET, FILM COATED ORAL at 20:33

## 2024-01-21 RX ADMIN — Medication 1 MILLIGRAM(S): at 08:35

## 2024-01-21 RX ADMIN — DIVALPROEX SODIUM 1000 MILLIGRAM(S): 500 TABLET, DELAYED RELEASE ORAL at 20:33

## 2024-01-21 RX ADMIN — Medication 150 MILLIGRAM(S): at 08:34

## 2024-01-21 RX ADMIN — OLANZAPINE 10 MILLIGRAM(S): 15 TABLET, FILM COATED ORAL at 20:34
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
## 2024-01-21 NOTE — BH INPATIENT PSYCHIATRY PROGRESS NOTE - NSBHFUPINTERVALHXFT_PSY_A_CORE
Pt compliant with medication and tolerating it well.  Chart reviewed, no events reported overnight.  Pt denies SI/HI/I/P or AH/VH or paranoia.  Pt reports eating and sleeping well.  Pt reports feeling relieved that she has not heard voices

## 2024-01-22 LAB — OLANZAPINE SERPL-MCNC: 16.5 NG/ML — SIGNIFICANT CHANGE UP (ref 10–80)

## 2024-01-22 PROCEDURE — 99232 SBSQ HOSP IP/OBS MODERATE 35: CPT

## 2024-01-22 RX ADMIN — Medication 1 MILLIGRAM(S): at 09:23

## 2024-01-22 RX ADMIN — CLOZAPINE 150 MILLIGRAM(S): 150 TABLET, ORALLY DISINTEGRATING ORAL at 20:23

## 2024-01-22 RX ADMIN — Medication 1000 UNIT(S): at 09:23

## 2024-01-22 RX ADMIN — Medication 150 MILLIGRAM(S): at 09:23

## 2024-01-22 RX ADMIN — LITHIUM CARBONATE 900 MILLIGRAM(S): 300 TABLET, EXTENDED RELEASE ORAL at 20:23

## 2024-01-22 RX ADMIN — OLANZAPINE 10 MILLIGRAM(S): 15 TABLET, FILM COATED ORAL at 20:24

## 2024-01-22 RX ADMIN — DIVALPROEX SODIUM 1000 MILLIGRAM(S): 500 TABLET, DELAYED RELEASE ORAL at 20:23

## 2024-01-22 RX ADMIN — ATORVASTATIN CALCIUM 10 MILLIGRAM(S): 80 TABLET, FILM COATED ORAL at 20:24

## 2024-01-22 NOTE — BH INPATIENT PSYCHIATRY PROGRESS NOTE - NSBHFUPINTERVALHXFT_PSY_A_CORE
RN report received, case reviewed at AM team, pt seen, MSE done.  No acute events o/n.  Remains in quiet room as roommate was disruptive in shared room, but now seems more at preference, isolative.  Feels clozapine restart is helping.  Engaging some groups, and feels she enjoys the isolation of the quiet room.  Adequate behavioral control, apathetic, blunted, paucity of ideas and affect but feels better with clozapine reinstated.  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  We discuss how socialization and less isolation could help with this goal.  Reviewed need for levels of lithium+depakote and  troughs in evening THURS show depakote+lithium levels low, will continue to monitor over weekend, continue clozapine increase and increase lithium.  Pt in agreement with plan.  No new SEs reported or observed.

## 2024-01-22 NOTE — BH INPATIENT PSYCHIATRY PROGRESS NOTE - OTHER
prominent alogia concrete, vague, paucity of ideas and affect rosaliaa apathatic, with hypervigilant stare

## 2024-01-22 NOTE — BH INPATIENT PSYCHIATRY PROGRESS NOTE - CURRENT MEDICATION
MEDICATIONS  (STANDING):  atorvastatin 10 milliGRAM(s) Oral at bedtime  cholecalciferol 1000 Unit(s) Oral daily  cloZAPine 150 milliGRAM(s) Oral at bedtime  divalproex ER 1000 milliGRAM(s) Oral at bedtime  folic acid 1 milliGRAM(s) Oral daily  lithium CR (ESKALITH-CR) 900 milliGRAM(s) Oral at bedtime  OLANZapine 10 milliGRAM(s) Oral at bedtime  venlafaxine  milliGRAM(s) Oral daily    MEDICATIONS  (PRN):  melatonin. 3 milliGRAM(s) Oral at bedtime PRN Insomnia  OLANZapine 5 milliGRAM(s) Oral every 4 hours PRN Agitation secondary to psychosis  OLANZapine Injectable 5 milliGRAM(s) IntraMuscular once PRN Severe agitation secondary to psychosis  polyethylene glycol 3350 17 Gram(s) Oral daily PRN Constipation  senna 2 Tablet(s) Oral at bedtime PRN Constipation

## 2024-01-22 NOTE — BH INPATIENT PSYCHIATRY PROGRESS NOTE - NSBHASSESSSUMMFT_PSY_ALL_CORE
Assessment/Plan:   Patient is a 38-year-old woman, living on [place] grounds at [facility], with PPH of schizoaffective disorder; multiple past inpatient admissions (last [place] 2022; [facility] Aug 2021); enrolled in AOT through [place]; follows with outpatient psychiatrist Dr. Whitaker; history of severe psychotic decompensation with CAH when not on meds with h/o aggression towards mother and jumping off 2nd story building; h/o legal issues including mother with OOP against pt; no known substance use and PMH of seizure disorder, HTN, DM, HLD who was BIBEMS activated by facility due to acute influenza infection and lethargy on 1/4/24. She was medically admitted due to blood cultures growing staph capitis, now thought to be contaminant.      [facility] psychiatry was following for medication management of home clozapine dose in the setting of increased sedation, decreasing ANC (1.07), and missed doses. She was discontinued off clozapine due to decrease in ANC whenever clozapine was reintroduced. On 1/11/24, she started reporting CAH to harm herself and others with a knife, though she denied intent. She was started Zyprexa up to 15 mg HS (first dose on 1/16) with some improvement in AH (no further commands, hears mumbling voices). She requires inpatient psych admission due to h/o severe decompensation with violence to self and others when off antipsychotics in the past and ongoing AH that are increased compared to baseline.  On assessment, pt appears internally preoccupied. Denies current AVH, SI or HI. Agrees to come to staff with new or worsening SI/HI. Appropriate for routine observation. Continues medications from [facility].     Plan:   1. Legals: admit on 9.27 status   2. Safety:    - Routine observation, denies SI/HI/I/P on the unit   - PRNs: Zyprexa 2.5 mg PO/IM q6h prn for agitation, may give additional Zyprexa 2.5 mg for refractory agitation (ensure QTc <500); avoid IM/IV Ativan within 1 hour of Zyprexa   3. Psychiatry:   - Continue Zyprexa 15 mg PO qHS (increased by CL from 10 mg on 1/16) for psychotic decompensation in setting of not receiving clozapine;  restart at 50 mg qhs clozapine with labs as per hx and increase 25 mg nightly;  weekly labs until provider collateral gives lab schedule  ANC has been normal for several days   - Continue haldol decanoate 200 mg q4 weeks, last received 12/27/23, next injection is due on 1/24/24   - Continue Lithium 300 mg PO BID for mood stabilization. Lithium level 0.8 on 1/4 and 0.4 on 1/9 and 0.5 on 1/18-- attempt 900 mg qhs as CR  - Continue Effexor  mg PO daily.    4. Group, milieu, individual therapy as appropriate.   5. Medical: no acute medical issues.   - Continue Depakote DR 1000 mg qHS- for seizures. VPA level 85.40 on 1/1 and 49.30 on 1/3   - Atorvastatin 10 mg qHS for HLD   - Vitamin D3 (cholecalciferol) 1000 units qd for Vitamin D deficiency   - Folic acid 1 mg qd for health maintenance   - Famotidine 20 mg qd PRN for acid reflux   - D/c'd on metformin, though A1c 4.8%, defer restarting to primary team  6. Dispo: pending clinical improvement.  Patient continues to require inpatient hospitalization for stabilization and safety.   7. Collaterals: outpatient psychiatrist Dr. Whitaker [phone], outpatient psychologist Dr. Treviño (weekly appt Thurs at 1:30 pm), and care coordinator Meagan Mckeon [phone]

## 2024-01-23 LAB — VIT B1 SERPL-MCNC: SIGNIFICANT CHANGE UP NMOL/L

## 2024-01-23 PROCEDURE — 99233 SBSQ HOSP IP/OBS HIGH 50: CPT

## 2024-01-23 RX ORDER — CLOZAPINE 150 MG/1
175 TABLET, ORALLY DISINTEGRATING ORAL AT BEDTIME
Refills: 0 | Status: DISCONTINUED | OUTPATIENT
Start: 2024-01-23 | End: 2024-01-24

## 2024-01-23 RX ORDER — LITHIUM CARBONATE 300 MG/1
1125 TABLET, EXTENDED RELEASE ORAL AT BEDTIME
Refills: 0 | Status: DISCONTINUED | OUTPATIENT
Start: 2024-01-23 | End: 2024-02-13

## 2024-01-23 RX ORDER — OLANZAPINE 15 MG/1
7.5 TABLET, FILM COATED ORAL AT BEDTIME
Refills: 0 | Status: DISCONTINUED | OUTPATIENT
Start: 2024-01-23 | End: 2024-01-24

## 2024-01-23 RX ADMIN — Medication 150 MILLIGRAM(S): at 09:08

## 2024-01-23 RX ADMIN — ATORVASTATIN CALCIUM 10 MILLIGRAM(S): 80 TABLET, FILM COATED ORAL at 20:18

## 2024-01-23 RX ADMIN — OLANZAPINE 7.5 MILLIGRAM(S): 15 TABLET, FILM COATED ORAL at 20:18

## 2024-01-23 RX ADMIN — LITHIUM CARBONATE 1125 MILLIGRAM(S): 300 TABLET, EXTENDED RELEASE ORAL at 20:18

## 2024-01-23 RX ADMIN — CLOZAPINE 175 MILLIGRAM(S): 150 TABLET, ORALLY DISINTEGRATING ORAL at 20:18

## 2024-01-23 RX ADMIN — Medication 1000 UNIT(S): at 09:08

## 2024-01-23 RX ADMIN — Medication 1 MILLIGRAM(S): at 09:09

## 2024-01-23 RX ADMIN — DIVALPROEX SODIUM 1000 MILLIGRAM(S): 500 TABLET, DELAYED RELEASE ORAL at 20:17

## 2024-01-23 NOTE — BH INPATIENT PSYCHIATRY PROGRESS NOTE - NSBHFUPINTERVALHXFT_PSY_A_CORE
RN report received, case reviewed at AM team, pt seen, MSE done.  No acute events o/n.  Remains in quiet room as roommate was disruptive in shared room, but now seems more at preference, isolative.  Feels clozapine restart is helping and notes AH much attenuated on clozapine restart.  Engaging groups but feels she enjoys the isolation of the quiet room.  Adequate behavioral control, apathetic, blunted, paucity of ideas and affect but feels better with clozapine reinstated.  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  We discussed again how socialization and less isolation could help with this goal.  Reviewed need for levels of lithium+depakote and  troughs in evening THURS show depakote+lithium levels low, will continue to monitor over weekend, continue clozapine increase and increased lithium.  Pt in agreement with plan.  Updated Dr. Whitaker and team in PM via email.  No new SEs reported or observed.

## 2024-01-23 NOTE — BH INPATIENT PSYCHIATRY PROGRESS NOTE - CURRENT MEDICATION
MEDICATIONS  (STANDING):  atorvastatin 10 milliGRAM(s) Oral at bedtime  cholecalciferol 1000 Unit(s) Oral daily  cloZAPine 175 milliGRAM(s) Oral at bedtime  divalproex ER 1000 milliGRAM(s) Oral at bedtime  folic acid 1 milliGRAM(s) Oral daily  lithium CR (ESKALITH-CR) 1125 milliGRAM(s) Oral at bedtime  OLANZapine 7.5 milliGRAM(s) Oral at bedtime  venlafaxine  milliGRAM(s) Oral daily    MEDICATIONS  (PRN):  melatonin. 3 milliGRAM(s) Oral at bedtime PRN Insomnia  OLANZapine 5 milliGRAM(s) Oral every 4 hours PRN Agitation secondary to psychosis  OLANZapine Injectable 5 milliGRAM(s) IntraMuscular once PRN Severe agitation secondary to psychosis  polyethylene glycol 3350 17 Gram(s) Oral daily PRN Constipation  senna 2 Tablet(s) Oral at bedtime PRN Constipation

## 2024-01-23 NOTE — BH INPATIENT PSYCHIATRY PROGRESS NOTE - OTHER
rosaliaa apathetic, with hypervigilant stare, flat prominent alogia concrete, vague, paucity of ideas and affect

## 2024-01-23 NOTE — BH INPATIENT PSYCHIATRY PROGRESS NOTE - NSBHASSESSSUMMFT_PSY_ALL_CORE
Assessment/Plan:   Patient is a 38-year-old woman, living on [place] grounds at [facility], with PPH of schizoaffective disorder; multiple past inpatient admissions (last [place] 2022; [facility] Aug 2021); enrolled in AOT through [place]; follows with outpatient psychiatrist Dr. Whitaker; history of severe psychotic decompensation with CAH when not on meds with h/o aggression towards mother and jumping off 2nd story building; h/o legal issues including mother with OOP against pt; no known substance use and PMH of seizure disorder, HTN, DM, HLD who was BIBEMS activated by facility due to acute influenza infection and lethargy on 1/4/24. She was medically admitted due to blood cultures growing staph capitis, now thought to be contaminant.      [facility] psychiatry was following for medication management of home clozapine dose in the setting of increased sedation, decreasing ANC (1.07), and missed doses. She was discontinued off clozapine due to decrease in ANC whenever clozapine was reintroduced. On 1/11/24, she started reporting CAH to harm herself and others with a knife, though she denied intent. She was started Zyprexa up to 15 mg HS (first dose on 1/16) with some improvement in AH (no further commands, hears mumbling voices). She requires inpatient psych admission due to h/o severe decompensation with violence to self and others when off antipsychotics in the past and ongoing AH that are increased compared to baseline.  On assessment, pt appears internally preoccupied. Denies current AVH, SI or HI. Agrees to come to staff with new or worsening SI/HI. Appropriate for routine observation. Continues medications from [facility].     Plan:   1. Legals: admit on 9.27 status   2. Safety:    - Routine observation, denies SI/HI/I/P on the unit   - PRNs: Zyprexa 2.5 mg PO/IM q6h prn for agitation, may give additional Zyprexa 2.5 mg for refractory agitation (ensure QTc <500); avoid IM/IV Ativan within 1 hour of Zyprexa   3. Psychiatry:   - Continue Zyprexa 15 mg PO qHS (increased by CL from 10 mg on 1/16) for psychotic decompensation in setting of not receiving clozapine; decreased to 10 and now 7.5 mg qhs  restart at 50 mg qhs clozapine with labs as per hx and increase 25 mg nightly; tonight to 175 mg qhs  weekly labs CBC+DIF and clozapine levels  ANC has been normal for several days   - Continue haldol decanoate 200 mg q4 weeks, last received 12/27/23, next injection is due on 1/24/24   - Continue Lithium 300 mg PO BID for mood stabilization. Lithium level 0.8 on 1/4 and 0.4 on 1/9 and 0.5 on 1/18-- now at 900 mg qhs as CR-- increase to 1125 mg qhs levels=0.6  - Continue Effexor  mg PO daily.    4. Group, milieu, individual therapy as appropriate.   5. Medical: no acute medical issues.   - Continue Depakote DR 1000 mg qHS- for seizures. VPA level 85.40 on 1/1 and 49.30 on 1/3   - Atorvastatin 10 mg qHS for HLD   - Vitamin D3 (cholecalciferol) 1000 units qd for Vitamin D deficiency   - Folic acid 1 mg qd for health maintenance   - Famotidine 20 mg qd PRN for acid reflux   - D/c'd on metformin, though A1c 4.8%, defer restarting to primary team  6. Dispo: pending clinical improvement.  Patient continues to require inpatient hospitalization for stabilization and safety.   7. Collaterals: outpatient psychiatrist Dr. Whitaker [phone], outpatient psychologist Dr. Treviño (weekly appt Thurs at 1:30 pm), and care coordinator Meagan Mckeon [phone]

## 2024-01-24 LAB
BASOPHILS # BLD AUTO: 0.03 K/UL — SIGNIFICANT CHANGE UP (ref 0–0.2)
BASOPHILS NFR BLD AUTO: 0.3 % — SIGNIFICANT CHANGE UP (ref 0–2)
CK SERPL-CCNC: 38 U/L — SIGNIFICANT CHANGE UP (ref 25–170)
EOSINOPHIL # BLD AUTO: 0.06 K/UL — SIGNIFICANT CHANGE UP (ref 0–0.5)
EOSINOPHIL NFR BLD AUTO: 0.6 % — SIGNIFICANT CHANGE UP (ref 0–6)
HCT VFR BLD CALC: 37.4 % — SIGNIFICANT CHANGE UP (ref 34.5–45)
HGB BLD-MCNC: 11.4 G/DL — LOW (ref 11.5–15.5)
IANC: 4.61 K/UL — SIGNIFICANT CHANGE UP (ref 1.8–7.4)
IMM GRANULOCYTES NFR BLD AUTO: 0.3 % — SIGNIFICANT CHANGE UP (ref 0–0.9)
LITHIUM SERPL-MCNC: 0.7 MMOL/L — SIGNIFICANT CHANGE UP (ref 0.6–1.2)
LYMPHOCYTES # BLD AUTO: 4.44 K/UL — HIGH (ref 1–3.3)
LYMPHOCYTES # BLD AUTO: 44.2 % — HIGH (ref 13–44)
MCHC RBC-ENTMCNC: 27 PG — SIGNIFICANT CHANGE UP (ref 27–34)
MCHC RBC-ENTMCNC: 30.5 GM/DL — LOW (ref 32–36)
MCV RBC AUTO: 88.4 FL — SIGNIFICANT CHANGE UP (ref 80–100)
MONOCYTES # BLD AUTO: 0.88 K/UL — SIGNIFICANT CHANGE UP (ref 0–0.9)
MONOCYTES NFR BLD AUTO: 8.8 % — SIGNIFICANT CHANGE UP (ref 2–14)
NEUTROPHILS # BLD AUTO: 4.61 K/UL — SIGNIFICANT CHANGE UP (ref 1.8–7.4)
NEUTROPHILS NFR BLD AUTO: 45.8 % — SIGNIFICANT CHANGE UP (ref 43–77)
NRBC # BLD: 0 /100 WBCS — SIGNIFICANT CHANGE UP (ref 0–0)
NRBC # FLD: 0 K/UL — SIGNIFICANT CHANGE UP (ref 0–0)
PLATELET # BLD AUTO: 253 K/UL — SIGNIFICANT CHANGE UP (ref 150–400)
RBC # BLD: 4.23 M/UL — SIGNIFICANT CHANGE UP (ref 3.8–5.2)
RBC # FLD: 19.9 % — HIGH (ref 10.3–14.5)
WBC # BLD: 10.05 K/UL — SIGNIFICANT CHANGE UP (ref 3.8–10.5)
WBC # FLD AUTO: 10.05 K/UL — SIGNIFICANT CHANGE UP (ref 3.8–10.5)

## 2024-01-24 PROCEDURE — 99232 SBSQ HOSP IP/OBS MODERATE 35: CPT

## 2024-01-24 RX ORDER — OLANZAPINE 15 MG/1
5 TABLET, FILM COATED ORAL AT BEDTIME
Refills: 0 | Status: DISCONTINUED | OUTPATIENT
Start: 2024-01-24 | End: 2024-01-26

## 2024-01-24 RX ORDER — CLOZAPINE 150 MG/1
200 TABLET, ORALLY DISINTEGRATING ORAL AT BEDTIME
Refills: 0 | Status: DISCONTINUED | OUTPATIENT
Start: 2024-01-24 | End: 2024-01-26

## 2024-01-24 RX ADMIN — ATORVASTATIN CALCIUM 10 MILLIGRAM(S): 80 TABLET, FILM COATED ORAL at 20:27

## 2024-01-24 RX ADMIN — Medication 1 MILLIGRAM(S): at 08:43

## 2024-01-24 RX ADMIN — LITHIUM CARBONATE 1125 MILLIGRAM(S): 300 TABLET, EXTENDED RELEASE ORAL at 20:25

## 2024-01-24 RX ADMIN — Medication 150 MILLIGRAM(S): at 08:43

## 2024-01-24 RX ADMIN — DIVALPROEX SODIUM 1000 MILLIGRAM(S): 500 TABLET, DELAYED RELEASE ORAL at 20:27

## 2024-01-24 RX ADMIN — OLANZAPINE 5 MILLIGRAM(S): 15 TABLET, FILM COATED ORAL at 20:27

## 2024-01-24 RX ADMIN — CLOZAPINE 200 MILLIGRAM(S): 150 TABLET, ORALLY DISINTEGRATING ORAL at 20:25

## 2024-01-24 RX ADMIN — Medication 1000 UNIT(S): at 08:43

## 2024-01-24 NOTE — BH INPATIENT PSYCHIATRY PROGRESS NOTE - CURRENT MEDICATION
MEDICATIONS  (STANDING):  atorvastatin 10 milliGRAM(s) Oral at bedtime  cholecalciferol 1000 Unit(s) Oral daily  cloZAPine 200 milliGRAM(s) Oral at bedtime  divalproex ER 1000 milliGRAM(s) Oral at bedtime  folic acid 1 milliGRAM(s) Oral daily  lithium CR (ESKALITH-CR) 1125 milliGRAM(s) Oral at bedtime  OLANZapine 5 milliGRAM(s) Oral at bedtime  venlafaxine  milliGRAM(s) Oral daily    MEDICATIONS  (PRN):  melatonin. 3 milliGRAM(s) Oral at bedtime PRN Insomnia  OLANZapine 5 milliGRAM(s) Oral every 4 hours PRN Agitation secondary to psychosis  OLANZapine Injectable 5 milliGRAM(s) IntraMuscular once PRN Severe agitation secondary to psychosis  polyethylene glycol 3350 17 Gram(s) Oral daily PRN Constipation  senna 2 Tablet(s) Oral at bedtime PRN Constipation

## 2024-01-24 NOTE — BH PSYCHOLOGY - GROUP THERAPY NOTE - NSPSYCHOLGRPCOGPT_PSY_A_CORE FT
Patient attended recovery oriented/acceptance & commitment therapy group. The group started with a brief check in question "Who is someone in your life that you admire?”  reflected back some of the common themes/qualities shared among group members during check-in. Of note, were characteristics like respect, loyalty, compassion, accountability, strength, braveness, and understanding.  facilitated a discussion surrounding the idea of personal values, and asked the group about their own relationship towards values.     facilitated discussion of concepts, encouraged active participation, and supported members providing feedback to peers. Group then engaged in a memory game/matching activity where members would overturn two index cards at a time in order to match two values. The group concluded with a summary of the concepts discussed, and discussion on how many of the qualities attributed to the people we admire in our lives are often values or characteristics we would like to have within ourselves/our own life.

## 2024-01-24 NOTE — BH INPATIENT PSYCHIATRY PROGRESS NOTE - OTHER
prominent alogia concrete, vague, paucity of ideas and affect rosaliaa fair, at times but overall very limited poor, not showering on unit apathetic, with hypervigilant stare, flat fair at times, often under covers but does poke head out to speak with MD

## 2024-01-24 NOTE — BH PSYCHOLOGY - GROUP THERAPY NOTE - NSBHPSYCHOLPARTICIPCOMMENT_PSY_A_CORE FT
Patient was an active participant during group, and appeared mostly alert, oriented and engaged. While patient did not sit in the [place] of group, she participated in check-in question, by sharing with the group that someone in her life that she admires is her mother - for her “bravery and strength.” Patient participated in discussion on values, and engaged in values memory matching game.

## 2024-01-24 NOTE — BH INPATIENT PSYCHIATRY PROGRESS NOTE - NSBHASSESSSUMMFT_PSY_ALL_CORE
Assessment/Plan:   Patient is a 38-year-old woman, living on [place] grounds at [facility], with PPH of schizoaffective disorder; multiple past inpatient admissions (last [place] 2022; [facility] Aug 2021); enrolled in AOT through [place]; follows with outpatient psychiatrist Dr. Whitaker; history of severe psychotic decompensation with CAH when not on meds with h/o aggression towards mother and jumping off 2nd story building; h/o legal issues including mother with OOP against pt; no known substance use and PMH of seizure disorder, HTN, DM, HLD who was BIBEMS activated by facility due to acute influenza infection and lethargy on 1/4/24. She was medically admitted due to blood cultures growing staph capitis, now thought to be contaminant.      [facility] psychiatry was following for medication management of home clozapine dose in the setting of increased sedation, decreasing ANC (1.07), and missed doses. She was discontinued off clozapine due to decrease in ANC whenever clozapine was reintroduced. On 1/11/24, she started reporting CAH to harm herself and others with a knife, though she denied intent. She was started Zyprexa up to 15 mg HS (first dose on 1/16) with some improvement in AH (no further commands, hears mumbling voices). She requires inpatient psych admission due to h/o severe decompensation with violence to self and others when off antipsychotics in the past and ongoing AH that are increased compared to baseline.  On assessment, pt appears internally preoccupied. Denies current AVH, SI or HI. Agrees to come to staff with new or worsening SI/HI. Appropriate for routine observation. Continues medications from [facility].     Plan:   1. Legals: admit on 9.27 status   2. Safety:    - Routine observation, denies SI/HI/I/P on the unit   - PRNs: Zyprexa 2.5 mg PO/IM q6h prn for agitation, may give additional Zyprexa 2.5 mg for refractory agitation (ensure QTc <500); avoid IM/IV Ativan within 1 hour of Zyprexa   3. Psychiatry:   - Continue Zyprexa 15 mg PO qHS (increased by CL from 10 mg on 1/16) for psychotic decompensation in setting of not receiving clozapine; decreased to 10 and now 7.5 mg qhs and tonight 5 mg qhs  restart at 50 mg qhs clozapine with labs as per hx and increase 25 mg nightly; tonight 175 mg qhs increased to 200 qhs  weekly labs CBC+DIF and clozapine levels tonight WED, ANC has been WNL   - Continue haldol decanoate 200 mg q4 weeks, last received 12/27/23, next injection is due on 1/24/24   - Continue Lithium 300 mg PO BID for mood stabilization. Lithium level 0.8 on 1/4 and 0.4 on 1/9 and 0.5 on 1/18-- now at 900 mg qhs as CR-- increase to 1125 mg qhs levels=0.6  - Continue Effexor  mg PO daily.    4. Group, milieu, individual therapy as appropriate.   5. Medical: no acute medical issues.   - Continue Depakote DR 1000 mg qHS- for seizures. VPA level 85.40 on 1/1 and 49.30 on 1/3   - Atorvastatin 10 mg qHS for HLD   - Vitamin D3 (cholecalciferol) 1000 units qd for Vitamin D deficiency   - Folic acid 1 mg qd for health maintenance   - Famotidine 20 mg qd PRN for acid reflux   - D/c'd on metformin, though A1c 4.8%, defer restarting to primary team  6. Dispo: pending clinical improvement.  Patient continues to require inpatient hospitalization for stabilization and safety.   7. Collaterals: outpatient psychiatrist Dr. Whitaker [phone], outpatient psychologist Dr. Treviño (weekly appt Thurs at 1:30 pm), and care coordinator Meagan Mckeon [phone]

## 2024-01-24 NOTE — BH INPATIENT PSYCHIATRY PROGRESS NOTE - NSBHFUPINTERVALHXFT_PSY_A_CORE
RN report received, case reviewed at AM team, pt seen, MSE done.  No acute events o/n.  Remains in quiet room as roommate was disruptive in shared room, but now seems more at preference, isolative.  Feels clozapine restart is helping and notes AH much attenuated on clozapine restart.  Engaging groups but feels she enjoys the isolation of the quiet room.  Adequate behavioral control, apathetic, blunted, paucity of ideas and affect but feels better with clozapine reinstated.  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  We discussed again how socialization and less isolation could help with this goal.  Also more attention to ADLs.  Says she is attempting groups but does not feel comfortable in shower.  Asked pt to seek help from a female RN.  Reviewed need for levels of lithium+depakote and  troughs in evening; prior labs show depakote+lithium levels low, will continue to monitor, continue clozapine increases and increased lithium and monitoring for effect.  Pt in agreement with plan.  Updated Dr. Whitaker and team TUES via email.  No new SEs reported or observed.

## 2024-01-25 LAB — CLOZAPINE SERPL-MCNC: 146 NG/ML — LOW (ref 350–600)

## 2024-01-25 RX ADMIN — LITHIUM CARBONATE 1125 MILLIGRAM(S): 300 TABLET, EXTENDED RELEASE ORAL at 20:26

## 2024-01-25 RX ADMIN — ATORVASTATIN CALCIUM 10 MILLIGRAM(S): 80 TABLET, FILM COATED ORAL at 20:26

## 2024-01-25 RX ADMIN — Medication 1 MILLIGRAM(S): at 08:30

## 2024-01-25 RX ADMIN — Medication 150 MILLIGRAM(S): at 08:30

## 2024-01-25 RX ADMIN — DIVALPROEX SODIUM 1000 MILLIGRAM(S): 500 TABLET, DELAYED RELEASE ORAL at 20:26

## 2024-01-25 RX ADMIN — OLANZAPINE 5 MILLIGRAM(S): 15 TABLET, FILM COATED ORAL at 20:26

## 2024-01-25 RX ADMIN — Medication 1000 UNIT(S): at 08:30

## 2024-01-25 NOTE — BH INPATIENT PSYCHIATRY PROGRESS NOTE - NSBHASSESSSUMMFT_PSY_ALL_CORE
Assessment/Plan:   Patient is a 38-year-old woman, living on [place] grounds at [facility], with PPH of schizoaffective disorder; multiple past inpatient admissions (last [place] 2022; [facility] Aug 2021); enrolled in AOT through [place]; follows with outpatient psychiatrist Dr. Whitaker; history of severe psychotic decompensation with CAH when not on meds with h/o aggression towards mother and jumping off 2nd story building; h/o legal issues including mother with OOP against pt; no known substance use and PMH of seizure disorder, HTN, DM, HLD who was BIBEMS activated by facility due to acute influenza infection and lethargy on 1/4/24. She was medically admitted due to blood cultures growing staph capitis, now thought to be contaminant.      [facility] psychiatry was following for medication management of home clozapine dose in the setting of increased sedation, decreasing ANC (1.07), and missed doses. She was discontinued off clozapine due to decrease in ANC whenever clozapine was reintroduced. On 1/11/24, she started reporting CAH to harm herself and others with a knife, though she denied intent. She was started Zyprexa up to 15 mg HS (first dose on 1/16) with some improvement in AH (no further commands, hears mumbling voices). She requires inpatient psych admission due to h/o severe decompensation with violence to self and others when off antipsychotics in the past and ongoing AH that are increased compared to baseline.  On assessment, pt appears internally preoccupied. Denies current AVH, SI or HI. Agrees to come to staff with new or worsening SI/HI. Appropriate for routine observation. Continues medications from [facility].     Plan:   1. Legals: admit on 9.27 status   2. Safety:    - Routine observation, denies SI/HI/I/P on the unit   - PRNs: Zyprexa 2.5 mg PO/IM q6h prn for agitation, may give additional Zyprexa 2.5 mg for refractory agitation (ensure QTc <500); avoid IM/IV Ativan within 1 hour of Zyprexa   3. Psychiatry:   - Continue Zyprexa 15 mg PO qHS (increased by CL from 10 mg on 1/16) for psychotic decompensation in setting of not receiving clozapine; decreased to 10 and now 7.5 mg qhs and tonight 5 mg qhs  restart at 50 mg qhs clozapine with labs as per hx and increase 25 mg nightly; tonight 175 mg qhs increased to 200 qhs  weekly labs CBC+DIF and clozapine levels tonight WED, ANC has been WNL   - Continue haldol decanoate 200 mg q4 weeks, last received 12/27/23, next injection is due on 1/24/24   - Continue Lithium 300 mg PO BID for mood stabilization. Lithium level 0.8 on 1/4 and 0.4 on 1/9 and 0.5 on 1/18-- now at 900 mg qhs as CR-- increase to 1125 mg qhs levels=0.6  - Continue Effexor  mg PO daily.    4. Group, milieu, individual therapy as appropriate.   5. Medical: no acute medical issues.   - Continue Depakote DR 1000 mg qHS- for seizures. VPA level 85.40 on 1/1 and 49.30 on 1/3   - Atorvastatin 10 mg qHS for HLD   - Vitamin D3 (cholecalciferol) 1000 units qd for Vitamin D deficiency   - Folic acid 1 mg qd for health maintenance   - Famotidine 20 mg qd PRN for acid reflux   - D/c'd on metformin, though A1c 4.8%, defer restarting to primary team  6. Dispo: pending clinical improvement.  Patient continues to require inpatient hospitalization for stabilization and safety.   7. Collaterals: outpatient psychiatrist Dr. Whitaker [phone], outpatient psychologist Dr. Treviño (weekly appt Thurs at 1:30 pm), and care coordinator Meagan Mckeon [phone]

## 2024-01-25 NOTE — BH INPATIENT PSYCHIATRY PROGRESS NOTE - OTHER
apathetic, with hypervigilant stare, flat poor, not showering on unit fair, at times but overall very limited rosaliaa concrete, vague, paucity of ideas and affect prominent alogia fair at times, often under covers but does poke head out to speak with MD morbidly obese with poor self care

## 2024-01-25 NOTE — BH INPATIENT PSYCHIATRY PROGRESS NOTE - NSBHFUPINTERVALHXFT_PSY_A_CORE
RN report received, case reviewed at AM team, pt seen, MSE done.  No acute events o/n.  Remains in quiet room as roommate was disruptive in shared room, but now seems more at preference, isolative.  Feels clozapine restart is helping and notes AH much attenuated on clozapine restart.  Engaging groups but feels she enjoys the isolation of the quiet room and largely remains in that room.  Adequate behavioral control, apathetic, blunted, paucity of ideas and affect but feels better with clozapine reinstated.  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  We discussed again how socialization and less isolation could help with this goal.  Also more attention to ADLs and today encouraged also fresh air break with better weather.  Says she is attempting groups but does not feel comfortable in shower.  Asked pt to seek help from a female RN again today.  Reviewed need for levels of lithium+depakote and  troughs in evening; prior labs show depakote+lithium levels low, will continue to monitor, continue clozapine increases and increased lithium and monitoring for effect.  Pt in agreement with plan but apathy and negative symptoms remain prominent, so will likely add abilify.  Updated Dr. Whitaker and team TUES via email and asked re any abilify hx.  No new SEs reported or observed.

## 2024-01-26 RX ORDER — CHOLECALCIFEROL (VITAMIN D3) 125 MCG
1000 CAPSULE ORAL AT BEDTIME
Refills: 0 | Status: DISCONTINUED | OUTPATIENT
Start: 2024-01-27 | End: 2024-02-05

## 2024-01-26 RX ORDER — ARIPIPRAZOLE 15 MG/1
5 TABLET ORAL AT BEDTIME
Refills: 0 | Status: DISCONTINUED | OUTPATIENT
Start: 2024-01-26 | End: 2024-01-29

## 2024-01-26 RX ORDER — VENLAFAXINE HCL 75 MG
187.5 CAPSULE, EXT RELEASE 24 HR ORAL AT BEDTIME
Refills: 0 | Status: DISCONTINUED | OUTPATIENT
Start: 2024-01-27 | End: 2024-01-31

## 2024-01-26 RX ORDER — CLOZAPINE 150 MG/1
250 TABLET, ORALLY DISINTEGRATING ORAL AT BEDTIME
Refills: 0 | Status: DISCONTINUED | OUTPATIENT
Start: 2024-01-27 | End: 2024-01-29

## 2024-01-26 RX ORDER — CLOZAPINE 150 MG/1
225 TABLET, ORALLY DISINTEGRATING ORAL AT BEDTIME
Refills: 0 | Status: COMPLETED | OUTPATIENT
Start: 2024-01-26 | End: 2024-01-26

## 2024-01-26 RX ADMIN — ARIPIPRAZOLE 5 MILLIGRAM(S): 15 TABLET ORAL at 21:17

## 2024-01-26 RX ADMIN — CLOZAPINE 225 MILLIGRAM(S): 150 TABLET, ORALLY DISINTEGRATING ORAL at 21:17

## 2024-01-26 RX ADMIN — Medication 1000 UNIT(S): at 08:15

## 2024-01-26 RX ADMIN — LITHIUM CARBONATE 1125 MILLIGRAM(S): 300 TABLET, EXTENDED RELEASE ORAL at 20:07

## 2024-01-26 RX ADMIN — DIVALPROEX SODIUM 1000 MILLIGRAM(S): 500 TABLET, DELAYED RELEASE ORAL at 20:07

## 2024-01-26 RX ADMIN — ATORVASTATIN CALCIUM 10 MILLIGRAM(S): 80 TABLET, FILM COATED ORAL at 20:07

## 2024-01-26 RX ADMIN — Medication 1 MILLIGRAM(S): at 08:15

## 2024-01-26 RX ADMIN — Medication 150 MILLIGRAM(S): at 08:15

## 2024-01-26 NOTE — BH INPATIENT PSYCHIATRY PROGRESS NOTE - OTHER
rosaliaa fair, at times but overall very limited morbidly obese with poor self care apathetic, with hypervigilant stare, flat concrete, vague, paucity of ideas and affect fair at times, often under covers but does poke head out to speak with MD prominent alogia poor, not showering on unit compliant with clozapine and feels this med helps her

## 2024-01-26 NOTE — BH SAFETY PLAN - CLINICIAN PAGER OR EMERGENCY CONTACT # 1
There are no preventive care reminders to display for this patient.    Patient is up to date, no discussion needed.             Please see transitions of care

## 2024-01-26 NOTE — BH INPATIENT PSYCHIATRY PROGRESS NOTE - NSBHFUPINTERVALHXFT_PSY_A_CORE
RN report received in case reviewed in a.m. with team at AM rounds, patient seen an MSE done. Patient seen outside of seclusion room which is a positive development as remained mostly in that room during stay to date. However, she is sitting on the periphery in the room in chair facing away from GROUP And not overly engaged in it or the milieu. Team continues to encourage showering, which has  been a major issue while on the unit and apparently also at the residence as per act team with poor ADL's. Self-care is prominent in all aspects of her treatment team continues to encourage engagement and socialization. no new side effects reported or observed. Clozapine uptitration in progress and labs and levels to be sent on WED, weekly. Pt agrees with plan.

## 2024-01-26 NOTE — BH INPATIENT PSYCHIATRY PROGRESS NOTE - NSBHASSESSSUMMFT_PSY_ALL_CORE
Assessment/Plan:   Patient is a 38-year-old woman, living on [place] grounds at [facility], with PPH of schizoaffective disorder; multiple past inpatient admissions (last [facility] 2022; [facility] Aug 2021); enrolled in AOT through [place]; follows with outpatient psychiatrist Dr. Whitaker; history of severe psychotic decompensation with CAH when not on meds with h/o aggression towards mother and jumping off 2nd story building; h/o legal issues including mother with OOP against pt; no known substance use and PMH of seizure disorder, HTN, DM, HLD who was BIBEMS activated by facility due to acute influenza infection and lethargy on 1/4/24. She was medically admitted due to blood cultures growing staph capitis, now thought to be contaminant. [facility] psychiatry was following for medication management of home clozapine dose in the setting of increased sedation, decreasing ANC (1.07), and missed doses. She was discontinued off clozapine due to decrease in ANC whenever clozapine was reintroduced. On 1/11/24, she started reporting CAH to harm herself and others with a knife, though she denied intent. She was started Zyprexa up to 15 mg HS (first dose on 1/16) with some improvement in AH (no further commands, hears mumbling voices). She requires inpatient psych admission due to h/o severe decompensation with violence to self and others when off antipsychotics in the past and ongoing AH that are increased compared to baseline.  On assessment, pt appears internally preoccupied. Denies current AVH, SI or HI. Agrees to come to staff with new or worsening SI/HI. Appropriate for routine observation. Continues medications from [facility].     Plan:   1. Legals: admit on 9.27 status   2. Safety:    - Routine observation, denies SI/HI/I/P on the unit   - PRNs: Zyprexa 2.5 mg PO/IM q6h prn for agitation, may give additional Zyprexa 2.5 mg for refractory agitation (ensure QTc <500); avoid IM/IV Ativan within 1 hour of Zyprexa   3. Psychiatry:   - Continue Zyprexa 15 mg PO qHS (increased by CL from 10 mg on 1/16) for psychotic decompensation in setting of not receiving clozapine; decreased to 10 and now 7.5 mg qhs and tonight 5 mg qhs  restart at 50 mg qhs clozapine with labs as per hx and increase 25 mg nightly; tonight 175 mg qhs increased to 200 qhs then 225 and 250 mg qhs withweekly labs CBC+DIF and clozapine levels WED, ANC has been WNL   - Continue haldol decanoate 200 mg q4 weeks, last received 12/27/23, next injection is due on 1/24/24   - Continue Lithium 300 mg PO BID for mood stabilization. Lithium level 0.8 on 1/4 and 0.4 on 1/9 and 0.5 on 1/18-- now at 900 mg qhs as CR-- increase to 1125 mg qhs levels=0.6  - Continue Effexor  mg PO daily-- increase to 187.5 mg and reschedule as QHS  Start abilify 5 mg qhs for negative symptoms  4. Group, milieu, individual therapy as appropriate.   5. Medical: no acute medical issues.   - Continue Depakote DR 1000 mg qHS- for seizures. VPA level 85.40 on 1/1 and 49.30 on 1/3   - Atorvastatin 10 mg qHS for HLD   - Vitamin D3 (cholecalciferol) 1000 units qd for Vitamin D deficiency   - Folic acid 1 mg qd for health maintenance   - Famotidine 20 mg qd PRN for acid reflux   - D/c'd on metformin, though A1c 4.8%, defer restarting to primary team  6. Dispo: pending clinical improvement.  Patient continues to require inpatient hospitalization for stabilization and safety.   7. Collaterals: outpatient psychiatrist Dr. Whitaker [phone], outpatient psychologist Dr. Treviño (weekly appt [place] at 1:30 pm), and care coordinator Meagan Mckeon [phone]

## 2024-01-26 NOTE — BH INPATIENT PSYCHIATRY PROGRESS NOTE - CURRENT MEDICATION
MEDICATIONS  (STANDING):  ARIPiprazole 5 milliGRAM(s) Oral at bedtime  atorvastatin 10 milliGRAM(s) Oral at bedtime  cholecalciferol 1000 Unit(s) Oral at bedtime  cloZAPine 250 milliGRAM(s) Oral at bedtime  divalproex ER 1000 milliGRAM(s) Oral at bedtime  folic acid 1 milliGRAM(s) Oral daily  lithium CR (ESKALITH-CR) 1125 milliGRAM(s) Oral at bedtime  venlafaxine .5 milliGRAM(s) Oral at bedtime    MEDICATIONS  (PRN):  melatonin. 3 milliGRAM(s) Oral at bedtime PRN Insomnia  OLANZapine 5 milliGRAM(s) Oral every 4 hours PRN Agitation secondary to psychosis  OLANZapine Injectable 5 milliGRAM(s) IntraMuscular once PRN Severe agitation secondary to psychosis  polyethylene glycol 3350 17 Gram(s) Oral daily PRN Constipation  senna 2 Tablet(s) Oral at bedtime PRN Constipation

## 2024-01-27 RX ADMIN — DIVALPROEX SODIUM 1000 MILLIGRAM(S): 500 TABLET, DELAYED RELEASE ORAL at 21:05

## 2024-01-27 RX ADMIN — Medication 1000 UNIT(S): at 21:05

## 2024-01-27 RX ADMIN — ARIPIPRAZOLE 5 MILLIGRAM(S): 15 TABLET ORAL at 21:05

## 2024-01-27 RX ADMIN — LITHIUM CARBONATE 1125 MILLIGRAM(S): 300 TABLET, EXTENDED RELEASE ORAL at 21:05

## 2024-01-27 RX ADMIN — CLOZAPINE 250 MILLIGRAM(S): 150 TABLET, ORALLY DISINTEGRATING ORAL at 21:04

## 2024-01-27 RX ADMIN — ATORVASTATIN CALCIUM 10 MILLIGRAM(S): 80 TABLET, FILM COATED ORAL at 21:05

## 2024-01-27 RX ADMIN — Medication 187.5 MILLIGRAM(S): at 21:05

## 2024-01-28 RX ADMIN — ATORVASTATIN CALCIUM 10 MILLIGRAM(S): 80 TABLET, FILM COATED ORAL at 20:46

## 2024-01-28 RX ADMIN — CLOZAPINE 250 MILLIGRAM(S): 150 TABLET, ORALLY DISINTEGRATING ORAL at 20:46

## 2024-01-28 RX ADMIN — Medication 1 MILLIGRAM(S): at 09:05

## 2024-01-28 RX ADMIN — DIVALPROEX SODIUM 1000 MILLIGRAM(S): 500 TABLET, DELAYED RELEASE ORAL at 20:45

## 2024-01-28 RX ADMIN — ARIPIPRAZOLE 5 MILLIGRAM(S): 15 TABLET ORAL at 20:47

## 2024-01-28 RX ADMIN — Medication 1000 UNIT(S): at 20:47

## 2024-01-28 RX ADMIN — LITHIUM CARBONATE 1125 MILLIGRAM(S): 300 TABLET, EXTENDED RELEASE ORAL at 20:45

## 2024-01-28 RX ADMIN — Medication 187.5 MILLIGRAM(S): at 20:46

## 2024-01-29 PROCEDURE — 99233 SBSQ HOSP IP/OBS HIGH 50: CPT

## 2024-01-29 RX ORDER — ARIPIPRAZOLE 15 MG/1
10 TABLET ORAL AT BEDTIME
Refills: 0 | Status: DISCONTINUED | OUTPATIENT
Start: 2024-01-29 | End: 2024-02-02

## 2024-01-29 RX ORDER — SENNA PLUS 8.6 MG/1
2 TABLET ORAL AT BEDTIME
Refills: 0 | Status: DISCONTINUED | OUTPATIENT
Start: 2024-01-29 | End: 2024-02-05

## 2024-01-29 RX ORDER — FOLIC ACID 0.8 MG
1 TABLET ORAL AT BEDTIME
Refills: 0 | Status: DISCONTINUED | OUTPATIENT
Start: 2024-01-30 | End: 2024-02-05

## 2024-01-29 RX ORDER — CLOZAPINE 150 MG/1
275 TABLET, ORALLY DISINTEGRATING ORAL AT BEDTIME
Refills: 0 | Status: DISCONTINUED | OUTPATIENT
Start: 2024-01-29 | End: 2024-01-30

## 2024-01-29 RX ADMIN — ARIPIPRAZOLE 10 MILLIGRAM(S): 15 TABLET ORAL at 20:29

## 2024-01-29 RX ADMIN — CLOZAPINE 275 MILLIGRAM(S): 150 TABLET, ORALLY DISINTEGRATING ORAL at 20:28

## 2024-01-29 RX ADMIN — ATORVASTATIN CALCIUM 10 MILLIGRAM(S): 80 TABLET, FILM COATED ORAL at 20:29

## 2024-01-29 RX ADMIN — DIVALPROEX SODIUM 1000 MILLIGRAM(S): 500 TABLET, DELAYED RELEASE ORAL at 20:28

## 2024-01-29 RX ADMIN — Medication 1000 UNIT(S): at 20:29

## 2024-01-29 RX ADMIN — LITHIUM CARBONATE 1125 MILLIGRAM(S): 300 TABLET, EXTENDED RELEASE ORAL at 20:28

## 2024-01-29 RX ADMIN — SENNA PLUS 2 TABLET(S): 8.6 TABLET ORAL at 20:29

## 2024-01-29 RX ADMIN — Medication 187.5 MILLIGRAM(S): at 20:28

## 2024-01-29 RX ADMIN — Medication 1 MILLIGRAM(S): at 08:51

## 2024-01-29 NOTE — BH INPATIENT PSYCHIATRY PROGRESS NOTE - OTHER
rosaliaa fair at times, often under covers but does poke head out to speak with MD, but today seen in PM in COG REM group, and improvement apathetic, with hypervigilant stare, flat poor, not showering on unit prominent alogia compliant with clozapine and feels this med helps her morbidly obese with poor self care concrete, vague, paucity of ideas and affect fair, at times but overall very limited

## 2024-01-29 NOTE — DIETITIAN INITIAL EVALUATION ADULT - PERSON TAUGHT/METHOD
Provided verbal education te Pt re: Healthy eating and weight management./verbal instruction/patient instructed

## 2024-01-29 NOTE — BH INPATIENT PSYCHIATRY PROGRESS NOTE - NSBHASSESSSUMMFT_PSY_ALL_CORE
Assessment/Plan:   Patient is a 38-year-old woman, living on [place] grounds at [facility], with PPH of schizoaffective disorder; multiple past inpatient admissions (last [place] 2022; [facility] Aug 2021); enrolled in AOT through [place]; follows with outpatient psychiatrist Dr. Whitaker; history of severe psychotic decompensation with CAH when not on meds with h/o aggression towards mother and jumping off 2nd story building; h/o legal issues including mother with OOP against pt; no known substance use and PMH of seizure disorder, HTN, DM, HLD who was BIBEMS activated by facility due to acute influenza infection and lethargy on 1/4/24. She was medically admitted due to blood cultures growing staph capitis, now thought to be contaminant. [facility] psychiatry was following for medication management of home clozapine dose in the setting of increased sedation, decreasing ANC (1.07), and missed doses. She was discontinued off clozapine due to decrease in ANC whenever clozapine was reintroduced. On 1/11/24, she started reporting CAH to harm herself and others with a knife, though she denied intent. She was started Zyprexa up to 15 mg HS (first dose on 1/16) with some improvement in AH (no further commands, hears mumbling voices). She requires inpatient psych admission due to h/o severe decompensation with violence to self and others when off antipsychotics in the past and ongoing AH that are increased compared to baseline.  On assessment, pt appears internally preoccupied. Denies current AVH, SI or HI. Agrees to come to staff with new or worsening SI/HI. Appropriate for routine observation. Continues medications from [facility].     Plan:   1. Legals: admit on 9.27 status   2. Safety:    - Routine observation, denies SI/HI/I/P on the unit   - PRNs: Zyprexa 2.5 mg PO/IM q6h prn for agitation, may give additional Zyprexa 2.5 mg for refractory agitation (ensure QTc <500); avoid IM/IV Ativan within 1 hour of Zyprexa   3. Psychiatry:   - Continue Zyprexa 15 mg PO qHS (increased by CL from 10 mg on 1/16) for psychotic decompensation in setting of not receiving clozapine; decreased to 10 and now 7.5 mg qhs and tonight 5 mg qhs-- then stop with next clozapine increase  restart at 50 mg qhs clozapine with labs as per hx and increase 25 mg nightly; tonight 175 mg qhs increased to 200 qhs then 225 and 250 mg qhs withweekly labs CBC+DIF and clozapine levels WED, ANC has been WNL-- MON 1/29 275 qhs and TUES 300 mg qhs  - Continue haldol decanoate 200 mg q4 weeks, last received 12/27/23, next injection is due on 1/24/24   - Continue Lithium 300 mg PO BID for mood stabilization. Lithium level 0.8 on 1/4 and 0.4 on 1/9 and 0.5 on 1/18-- now at 900 mg qhs as CR-- increase to 1125 mg qhs levels=0.6  - Continue Effexor  mg PO daily-- increased to 187.5 mg and rescheduled as QHS to aid compliance  Start abilify 5 mg qhs for negative symptoms-- increase to 10 mg qhs MON  4. Group, milieu, individual therapy as appropriate.   5. Medical: no acute medical issues.   - Continue Depshireen DOUGLASS 1000 mg qHS- for seizures. VPA level 85.40 on 1/1 and 49.30 on 1/3   - Atorvastatin 10 mg qHS for HLD   - Vitamin D3 (cholecalciferol) 1000 units qd for Vitamin D deficiency   - Folic acid 1 mg qd for health maintenance   - Famotidine 20 mg qd PRN for acid reflux   - D/c'd on metformin, though A1c 4.8%, defer restarting to primary team  6. Dispo: pending clinical improvement.  Patient continues to require inpatient hospitalization for stabilization and safety.   7. Collaterals: outpatient psychiatrist Dr. Whitaker [phone], outpatient psychologist Dr. Treviño (weekly appt Thurs at 1:30 pm), and care coordinator Meagan Mckeon [phone]

## 2024-01-29 NOTE — DIETITIAN INITIAL EVALUATION ADULT - OTHER INFO
Pt is a 39 y/o female, living in [facility].  PPHx: SAD, h/o aggression. PMHx: Seizure Disorder, HTN, Diabetes?, Hyperlipidemia.  Pt was BIB EMS activated by facility due to acute influenza infection and lethargy on 1/4/24  Pt medically admitted due to blood staph capitis and clozapine had to be held.  Pt started to hear voices telling to kill herself and had to be transferred to [facility] once was medically cleared.  Saw Pt in her room (quiet room) Reports good appetite/po intake at present. No GI distress noted. NKFA.  Provided verbal education re: Healthy eating and weight management. Pt verbalized understanding.   Food preferences explored and implemented. Pt can not recall UBW. NKFA.

## 2024-01-29 NOTE — BH CHART NOTE - NSPSYPRGNOTEFT_PSY_ALL_CORE
Patient engaged in cognitive remediation group intended to improve cognitive deficits and develop compensatory strategies for cognitive difficulties. Group started with orientation to person, time, date, and place. Group then focused on memory functioning. Members were asked to read a list of tasks, and then answer questions about the tasks. Members discussed the tasks as a group to aid in remembering. Members lastly were shown their calendars and asked to check them daily. Patient was actively engaged in group activities.  Patient engaged in 45 minute cognitive remediation group intended to improve cognitive deficits and develop compensatory strategies for cognitive difficulties. Group started with orientation to person, time, date, and place. Group then focused on memory functioning. Members were asked to read a list of tasks, and then answer questions about the tasks. Members discussed the tasks as a group to aid in remembering. Members lastly were shown their calendars and asked to check them daily. Patient was actively engaged in group activities.

## 2024-01-29 NOTE — BH INPATIENT PSYCHIATRY PROGRESS NOTE - NSBHFUPINTERVALHXFT_PSY_A_CORE
RN report received in case reviewed in a.m. with team at AM rounds, patient seen an MSE done. Patient seen outside of seclusion room which is a positive development as remained mostly in that room during stay to date. However, FRI seen sitting on the periphery in the room in chair facing away from GROUP And not overly engaged in it or the milieu. Team continues to encourage showering, which has  been a major issue while on the unit and apparently also at the residence as per act team with poor ADL's. Self-care is prominent in all aspects of her treatment team continues to encourage engagement and socialization. No new side effects reported or observed. But today, seen sitting in COG REM group with psychology team.  Clozapine uptitration in progress and labs and levels to be sent on WED, weekly. Pt agrees with plan.  In summary, slight improvements noted and unclear yet if abilify, added for negative symptoms, accounts for this.

## 2024-01-29 NOTE — BH INPATIENT PSYCHIATRY PROGRESS NOTE - CURRENT MEDICATION
MEDICATIONS  (STANDING):  ARIPiprazole 10 milliGRAM(s) Oral at bedtime  atorvastatin 10 milliGRAM(s) Oral at bedtime  cholecalciferol 1000 Unit(s) Oral at bedtime  cloZAPine 275 milliGRAM(s) Oral at bedtime  divalproex ER 1000 milliGRAM(s) Oral at bedtime  lithium CR (ESKALITH-CR) 1125 milliGRAM(s) Oral at bedtime  senna 2 Tablet(s) Oral at bedtime  venlafaxine .5 milliGRAM(s) Oral at bedtime    MEDICATIONS  (PRN):  OLANZapine 5 milliGRAM(s) Oral every 4 hours PRN Agitation secondary to psychosis  OLANZapine Injectable 5 milliGRAM(s) IntraMuscular once PRN Severe agitation secondary to psychosis  polyethylene glycol 3350 17 Gram(s) Oral daily PRN Constipation

## 2024-01-30 RX ORDER — ATORVASTATIN CALCIUM 80 MG/1
20 TABLET, FILM COATED ORAL AT BEDTIME
Refills: 0 | Status: DISCONTINUED | OUTPATIENT
Start: 2024-01-30 | End: 2024-02-13

## 2024-01-30 RX ORDER — CLOZAPINE 150 MG/1
300 TABLET, ORALLY DISINTEGRATING ORAL AT BEDTIME
Refills: 0 | Status: DISCONTINUED | OUTPATIENT
Start: 2024-01-30 | End: 2024-02-02

## 2024-01-30 RX ADMIN — LITHIUM CARBONATE 1125 MILLIGRAM(S): 300 TABLET, EXTENDED RELEASE ORAL at 20:52

## 2024-01-30 RX ADMIN — Medication 1 MILLIGRAM(S): at 20:53

## 2024-01-30 RX ADMIN — ATORVASTATIN CALCIUM 20 MILLIGRAM(S): 80 TABLET, FILM COATED ORAL at 20:54

## 2024-01-30 RX ADMIN — ARIPIPRAZOLE 10 MILLIGRAM(S): 15 TABLET ORAL at 20:54

## 2024-01-30 RX ADMIN — SENNA PLUS 2 TABLET(S): 8.6 TABLET ORAL at 20:54

## 2024-01-30 RX ADMIN — Medication 187.5 MILLIGRAM(S): at 20:53

## 2024-01-30 RX ADMIN — Medication 1000 UNIT(S): at 20:54

## 2024-01-30 RX ADMIN — DIVALPROEX SODIUM 1000 MILLIGRAM(S): 500 TABLET, DELAYED RELEASE ORAL at 20:53

## 2024-01-30 RX ADMIN — CLOZAPINE 300 MILLIGRAM(S): 150 TABLET, ORALLY DISINTEGRATING ORAL at 20:54

## 2024-01-30 NOTE — BH INPATIENT PSYCHIATRY PROGRESS NOTE - NSBHFUPINTERVALHXFT_PSY_A_CORE
RN report received in case reviewed in a.m. with team at AM rounds, patient seen an MSE done. Patient seen outside of seclusion room which is a positive development as remained mostly in that room during stay to date. However, seen sitting on periphery of a group, and now even in COG REM group and more ambulatory on unit. Trial of abilify in progress for negative symptoms. Team continues to encourage showering, which has  been a major issue while on the unit and apparently also at the residence as per act team with poor ADL's. Self-care is prominent in all aspects of her treatment team continues to encourage engagement and socialization. No new side effects reported or observed.  Clozapine uptitration in progress and labs and levels to be sent on for weekly labs and agrees with plan.  In summary, slight improvements noted and will also increase effexor for mood/anxiety, in parallel.

## 2024-01-30 NOTE — BH INPATIENT PSYCHIATRY PROGRESS NOTE - OTHER
fair at times, often under covers but does poke head out to speak with MD, but today seen in PM in COG REM group, and improvement limited, apathetic, consistent with chronic SCZ, however slightly improved poor, not showering on unit rosaliaa compliant with clozapine and feels this med helps her prominent alogia concrete, vague, paucity of ideas and affect morbidly obese with poor self care apathetic, with hypervigilant stare, flat

## 2024-01-30 NOTE — BH INPATIENT PSYCHIATRY PROGRESS NOTE - CURRENT MEDICATION
MEDICATIONS  (STANDING):  ARIPiprazole 10 milliGRAM(s) Oral at bedtime  atorvastatin 20 milliGRAM(s) Oral at bedtime  cholecalciferol 1000 Unit(s) Oral at bedtime  cloZAPine 300 milliGRAM(s) Oral at bedtime  divalproex ER 1000 milliGRAM(s) Oral at bedtime  folic acid 1 milliGRAM(s) Oral at bedtime  lithium CR (ESKALITH-CR) 1125 milliGRAM(s) Oral at bedtime  senna 2 Tablet(s) Oral at bedtime  venlafaxine  milliGRAM(s) Oral at bedtime    MEDICATIONS  (PRN):  OLANZapine 5 milliGRAM(s) Oral every 4 hours PRN Agitation secondary to psychosis  OLANZapine Injectable 5 milliGRAM(s) IntraMuscular once PRN Severe agitation secondary to psychosis  polyethylene glycol 3350 17 Gram(s) Oral daily PRN Constipation

## 2024-01-30 NOTE — BH INPATIENT PSYCHIATRY PROGRESS NOTE - NSBHASSESSSUMMFT_PSY_ALL_CORE
Assessment/Plan:   Patient is a 38-year-old woman, living on [place] grounds at [facility], with PPH of schizoaffective disorder; multiple past inpatient admissions (last [place] 2022; [facility] Aug 2021); enrolled in AOT through [place]; follows with outpatient psychiatrist Dr. Whitaker; history of severe psychotic decompensation with CAH when not on meds with h/o aggression towards mother and jumping off 2nd story building; h/o legal issues including mother with OOP against pt; no known substance use and PMH of seizure disorder, HTN, DM, HLD who was BIBEMS activated by facility due to acute influenza infection and lethargy on 1/4/24. She was medically admitted due to blood cultures growing staph capitis, now thought to be contaminant. [facility] psychiatry was following for medication management of home clozapine dose in the setting of increased sedation, decreasing ANC (1.07), and missed doses. She was discontinued off clozapine due to decrease in ANC whenever clozapine was reintroduced. On 1/11/24, she started reporting CAH to harm herself and others with a knife, though she denied intent. She was started Zyprexa up to 15 mg HS (first dose on 1/16) with some improvement in AH (no further commands, hears mumbling voices). She requires inpatient psych admission due to h/o severe decompensation with violence to self and others when off antipsychotics in the past and ongoing AH that are increased compared to baseline.  On assessment, pt appears internally preoccupied. Denies current AVH, SI or HI. Agrees to come to staff with new or worsening SI/HI. Appropriate for routine observation. Continues medications from [facility].     Plan:   1. Legals: admit on 9.27 status   2. Safety:    - Routine observation, denies SI/HI/I/P on the unit   - PRNs: Zyprexa 2.5 mg PO/IM q6h prn for agitation, may give additional Zyprexa 2.5 mg for refractory agitation (ensure QTc <500); avoid IM/IV Ativan within 1 hour of Zyprexa   3. Psychiatry:   - Continue Zyprexa 15 mg PO qHS (increased by CL from 10 mg on 1/16) for psychotic decompensation in setting of not receiving clozapine; decreased to 10 and now 7.5 mg qhs and tonight 5 mg qhs-- then stop with next clozapine increase  restart at 50 mg qhs clozapine with labs as per hx and increase 25 mg nightly; tonight 175 mg qhs increased to 200 qhs then 225 and 250 mg qhs withweekly labs CBC+DIF and clozapine levels WED, ANC has been WNL-- MON 1/29 275 qhs and TUES 300 mg qhs  - Continue haldol decanoate 200 mg q4 weeks, last received 12/27/23, next injection is due on 1/24/24   - Continue Lithium 300 mg PO BID for mood stabilization. Lithium level 0.8 on 1/4 and 0.4 on 1/9 and 0.5 on 1/18-- now at 900 mg qhs as CR-- increase to 1125 mg qhs levels=0.6  - Continue Effexor  mg PO daily-- increased to 187.5 mg and rescheduled as QHS to aid compliance-- will increase to 225 mg qhs  Start abilify 5 mg qhs for negative symptoms-- increase to 10 mg qhs MON and THURS 15 mg qhs  4. Group, milieu, individual therapy as appropriate.   5. Medical: no acute medical issues.   - Continue Depakote DR 1000 mg qHS- for seizures. VPA level 85.40 on 1/1 and 49.30 on 1/3   - Atorvastatin 10 mg qHS for HLD   - Vitamin D3 (cholecalciferol) 1000 units qd for Vitamin D deficiency   - Folic acid 1 mg qd for health maintenance   - Famotidine 20 mg qd PRN for acid reflux   - D/c'd on metformin, though A1c 4.8%, defer restarting to primary team  6. Dispo: pending clinical improvement.  Patient continues to require inpatient hospitalization for stabilization and safety.   7. Collaterals: outpatient psychiatrist Dr. Whitaker [phone], outpatient psychologist Dr. Treviño (weekly appt Thurs at 1:30 pm), and care coordinator Meagan Mckeon [phone]

## 2024-01-31 LAB
CLOZAPINE SERPL-MCNC: 201 NG/ML — LOW (ref 350–600)
CLOZAPINE SPEC-SCNC: 258 NG/ML — LOW
NORCLOZAPINE SERPL-MCNC: 57 NG/ML — SIGNIFICANT CHANGE UP

## 2024-01-31 RX ORDER — VENLAFAXINE HCL 75 MG
225 CAPSULE, EXT RELEASE 24 HR ORAL AT BEDTIME
Refills: 0 | Status: DISCONTINUED | OUTPATIENT
Start: 2024-01-31 | End: 2024-02-05

## 2024-01-31 RX ADMIN — ATORVASTATIN CALCIUM 20 MILLIGRAM(S): 80 TABLET, FILM COATED ORAL at 20:55

## 2024-01-31 RX ADMIN — SENNA PLUS 2 TABLET(S): 8.6 TABLET ORAL at 20:55

## 2024-01-31 RX ADMIN — Medication 1000 UNIT(S): at 20:56

## 2024-01-31 RX ADMIN — Medication 225 MILLIGRAM(S): at 20:56

## 2024-01-31 RX ADMIN — LITHIUM CARBONATE 1125 MILLIGRAM(S): 300 TABLET, EXTENDED RELEASE ORAL at 20:56

## 2024-01-31 RX ADMIN — DIVALPROEX SODIUM 1000 MILLIGRAM(S): 500 TABLET, DELAYED RELEASE ORAL at 20:55

## 2024-01-31 RX ADMIN — CLOZAPINE 300 MILLIGRAM(S): 150 TABLET, ORALLY DISINTEGRATING ORAL at 20:56

## 2024-01-31 RX ADMIN — ARIPIPRAZOLE 10 MILLIGRAM(S): 15 TABLET ORAL at 20:55

## 2024-01-31 RX ADMIN — Medication 1 MILLIGRAM(S): at 20:55

## 2024-01-31 NOTE — BH INPATIENT PSYCHIATRY PROGRESS NOTE - NSBHASSESSSUMMFT_PSY_ALL_CORE
Assessment/Plan:   Patient is a 38-year-old woman, living on [place] grounds at [facility], with PPH of schizoaffective disorder; multiple past inpatient admissions (last [place] 2022; [facility] Aug 2021); enrolled in AOT through [place]; follows with outpatient psychiatrist Dr. Whitaker; history of severe psychotic decompensation with CAH when not on meds with h/o aggression towards mother and jumping off 2nd story building; h/o legal issues including mother with OOP against pt; no known substance use and PMH of seizure disorder, HTN, DM, HLD who was BIBEMS activated by facility due to acute influenza infection and lethargy on 1/4/24. She was medically admitted due to blood cultures growing staph capitis, now thought to be contaminant. [facility] psychiatry was following for medication management of home clozapine dose in the setting of increased sedation, decreasing ANC (1.07), and missed doses. She was discontinued off clozapine due to decrease in ANC whenever clozapine was reintroduced. On 1/11/24, she started reporting CAH to harm herself and others with a knife, though she denied intent. She was started Zyprexa up to 15 mg HS (first dose on 1/16) with some improvement in AH (no further commands, hears mumbling voices). She requires inpatient psych admission due to h/o severe decompensation with violence to self and others when off antipsychotics in the past and ongoing AH that are increased compared to baseline.  On assessment, pt appears internally preoccupied. Denies current AVH, SI or HI. Agrees to come to staff with new or worsening SI/HI. Appropriate for routine observation. Continues medications from [facility].     Plan:   1. Legals: admit on 9.27 status   2. Safety:    - Routine observation, denies SI/HI/I/P on the unit   - PRNs: Zyprexa 2.5 mg PO/IM q6h prn for agitation, may give additional Zyprexa 2.5 mg for refractory agitation (ensure QTc <500); avoid IM/IV Ativan within 1 hour of Zyprexa   3. Psychiatry:   - Continue Zyprexa 15 mg PO qHS (increased by CL from 10 mg on 1/16) for psychotic decompensation in setting of not receiving clozapine; decreased to 10 and now 7.5 mg qhs and tonight 5 mg qhs-- then stop with next clozapine increase  restart at 50 mg qhs clozapine with labs as per hx and increase 25 mg nightly; tonight 175 mg qhs increased to 200 qhs then 225 and 250 mg qhs withweekly labs CBC+DIF and clozapine levels WED, ANC has been WNL-- MON 1/29 275 qhs and TUES 300 mg qhs  - Continue haldol decanoate 200 mg q4 weeks, last received 12/27/23, next injection is due on 1/24/24   - Continue Lithium 300 mg PO BID for mood stabilization. Lithium level 0.8 on 1/4 and 0.4 on 1/9 and 0.5 on 1/18-- now at 900 mg qhs as CR-- increase to 1125 mg qhs levels=0.6  - Continue Effexor  mg PO daily-- increased to 187.5 mg and rescheduled as QHS to aid compliance-- increased to 225 mg qhs  Start abilify 5 mg qhs for negative symptoms-- increase to 10 mg qhs MON and FRI 15 mg qhs  4. Group, milieu, individual therapy as appropriate.   5. Medical: no acute medical issues.   - Continue Depakote DR 1000 mg qHS- for seizures. VPA level 85.40 on 1/1 and 49.30 on 1/3   - Atorvastatin 10 mg qHS for HLD   - Vitamin D3 (cholecalciferol) 1000 units qd for Vitamin D deficiency   - Folic acid 1 mg qd for health maintenance   - Famotidine 20 mg qd PRN for acid reflux   - D/c'd on metformin, though A1c 4.8%, defer restarting to primary team  6. Dispo: pending clinical improvement.  Patient continues to require inpatient hospitalization for stabilization and safety.   7. Collaterals: outpatient psychiatrist Dr. Whitaker [phone], outpatient psychologist Dr. Treviño (weekly appt Thurs at 1:30 pm), and care coordinator Meagan Mckeon [phone]

## 2024-01-31 NOTE — BH INPATIENT PSYCHIATRY PROGRESS NOTE - NSBHFUPINTERVALHXFT_PSY_A_CORE
RN report received in case reviewed in a.m. with team at AM rounds, patient seen an MSE done. Patient seen outside of seclusion room which is a positive development as remained mostly in that room during stay to date. However, seen now as more ambulatory on unit, in day area more ofetn, and unable to use seclusion room d/t issues on unit with peers, and now even in COG REM group. Trial of abilify in progress for negative symptoms and showing results. Team continues to encourage showering, which has  been a major issue while on the unit and apparently also at the residence as per act team with poor ADL's. Self-care is prominent in all aspects of her treatment team continues to encourage engagement and socialization. No new side effects reported or observed.  Clozapine uptitration in progress and labs and levels to be sent on for weekly labs and agrees with plan.  In summary, slight improvements noted and will also increase effexor for mood/anxiety, in parallel.

## 2024-01-31 NOTE — BH INPATIENT PSYCHIATRY PROGRESS NOTE - OTHER
apathetic, with hypervigilant stare, flat rosaliaa poor, not showering on unit prominent alogia concrete, vague, paucity of ideas and affect morbidly obese with poor self care limited, apathetic, consistent with chronic SCZ, however slightly improved compliant with clozapine and feels this med helps her fair at times, often under covers but does poke head out to speak with MD, but today seen in PM in COG REM group, and improvement

## 2024-02-01 LAB
BASOPHILS # BLD AUTO: 0.03 K/UL — SIGNIFICANT CHANGE UP (ref 0–0.2)
BASOPHILS NFR BLD AUTO: 0.3 % — SIGNIFICANT CHANGE UP (ref 0–2)
EOSINOPHIL # BLD AUTO: 0.22 K/UL — SIGNIFICANT CHANGE UP (ref 0–0.5)
EOSINOPHIL NFR BLD AUTO: 2.3 % — SIGNIFICANT CHANGE UP (ref 0–6)
HCT VFR BLD CALC: 37.6 % — SIGNIFICANT CHANGE UP (ref 34.5–45)
HGB BLD-MCNC: 11.8 G/DL — SIGNIFICANT CHANGE UP (ref 11.5–15.5)
IANC: 4.45 K/UL — SIGNIFICANT CHANGE UP (ref 1.8–7.4)
IMM GRANULOCYTES NFR BLD AUTO: 0.5 % — SIGNIFICANT CHANGE UP (ref 0–0.9)
LITHIUM SERPL-MCNC: 0.8 MMOL/L — SIGNIFICANT CHANGE UP (ref 0.6–1.2)
LYMPHOCYTES # BLD AUTO: 3.99 K/UL — HIGH (ref 1–3.3)
LYMPHOCYTES # BLD AUTO: 42.4 % — SIGNIFICANT CHANGE UP (ref 13–44)
MCHC RBC-ENTMCNC: 27.5 PG — SIGNIFICANT CHANGE UP (ref 27–34)
MCHC RBC-ENTMCNC: 31.4 GM/DL — LOW (ref 32–36)
MCV RBC AUTO: 87.6 FL — SIGNIFICANT CHANGE UP (ref 80–100)
MONOCYTES # BLD AUTO: 0.66 K/UL — SIGNIFICANT CHANGE UP (ref 0–0.9)
MONOCYTES NFR BLD AUTO: 7 % — SIGNIFICANT CHANGE UP (ref 2–14)
NEUTROPHILS # BLD AUTO: 4.45 K/UL — SIGNIFICANT CHANGE UP (ref 1.8–7.4)
NEUTROPHILS NFR BLD AUTO: 47.5 % — SIGNIFICANT CHANGE UP (ref 43–77)
NRBC # BLD: 0 /100 WBCS — SIGNIFICANT CHANGE UP (ref 0–0)
NRBC # FLD: 0 K/UL — SIGNIFICANT CHANGE UP (ref 0–0)
PLATELET # BLD AUTO: 210 K/UL — SIGNIFICANT CHANGE UP (ref 150–400)
RBC # BLD: 4.29 M/UL — SIGNIFICANT CHANGE UP (ref 3.8–5.2)
RBC # FLD: 20.6 % — HIGH (ref 10.3–14.5)
WBC # BLD: 9.4 K/UL — SIGNIFICANT CHANGE UP (ref 3.8–10.5)
WBC # FLD AUTO: 9.4 K/UL — SIGNIFICANT CHANGE UP (ref 3.8–10.5)

## 2024-02-01 RX ADMIN — Medication 1000 UNIT(S): at 20:27

## 2024-02-01 RX ADMIN — Medication 225 MILLIGRAM(S): at 20:28

## 2024-02-01 RX ADMIN — ARIPIPRAZOLE 10 MILLIGRAM(S): 15 TABLET ORAL at 20:27

## 2024-02-01 RX ADMIN — CLOZAPINE 300 MILLIGRAM(S): 150 TABLET, ORALLY DISINTEGRATING ORAL at 20:27

## 2024-02-01 RX ADMIN — DIVALPROEX SODIUM 1000 MILLIGRAM(S): 500 TABLET, DELAYED RELEASE ORAL at 20:27

## 2024-02-01 RX ADMIN — Medication 1 MILLIGRAM(S): at 20:28

## 2024-02-01 RX ADMIN — LITHIUM CARBONATE 1125 MILLIGRAM(S): 300 TABLET, EXTENDED RELEASE ORAL at 20:27

## 2024-02-01 RX ADMIN — ATORVASTATIN CALCIUM 20 MILLIGRAM(S): 80 TABLET, FILM COATED ORAL at 20:28

## 2024-02-01 RX ADMIN — SENNA PLUS 2 TABLET(S): 8.6 TABLET ORAL at 20:27

## 2024-02-01 NOTE — BH INPATIENT PSYCHIATRY PROGRESS NOTE - NSBHFUPINTERVALHXFT_PSY_A_CORE
RN report received in case reviewed in a.m. with team at AM rounds, patient seen and MSE done. Patient seen outside of seclusion room which is a positive development as remained mostly in that room during stay to date, seen again in milieu today, more affect evident at times. However, seen now as more ambulatory on unit, in day area more often, and unable to use seclusion room d/t issues on unit with peers, and now even in COG REM group. Trial of abilify in progress for negative symptoms and showing results, but effexor also increased. Team continues to encourage showering, which has  been a major issue while on the unit and apparently also at the residence as per act team with poor ADL's. Self-care issues prominent and treatment team continues to encourage engagement and socialization. No new side effects reported or observed.  Clozapine uptitration in progress and labs and levels to be sent tonight and will review in AM and agrees with plan.  We made rough outline of plan for DC by end of next week, assuming sustained improvements.  In summary, slight improvements noted and increased effexor for mood/anxiety, in parallel shows effect.

## 2024-02-01 NOTE — BH INPATIENT PSYCHIATRY PROGRESS NOTE - NSBHASSESSSUMMFT_PSY_ALL_CORE
Assessment/Plan:   Patient is a 38-year-old woman, living on [place] grounds at [facility], with PPH of schizoaffective disorder; multiple past inpatient admissions (last [place] 2022; [facility] Aug 2021); enrolled in AOT through [place]; follows with outpatient psychiatrist Dr. Whitaker; history of severe psychotic decompensation with CAH when not on meds with h/o aggression towards mother and jumping off 2nd story building; h/o legal issues including mother with OOP against pt; no known substance use and PMH of seizure disorder, HTN, DM, HLD who was BIBEMS activated by facility due to acute influenza infection and lethargy on 1/4/24. She was medically admitted due to blood cultures growing staph capitis, now thought to be contaminant. [facility] psychiatry was following for medication management of home clozapine dose in the setting of increased sedation, decreasing ANC (1.07), and missed doses. She was discontinued off clozapine due to decrease in ANC whenever clozapine was reintroduced. On 1/11/24, she started reporting CAH to harm herself and others with a knife, though she denied intent. She was started Zyprexa up to 15 mg HS (first dose on 1/16) with some improvement in AH (no further commands, hears mumbling voices). She requires inpatient psych admission due to h/o severe decompensation with violence to self and others when off antipsychotics in the past and ongoing AH that are increased compared to baseline.  On assessment, pt appears internally preoccupied. Denies current AVH, SI or HI. Agrees to come to staff with new or worsening SI/HI. Appropriate for routine observation. Continues medications from [facility].     Plan:   1. Legals: admit on 9.27 status   2. Safety:    - Routine observation, denies SI/HI/I/P on the unit   - PRNs: Zyprexa 2.5 mg PO/IM q6h prn for agitation, may give additional Zyprexa 2.5 mg for refractory agitation (ensure QTc <500); avoid IM/IV Ativan within 1 hour of Zyprexa   3. Psychiatry:   - Continue Zyprexa 15 mg PO qHS (increased by CL from 10 mg on 1/16) for psychotic decompensation in setting of not receiving clozapine; decreased to 10 and now 7.5 mg qhs and tonight 5 mg qhs-- then stop with next clozapine increase  restart at 50 mg qhs clozapine with labs as per hx and increase 25 mg nightly; tonight 175 mg qhs increased to 200 qhs then 225 and 250 mg qhs withweekly labs CBC+DIF and clozapine levels WED, ANC has been WNL-- MON 1/29 275 qhs and TUES 300 mg qhs  - Continue haldol decanoate 200 mg q4 weeks, last received 12/27/23, next injection is due on 1/24/24   - Continue Lithium 300 mg PO BID for mood stabilization. Lithium level 0.8 on 1/4 and 0.4 on 1/9 and 0.5 on 1/18-- now at 900 mg qhs as CR-- increase to 1125 mg qhs levels=0.6  - Continue Effexor  mg PO daily-- increased to 187.5 mg and rescheduled as QHS to aid compliance-- increased to 225 mg qhs  Start abilify 5 mg qhs for negative symptoms-- increase to 10 mg qhs MON and FRI 15 mg qhs  4. Group, milieu, individual therapy as appropriate.   5. Medical: no acute medical issues.   - Continue Depakote DR 1000 mg qHS- for seizures. VPA level 85.40 on 1/1 and 49.30 on 1/3   - Atorvastatin 10 mg qHS for HLD   - Vitamin D3 (cholecalciferol) 1000 units qd for Vitamin D deficiency   - Folic acid 1 mg qd for health maintenance   - Famotidine 20 mg qd PRN for acid reflux   - D/c'd on metformin, though A1c 4.8%, defer restarting to primary team  6. Dispo: pending clinical improvement.  Patient continues to require inpatient hospitalization for stabilization and safety.   7. Collaterals: outpatient psychiatrist Dr. Whitaker [phone], outpatient psychologist Dr. Treviño (weekly appt Thurs at 1:30 pm), and care coordinator Meagan Mckeon [phone]

## 2024-02-01 NOTE — BH INPATIENT PSYCHIATRY PROGRESS NOTE - OTHER
apathetic, with hypervigilant stare, flat, avoidant fair at times, often under covers but does poke head out to speak with MD, but today seen in PM in COG REM group, and improvement concrete, vague, paucity of ideas and affect rosaliaa morbidly obese with poor self care prominent alogia limited, apathetic, consistent with chronic SCZ, however slightly improved poor, not showering on unit compliant with clozapine and feels this med helps her, compliant but apathetic, but now shows slightly more engagement in G&M

## 2024-02-02 LAB — CLOZAPINE SERPL-MCNC: 352 NG/ML — SIGNIFICANT CHANGE UP (ref 350–600)

## 2024-02-02 RX ORDER — ARIPIPRAZOLE 15 MG/1
15 TABLET ORAL AT BEDTIME
Refills: 0 | Status: DISCONTINUED | OUTPATIENT
Start: 2024-02-02 | End: 2024-02-13

## 2024-02-02 RX ORDER — CLOZAPINE 150 MG/1
350 TABLET, ORALLY DISINTEGRATING ORAL AT BEDTIME
Refills: 0 | Status: DISCONTINUED | OUTPATIENT
Start: 2024-02-03 | End: 2024-02-05

## 2024-02-02 RX ORDER — CLOZAPINE 150 MG/1
325 TABLET, ORALLY DISINTEGRATING ORAL AT BEDTIME
Refills: 0 | Status: COMPLETED | OUTPATIENT
Start: 2024-02-02 | End: 2024-02-02

## 2024-02-02 RX ADMIN — DIVALPROEX SODIUM 1000 MILLIGRAM(S): 500 TABLET, DELAYED RELEASE ORAL at 20:16

## 2024-02-02 RX ADMIN — Medication 225 MILLIGRAM(S): at 20:16

## 2024-02-02 RX ADMIN — Medication 1 MILLIGRAM(S): at 20:16

## 2024-02-02 RX ADMIN — ARIPIPRAZOLE 15 MILLIGRAM(S): 15 TABLET ORAL at 20:16

## 2024-02-02 RX ADMIN — Medication 1000 UNIT(S): at 20:16

## 2024-02-02 RX ADMIN — ATORVASTATIN CALCIUM 20 MILLIGRAM(S): 80 TABLET, FILM COATED ORAL at 20:17

## 2024-02-02 RX ADMIN — CLOZAPINE 325 MILLIGRAM(S): 150 TABLET, ORALLY DISINTEGRATING ORAL at 20:16

## 2024-02-02 RX ADMIN — SENNA PLUS 2 TABLET(S): 8.6 TABLET ORAL at 20:16

## 2024-02-02 RX ADMIN — LITHIUM CARBONATE 1125 MILLIGRAM(S): 300 TABLET, EXTENDED RELEASE ORAL at 20:15

## 2024-02-02 NOTE — BH INPATIENT PSYCHIATRY PROGRESS NOTE - NSBHASSESSSUMMFT_PSY_ALL_CORE
Assessment/Plan:   Patient is a 38-year-old woman, living on [place] grounds at [facility], with PPH of schizoaffective disorder; multiple past inpatient admissions (last [place] 2022; [facility] Aug 2021); enrolled in AOT through [place]; follows with outpatient psychiatrist Dr. Whitaker; history of severe psychotic decompensation with CAH when not on meds with h/o aggression towards mother and jumping off 2nd story building; h/o legal issues including mother with OOP against pt; no known substance use and PMH of seizure disorder, HTN, DM, HLD who was BIBEMS activated by facility due to acute influenza infection and lethargy on 1/4/24. She was medically admitted due to blood cultures growing staph capitis, now thought to be contaminant. [facility] psychiatry was following for medication management of home clozapine dose in the setting of increased sedation, decreasing ANC (1.07), and missed doses. She was discontinued off clozapine due to decrease in ANC whenever clozapine was reintroduced. On 1/11/24, she started reporting CAH to harm herself and others with a knife, though she denied intent. She was started Zyprexa up to 15 mg HS (first dose on 1/16) with some improvement in AH (no further commands, hears mumbling voices). She requires inpatient psych admission due to h/o severe decompensation with violence to self and others when off antipsychotics in the past and ongoing AH that are increased compared to baseline.  On assessment, pt appears internally preoccupied. Denies current AVH, SI or HI. Agrees to come to staff with new or worsening SI/HI. Appropriate for routine observation. Continues medications from [facility].     Plan:   1. Legals: admit on 9.27 status   2. Safety:    - Routine observation, denies SI/HI/I/P on the unit   - PRNs: Zyprexa 2.5 mg PO/IM q6h prn for agitation, may give additional Zyprexa 2.5 mg for refractory agitation (ensure QTc <500); avoid IM/IV Ativan within 1 hour of Zyprexa   3. Psychiatry:   - Continue Zyprexa 15 mg PO qHS (increased by CL from 10 mg on 1/16) for psychotic decompensation in setting of not receiving clozapine; decreased to 10 and now 7.5 mg qhs and tonight 5 mg qhs-- then stop with next clozapine increase  restart at 50 mg qhs clozapine with labs as per hx and increase 25 mg nightly; tonight 175 mg qhs increased to 200 qhs then 225 and 250 then 275 and 300 mg qhs with weekly labs CBC+DIF and clozapine levels WED, ANC has been WNL--  qhs and  mg qhs target based on levels  - Continue haldol decanoate 200 mg q4 weeks, last received 12/27/23, next injection is due on 1/24/24   - Continue Lithium 300 mg PO BID for mood stabilization. Lithium level 0.8 on 1/4 and 0.4 on 1/9 and 0.5 on 1/18-- now at 900 mg qhs as CR-- increased to 1125 mg qhs levels=0.6  - Continue Effexor  mg PO daily-- increased to 187.5 mg and rescheduled as QHS to aid compliance-- increased to 225 mg qhs/consider pristiq  Start abilify 5 mg qhs for negative symptoms-- increase to 10 mg qhs MON and FRI 15 mg qhs  4. Group, milieu, individual therapy as appropriate;  psycn collateral-- updated dr. whitaker via email  5. Medical: no acute medical issues.   - Continue Depakote  1000 mg qHS- for seizures. VPA level 85.40 on 1/1 and 49.30 on 1/3   - Atorvastatin 10 mg qHS for HLD   - Vitamin D3 (cholecalciferol) 1000 units qd for Vitamin D deficiency   - Folic acid 1 mg qd for health maintenance   - Famotidine 20 mg qd PRN for acid reflux   - D/c'd on metformin, though A1c 4.8%, defer restarting to primary team  6. Dispo: pending clinical improvement.  Patient continues to require inpatient hospitalization for stabilization and safety.   7. Collaterals: outpatient psychiatrist Dr. Whitaker [phone], outpatient psychologist Dr. Treviño (weekly appt Thurs at 1:30 pm), and care coordinator Meagan Mckeon [phone]

## 2024-02-02 NOTE — BH INPATIENT PSYCHIATRY PROGRESS NOTE - CURRENT MEDICATION
MEDICATIONS  (STANDING):  ARIPiprazole 15 milliGRAM(s) Oral at bedtime  atorvastatin 20 milliGRAM(s) Oral at bedtime  cholecalciferol 1000 Unit(s) Oral at bedtime  cloZAPine 350 milliGRAM(s) Oral at bedtime  divalproex ER 1000 milliGRAM(s) Oral at bedtime  folic acid 1 milliGRAM(s) Oral at bedtime  lithium CR (ESKALITH-CR) 1125 milliGRAM(s) Oral at bedtime  senna 2 Tablet(s) Oral at bedtime  venlafaxine  milliGRAM(s) Oral at bedtime    MEDICATIONS  (PRN):  OLANZapine 5 milliGRAM(s) Oral every 4 hours PRN Agitation secondary to psychosis  OLANZapine Injectable 5 milliGRAM(s) IntraMuscular once PRN Severe agitation secondary to psychosis  polyethylene glycol 3350 17 Gram(s) Oral daily PRN Constipation

## 2024-02-02 NOTE — BH INPATIENT PSYCHIATRY PROGRESS NOTE - OTHER
apathetic, with hypervigilant stare, flat, avoidant limited, apathetic, consistent with chronic SCZ, however slightly improved rosaliaa prominent alogia fair at times, often under covers but does poke head out to speak with MD, but today seen in PM in COG REM group, and improvement poor, not showering on unit concrete, vague, paucity of ideas and affect morbidly obese with poor self care compliant with clozapine and feels this med helps her, compliant but apathetic, but now shows slightly more engagement in G&M

## 2024-02-02 NOTE — BH INPATIENT PSYCHIATRY PROGRESS NOTE - NSBHFUPINTERVALHXFT_PSY_A_CORE
RN report received in case reviewed in a.m. with team at AM rounds, patient seen and MSE done. Patient seen outside of seclusion room which is a positive development as remained mostly in that room during stay to date, seen again in milieu today, more affect evident at times. However, seen now as more ambulatory on unit, in day area more often, and unable to use seclusion room d/t issues on unit with peers, and now even in COG REM group. Trial of abilify in progress for negative symptoms and showing results, but effexor also increased. Team continues to encourage showering, which has  been a major issue while on the unit and apparently also at the residence as per act team with poor ADL's.  Self-care issues prominent and treatment team continues to encourage engagement and socialization. No new side effects reported or observed.  Clozapine uptitration in progress and labs and levels sent THURS and reviewed, no need to increase lithium further, also can avoid SEs.  We made rough outline of plan for DC by end of next week, assuming sustained improvements.  Briefly discussed how efforts towards socialization will help pt with goal of becoming a .  In summary, slight improvements noted and increase in clozapine+abilify+ effexor for mood/anxiety shows some benefits.  No new SEs reported or observed.

## 2024-02-03 RX ADMIN — ARIPIPRAZOLE 15 MILLIGRAM(S): 15 TABLET ORAL at 20:14

## 2024-02-03 RX ADMIN — Medication 1000 UNIT(S): at 20:13

## 2024-02-03 RX ADMIN — DIVALPROEX SODIUM 1000 MILLIGRAM(S): 500 TABLET, DELAYED RELEASE ORAL at 20:14

## 2024-02-03 RX ADMIN — CLOZAPINE 350 MILLIGRAM(S): 150 TABLET, ORALLY DISINTEGRATING ORAL at 20:13

## 2024-02-03 RX ADMIN — ATORVASTATIN CALCIUM 20 MILLIGRAM(S): 80 TABLET, FILM COATED ORAL at 20:13

## 2024-02-03 RX ADMIN — Medication 225 MILLIGRAM(S): at 20:13

## 2024-02-03 RX ADMIN — Medication 1 MILLIGRAM(S): at 20:12

## 2024-02-03 RX ADMIN — SENNA PLUS 2 TABLET(S): 8.6 TABLET ORAL at 20:13

## 2024-02-03 RX ADMIN — LITHIUM CARBONATE 1125 MILLIGRAM(S): 300 TABLET, EXTENDED RELEASE ORAL at 20:13

## 2024-02-04 RX ADMIN — SENNA PLUS 2 TABLET(S): 8.6 TABLET ORAL at 20:34

## 2024-02-04 RX ADMIN — CLOZAPINE 350 MILLIGRAM(S): 150 TABLET, ORALLY DISINTEGRATING ORAL at 20:35

## 2024-02-04 RX ADMIN — Medication 225 MILLIGRAM(S): at 20:34

## 2024-02-04 RX ADMIN — ATORVASTATIN CALCIUM 20 MILLIGRAM(S): 80 TABLET, FILM COATED ORAL at 20:34

## 2024-02-04 RX ADMIN — DIVALPROEX SODIUM 1000 MILLIGRAM(S): 500 TABLET, DELAYED RELEASE ORAL at 20:34

## 2024-02-04 RX ADMIN — ARIPIPRAZOLE 15 MILLIGRAM(S): 15 TABLET ORAL at 20:34

## 2024-02-04 RX ADMIN — Medication 1 MILLIGRAM(S): at 20:35

## 2024-02-04 RX ADMIN — LITHIUM CARBONATE 1125 MILLIGRAM(S): 300 TABLET, EXTENDED RELEASE ORAL at 20:34

## 2024-02-04 RX ADMIN — Medication 1000 UNIT(S): at 20:35

## 2024-02-05 LAB
CLOZAPINE SERPL-MCNC: 476 NG/ML — SIGNIFICANT CHANGE UP (ref 350–600)
CLOZAPINE SPEC-SCNC: 631 NG/ML — SIGNIFICANT CHANGE UP
NORCLOZAPINE SERPL-MCNC: 155 NG/ML — SIGNIFICANT CHANGE UP

## 2024-02-05 PROCEDURE — 99233 SBSQ HOSP IP/OBS HIGH 50: CPT

## 2024-02-05 PROCEDURE — 90853 GROUP PSYCHOTHERAPY: CPT

## 2024-02-05 RX ORDER — DESVENLAFAXINE 50 MG/1
100 TABLET, EXTENDED RELEASE ORAL AT BEDTIME
Refills: 0 | Status: DISCONTINUED | OUTPATIENT
Start: 2024-02-05 | End: 2024-02-13

## 2024-02-05 RX ORDER — DIVALPROEX SODIUM 500 MG/1
500 TABLET, DELAYED RELEASE ORAL AT BEDTIME
Refills: 0 | Status: DISCONTINUED | OUTPATIENT
Start: 2024-02-05 | End: 2024-02-05

## 2024-02-05 RX ORDER — HALOPERIDOL DECANOATE 100 MG/ML
150 INJECTION INTRAMUSCULAR
Refills: 0 | Status: DISCONTINUED | OUTPATIENT
Start: 2024-02-06 | End: 2024-02-13

## 2024-02-05 RX ORDER — DIVALPROEX SODIUM 500 MG/1
1000 TABLET, DELAYED RELEASE ORAL AT BEDTIME
Refills: 0 | Status: DISCONTINUED | OUTPATIENT
Start: 2024-02-05 | End: 2024-02-13

## 2024-02-05 RX ORDER — METOPROLOL TARTRATE 50 MG
25 TABLET ORAL AT BEDTIME
Refills: 0 | Status: DISCONTINUED | OUTPATIENT
Start: 2024-02-05 | End: 2024-02-07

## 2024-02-05 RX ORDER — CLOZAPINE 150 MG/1
375 TABLET, ORALLY DISINTEGRATING ORAL AT BEDTIME
Refills: 0 | Status: DISCONTINUED | OUTPATIENT
Start: 2024-02-05 | End: 2024-02-06

## 2024-02-05 RX ADMIN — CLOZAPINE 375 MILLIGRAM(S): 150 TABLET, ORALLY DISINTEGRATING ORAL at 20:40

## 2024-02-05 RX ADMIN — DESVENLAFAXINE 100 MILLIGRAM(S): 50 TABLET, EXTENDED RELEASE ORAL at 20:38

## 2024-02-05 RX ADMIN — DIVALPROEX SODIUM 1000 MILLIGRAM(S): 500 TABLET, DELAYED RELEASE ORAL at 20:39

## 2024-02-05 RX ADMIN — ATORVASTATIN CALCIUM 20 MILLIGRAM(S): 80 TABLET, FILM COATED ORAL at 20:42

## 2024-02-05 RX ADMIN — LITHIUM CARBONATE 1125 MILLIGRAM(S): 300 TABLET, EXTENDED RELEASE ORAL at 20:41

## 2024-02-05 RX ADMIN — Medication 25 MILLIGRAM(S): at 20:40

## 2024-02-05 RX ADMIN — ARIPIPRAZOLE 15 MILLIGRAM(S): 15 TABLET ORAL at 20:39

## 2024-02-05 NOTE — BH PSYCHOLOGY - GROUP THERAPY NOTE - NSBHPSYCHOLPARTICIPCOMMENT_PSY_A_CORE FT
Patient was fully engaged in group discussion  Patient was fully engaged in group discussion. She listened and participated in chunking activity and showed brighter affect as session progressed.

## 2024-02-05 NOTE — BH INPATIENT PSYCHIATRY PROGRESS NOTE - NSBHFUPINTERVALHXFT_PSY_A_CORE
RN report received in case reviewed in a.m. with team at AM rounds, patient seen and MSE done. Patient often seen outside of seclusion room which is a positive development as remained mostly in that room during stay to date, but today again in bed, and does not get up for attending but more affect evident at times.  Some fatigue 2/2 clozapine increase over weekend. However, seen now as more ambulatory on unit, in day area more often, and unable to use seclusion room d/t issues on unit with peers, and now even in COG REM group. Trial of abilify in progress for negative symptoms and showing results, but effexor also increased and will complete increase as pristiq. Will also trial lowering of depakote with clozapine increase to reduce polypharmacy and possible weight gain. Team continues to encourage showering, which has  been a major issue while on the unit and apparently also at the residence as per act team with poor ADL's.  Self-care issues prominent and treatment team continues to encourage engagement and socialization. No new side effects reported or observed.  Clozapine uptitration in progress and labs and levels sent THURS and reviewed, no need to increase lithium further, also can avoid SEs.  We made rough outline of plan for DC by end of next week, assuming sustained improvements.  Briefly and ongoing discussed how efforts towards socialization will help pt with goal of becoming a .  In summary, slight improvements noted and increase in clozapine+abilify+ effexor for mood/anxiety shows some benefits.  Some tachycardia noted 2/2 clozapine increase.  No other new SEs reported or observed. RN report received in case reviewed in a.m. with team at AM rounds, patient seen and MSE done. Patient often seen outside of seclusion room which is a positive development as remained mostly in that room during stay to date, but today again in bed, and does not get up for attending but more affect evident at times.  Some fatigue 2/2 clozapine increase over weekend. However, seen now as more ambulatory on unit, in day area more often, and unable to use seclusion room d/t issues on unit with peers, and now even in COG REM group. Trial of abilify in progress for negative symptoms and showing results, but effexor also increased and will complete increase as pristiq. Will not trial lowering of depakote given seizure indication/hx with clozapine increase to reduce polypharmacy and possible weight gain. Team continues to encourage showering, which has  been a major issue while on the unit and apparently also at the residence as per act team with poor ADL's. Haldol DEC past due, will order.  Self-care issues prominent and treatment team continues to encourage engagement and socialization. No new side effects reported or observed.  Clozapine uptitration in progress and labs and levels sent THURS and reviewed, no need to increase lithium further, also can avoid SEs.  We made rough outline of plan for DC by end of next week, assuming sustained improvements.  Briefly and ongoing discussed how efforts towards socialization will help pt with goal of becoming a .  In summary, slight improvements noted and increase in clozapine+abilify+ effexor for mood/anxiety shows some benefits.  Some tachycardia noted 2/2 clozapine increase.  No other new SEs reported or observed.

## 2024-02-05 NOTE — BH INPATIENT PSYCHIATRY PROGRESS NOTE - NSBHASSESSSUMMFT_PSY_ALL_CORE
Assessment/Plan:   Patient is a 38-year-old woman, living on [place] grounds at [facility], with PPH of schizoaffective disorder; multiple past inpatient admissions (last [place] 2022; [facility] Aug 2021); enrolled in AOT through [place]; follows with outpatient psychiatrist Dr. Whitaker; history of severe psychotic decompensation with CAH when not on meds with h/o aggression towards mother and jumping off 2nd story building; h/o legal issues including mother with OOP against pt; no known substance use and PMH of seizure disorder, HTN, DM, HLD who was BIBEMS activated by facility due to acute influenza infection and lethargy on 1/4/24. She was medically admitted due to blood cultures growing staph capitis, now thought to be contaminant. [facility] psychiatry was following for medication management of home clozapine dose in the setting of increased sedation, decreasing ANC (1.07), and missed doses. She was discontinued off clozapine due to decrease in ANC whenever clozapine was reintroduced. On 1/11/24, she started reporting CAH to harm herself and others with a knife, though she denied intent. She was started Zyprexa up to 15 mg HS (first dose on 1/16) with some improvement in AH (no further commands, hears mumbling voices). She requires inpatient psych admission due to h/o severe decompensation with violence to self and others when off antipsychotics in the past and ongoing AH that are increased compared to baseline.  On assessment, pt appears internally preoccupied. Denies current AVH, SI or HI. Agrees to come to staff with new or worsening SI/HI. Appropriate for routine observation. Continues medications from [facility].     Plan:   1. Legals: admit on 9.27 status   2. Safety:    - Routine observation, denies SI/HI/I/P on the unit   - PRNs: Zyprexa 2.5 mg PO/IM q6h prn for agitation, may give additional Zyprexa 2.5 mg for refractory agitation (ensure QTc <500); avoid IM/IV Ativan within 1 hour of Zyprexa   3. Psychiatry:   - Continue Zyprexa 15 mg PO qHS (increased by CL from 10 mg on 1/16) for psychotic decompensation in setting of not receiving clozapine; decreased to 10 and now 7.5 mg qhs and tonight 5 mg qhs-- now stopped  restart at 50 mg qhs clozapine with labs as per hx and increase 25 mg nightly; tonight 175 mg qhs increased to 200 qhs then 225 and 250 then 275 and 300 mg qhs with weekly labs CBC+DIF and clozapine levels WED, ANC has been WNL--  qhs and  mg qhs target based on levels-- 375 mg qhs tonight  - Continue haldol decanoate 200 mg q4 weeks, last received 12/27/23, next injection is due on 1/24/24   - Continue Lithium 300 mg PO BID for mood stabilization. Lithium level 0.8 on 1/4 and 0.4 on 1/9 and 0.5 on 1/18-- now at 900 mg qhs as CR-- increased to 1125 mg qhs levels=0.6  - Continue Effexor  mg PO daily-- increased to 187.5 mg and rescheduled as QHS to aid compliance-- increased to 225 mg qhs/increase to 100 qhs as pristiq  Start abilify 5 mg qhs for negative symptoms-- increase to 10 mg qhs MON and FRI 15 mg qhs  Start metoprolol xl 25 mg qhs for clozapine tachycardia  4. Group, milieu, individual therapy as appropriate;  psycn collateral-- updated dr. whitaker via email  5. Medical: no acute medical issues.   - Continue Depakote DR 1000 mg qHS- for seizures. VPA level 85.40 on 1/1 and 49.30 on 1/3   - Atorvastatin 20 mg qHS for HLD   - Vitamin D3 (cholecalciferol) 1000 units qd for Vitamin D deficiency-stopped  - Folic acid 1 mg qd for health maintenance-stopped  - Famotidine 20 mg qd PRN for acid reflux- stopped   - D/c'd on metformin, though A1c 4.8%, defer restarting to PCP  6. Dispo: pending clinical improvement.  Patient continues to require inpatient hospitalization for stabilization and safety.   7. Collaterals: outpatient psychiatrist Dr. Whitaker [phone], outpatient psychologist Dr. Treviño (weekly appt Thurs at 1:30 pm), and care coordinator Meagan Mckeon [phone]  Assessment/Plan:   Patient is a 38-year-old woman, living on [place] grounds at [facility], with PPH of schizoaffective disorder; multiple past inpatient admissions (last [place] 2022; [facility] Aug 2021); enrolled in AOT through [place]; follows with outpatient psychiatrist Dr. Whitaker; history of severe psychotic decompensation with CAH when not on meds with h/o aggression towards mother and jumping off 2nd story building; h/o legal issues including mother with OOP against pt; no known substance use and PMH of seizure disorder, HTN, DM, HLD who was BIBEMS activated by facility due to acute influenza infection and lethargy on 1/4/24. She was medically admitted due to blood cultures growing staph capitis, now thought to be contaminant. [facility] psychiatry was following for medication management of home clozapine dose in the setting of increased sedation, decreasing ANC (1.07), and missed doses. She was discontinued off clozapine due to decrease in ANC whenever clozapine was reintroduced. On 1/11/24, she started reporting CAH to harm herself and others with a knife, though she denied intent. She was started Zyprexa up to 15 mg HS (first dose on 1/16) with some improvement in AH (no further commands, hears mumbling voices). She requires inpatient psych admission due to h/o severe decompensation with violence to self and others when off antipsychotics in the past and ongoing AH that are increased compared to baseline.  On assessment, pt appears internally preoccupied. Denies current AVH, SI or HI. Agrees to come to staff with new or worsening SI/HI. Appropriate for routine observation. Continues medications from [facility].     Plan:   1. Legals: admit on 9.27 status   2. Safety:    - Routine observation, denies SI/HI/I/P on the unit   - PRNs: Zyprexa 2.5 mg PO/IM q6h prn for agitation, may give additional Zyprexa 2.5 mg for refractory agitation (ensure QTc <500); avoid IM/IV Ativan within 1 hour of Zyprexa   3. Psychiatry:   - Continue Zyprexa 15 mg PO qHS (increased by CL from 10 mg on 1/16) for psychotic decompensation in setting of not receiving clozapine; decreased to 10 and now 7.5 mg qhs and tonight 5 mg qhs-- now stopped  restart at 50 mg qhs clozapine with labs as per hx and increase 25 mg nightly; tonight 175 mg qhs increased to 200 qhs then 225 and 250 then 275 and 300 mg qhs with weekly labs CBC+DIF and clozapine levels WED, ANC has been WNL--  qhs and  mg qhs target based on levels-- 375 mg qhs tonight  - Continue haldol decanoate 200 mg q4 weeks, last received 12/27/23, next injection is due on 1/24/24-- lowered to 150 mg qhs  - Continue Lithium 300 mg PO BID for mood stabilization. Lithium level 0.8 on 1/4 and 0.4 on 1/9 and 0.5 on 1/18-- now at 900 mg qhs as CR-- increased to 1125 mg qhs levels=0.6  - Continue Effexor  mg PO daily-- increased to 187.5 mg and rescheduled as QHS to aid compliance-- increased to 225 mg qhs/increase to 100 qhs as pristiq  Start abilify 5 mg qhs for negative symptoms-- increase to 10 mg qhs MON and FRI 15 mg qhs  Start metoprolol xl 25 mg qhs for clozapine tachycardia  4. Group, milieu, individual therapy as appropriate;  psycn collateral-- updated dr. whitaker via email  5. Medical: no acute medical issues.   - Continue Depakote DR 1000 mg qHS- for seizures. VPA level 85.40 on 1/1 and 49.30 on 1/3   - Atorvastatin 20 mg qHS for HLD   - Vitamin D3 (cholecalciferol) 1000 units qd for Vitamin D deficiency-stopped  - Folic acid 1 mg qd for health maintenance-stopped  - Famotidine 20 mg qd PRN for acid reflux- stopped   - D/c'd on metformin, though A1c 4.8%, defer restarting to PCP  6. Dispo: pending clinical improvement.  Patient continues to require inpatient hospitalization for stabilization and safety.   7. Collaterals: outpatient psychiatrist Dr. Whitaker [phone], outpatient psychologist Dr. Treviño (weekly appt Thurs at 1:30 pm), and care coordinator Meagan Mckeon [phone]

## 2024-02-05 NOTE — BH PSYCHOLOGY - GROUP THERAPY NOTE - NSPSYCHOLGRPCOGPT_PSY_A_CORE FT
Patient engaged in 45 minute cognitive remediation group intended to improve cognitive deficits and develop compensatory strategies for cognitive difficulties. Group started with orientation to person, time, date, and place. Group then focused on planning. Members were asked to sort items from a grocery list into different categories. Members discussed the task as a group and discussed how they sorted the items. Members lastly were shown their calendars and asked to check them daily. Patient was actively engaged in group activities

## 2024-02-05 NOTE — BH INPATIENT PSYCHIATRY PROGRESS NOTE - CURRENT MEDICATION
MEDICATIONS  (STANDING):  ARIPiprazole 15 milliGRAM(s) Oral at bedtime  atorvastatin 20 milliGRAM(s) Oral at bedtime  cholecalciferol 1000 Unit(s) Oral at bedtime  cloZAPine 350 milliGRAM(s) Oral at bedtime  divalproex ER 1000 milliGRAM(s) Oral at bedtime  folic acid 1 milliGRAM(s) Oral at bedtime  lithium CR (ESKALITH-CR) 1125 milliGRAM(s) Oral at bedtime  metoprolol succinate ER 25 milliGRAM(s) Oral at bedtime  senna 2 Tablet(s) Oral at bedtime  venlafaxine  milliGRAM(s) Oral at bedtime    MEDICATIONS  (PRN):  OLANZapine 5 milliGRAM(s) Oral every 4 hours PRN Agitation secondary to psychosis  OLANZapine Injectable 5 milliGRAM(s) IntraMuscular once PRN Severe agitation secondary to psychosis  polyethylene glycol 3350 17 Gram(s) Oral daily PRN Constipation   MEDICATIONS  (STANDING):  ARIPiprazole 15 milliGRAM(s) Oral at bedtime  atorvastatin 20 milliGRAM(s) Oral at bedtime  cloZAPine 375 milliGRAM(s) Oral at bedtime  desvenlafaxine  milliGRAM(s) Oral at bedtime  divalproex ER 1000 milliGRAM(s) Oral at bedtime  lithium CR (ESKALITH-CR) 1125 milliGRAM(s) Oral at bedtime  metoprolol succinate ER 25 milliGRAM(s) Oral at bedtime    MEDICATIONS  (PRN):  OLANZapine 5 milliGRAM(s) Oral every 4 hours PRN Agitation secondary to psychosis  OLANZapine Injectable 5 milliGRAM(s) IntraMuscular once PRN Severe agitation secondary to psychosis  polyethylene glycol 3350 17 Gram(s) Oral daily PRN Constipation

## 2024-02-05 NOTE — BH INPATIENT PSYCHIATRY PROGRESS NOTE - OTHER
paranoia slightly attenuated concrete, vague, paucity of ideas and affect apathetic, with hypervigilant stare, flat, avoidant fair at times, often under covers but does poke head out to speak with MD, but today seen in PM in [facility] group, and improvement limited, apathetic, consistent with chronic SCZ, however slightly improved prominent alogia compliant with clozapine and feels this med helps her, compliant but apathetic, but now shows slightly more engagement in G&M morbidly obese with poor self care, apathetic but slightly attenuated poor, not showering on unit

## 2024-02-06 PROCEDURE — 99232 SBSQ HOSP IP/OBS MODERATE 35: CPT

## 2024-02-06 RX ORDER — CLOZAPINE 150 MG/1
400 TABLET, ORALLY DISINTEGRATING ORAL AT BEDTIME
Refills: 0 | Status: DISCONTINUED | OUTPATIENT
Start: 2024-02-06 | End: 2024-02-13

## 2024-02-06 RX ADMIN — CLOZAPINE 400 MILLIGRAM(S): 150 TABLET, ORALLY DISINTEGRATING ORAL at 20:26

## 2024-02-06 RX ADMIN — Medication 25 MILLIGRAM(S): at 20:26

## 2024-02-06 RX ADMIN — ATORVASTATIN CALCIUM 20 MILLIGRAM(S): 80 TABLET, FILM COATED ORAL at 20:26

## 2024-02-06 RX ADMIN — ARIPIPRAZOLE 15 MILLIGRAM(S): 15 TABLET ORAL at 20:26

## 2024-02-06 RX ADMIN — LITHIUM CARBONATE 1125 MILLIGRAM(S): 300 TABLET, EXTENDED RELEASE ORAL at 20:25

## 2024-02-06 RX ADMIN — DIVALPROEX SODIUM 1000 MILLIGRAM(S): 500 TABLET, DELAYED RELEASE ORAL at 20:25

## 2024-02-06 RX ADMIN — DESVENLAFAXINE 100 MILLIGRAM(S): 50 TABLET, EXTENDED RELEASE ORAL at 20:26

## 2024-02-06 NOTE — BH INPATIENT PSYCHIATRY PROGRESS NOTE - CURRENT MEDICATION
MEDICATIONS  (STANDING):  ARIPiprazole 15 milliGRAM(s) Oral at bedtime  atorvastatin 20 milliGRAM(s) Oral at bedtime  cloZAPine 400 milliGRAM(s) Oral at bedtime  desvenlafaxine  milliGRAM(s) Oral at bedtime  divalproex ER 1000 milliGRAM(s) Oral at bedtime  haloperidol decanoate Injectable, Long Acting 150 milliGRAM(s) IntraMuscular <User Schedule>  lithium CR (ESKALITH-CR) 1125 milliGRAM(s) Oral at bedtime  metoprolol succinate ER 25 milliGRAM(s) Oral at bedtime    MEDICATIONS  (PRN):  OLANZapine 5 milliGRAM(s) Oral every 4 hours PRN Agitation secondary to psychosis  OLANZapine Injectable 5 milliGRAM(s) IntraMuscular once PRN Severe agitation secondary to psychosis  polyethylene glycol 3350 17 Gram(s) Oral daily PRN Constipation

## 2024-02-06 NOTE — BH INPATIENT PSYCHIATRY PROGRESS NOTE - OTHER
fair at times, often under covers but does poke head out to speak with MD, but today seen in PM in [facility] group, and improvement poor, not showering on unit prominent alogia concrete, vague, paucity of ideas and affect apathetic, with hypervigilant stare, flat, avoidant limited, apathetic, consistent with chronic SCZ, however slightly improved morbidly obese with poor self care, apathetic but slightly attenuated paranoia slightly attenuated compliant with clozapine and feels this med helps her, compliant but apathetic, but now shows slightly more engagement in G&M, provocative at times, intrusive at times

## 2024-02-06 NOTE — BH INPATIENT PSYCHIATRY PROGRESS NOTE - NSBHLOCFT_PSY_A_CORE
alert but apathetic and ambivalent
alert but apathetic and ambivalent but slightly less evident negative symptoms
alert but apathetic and ambivalent
alert but apathetic and ambivalent but slightly less evident negative symptoms
alert but apathetic and ambivalent
alert but apathetic and ambivalent but slightly less evident negative symptoms/affect emerging
alert but apathetic and ambivalent
alert but apathetic and ambivalent but slightly less evident negative symptoms/affect emerging
alert but apathetic and ambivalent but slightly less evident negative symptoms/affect emerging

## 2024-02-06 NOTE — BH INPATIENT PSYCHIATRY PROGRESS NOTE - NSBHASSESSSUMMFT_PSY_ALL_CORE
Assessment/Plan:   Patient is a 38-year-old woman, living on [place] grounds at [facility], with PPH of schizoaffective disorder; multiple past inpatient admissions (last [place] 2022; [facility] Aug 2021); enrolled in AOT through [place]; follows with outpatient psychiatrist Dr. Whitaker; history of severe psychotic decompensation with CAH when not on meds with h/o aggression towards mother and jumping off 2nd story building; h/o legal issues including mother with OOP against pt; no known substance use and PMH of seizure disorder, HTN, DM, HLD who was BIBEMS activated by facility due to acute influenza infection and lethargy on 1/4/24. She was medically admitted due to blood cultures growing staph capitis, now thought to be contaminant. [facility] psychiatry was following for medication management of home clozapine dose in the setting of increased sedation, decreasing ANC (1.07), and missed doses. She was discontinued off clozapine due to decrease in ANC whenever clozapine was reintroduced. On 1/11/24, she started reporting CAH to harm herself and others with a knife, though she denied intent. She was started Zyprexa up to 15 mg HS (first dose on 1/16) with some improvement in AH (no further commands, hears mumbling voices). She requires inpatient psych admission due to h/o severe decompensation with violence to self and others when off antipsychotics in the past and ongoing AH that are increased compared to baseline.  On assessment, pt appears internally preoccupied. Denies current AVH, SI or HI. Agrees to come to staff with new or worsening SI/HI. Appropriate for routine observation. Continues medications from [facility].     Plan:   1. Legals: admit on 9.27 status   2. Safety:    - Routine observation, denies SI/HI/I/P on the unit   - PRNs: Zyprexa 2.5 mg PO/IM q6h prn for agitation, may give additional Zyprexa 2.5 mg for refractory agitation (ensure QTc <500); avoid IM/IV Ativan within 1 hour of Zyprexa   3. Psychiatry:   - Continue Zyprexa 15 mg PO qHS (increased by CL from 10 mg on 1/16) for psychotic decompensation in setting of not receiving clozapine; decreased to 10 and now 7.5 mg qhs and tonight 5 mg qhs-- now stopped  restart at 50 mg qhs clozapine with labs as per hx and increase 25 mg nightly; tonight 175 mg qhs increased to 200 qhs then 225 and 250 then 275 and 300 mg qhs with weekly labs CBC+DIF and clozapine levels WED, ANC has been WNL--  qhs and  mg qhs target based on levels-- 375 then 400 mg qhs tonight  - Continue haldol decanoate 200 mg q4 weeks, last received 12/27/23, next injection is due on 1/24/24-- lowered to 150 mg qhs  - Continue Lithium 300 mg PO BID for mood stabilization. Lithium level 0.8 on 1/4 and 0.4 on 1/9 and 0.5 on 1/18-- now at 900 mg qhs as CR-- increased to 1125 mg qhs levels=0.6  - Continue Effexor  mg PO daily-- increased to 187.5 mg and rescheduled as QHS to aid compliance-- increased to 225 mg qhs/increased to 100 qhs as pristiq  Start abilify 5 mg qhs for negative symptoms-- increase to 10 mg qhs MON and FRI 15 mg qhs  Start metoprolol xl 25 mg qhs for clozapine tachycardia  4. Group, milieu, individual therapy as appropriate;  psycn collateral-- updated dr. whitaker via email  5. Medical: no acute medical issues.   - Continue Depakote DR 1000 mg qHS- for seizures. VPA level 85.40 on 1/1 and 49.30 on 1/3   - Atorvastatin 20 mg qHS for HLD   - Vitamin D3 (cholecalciferol) 1000 units qd for Vitamin D deficiency-stopped  - Folic acid 1 mg qd for health maintenance-stopped  - Famotidine 20 mg qd PRN for acid reflux- stopped   - D/c'd on metformin, though A1c 4.8%, defer restarting to PCP  6. Dispo: pending clinical improvement.  Patient continues to require inpatient hospitalization for stabilization and safety.   7. Collaterals: outpatient psychiatrist Dr. Whitaker [phone], outpatient psychologist Dr. Treviño (weekly appt Thurs at 1:30 pm), and care coordinator Meagan Mckeon [phone]

## 2024-02-06 NOTE — BH INPATIENT PSYCHIATRY PROGRESS NOTE - NSBHFUPINTERVALHXFT_PSY_A_CORE
RN report received in case reviewed in a.m. with team at AM rounds, patient seen and MSE done. Patient often seen outside of seclusion room which is a positive development as remained mostly in that room during stay to date, but today again in bed, and does slightly get up for attending but more affect evident at times.  Some fatigue 2/2 clozapine increase over weekend but no appreciable complaint of tiredness. However, seen now as more ambulatory on unit, in day area more often, and unable to use seclusion room d/t issues on unit with peers, and now even in [facility] REM group. Trial of abilify in progress for negative symptoms and showing results, but effexor also increased and will complete increase as pristiq. Will not trial lowering of depakote given seizure indication/hx with clozapine increase to reduce polypharmacy and possible weight gain. Team continues to encourage showering and pt did so yesterday as per RN, which has  been a major issue while on the unit and apparently also at the residence as per act team with poor ADL's. Haldol DEC past due, ordered for today with slight reduction as clozapine increased and abilify added to SGA regimen.  Self-care issues prominent and treatment team continues to encourage engagement and socialization. No new side effects reported or observed.  Clozapine uptitration in progress and to complete tonight and labs and levels sent last week and reviewed, no need to increase lithium further, also can avoid SEs.  We will resend labs this week before Discharge for ANC and clozapine levels.  We made rough outline of plan for DC by end of next week, assuming sustained improvements.  Briefly and ongoing discussed how efforts towards socialization will help pt with goal of becoming a .  In summary, slight improvements noted and increase in clozapine+abilify+ effexor for mood/anxiety shows some benefits.  Some tachycardia noted 2/2 clozapine increase.  No other new SEs reported or observed.

## 2024-02-07 PROCEDURE — 99231 SBSQ HOSP IP/OBS SF/LOW 25: CPT

## 2024-02-07 RX ORDER — METOPROLOL TARTRATE 50 MG
50 TABLET ORAL AT BEDTIME
Refills: 0 | Status: DISCONTINUED | OUTPATIENT
Start: 2024-02-07 | End: 2024-02-13

## 2024-02-07 RX ADMIN — CLOZAPINE 400 MILLIGRAM(S): 150 TABLET, ORALLY DISINTEGRATING ORAL at 20:23

## 2024-02-07 RX ADMIN — Medication 50 MILLIGRAM(S): at 20:23

## 2024-02-07 RX ADMIN — ATORVASTATIN CALCIUM 20 MILLIGRAM(S): 80 TABLET, FILM COATED ORAL at 20:23

## 2024-02-07 RX ADMIN — DESVENLAFAXINE 100 MILLIGRAM(S): 50 TABLET, EXTENDED RELEASE ORAL at 20:23

## 2024-02-07 RX ADMIN — DIVALPROEX SODIUM 1000 MILLIGRAM(S): 500 TABLET, DELAYED RELEASE ORAL at 20:23

## 2024-02-07 RX ADMIN — ARIPIPRAZOLE 15 MILLIGRAM(S): 15 TABLET ORAL at 20:23

## 2024-02-07 RX ADMIN — LITHIUM CARBONATE 1125 MILLIGRAM(S): 300 TABLET, EXTENDED RELEASE ORAL at 20:22

## 2024-02-07 NOTE — BH INPATIENT PSYCHIATRY PROGRESS NOTE - NSBHASSESSSUMMFT_PSY_ALL_CORE
Patient is a 38-year-old woman, living on [place] grounds at [facility], with PPH of schizoaffective disorder; multiple past inpatient admissions (last [place] 2022; [facility] Aug 2021); enrolled in AOT through [place]; follows with outpatient psychiatrist Dr. Whitaker; history of severe psychotic decompensation with CAH when not on meds with h/o aggression towards mother and jumping off 2nd story building; h/o legal issues including mother with OOP against pt; no known substance use and PMH of seizure disorder, HTN, DM, HLD who was BIBEMS activated by facility due to acute influenza infection and lethargy on 1/4/24. She was medically admitted due to blood cultures growing staph capitis, now thought to be contaminant. [facility] psychiatry was following for medication management of home clozapine dose in the setting of increased sedation, decreasing ANC (1.07), and missed doses. She was discontinued off clozapine due to decrease in ANC whenever clozapine was reintroduced. On 1/11/24, she started reporting CAH to harm herself and others with a knife, though she denied intent. She was started Zyprexa up to 15 mg HS (first dose on 1/16) with some improvement in AH (no further commands, hears mumbling voices). She requires inpatient psych admission due to h/o severe decompensation with violence to self and others when off antipsychotics in the past and ongoing AH that are increased compared to baseline.  On assessment, pt appears internally preoccupied. Denies current AVH, SI or HI. Agrees to come to staff with new or worsening SI/HI. Appropriate for routine observation. Continues medications from [facility].     Plan:   1. Legals: admit on 9.27 status   2. Safety:    - Routine observation, denies SI/HI/I/P on the unit   - PRNs: Zyprexa 2.5 mg PO/IM q6h prn for agitation, may give additional Zyprexa 2.5 mg for refractory agitation (ensure QTc <500); avoid IM/IV Ativan within 1 hour of Zyprexa   3. Psychiatry:   - Continue Zyprexa 15 mg PO qHS (increased by CL from 10 mg on 1/16) for psychotic decompensation in setting of not receiving clozapine; decreased to 10 and now 7.5 mg qhs and tonight 5 mg qhs-- now stopped  restart at 50 mg qhs clozapine with labs as per hx and increase 25 mg nightly; tonight 175 mg qhs increased to 200 qhs then 225 and 250 then 275 and 300 mg qhs with weekly labs CBC+DIF and clozapine levels WED, ANC has been WNL--  qhs and  mg qhs target based on levels-- 375 then 400 mg qhs tonight  - Continue haldol decanoate 200 mg q4 weeks, last received 12/27/23, next injection is due on 1/24/24-- lowered to 150 mg qhs  - Continue Lithium 300 mg PO BID for mood stabilization. Lithium level 0.8 on 1/4 and 0.4 on 1/9 and 0.5 on 1/18-- now at 900 mg qhs as CR-- increased to 1125 mg qhs levels=0.6  - Continue Effexor  mg PO daily-- increased to 187.5 mg and rescheduled as QHS to aid compliance-- increased to 225 mg qhs/increased to 100 qhs as pristiq  Start abilify 5 mg qhs for negative symptoms-- increase to 10 mg qhs MON and FRI 15 mg qhs  Start metoprolol xl 25 mg qhs for clozapine tachycardia  4. Group, milieu, individual therapy as appropriate;  psycn collateral-- updated dr. whitaker via email  5. Medical: no acute medical issues.   - Continue Depakote DR 1000 mg qHS- for seizures. VPA level 85.40 on 1/1 and 49.30 on 1/3   - Atorvastatin 20 mg qHS for HLD   - Vitamin D3 (cholecalciferol) 1000 units qd for Vitamin D deficiency-stopped  - Folic acid 1 mg qd for health maintenance-stopped  - Famotidine 20 mg qd PRN for acid reflux- stopped   - D/c'd on metformin, though A1c 4.8%, defer restarting to PCP  6. Dispo: pending clinical improvement.  Patient continues to require inpatient hospitalization for stabilization and safety.   7. Collaterals: outpatient psychiatrist Dr. Whitaker [phone], outpatient psychologist Dr. Treviño (weekly appt Thurs at 1:30 pm), and care coordinator Meagan Mckeon [phone]

## 2024-02-07 NOTE — BH DISCHARGE NOTE NURSING/SOCIAL WORK/PSYCH REHAB - NSDCPRRECOMMEND_PSY_ALL_CORE
Upon discharge patient has aftercare appointment at [facility] -  with Dr. Treviño. Writer recommends patient attend appointment to continue further treatment.

## 2024-02-07 NOTE — BH INPATIENT PSYCHIATRY PROGRESS NOTE - OTHER
paranoia slightly attenuated apathetic, with hypervigilant stare, flat, avoidant limited, apathetic, consistent with chronic SCZ, however slightly improved compliant with clozapine but apathetic, but now shows slightly more engagement in G&M, provocative at times, intrusive at times prominent alogia concrete, vague, paucity of ideas and affect

## 2024-02-07 NOTE — BH DISCHARGE NOTE NURSING/SOCIAL WORK/PSYCH REHAB - DISCHARGE INSTRUCTIONS AFTERCARE APPOINTMENTS
In order to check the location, date, or time of your aftercare appointment, please refer to your Discharge Instructions Document given to you upon leaving the hospital.  If you have lost the instructions please call [phone]

## 2024-02-07 NOTE — BH INPATIENT PSYCHIATRY PROGRESS NOTE - NSBHFUPINTERVALHXFT_PSY_A_CORE
Pt is seen and evaluated for follow up for SAD.  Chart reviewed and case discussed with treatment team in teams meeting this morning. RN report received. Pt refused Haldol dec MORSE yesterday stating she was afraid of needles. VSS. Pt seen this morning. Pt denies SI/AVH. Pt states her mood is stable, reporting good sleep and good appetite. Affect however, is flat. Adherent with medications. No side effects reported.  No agitation.

## 2024-02-07 NOTE — BH DISCHARGE NOTE NURSING/SOCIAL WORK/PSYCH REHAB - NSCDUDCCRISIS_PSY_A_CORE
.  [facility]  [address] [place] [id number] [phone]   Hours:  Monday through Friday from 9 AM to 3 PM

## 2024-02-07 NOTE — BH DISCHARGE NOTE NURSING/SOCIAL WORK/PSYCH REHAB - PATIENT PORTAL LINK FT
You can access the FollowMyHealth Patient Portal offered by [facility] by registering at the following website: [email or web address]. By joining [facility]’s FollowMyHealth portal, you will also be able to view your health information using other applications (apps) compatible with our system.

## 2024-02-07 NOTE — BH DISCHARGE NOTE NURSING/SOCIAL WORK/PSYCH REHAB - NSDCPRGOAL_PSY_ALL_CORE
Writer met with patient for an individual session to review overall progress towards rehabilitation goals and to assess current functioning.  Patient was hospitalized for psychosis, depression and negative symptoms. Overall patient has made progress towards psychiatric symptoms. Patient has been observed with a brighter affect, less depressed and anxious and ADLS have improved since admission. Patient reported mood improving and feeling better compared to admission. Patient sleep and appetite have been fair on the unit. Patient has been compliant with medication and tolerating well. In session patient expressed excitement about discharge.  When ask about patient future goals patient still wants to go to school to become a . Patient has met goal of identifying 2 coping skills to meet their needs. Patient identified numerous of skills such as listening to music and going outside. On the unit patient was fairly visible. Patient engaged in group sessions and was not a behavioral issue on the unit.  Patient denied SI/SH/AH/VH/HI and was able to engage in safety planning.

## 2024-02-08 PROCEDURE — 99232 SBSQ HOSP IP/OBS MODERATE 35: CPT

## 2024-02-08 RX ORDER — HALOPERIDOL DECANOATE 100 MG/ML
150 INJECTION INTRAMUSCULAR ONCE
Refills: 0 | Status: COMPLETED | OUTPATIENT
Start: 2024-02-08 | End: 2024-02-08

## 2024-02-08 RX ADMIN — CLOZAPINE 400 MILLIGRAM(S): 150 TABLET, ORALLY DISINTEGRATING ORAL at 20:11

## 2024-02-08 RX ADMIN — ARIPIPRAZOLE 15 MILLIGRAM(S): 15 TABLET ORAL at 20:10

## 2024-02-08 RX ADMIN — LITHIUM CARBONATE 1125 MILLIGRAM(S): 300 TABLET, EXTENDED RELEASE ORAL at 20:08

## 2024-02-08 RX ADMIN — HALOPERIDOL DECANOATE 150 MILLIGRAM(S): 100 INJECTION INTRAMUSCULAR at 16:36

## 2024-02-08 RX ADMIN — DIVALPROEX SODIUM 1000 MILLIGRAM(S): 500 TABLET, DELAYED RELEASE ORAL at 20:11

## 2024-02-08 RX ADMIN — ATORVASTATIN CALCIUM 20 MILLIGRAM(S): 80 TABLET, FILM COATED ORAL at 20:10

## 2024-02-08 RX ADMIN — DESVENLAFAXINE 100 MILLIGRAM(S): 50 TABLET, EXTENDED RELEASE ORAL at 20:10

## 2024-02-08 RX ADMIN — Medication 50 MILLIGRAM(S): at 20:10

## 2024-02-08 NOTE — BH INPATIENT PSYCHIATRY PROGRESS NOTE - CURRENT MEDICATION
MEDICATIONS  (STANDING):  ARIPiprazole 15 milliGRAM(s) Oral at bedtime  atorvastatin 20 milliGRAM(s) Oral at bedtime  cloZAPine 400 milliGRAM(s) Oral at bedtime  desvenlafaxine  milliGRAM(s) Oral at bedtime  divalproex ER 1000 milliGRAM(s) Oral at bedtime  haloperidol decanoate Injectable, Long Acting 150 milliGRAM(s) IntraMuscular <User Schedule>  lithium CR (ESKALITH-CR) 1125 milliGRAM(s) Oral at bedtime  metoprolol succinate ER 50 milliGRAM(s) Oral at bedtime    MEDICATIONS  (PRN):  OLANZapine 5 milliGRAM(s) Oral every 4 hours PRN Agitation secondary to psychosis  OLANZapine Injectable 5 milliGRAM(s) IntraMuscular once PRN Severe agitation secondary to psychosis  polyethylene glycol 3350 17 Gram(s) Oral daily PRN Constipation

## 2024-02-08 NOTE — BH INPATIENT PSYCHIATRY PROGRESS NOTE - NSBHASSESSSUMMFT_PSY_ALL_CORE
Patient is a 38-year-old woman, living on [place] grounds at [facility], with PPH of schizoaffective disorder; multiple past inpatient admissions (last [place] 2022; [facility] Aug 2021); enrolled in AOT through [place]; follows with outpatient psychiatrist Dr. Whitaker; history of severe psychotic decompensation with CAH when not on meds with h/o aggression towards mother and jumping off 2nd story building; h/o legal issues including mother with OOP against pt; no known substance use and PMH of seizure disorder, HTN, DM, HLD who was BIBEMS activated by facility due to acute influenza infection and lethargy on 1/4/24. She was medically admitted due to blood cultures growing staph capitis, now thought to be contaminant. [facility] psychiatry was following for medication management of home clozapine dose in the setting of increased sedation, decreasing ANC (1.07), and missed doses. She was discontinued off clozapine due to decrease in ANC whenever clozapine was reintroduced. On 1/11/24, she started reporting CAH to harm herself and others with a knife, though she denied intent. She was started Zyprexa up to 15 mg HS (first dose on 1/16) with some improvement in AH (no further commands, hears mumbling voices). She requires inpatient psych admission due to h/o severe decompensation with violence to self and others when off antipsychotics in the past and ongoing AH that are increased compared to baseline.  On assessment, pt appears internally preoccupied. Denies current AVH, SI or HI. Agrees to come to staff with new or worsening SI/HI. Appropriate for routine observation. Continues medications from [facility].     Plan:   1. Legals: admit on 9.27 status   2. Safety:    - Routine observation, denies SI/HI/I/P on the unit   - PRNs: Zyprexa 2.5 mg PO/IM q6h prn for agitation, may give additional Zyprexa 2.5 mg for refractory agitation (ensure QTc <500); avoid IM/IV Ativan within 1 hour of Zyprexa   3. Psychiatry:   - Continue Zyprexa 15 mg PO qHS (increased by CL from 10 mg on 1/16) for psychotic decompensation in setting of not receiving clozapine; decreased to 10 and now 7.5 mg qhs and tonight 5 mg qhs-- now stopped  restart at 50 mg qhs clozapine with labs as per hx and increase 25 mg nightly; tonight 175 mg qhs increased to 200 qhs then 225 and 250 then 275 and 300 mg qhs with weekly labs CBC+DIF and clozapine levels WED, ANC has been WNL--  qhs and  mg qhs target based on levels-- 375 then 400 mg qhs tonight  - Continue haldol decanoate 200 mg q4 weeks, last received 12/27/23, next injection is due on 1/24/24-- lowered to 150 mg qhs  - Continue Lithium 300 mg PO BID for mood stabilization. Lithium level 0.8 on 1/4 and 0.4 on 1/9 and 0.5 on 1/18-- now at 900 mg qhs as CR-- increased to 1125 mg qhs levels=0.6  - Continue Effexor  mg PO daily-- increased to 187.5 mg and rescheduled as QHS to aid compliance-- increased to 225 mg qhs/increased to 100 qhs as pristiq  Start abilify 5 mg qhs for negative symptoms-- increase to 10 mg qhs MON and FRI 15 mg qhs  Start metoprolol xl 25 mg qhs for clozapine tachycardia  4. Group, milieu, individual therapy as appropriate;  psycn collateral-- updated dr. whitaker via email  5. Medical: no acute medical issues.   - Continue Depakote DR 1000 mg qHS- for seizures. VPA level 85.40 on 1/1 and 49.30 on 1/3   - Atorvastatin 20 mg qHS for HLD   - Vitamin D3 (cholecalciferol) 1000 units qd for Vitamin D deficiency-stopped  - Folic acid 1 mg qd for health maintenance-stopped  - Famotidine 20 mg qd PRN for acid reflux- stopped   - D/c'd on metformin, though A1c 4.8%, defer restarting to PCP  6. Dispo: pending clinical improvement.  Patient continues to require inpatient hospitalization for stabilization and safety.   7. Collaterals: outpatient psychiatrist Dr. Whitaker [phone], outpatient psychologist Dr. Treviño (weekly appt Thurs at 1:30 pm), and care coordinator Meagan Mckeon [phone]  Patient is a 38-year-old woman, living on [place] grounds at [facility], with PPH of schizoaffective disorder; multiple past inpatient admissions (last [place] 2022; [facility] Aug 2021); enrolled in AOT through [place]; follows with outpatient psychiatrist Dr. Whitaker; history of severe psychotic decompensation with CAH when not on meds with h/o aggression towards mother and jumping off 2nd story building; h/o legal issues including mother with OOP against pt; no known substance use and PMH of seizure disorder, HTN, DM, HLD who was BIBEMS activated by facility due to acute influenza infection and lethargy on 1/4/24. She was medically admitted due to blood cultures growing staph capitis, now thought to be contaminant. [facility] psychiatry was following for medication management of home clozapine dose in the setting of increased sedation, decreasing ANC (1.07), and missed doses. She was discontinued off clozapine due to decrease in ANC whenever clozapine was reintroduced. On 1/11/24, she started reporting CAH to harm herself and others with a knife, though she denied intent. She was started Zyprexa up to 15 mg HS (first dose on 1/16) with some improvement in AH (no further commands, hears mumbling voices). She requires inpatient psych admission due to h/o severe decompensation with violence to self and others when off antipsychotics in the past and ongoing AH that are increased compared to baseline.  On assessment, pt appears internally preoccupied. Denies current AVH, SI or HI. Agrees to come to staff with new or worsening SI/HI. Appropriate for routine observation. Continues medications from [facility].     Plan:   1. Legals: admit on 9.27 status   2. Safety:    - Routine observation, denies SI/HI/I/P on the unit   - PRNs: Zyprexa 2.5 mg PO/IM q6h prn for agitation, may give additional Zyprexa 2.5 mg for refractory agitation (ensure QTc <500); avoid IM/IV Ativan within 1 hour of Zyprexa   3. Psychiatry:   - Continue Zyprexa 15 mg PO qHS (increased by CL from 10 mg on 1/16) for psychotic decompensation in setting of not receiving clozapine; decreased to 10 and now 7.5 mg qhs and tonight 5 mg qhs-- now stopped  restart at 50 mg qhs clozapine with labs as per hx and increase 25 mg nightly; tonight 175 mg qhs increased to 200 qhs then 225 and 250 then 275 and 300 mg qhs with weekly labs CBC+DIF and clozapine levels WED, ANC has been WNL--  qhs and  mg qhs target based on levels-- 375 then 400 mg qhs tonight  - Continue haldol decanoate 200 mg q4 weeks, last received 12/27/23, next injection is due on 1/24/24-- lowered to 150 mg qhs  - Continue Lithium 300 mg PO BID for mood stabilization. Lithium level 0.8 on 1/4 and 0.4 on 1/9 and 0.5 on 1/18-- now at 900 mg qhs as CR-- increased to 1125 mg qhs levels=0.6  - Continue Effexor  mg PO daily-- increased to 187.5 mg and rescheduled as QHS to aid compliance-- increased to 225 mg qhs/increased to 100 qhs as pristiq  Start abilify 5 mg qhs for negative symptoms-- increase to 10 mg qhs MON and FRI 15 mg qhs  Start metoprolol xl 25 mg qhs for clozapine tachycardia-- increased to 50 mg qhs  4. Group, milieu, individual therapy as appropriate;  psycn collateral-- updated dr. whitaker via email  5. Medical: no acute medical issues.   - Continue Depakote DR 1000 mg qHS- for seizures. VPA level 85.40 on 1/1 and 49.30 on 1/3   - Atorvastatin 20 mg qHS for HLD   - Vitamin D3 (cholecalciferol) 1000 units qd for Vitamin D deficiency-stopped  - Folic acid 1 mg qd for health maintenance-stopped  - Famotidine 20 mg qd PRN for acid reflux- stopped   - D/c'd on metformin, though A1c 4.8%, defer restarting to PCP  6. Dispo: pending clinical improvement.  Patient continues to require inpatient hospitalization for stabilization and safety.   7. Collaterals: outpatient psychiatrist Dr. Whitaker [phone], outpatient psychologist Dr. Treviño (weekly appt Thurs at 1:30 pm), and care coordinator Meagan Mckeon [phone]

## 2024-02-08 NOTE — BH INPATIENT PSYCHIATRY PROGRESS NOTE - NSBHFUPINTERVALHXFT_PSY_A_CORE
Pt is seen and evaluated for follow up for SAD.  Chart reviewed and case discussed with treatment team in teams meeting this morning. RN report received. Pt refused Haldol dec MORSE TUES stating she was afraid of needles but gave pt further psychoed re MORSE meds and how they prevent readmit.  Writer also explained dose is lower and so pt agreed, and reordering needed. VSS. Pt seen this morning. Pt denies SHIIP or AVTH. Explained need for meeting MON with o/p tx team and so goal is actually now TUES DC to residence and pt agrees with plan. When asked about future plans, again states an interest in becoming a . Pt states her mood is stable, reporting good sleep and good appetite. Affect however, is flat. Adherent with medications. No side effects reported or observed.  No agitation. Pt is seen and evaluated for follow up for SAD.  Chart reviewed and case discussed with treatment team in teams meeting this morning. RN report received. Pt refused Haldol dec MORSE TUES stating she was afraid of needles but gave pt further psychoed re MORSE meds and how they prevent readmit.  Writer also explained dose is lower and so pt agreed, and reordering needed. VSS. Pt seen this morning. Pt denies SHIIP or AVTH. Explained need for meeting MON with o/p tx team and so goal is actually now TUES DC to residence and pt agrees with plan. When asked about future plans, again states an interest in becoming a . Pt states her mood is stable, reporting good sleep and good appetite. Affect however, is flat. Adherent with medications. No new side effects reported or observed.  No agitation. Metoprolol for clozapine tachycardia shows good effect.

## 2024-02-08 NOTE — BH INPATIENT PSYCHIATRY PROGRESS NOTE - OTHER
apathetic, with hypervigilant stare, flat, avoidant poor, not showering on unit but now slightly improved paranoia slightly attenuated prominent alogia, very slightly improved limited, apathetic, consistent with chronic SCZ, however slightly improved with brighter affect emerging compliant with clozapine but apathetic, but now shows slightly more engagement in [employer] concrete, vague, paucity of ideas and affect

## 2024-02-09 LAB
BASOPHILS # BLD AUTO: 0.04 K/UL — SIGNIFICANT CHANGE UP (ref 0–0.2)
BASOPHILS NFR BLD AUTO: 0.3 % — SIGNIFICANT CHANGE UP (ref 0–2)
EOSINOPHIL # BLD AUTO: 0.21 K/UL — SIGNIFICANT CHANGE UP (ref 0–0.5)
EOSINOPHIL NFR BLD AUTO: 1.8 % — SIGNIFICANT CHANGE UP (ref 0–6)
HCT VFR BLD CALC: 36 % — SIGNIFICANT CHANGE UP (ref 34.5–45)
HGB BLD-MCNC: 11.3 G/DL — LOW (ref 11.5–15.5)
IANC: 6.07 K/UL — SIGNIFICANT CHANGE UP (ref 1.8–7.4)
IMM GRANULOCYTES NFR BLD AUTO: 0.4 % — SIGNIFICANT CHANGE UP (ref 0–0.9)
LITHIUM SERPL-MCNC: 0.7 MMOL/L — SIGNIFICANT CHANGE UP (ref 0.6–1.2)
LYMPHOCYTES # BLD AUTO: 38.4 % — SIGNIFICANT CHANGE UP (ref 13–44)
LYMPHOCYTES # BLD AUTO: 4.49 K/UL — HIGH (ref 1–3.3)
MCHC RBC-ENTMCNC: 27.7 PG — SIGNIFICANT CHANGE UP (ref 27–34)
MCHC RBC-ENTMCNC: 31.4 GM/DL — LOW (ref 32–36)
MCV RBC AUTO: 88.2 FL — SIGNIFICANT CHANGE UP (ref 80–100)
MONOCYTES # BLD AUTO: 0.83 K/UL — SIGNIFICANT CHANGE UP (ref 0–0.9)
MONOCYTES NFR BLD AUTO: 7.1 % — SIGNIFICANT CHANGE UP (ref 2–14)
NEUTROPHILS # BLD AUTO: 6.07 K/UL — SIGNIFICANT CHANGE UP (ref 1.8–7.4)
NEUTROPHILS NFR BLD AUTO: 52 % — SIGNIFICANT CHANGE UP (ref 43–77)
NRBC # BLD: 0 /100 WBCS — SIGNIFICANT CHANGE UP (ref 0–0)
NRBC # FLD: 0 K/UL — SIGNIFICANT CHANGE UP (ref 0–0)
PLATELET # BLD AUTO: 266 K/UL — SIGNIFICANT CHANGE UP (ref 150–400)
RBC # BLD: 4.08 M/UL — SIGNIFICANT CHANGE UP (ref 3.8–5.2)
RBC # FLD: 20.5 % — HIGH (ref 10.3–14.5)
WBC # BLD: 11.69 K/UL — HIGH (ref 3.8–10.5)
WBC # FLD AUTO: 11.69 K/UL — HIGH (ref 3.8–10.5)

## 2024-02-09 PROCEDURE — 99232 SBSQ HOSP IP/OBS MODERATE 35: CPT

## 2024-02-09 PROCEDURE — 90853 GROUP PSYCHOTHERAPY: CPT

## 2024-02-09 RX ADMIN — Medication 50 MILLIGRAM(S): at 20:27

## 2024-02-09 RX ADMIN — ARIPIPRAZOLE 15 MILLIGRAM(S): 15 TABLET ORAL at 20:27

## 2024-02-09 RX ADMIN — CLOZAPINE 400 MILLIGRAM(S): 150 TABLET, ORALLY DISINTEGRATING ORAL at 20:26

## 2024-02-09 RX ADMIN — DESVENLAFAXINE 100 MILLIGRAM(S): 50 TABLET, EXTENDED RELEASE ORAL at 20:27

## 2024-02-09 RX ADMIN — ATORVASTATIN CALCIUM 20 MILLIGRAM(S): 80 TABLET, FILM COATED ORAL at 20:27

## 2024-02-09 RX ADMIN — LITHIUM CARBONATE 1125 MILLIGRAM(S): 300 TABLET, EXTENDED RELEASE ORAL at 20:26

## 2024-02-09 RX ADMIN — DIVALPROEX SODIUM 1000 MILLIGRAM(S): 500 TABLET, DELAYED RELEASE ORAL at 20:26

## 2024-02-09 NOTE — BH TREATMENT PLAN - NSTXPLANTHERAPYSESSIONSFT_PSY_ALL_CORE
02-02-24  Type of therapy: Psychoeducation, Relapse prevention, Safety planning  --  --  --  --  --    02-03-24  Type of therapy: Medication management, Psychoeducation, Relapse prevention  --  --  --  --  --    02-04-24  Type of therapy: Medication management, Psychoeducation, Relapse prevention  --  --  --  --  --    02-07-24  Type of therapy: Coping skills, Leisure development, Music therapy, Peer advocate, Spirituality  Type of session: Individual  Level of patient participation: Attentive, Engaged, Participates  Duration of participation: 15 minutes  Therapy conducted by: Psych rehab  Therapy Summary: Writer met with patient for an individual session to review overall progress towards rehabilitation goals and to assess current functioning. In session patient was receptive and cooperative. Patient has demonstrated progress towards psychiatric symptoms. Patient is more oriented, engaged with brighter affect. Patient reported mood improving and feeling better compared to last week. Patient appetite is fair and is sleeping well. Patient has been compliant with medication and tolerating well. Moreover, patient has improved on self-care and hygiene per team. Patient has demonstrated minimal progress towards psychiatric rehabilitation goals of identifying 2 coping skills to meet their needs. Patient identified and going outside as 2 skills. On the unit patient is visible. Patient has attended a fair amount of group sessions and is fairly engaged and interactive throughout. Patient is receptive and cooperative with staff and peers is not a behavioral issue. Patient denied SI/SH/AH/VH/HI. Psych rehab staff will continue to engage and support patient throughout.    02-07-24  Type of therapy: Psychoeducation, Relapse prevention, Safety planning  --  --  --  --  --  
  01-24-24  Type of therapy: Leisure development, Medication management, Mindfulness, Peer advocate  Type of session: Individual  Level of patient participation: Attentive, Engaged, Participates  Duration of participation: 15 minutes  Therapy conducted by: Psych rehab  Therapy Summary: Writer met with patient for an individual session to review overall progress towards rehabilitation goals and to assess current functioning. In session patient was receptive and cooperative. Patient has made progress on psychiatric symptoms. Patient is less thought blocked and isolative and socializing with peers. Patient reported mood improving and overall feeling happier. Patient appetite is fair and is sleeping well.  Patient is compliant with medication and tolerating it well. In session patient reported medication helping her feel calmer and less anxious.  Patient has demonstrated progress towards psychiatric rehabilitation goals of identifying 2 coping skills to meet there needs. Patient identified listening to music and walking as 2 coping skills. On the unit patient is fairly visible. Patient is attending group sessions and becoming more social with peers and staff. Patient denied SI/SH/AH/VH/HI. Psych rehab staff will continue to engage and support patient throughout.  
  01-31-24  Type of therapy: Dialectical behavior therapy, Coping skills, Leisure development, Spirituality  Type of session: Individual  Level of patient participation: Attentive, Engaged, Participates  Duration of participation: 15 minutes  Therapy conducted by: Psych rehab  Therapy Summary: Writer met with patient for an individual session to review overall progress towards rehabilitation goals and to assess current functioning. In session patient was receptive and cooperative. Patient has made fair progress on psychiatric symptoms. Patient has become more engaged and is observed being out more in the day room. Patient ADLS continue to be a issue, but patient is encouraged to improved ADLS. Patient reported feeling better and in good mood. Patient reported AH hallucinations becoming less and mind clearing up. Patient appetite has been fair and is sleeping well. Patient is compliant with medication and tolerating well. In session patient reported that she is starting to love herself more and becoming more self-appreciative.   Patient has demonstrated fair progress towards psychiatric rehabilitation goals of identifying 2 coping skills that meet there needs. Patient identified listening to music and group sessions as two skills. Patient is receptive and cooperative with staff and not a behavioral issue. Patient denied SI/SH/AH/VH/HI. Psych rehab staff will continue to engage and support patient throughout.    02-01-24  Type of therapy: Psychoeducation, Relapse prevention  --  --  --  --  --    02-02-24  Type of therapy: Psychoeducation, Relapse prevention, Safety planning  --  --  --  --  --    02-03-24  Type of therapy: Medication management, Psychoeducation, Relapse prevention  --  --  --  --  --

## 2024-02-09 NOTE — BH TREATMENT PLAN - NSTXSUICIDINTERRN_PSY_ALL_CORE
Pt educated to take all medications to decrease SI/SIB symptoms. Pt edcuated to attend all groups to learn coping skills. Pt educated to report any worsening SI/SIB to staff promptly.

## 2024-02-09 NOTE — BH TREATMENT PLAN - NSPTSTATEDGOAL_PSY_ALL_CORE
I think the clozapine was helping.
 I think the clozapine was helping.
did not state a goal at this time... I think the clozapine was helping.
I think the clozapine was helping.

## 2024-02-09 NOTE — BH TREATMENT PLAN - NSTXPATIENTPARTICIPATE_PSY_ALL_CORE
Patient participated in identification of needs/problems/goals for treatment/Patient participated in defining interventions/Patient participated in development of after care plan
Patient participated in identification of needs/problems/goals for treatment
Patient participated in identification of needs/problems/goals for treatment/Patient participated in defining interventions
Patient participated in identification of needs/problems/goals for treatment/Patient participated in defining interventions/Patient participated in development of after care plan

## 2024-02-09 NOTE — BH TREATMENT PLAN - NSTXPSYCHOINTERMD_PSY_ALL_CORE
Psychopharm with clozapine restart with MORSE continuance and supportive therapy with return to providers

## 2024-02-09 NOTE — BH TREATMENT PLAN - NSTXSUPORTGOAL_PSY_ALL_CORE
Patient will explore available community resources to build support network

## 2024-02-09 NOTE — BH INPATIENT PSYCHIATRY PROGRESS NOTE - OTHER
prominent alogia, very slightly improved compliant with clozapine but apathetic, but now shows slightly more engagement in [employer] pt narrative paranoia slightly attenuated poor, not showering on unit but now slightly improved apathetic, with hypervigilant stare, flat, avoidant improved, was not showering on unit but now slightly improved, refused but now agrees to MORSE concrete, vague, paucity of ideas and affect limited, apathetic, consistent with chronic SCZ, however slightly improved with brighter affect emerging
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
## 2024-02-09 NOTE — BH PSYCHOLOGY - GROUP THERAPY NOTE - NSPSYCHOLGRPCOGPT_PSY_A_CORE FT
Patient attended recovery oriented/acceptance & commitment therapy group. Group started with check-in question - what communication skill would you like to work on? Members shared wanting to address their tone of voice, asking for help when needed, and being receptive to support from others. Group then focused on skill building of assertive communication through active discussion and behavioral rehearsal. Members reviewed components of assertive communication like steady tone of voice, respectful language, and body language. Members practiced how to make requests aggressively, passively, and assertively and received feedback from group members. Group identified what communication style is most likely to result in an effective outcome (e.g., getting what I want). Members were encouraged to make requests assertively today on the unit and to treat others and themselves with respect.  facilitated discussion of concepts, encouraged active participation, and supported members providing feedback to peers.

## 2024-02-09 NOTE — BH PSYCHOLOGY - GROUP THERAPY NOTE - NSPSYCHOLGRPCOGINT_PSY_A_CORE FT
Cognitive/behavioral therapy, Acceptance and Commitment Therapy, Emotion regulation/coping skills taught, Psychoed  
Acceptance & commitment therapy, Emotion regulation/coping skills taught, Psychoeducation  
Acceptance & commitment therapy, Emotion regulation/coping skills taught, Psychoeducation

## 2024-02-09 NOTE — BH TREATMENT PLAN - NSTXPROBSUPORT_PSY_ALL_CORE
SUPPORT SYSTEM, INADEQUATE

## 2024-02-09 NOTE — BH PSYCHOLOGY - GROUP THERAPY NOTE - NSPSYCHOLGRPCOGPROB_PSY_A_CORE FT
Anxiety, Depression, Emotion dysregulation, Lack of coping skills  
Anxiety, Depression, Emotion dysregulation, Lack of coping skills

## 2024-02-09 NOTE — BH TREATMENT PLAN - NSDCCRITERIA_PSY_ALL_CORE
cgi<=2  reinstated clozapine

## 2024-02-09 NOTE — BH INPATIENT PSYCHIATRY PROGRESS NOTE - NSBHFUPINTERVALHXFT_PSY_A_CORE
Pt is seen and evaluated for follow up for SAD.  Chart reviewed and case discussed with treatment team in teams meeting this morning. RN report received. Pt refused Haldol dec MORSE TUES stating she was afraid of needles but gave pt further psychoed re MORSE meds and how they prevent readmit.  Reattempted MORSE yesterday and pt says she will accept MORSE, given with no issues as per RN.  Writer also explained dose is lower and so pt agreed. VSS. Pt seen this morning. Pt denies SHIIP or AVTH. Explained need for meeting MON with o/p tx team and so goal is actually now TUES DC to residence and pt agrees with plan. When asked about future plans, again states an interest in becoming a . Pt states her mood is stable, reporting good sleep and good appetite. Explained need for weekly clozapine labs and will review clozapine and lithium levels with pt on MON. Affect however, is flat. Adherent with medications. No new side effects reported or observed.  No agitation. Metoprolol for clozapine tachycardia shows good effect.

## 2024-02-09 NOTE — BH TREATMENT PLAN - NSTXPSYCHOGOAL_PSY_ALL_CORE
Will engage in a 15 minute conversation with no irrational content

## 2024-02-09 NOTE — BH TREATMENT PLAN - NSTXCOPEINTERPR_PSY_ALL_CORE
100
Patient has demonstrated minimal progress towards psychiatric rehabilitation goals of identifying 2 coping skills to meet their needs. Patient identified and going outside as 2 skills. Over the next 7 days, psychiatric rehabilitation staff will support the pt towards goal progress and encourage group participation for skill development and socialization.
Patient has demonstrated fair progress towards psychiatric rehabilitation goals of identifying 2 coping skills that meet there needs. Patient identified listening to music and group sessions as two skills. Over the next 7 days, psychiatric rehabilitation staff will support the pt towards goal progress and encourage group participation for skill development and socialization.
Patient has demonstrated progress towards psychiatric rehabilitation goals of identifying 2 coping skills to meet there needs. Patient identified listening to music and walking as 2 coping skills. Over the next 7 days, psychiatric rehabilitation staff will support the pt towards goal progress and encourage group participation for skill development and socialization.
Writer and patient, we able to collaboratively establish psychiatric rehabilitation goal for patient to identify and utlize 2 coping skills to meet their needs.

## 2024-02-09 NOTE — BH TREATMENT PLAN - NSTXDISORGINTERRN_PSY_ALL_CORE
Pt educated to attend groups to decrease thought disorganization. Pt educated on disorganization symptoms. Pt educated to take all medications to improve symptoms

## 2024-02-09 NOTE — BH TREATMENT PLAN - NSTXDCOTHRINTERSW_PSY_ALL_CORE
SW will provide support, education and ongoing discussion of dc plans

## 2024-02-09 NOTE — BH TREATMENT PLAN - NSCMSPTSTRENGTHS_PSY_ALL_CORE
Compliance to treatment/Flexibility/Physically healthy/Positive attitude/Tolerant
Physically healthy/Positive attitude
Physically healthy/Positive attitude/Tolerant
Flexibility/Physically healthy/Positive attitude/Tolerant

## 2024-02-09 NOTE — BH PSYCHOLOGY - GROUP THERAPY NOTE - NSPSYCHOLGRPCOGGOAL_PSY_A_CORE FT
Decrease symptoms, Develop coping/emotion regulation skills, Increase value-based action, Psychoeducation  
Decrease symptoms, Develop coping/emotion regulation skills, Increase value-based action, Psychoeducation

## 2024-02-09 NOTE — BH PSYCHOLOGY - GROUP THERAPY NOTE - NSBHPSYCHOLPARTICIPCOMMENT_PSY_A_CORE FT
Pt participated in group by supportively listening to peers and sharing that she hopes to work on her listening skills.

## 2024-02-10 LAB — CLOZAPINE SERPL-MCNC: 429 NG/ML — SIGNIFICANT CHANGE UP (ref 350–600)

## 2024-02-10 RX ADMIN — ARIPIPRAZOLE 15 MILLIGRAM(S): 15 TABLET ORAL at 20:16

## 2024-02-10 RX ADMIN — Medication 50 MILLIGRAM(S): at 20:15

## 2024-02-10 RX ADMIN — DIVALPROEX SODIUM 1000 MILLIGRAM(S): 500 TABLET, DELAYED RELEASE ORAL at 20:15

## 2024-02-10 RX ADMIN — CLOZAPINE 400 MILLIGRAM(S): 150 TABLET, ORALLY DISINTEGRATING ORAL at 20:15

## 2024-02-10 RX ADMIN — ATORVASTATIN CALCIUM 20 MILLIGRAM(S): 80 TABLET, FILM COATED ORAL at 20:15

## 2024-02-10 RX ADMIN — DESVENLAFAXINE 100 MILLIGRAM(S): 50 TABLET, EXTENDED RELEASE ORAL at 20:16

## 2024-02-10 RX ADMIN — LITHIUM CARBONATE 1125 MILLIGRAM(S): 300 TABLET, EXTENDED RELEASE ORAL at 20:15

## 2024-02-11 RX ADMIN — DESVENLAFAXINE 100 MILLIGRAM(S): 50 TABLET, EXTENDED RELEASE ORAL at 21:10

## 2024-02-11 RX ADMIN — ATORVASTATIN CALCIUM 20 MILLIGRAM(S): 80 TABLET, FILM COATED ORAL at 21:10

## 2024-02-11 RX ADMIN — LITHIUM CARBONATE 1125 MILLIGRAM(S): 300 TABLET, EXTENDED RELEASE ORAL at 21:10

## 2024-02-11 RX ADMIN — DIVALPROEX SODIUM 1000 MILLIGRAM(S): 500 TABLET, DELAYED RELEASE ORAL at 21:09

## 2024-02-11 RX ADMIN — Medication 50 MILLIGRAM(S): at 21:10

## 2024-02-11 RX ADMIN — ARIPIPRAZOLE 15 MILLIGRAM(S): 15 TABLET ORAL at 21:09

## 2024-02-11 RX ADMIN — CLOZAPINE 400 MILLIGRAM(S): 150 TABLET, ORALLY DISINTEGRATING ORAL at 21:10

## 2024-02-12 PROCEDURE — 99239 HOSP IP/OBS DSCHRG MGMT >30: CPT

## 2024-02-12 PROCEDURE — 90832 PSYTX W PT 30 MINUTES: CPT | Mod: 59

## 2024-02-12 PROCEDURE — 90853 GROUP PSYCHOTHERAPY: CPT

## 2024-02-12 RX ORDER — LITHIUM CARBONATE 300 MG/1
1 TABLET, EXTENDED RELEASE ORAL
Refills: 0 | DISCHARGE

## 2024-02-12 RX ORDER — ATORVASTATIN CALCIUM 80 MG/1
1 TABLET, FILM COATED ORAL
Qty: 30 | Refills: 0
Start: 2024-02-12 | End: 2024-03-12

## 2024-02-12 RX ORDER — LITHIUM CARBONATE 300 MG/1
2.5 TABLET, EXTENDED RELEASE ORAL
Qty: 75 | Refills: 0
Start: 2024-02-12 | End: 2024-03-12

## 2024-02-12 RX ORDER — CLOZAPINE 150 MG/1
2 TABLET, ORALLY DISINTEGRATING ORAL
Qty: 60 | Refills: 0
Start: 2024-02-12 | End: 2024-03-12

## 2024-02-12 RX ORDER — HALOPERIDOL DECANOATE 100 MG/ML
200 INJECTION INTRAMUSCULAR
Refills: 0 | DISCHARGE

## 2024-02-12 RX ORDER — ATORVASTATIN CALCIUM 80 MG/1
1 TABLET, FILM COATED ORAL
Refills: 0 | DISCHARGE

## 2024-02-12 RX ORDER — METOPROLOL TARTRATE 50 MG
1 TABLET ORAL
Qty: 30 | Refills: 0
Start: 2024-02-12 | End: 2024-03-12

## 2024-02-12 RX ORDER — DIVALPROEX SODIUM 500 MG/1
2 TABLET, DELAYED RELEASE ORAL
Qty: 60 | Refills: 0
Start: 2024-02-12 | End: 2024-03-12

## 2024-02-12 RX ORDER — SENNA PLUS 8.6 MG/1
2 TABLET ORAL
Refills: 0 | DISCHARGE

## 2024-02-12 RX ORDER — DESVENLAFAXINE 50 MG/1
1 TABLET, EXTENDED RELEASE ORAL
Qty: 30 | Refills: 0
Start: 2024-02-12 | End: 2024-03-12

## 2024-02-12 RX ORDER — DIVALPROEX SODIUM 500 MG/1
2 TABLET, DELAYED RELEASE ORAL
Refills: 0 | DISCHARGE

## 2024-02-12 RX ORDER — METFORMIN HYDROCHLORIDE 850 MG/1
1 TABLET ORAL
Refills: 0 | DISCHARGE

## 2024-02-12 RX ORDER — FAMOTIDINE 10 MG/ML
1 INJECTION INTRAVENOUS
Refills: 0 | DISCHARGE

## 2024-02-12 RX ORDER — HALOPERIDOL DECANOATE 100 MG/ML
150 INJECTION INTRAMUSCULAR
Qty: 1 | Refills: 0
Start: 2024-02-12 | End: 2024-02-12

## 2024-02-12 RX ORDER — VENLAFAXINE HCL 75 MG
1 CAPSULE, EXT RELEASE 24 HR ORAL
Refills: 0 | DISCHARGE

## 2024-02-12 RX ORDER — ARIPIPRAZOLE 15 MG/1
1 TABLET ORAL
Qty: 30 | Refills: 0
Start: 2024-02-12 | End: 2024-03-12

## 2024-02-12 RX ORDER — METOPROLOL TARTRATE 50 MG
0.5 TABLET ORAL
Refills: 0 | DISCHARGE

## 2024-02-12 RX ADMIN — CLOZAPINE 400 MILLIGRAM(S): 150 TABLET, ORALLY DISINTEGRATING ORAL at 20:15

## 2024-02-12 RX ADMIN — LITHIUM CARBONATE 1125 MILLIGRAM(S): 300 TABLET, EXTENDED RELEASE ORAL at 20:15

## 2024-02-12 RX ADMIN — ATORVASTATIN CALCIUM 20 MILLIGRAM(S): 80 TABLET, FILM COATED ORAL at 20:15

## 2024-02-12 RX ADMIN — Medication 50 MILLIGRAM(S): at 20:15

## 2024-02-12 RX ADMIN — DIVALPROEX SODIUM 1000 MILLIGRAM(S): 500 TABLET, DELAYED RELEASE ORAL at 20:15

## 2024-02-12 RX ADMIN — ARIPIPRAZOLE 15 MILLIGRAM(S): 15 TABLET ORAL at 20:15

## 2024-02-12 RX ADMIN — DESVENLAFAXINE 100 MILLIGRAM(S): 50 TABLET, EXTENDED RELEASE ORAL at 20:15

## 2024-02-12 NOTE — BH INPATIENT PSYCHIATRY DISCHARGE NOTE - NSDCCPCAREPLAN_GEN_ALL_CORE_FT
PRINCIPAL DISCHARGE DIAGNOSIS  Diagnosis: Acute exacerbation of chronic schizophrenia  Assessment and Plan of Treatment:

## 2024-02-12 NOTE — BH INPATIENT PSYCHIATRY DISCHARGE NOTE - OTHER
more affect emerging bmi=44 concrete, vague, paucity of ideas and affect paranoia attenuated, chronic pt narrative compliant with clozapine, less apathetic, shows more engagement in G&M and meeting today with residence staff prominent alogia, very slightly improved limited, apathetic, consistent with chronic SCZ, however slightly improved with brighter affect emerging apathetic, with hypervigilant stare, flat, avoidant

## 2024-02-12 NOTE — BH INPATIENT PSYCHIATRY DISCHARGE NOTE - NSBHMSEKNOW_PSY_A_CORE
Discussed condition and exacerbating conditions/situations (e.g., dry/arid environments, overhead fans, air conditioners, side effect of medications). Normal

## 2024-02-12 NOTE — BH INPATIENT PSYCHIATRY DISCHARGE NOTE - NSBHDCRISKMITIGATE_PSY_ALL_CORE
Safety planning/Reduction in access to lethal methods (pills, firearms, etc)/Referral to case management/Long acting injectable medication/Medications targeting suicidality/non-suicidal self injurious behavior/Other

## 2024-02-12 NOTE — BH INPATIENT PSYCHIATRY DISCHARGE NOTE - ATTENDING DISCHARGE PHYSICAL EXAMINATION:
As above  Stable for DC to residence.  Rx meds filled via [facility].  Clozapine now at 400 mg qhs.  Haldol DEC lowered to 150 mg q 4 weekly, but can be given sooner, if needed.  Denies all SHIIP.

## 2024-02-12 NOTE — BH INPATIENT PSYCHIATRY DISCHARGE NOTE - NSBHDCHANDOFFFT_PSY_ALL_CORE
Signout to Dr. Whitaker and tx team via emails to [facility] team and DC summary. Pt on MORSE medication, clozapine optimized, labs WNL. Appt: Pt on MORSE. [facility] - Dr. Treviño [phone] and Dr. Whitaker. [facility] team updated via emails, including clozapine restart and increase. Labs WNL.
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
## 2024-02-12 NOTE — BH PSYCHOLOGY - GROUP THERAPY NOTE - NSBHPSYCHOLRESPONSE_PSY_A_CORE
Accepted support
Coping skills acquired/Accepted support
Accepted support
Coping skills acquired/Accepted support

## 2024-02-12 NOTE — BH INPATIENT PSYCHIATRY DISCHARGE NOTE - DETAILS
Family Hx: denies    Social Hx: Living on [facility] x 1.5 years. No contact with mother. Originally lived in [place], [place].

## 2024-02-12 NOTE — BH INPATIENT PSYCHIATRY DISCHARGE NOTE - HOSPITAL COURSE
To be updated by Dr. Jon    Pt on MORSE medication? Y-- see below.     Updated by Dr. Jon    Pt on MORSE medication? Y-- see below.    BD: [birth year]  CLINICAL COURSE  2P INVOL Admit dates on [facility]: 1/17/24 to 2/13/24  Pt arrived to the [facility] unit in the above context. Nannette arrived to [facility]6 unit after a stay in Medicine for Covid and also a drop in white blood cells, also with confusion and treatment team there had deemed clozapine contributory and so it was stopped. Patient was then crosstapered to Zyprexa, but continued to have auditory hallucinations. Patient stated that she felt clozapine had worked well for her and wished to be on this medication and hence treatment team crosstapered this medication back. Clozapine was slowly uptitrated as Zyprexa was downtitrated with no issues and weekly labs were sent for monitoring despite patient having been a monthly CBC patient. Patient began to show improvement in attenuation of AH. However, she remained very isolative and slept in the quiet room, disengaged from treatment most of the day with prominent apathy. Consequently team opted to work with lithium so as to enhance mood stabilizing effects and uptitration of this medication showed improved effect at 1325 mg QHS. Patient was also noted to be on Depakote 1000 mg nightly however, this was not a dual lithium plus Depakote regimen for synergy or reduced SEs but instead given history of seizure with unclear etiology (2/2 clozapine?). Consequently, lithium was retained with Depakote and team also opted to optimize Effexor XR for mood and anxiety. As clozapine reached therapeutic levels at 400 mg nightly with Effexor increased to 225 mg, patient did show improvements with brighter affect emerging, more energy and less anxiety. Effexor was then cross titrated to Pristiq/desvenlafaxine so as to avoid SEs at 100 mg qhs. Team also had noted prominent negative symptoms so abilify was added and uptitrated from 5 to 15 mg qhs to good effect, with less apathy noted. Pt was reluctant to continue Haldol DEC, as pt noted she was afraid of needles, but his was encouraged and team able to reduce from 200 to 150 mg q 4 weekly given clozapine increase to therapeutic levels now with abilify+Pristiq. Pt felt that she was ready to return to residence and resume o/p care via return to providers at [facility]. Patient was deemed psychiatrically stable for discharge with notably attenuated psychosis and no wish for self harm or harm of others and adequate behavioral control and no AVTH or other prominent delusions and no kiran or depression and mood much improved and anxiety attenuated with brighter affect. Metoprolol xl was added for clozapine tachycardia and uptitrated to 50 mg qhs. Pt able to achieve all qhs med regimen for ease and improved compliance. Team can consider addition of Abilify MORSE Maintena, Aristada or ASIMTUFII q 2 monthly.  Low acute risk of harm to self or suicide. The patient is at elevated chronic risk of self-harm due to an underlying psychotic illness also with affective symptoms. Other risk factors include history of multiple psychiatric hospitalizations and noncompliance, poor frustration tolerance, and impulsivity. Protective factors for suicide include improved insight, treatment engagement, medication adherence, lack of access to firearms, supportive social network, future-oriented, patient denies current SIIP and SIIP throughout hospitalization. Risk factors mitigated during this hospitalization include optimization of clozapine and lithium levels for psychosis and mood and continuance of MORSE for safety and compliance.  Low acute risk of violence or aggression. Risk factors include poor insight, poor frustration tolerance and noncompliance. Protective factors include denies current homicidal, aggressive, or violent ideation, intent, or plan, patient was in good behavioral control during hospitalization, now with some improvements in insight. Risk factors mitigated during this hospitalization include optimization of clozapine and lithium levels for psychosis and mood and continuance of MORSE for safety and compliance.    No acute MED issues during stay.  Please contact Dr. Jon/Hilaria Attending and Unit Chief, if any questions:  [phone] or [email or web address] or [email or web address]  See DISCHARGE PLAN and Rx meds at discharge, below.      DSM5 DD  SAD, BAD type, current episode depressed w/o catatonia  Anxiety disorder NOS  r/o personality disorder in adult/cluster b vs cluster c    DISCHARGE PLAN  1)	Pt stable for DC to residence with Rx meds via [facility];  2)	Psychopharm, as below:    Home Medications at time of Discharge Reconciliation: 12-Feb-2024 19:27    ARIPiprazole 15 mg oral tablet 1 tab(s) orally once a day (at bedtime) ; Active    atorvastatin 40 mg oral tablet 1 tab(s) orally once a day (at bedtime) ; Active    cloZAPine 200 mg oral tablet 2 tab(s) orally once a day (at bedtime) Pt has monthly labs/followed by ACT team. ; Active    desvenlafaxine (as succinate) 100 mg oral tablet, extended release 1 tab(s) orally once a day (at bedtime) ; Active    divalproex sodium 500 mg oral tablet, extended release 2 tab(s) orally once a day (at bedtime) ; Active    haloperidol decanoate 50 mg/mL intramuscular solution 150 milligram(s) intramuscular every 4 weeks Given 2/8/24; NEXT DUE: 3/7/24 (does not need fill today) ; Active    lithium 450 mg oral tablet, extended release 2.5 tab(s) orally once a day (at bedtime) ; Active    metoprolol succinate 50 mg oral tablet, extended release 1 tab(s) orally once a day (at bedtime) ; Active     3)	Long Acting Injectible medication (MORSE): haloperidol decanoate 50 mg/mL intramuscular solution 150 milligram(s) intramuscular every 4 weeks Given 2/8/24; NEXT DUE: 3/7/24. No PO Bridging needed.    4)	[facility]    5)	Medical issues needing f/u: morbid obesity, SZD, HLD, and tachycardia 2/2 clozapine

## 2024-02-12 NOTE — BH PSYCHOLOGY - GROUP THERAPY NOTE - NSPSYCHOLGRPCOGPT_PSY_A_CORE FT
Patient engaged in 45 minute cognitive remediation group intended to improve cognitive deficits and develop compensatory strategies for cognitive difficulties. Group started with orientation to person, time, date, and place. Group then focused on an activity involving executive function, memory, and attention. Members were asked to read a letter from a doctors office and identify the important information and remember the specific details. Members discussed the task as a group and discussed how they could find the relevant information and remember the details. Members lastly were shown their calendars and asked to check them daily. Patient was actively engaged in group activities Patient engaged in 45 minute cognitive remediation group intended to improve cognitive deficits and develop compensatory strategies for cognitive difficulties. Group started with orientation to person, time, date, and place. Group then focused on an activity involving executive function, memory, and attention. Members were asked to read a letter from a doctors office and identify the important information and remember the specific details. Members discussed the task as a group and discussed how they could find the relevant information and remember the details. Members lastly were shown their calendars and asked to check them daily. Patient was actively engaged in group activities.

## 2024-02-12 NOTE — BH PSYCHOLOGY - CLINICIAN PSYCHOTHERAPY NOTE - TOKEN PULL-DIAGNOSIS
Primary Diagnosis:  Schizoaffective disorder [F25.9]        Problem Dx:   
Primary Diagnosis:  Schizoaffective disorder [F25.9]        Problem Dx:

## 2024-02-12 NOTE — BH INPATIENT PSYCHIATRY DISCHARGE NOTE - NSBHMETABOLIC_PSY_ALL_CORE_FT
BMI: BMI (kg/m2): 44.3 (01-27-24 @ 07:46)  HbA1c: A1C with Estimated Average Glucose Result: 4.8 % (01-04-24 @ 02:03)    Glucose: POCT Blood Glucose.: 105 mg/dL (01-11-24 @ 12:26)    BP: 103/72 (02-12-24 @ 07:58) (103/72 - 103/72)Vital Signs Last 24 Hrs  T(C): 36.7 (02-12-24 @ 07:58), Max: 36.9 (02-11-24 @ 20:43)  T(F): 98.1 (02-12-24 @ 07:58), Max: 98.4 (02-11-24 @ 20:43)  HR: 94 (02-12-24 @ 07:58) (94 - 94)  BP: 103/72 (02-12-24 @ 07:58) (103/72 - 103/72)  BP(mean): --  RR: --  SpO2: --    Orthostatic VS  02-11-24 @ 20:43  Lying BP: --/-- HR: --  Sitting BP: 120/82 HR: 94  Standing BP: 134/66 HR: 107  Site: --  Mode: --  Orthostatic VS  02-11-24 @ 08:17  Lying BP: 106/71 HR: 83  Sitting BP: 99/64 HR: 86  Standing BP: --/-- HR: --  Site: --  Mode: --  Orthostatic VS  02-10-24 @ 20:32  Lying BP: --/-- HR: --  Sitting BP: 124/89 HR: 87  Standing BP: 120/76 HR: 106  Site: --  Mode: --    Lipid Panel: Date/Time: 01-09-24 @ 07:06  Cholesterol, Serum: 79  LDL Cholesterol Calculated: 24  HDL Cholesterol, Serum: 32  Total Cholesterol/HDL Ration Measurement: --  Triglycerides, Serum: 115

## 2024-02-12 NOTE — BH PSYCHOLOGY - GROUP THERAPY NOTE - NSBHPSYCHOLASSESSPROV_PSY_A_CORE
Licensed Psychologist
Licensed Psychologist and Psychology Trainee

## 2024-02-12 NOTE — BH PSYCHOLOGY - GROUP THERAPY NOTE - NSBHPSYCHOLGRPNAME_PSY_A_CORE
CBT (Cognitive Behavioral Therapy) Group
Cognitive Skills
CBT (Cognitive Behavioral Therapy) Group
Cognitive Skills

## 2024-02-12 NOTE — BH PSYCHOLOGY - GROUP THERAPY NOTE - NSPSYCHOLGRPCOGGOAL_PSY_A_CORE
develop improved problem solving skills/learn cognitive skills to improve coping with stress
other...
develop improved problem solving skills/learn cognitive skills to improve coping with stress
other...
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
## 2024-02-12 NOTE — BH PSYCHOLOGY - CLINICIAN PSYCHOTHERAPY NOTE - NSBHPSYCHOLNARRATIVE_PSY_A_CORE FT
On a screening cognitive tool, the MoCA, patient scored below the cutoff considered to be within normal limits (26/30), earning a score of 20/30 suggesting that further cognitive assessment is indicated. Notably, she displayed executive functioning deficits in set shifting and visuospatial deficits as evidenced by her inability to reproduce a simple cube or engage in trail making exercise. Although she kaitlyn a clock with appropriate contours and numbers, she was unable to draw the hands pointing to the correct time. She demonstrated strengths in her ability to maintain attention and engage in abstraction exercise. Patient was able to engage in sentence repetition, but struggled with verbal fluency when asked to produce words that begin with "S." No deficits were noted with regard to immediate and long-term memory skills and language production. Patient recalled 1/5 words in with no category cue, and was able to recall 3/5 words with category cue. Patient was oriented to person, place and time.    Patient presented with adequate grooming and was casually dressed. Patient maintained appropriate eye contact and demonstrated motivation and cooperative attitude throughout the assessment. No abnormal motor movements noted. Psychomotor speed was slowed. Patient appeared shaky and appeared to be having a hard time gripping the pencil. Speech was within normal limits. No perceptual disturbances noted or observed. Patient's thought process was linear and coherent. No SI/HI endorsed. Insight and judgement remain fair. 
On a screening cognitive tool, the MoCA, patient scored below the cutoff considered to be within normal limits (26/30), earning a score of 20/30. Pt demonstrated difficulties in the cognitive domains of long-term memory, visuospatial functioning, and abstraction. Pt demonstrated intact basic attention and orientation. Regarding visuospatial functioning more specifically, pt nearly reproduced a three-dimensional chair and kaitlyn a clock with the incorrect time for hour/minute. On a measure of long-term memory, pt recalled 0/5 words after a 5 minute delay and recognized 3/5 words with category cue & multiple choices. Pt excelled on tasks required basic attention like tapping her hand when writer said the letter A and repeating back a string of numbers (forwards and backwards).   Pt utilized coping strategies during the MOCA including double-checking her work and going at a slow and deliberate pace. She appeared to put forth sufficient effort into the cognitive exercise. Writer provided support, encouragement, and reinforcement of skills.     Patient presented with adequate grooming and was casually dressed. Patient maintained appropriate eye contact and demonstrated motivation and was cooperative attitude throughout the assessment. No abnormal motor movements noted. Psychomotor speed was slowed and non-fluid hand motions were observed. Speech was within normal limits. No perceptual disturbances noted or observed. Patient's thought process was linear and coherent. TC: treatment engagement. No SI/HI endorsed. Insight and judgement are fair.
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
## 2024-02-12 NOTE — BH PSYCHOLOGY - GROUP THERAPY NOTE - NSBHPSYCHOLPARTICIPCOMMENT_PSY_A_CORE FT
Patient was fully engaged in group discussion. Patient was fully engaged in group discussion. Pt listened to her peers and participated when prompted by facilitators. Pt showed interest in completing her calendar and agreed to receive calendars to use for her outpatient care.

## 2024-02-12 NOTE — BH PSYCHOLOGY - GROUP THERAPY NOTE - TOKEN PULL-DIAGNOSIS
Primary Diagnosis:  Schizoaffective disorder [F25.9]        Problem Dx:   

## 2024-02-12 NOTE — BH INPATIENT PSYCHIATRY DISCHARGE NOTE - NSDCMRMEDTOKEN_GEN_ALL_CORE_FT
ARIPiprazole 15 mg oral tablet: 1 tab(s) orally once a day (at bedtime)  atorvastatin 40 mg oral tablet: 1 tab(s) orally once a day (at bedtime)  cloZAPine 200 mg oral tablet: 2 tab(s) orally once a day (at bedtime) Pt has monthly labs/followed by ACT team.  desvenlafaxine (as succinate) 100 mg oral tablet, extended release: 1 tab(s) orally once a day (at bedtime)  divalproex sodium 500 mg oral tablet, extended release: 2 tab(s) orally once a day (at bedtime)  haloperidol decanoate 50 mg/mL intramuscular solution: 150 milligram(s) intramuscular every 4 weeks Given 2/8/24; NEXT DUE: 3/7/24 (does not need fill today)  lithium 450 mg oral tablet, extended release: 2.5 tab(s) orally once a day (at bedtime)  metoprolol succinate 50 mg oral tablet, extended release: 1 tab(s) orally once a day (at bedtime)

## 2024-02-12 NOTE — BH INPATIENT PSYCHIATRY DISCHARGE NOTE - NSBHFUPINTERVALHXFT_PSY_A_CORE
Pt is seen and evaluated for follow up for SCZ vs SAD.  Chart reviewed and case discussed with treatment team in teams meeting this morning. RN report received.  Last week gave pt further psychoed re MORSE meds and how they prevent readmit, agreed to continue haldol DEC, lowered to 150 mg q4 weeks, no issues.  VSS. Pt seen this morning. Pt denies SHIIP or AVTH. Explained need for meeting MON with o/p residence tx team and accomplished this today.  Reviewed progress with pt and team, doing much better on reinstatement of clozapine, no issues in labs, pt has monthly CBC as per team. When asked about future plans, again states an interest in becoming a . Pt states her mood is stable, reporting good sleep and good appetite.  Affect remains blunted and flat but with improvements with clozapine uptitration done now with abilify+pristiq and metoprolol addedf or clozapine tachycardia.  Adherent with medications. No new side effects reported or observed.  No agitation.  Stable for DC in AM to residence.  Says she is sure she is ready for DC.

## 2024-02-12 NOTE — BH PSYCHOLOGY - GROUP THERAPY NOTE - NSPSYCHOLGRPBILLING_PSY_A_CORE
[id number] - Group Psychotherapy
[id number] - Group Psychotherapy
[id number] - Group Psychotherapy
[id number] - Group Psychotherapy

## 2024-02-12 NOTE — BH INPATIENT PSYCHIATRY DISCHARGE NOTE - REASON FOR ADMISSION
Psychosis+depressed mood vs negative symtoms+medication SEs+covid/transfer to [facility] and later [facility] psych+optimization of medication

## 2024-02-13 VITALS — TEMPERATURE: 99 F

## 2024-02-13 PROCEDURE — 99232 SBSQ HOSP IP/OBS MODERATE 35: CPT

## 2024-02-13 NOTE — BH INPATIENT PSYCHIATRY PROGRESS NOTE - NSTXSUPORTGOAL_PSY_ALL_CORE
Patient will explore available community resources to build support network

## 2024-02-13 NOTE — BH INPATIENT PSYCHIATRY PROGRESS NOTE - NSBHMSEMOVE_PSY_A_CORE
No abnormal movements
Unable to assess
Unable to assess
No abnormal movements
Unable to assess
No abnormal movements

## 2024-02-13 NOTE — BH INPATIENT PSYCHIATRY PROGRESS NOTE - NSTXDCOTHRDATEEST_PSY_ALL_CORE
31-Jan-2024
07-Feb-2024
17-Jan-2024
31-Jan-2024
24-Jan-2024
07-Feb-2024
17-Jan-2024
17-Jan-2024
24-Jan-2024
31-Jan-2024
24-Jan-2024
31-Jan-2024
17-Jan-2024
31-Jan-2024
31-Jan-2024
24-Jan-2024
07-Feb-2024
07-Feb-2024

## 2024-02-13 NOTE — BH INPATIENT PSYCHIATRY PROGRESS NOTE - NSBHMSESPEECH_PSY_A_CORE
Abnormal as indicated, otherwise normal...

## 2024-02-13 NOTE — BH INPATIENT PSYCHIATRY PROGRESS NOTE - NSBHFUPINTERVALHXFT_PSY_A_CORE
Pt is seen and evaluated for follow up for SAD.  Chart reviewed and case discussed with treatment team in teams meeting this morning. RN report received. Pt refused Haldol dec MORSE TUES stating she was afraid of needles but gave pt further psychoed re MORSE meds and how they prevent readmit.  Reattempted MORSE yesterday and pt says she will accept MORSE, given with no issues as per RN.  Writer also explained dose is lower and so pt agreed. VSS. Pt seen this morning. Pt denies SHIIP or AVTH. Explained need for meeting MON with o/p tx team and so goal is actually now TUES DC to residence and pt agrees with plan. When asked about future plans, again states an interest in becoming a . Pt states her mood is stable, reporting good sleep and good appetite. Explained need for weekly clozapine labs and reviewed clozapine and lithium levels with pt and residence team on MON, and PSF form completed for pt today in clozapine REMS database. Brighter affect emerging, and pt sure she is ready for DC today. Adherent with medications. No new side effects reported or observed with good behavioral control. Metoprolol for clozapine tachycardia shows good effect.  Stable for DC to residence.

## 2024-02-13 NOTE — BH INPATIENT PSYCHIATRY PROGRESS NOTE - NSTXPROBSUPORT_PSY_ALL_CORE
SUPPORT SYSTEM, INADEQUATE

## 2024-02-13 NOTE — BH INPATIENT PSYCHIATRY PROGRESS NOTE - NSTXDCOTHRGOAL_PSY_ALL_CORE
Patient will adhere to medication regimen and recommended treatment x7 days

## 2024-02-13 NOTE — BH INPATIENT PSYCHIATRY PROGRESS NOTE - NSTXSUICIDDATETRGT_PSY_ALL_CORE
24-Jan-2024
24-Jan-2024
14-Feb-2024
24-Jan-2024
14-Feb-2024
24-Jan-2024
14-Feb-2024
24-Jan-2024
24-Jan-2024

## 2024-02-13 NOTE — BH INPATIENT PSYCHIATRY PROGRESS NOTE - NSBHMSETHTPROC_PSY_A_CORE
Thought blocking/Other
Thought blocking/Impaired reasoning/Other
Impaired reasoning/Other
Impaired reasoning/Other
Thought blocking/Impaired reasoning/Other
Thought blocking/Impaired reasoning/Other
Thought blocking/Other
Thought blocking/Impaired reasoning/Other
Thought blocking/Impaired reasoning/Other
Impaired reasoning/Other
Thought blocking/Impaired reasoning/Other
Impaired reasoning/Other
Thought blocking/Impaired reasoning/Other
Other
Thought blocking/Impaired reasoning/Other
Impaired reasoning/Other

## 2024-02-13 NOTE — BH INPATIENT PSYCHIATRY PROGRESS NOTE - NSTXDCOTHRPROGRES_PSY_ALL_CORE
Improving
No Change
Improving
No Change
No Change
Improving
No Change
Improving

## 2024-02-13 NOTE — BH INPATIENT PSYCHIATRY PROGRESS NOTE - NSDCCRITERIA_PSY_ALL_CORE
cgi<=2  reinstated clozapine
cgi<=2
cgi<=2  reinstated clozapine

## 2024-02-13 NOTE — BH INPATIENT PSYCHIATRY PROGRESS NOTE - NSTXDCFAMGOAL_PSY_ALL_CORE
Will agree to involve supports/family in treatment and discharge planning

## 2024-02-13 NOTE — BH INPATIENT PSYCHIATRY PROGRESS NOTE - NSTXDISORGPROGRES_PSY_ALL_CORE
Improving
Met - goal discontinued
Improving

## 2024-02-13 NOTE — BH INPATIENT PSYCHIATRY PROGRESS NOTE - NSTXSUICIDGOAL_PSY_ALL_CORE
Will identify and utilize 2 coping skills

## 2024-02-13 NOTE — BH INPATIENT PSYCHIATRY PROGRESS NOTE - NSTXPSYCHOPROGRES_PSY_ALL_CORE
Improving
Met - goal discontinued
Improving

## 2024-02-13 NOTE — BH INPATIENT PSYCHIATRY PROGRESS NOTE - NSBHATTESTTYPEVISIT_PSY_A_CORE
Attending Only
DARRELL without on-site Attending supervision
DARRELL without on-site Attending supervision
Attending Only

## 2024-02-13 NOTE — BH INPATIENT PSYCHIATRY PROGRESS NOTE - NSBHMSETHTCONTENT_PSY_A_CORE
Delusions/Other
Unremarkable
Delusions/Other
Delusions/Other
Unremarkable
Delusions/Other

## 2024-02-13 NOTE — BH INPATIENT PSYCHIATRY PROGRESS NOTE - PRN MEDS
MEDICATIONS  (PRN):  melatonin. 3 milliGRAM(s) Oral at bedtime PRN Insomnia  OLANZapine 5 milliGRAM(s) Oral every 4 hours PRN Agitation secondary to psychosis  OLANZapine Injectable 5 milliGRAM(s) IntraMuscular once PRN Severe agitation secondary to psychosis  polyethylene glycol 3350 17 Gram(s) Oral daily PRN Constipation  senna 2 Tablet(s) Oral at bedtime PRN Constipation  
MEDICATIONS  (PRN):  OLANZapine 5 milliGRAM(s) Oral every 4 hours PRN Agitation secondary to psychosis  OLANZapine Injectable 5 milliGRAM(s) IntraMuscular once PRN Severe agitation secondary to psychosis  polyethylene glycol 3350 17 Gram(s) Oral daily PRN Constipation  
MEDICATIONS  (PRN):  OLANZapine 5 milliGRAM(s) Oral every 4 hours PRN Agitation secondary to psychosis  OLANZapine Injectable 5 milliGRAM(s) IntraMuscular once PRN Severe agitation secondary to psychosis  polyethylene glycol 3350 17 Gram(s) Oral daily PRN Constipation  
MEDICATIONS  (PRN):  melatonin. 3 milliGRAM(s) Oral at bedtime PRN Insomnia  OLANZapine 5 milliGRAM(s) Oral every 4 hours PRN Agitation secondary to psychosis  OLANZapine Injectable 5 milliGRAM(s) IntraMuscular once PRN Severe agitation secondary to psychosis  polyethylene glycol 3350 17 Gram(s) Oral daily PRN Constipation  senna 2 Tablet(s) Oral at bedtime PRN Constipation  
MEDICATIONS  (PRN):  famotidine    Tablet 20 milliGRAM(s) Oral daily PRN Acid reflux  melatonin. 3 milliGRAM(s) Oral at bedtime PRN Insomnia  OLANZapine 5 milliGRAM(s) Oral every 4 hours PRN Agitation secondary to psychosis  OLANZapine Injectable 5 milliGRAM(s) IntraMuscular once PRN Severe agitation secondary to psychosis  polyethylene glycol 3350 17 Gram(s) Oral daily PRN Constipation  senna 2 Tablet(s) Oral at bedtime PRN Constipation  
MEDICATIONS  (PRN):  OLANZapine 5 milliGRAM(s) Oral every 4 hours PRN Agitation secondary to psychosis  OLANZapine Injectable 5 milliGRAM(s) IntraMuscular once PRN Severe agitation secondary to psychosis  polyethylene glycol 3350 17 Gram(s) Oral daily PRN Constipation  
MEDICATIONS  (PRN):  famotidine    Tablet 20 milliGRAM(s) Oral daily PRN Acid reflux  melatonin. 3 milliGRAM(s) Oral at bedtime PRN Insomnia  OLANZapine 5 milliGRAM(s) Oral every 4 hours PRN Agitation secondary to psychosis  OLANZapine Injectable 5 milliGRAM(s) IntraMuscular once PRN Severe agitation secondary to psychosis  polyethylene glycol 3350 17 Gram(s) Oral daily PRN Constipation  senna 2 Tablet(s) Oral at bedtime PRN Constipation  
MEDICATIONS  (PRN):  OLANZapine 5 milliGRAM(s) Oral every 4 hours PRN Agitation secondary to psychosis  OLANZapine Injectable 5 milliGRAM(s) IntraMuscular once PRN Severe agitation secondary to psychosis  polyethylene glycol 3350 17 Gram(s) Oral daily PRN Constipation  
MEDICATIONS  (PRN):  melatonin. 3 milliGRAM(s) Oral at bedtime PRN Insomnia  OLANZapine 5 milliGRAM(s) Oral every 4 hours PRN Agitation secondary to psychosis  OLANZapine Injectable 5 milliGRAM(s) IntraMuscular once PRN Severe agitation secondary to psychosis  polyethylene glycol 3350 17 Gram(s) Oral daily PRN Constipation  senna 2 Tablet(s) Oral at bedtime PRN Constipation  
MEDICATIONS  (PRN):  OLANZapine 5 milliGRAM(s) Oral every 4 hours PRN Agitation secondary to psychosis  OLANZapine Injectable 5 milliGRAM(s) IntraMuscular once PRN Severe agitation secondary to psychosis  polyethylene glycol 3350 17 Gram(s) Oral daily PRN Constipation  

## 2024-02-13 NOTE — BH INPATIENT PSYCHIATRY PROGRESS NOTE - NSBHMSESPABN_PSY_A_CORE
Soft volume/Decreased productivity/Increased latency/Other
Soft volume/Decreased productivity/Increased latency
Soft volume/Decreased productivity/Increased latency/Other
Soft volume/Decreased productivity/Increased latency
Soft volume/Decreased productivity/Increased latency/Other

## 2024-02-13 NOTE — BH INPATIENT PSYCHIATRY PROGRESS NOTE - NSTXPSYCHOGOAL_PSY_ALL_CORE
Will engage in a 15 minute conversation with no irrational content

## 2024-02-13 NOTE — BH INPATIENT PSYCHIATRY PROGRESS NOTE - NSTXSUPORTPROGRES_PSY_ALL_CORE
Improving
No Change
No Change
Met - goal discontinued

## 2024-02-13 NOTE — BH INPATIENT PSYCHIATRY PROGRESS NOTE - NSTXSUICIDDATEEST_PSY_ALL_CORE
17-Jan-2024
07-Feb-2024
07-Feb-2024
17-Jan-2024
07-Feb-2024
17-Jan-2024

## 2024-02-13 NOTE — BH INPATIENT PSYCHIATRY PROGRESS NOTE - NSTXSUICIDPROGRES_PSY_ALL_CORE
Met - goal discontinued
Improving

## 2024-02-13 NOTE — BH INPATIENT PSYCHIATRY PROGRESS NOTE - NSTXDCFAMDATETRGT_PSY_ALL_CORE
07-Feb-2024
07-Feb-2024
14-Feb-2024
07-Feb-2024
07-Feb-2024
14-Feb-2024
07-Feb-2024
07-Feb-2024
14-Feb-2024
07-Feb-2024

## 2024-02-13 NOTE — BH INPATIENT PSYCHIATRY PROGRESS NOTE - NSTXCOPEPROGRES_PSY_ALL_CORE
No Change
No Change
Met - goal discontinued
Improving
No Change
No Change
Improving
Improving
No Change
Improving
No Change
Improving

## 2024-02-13 NOTE — BH INPATIENT PSYCHIATRY PROGRESS NOTE - NSBHATTESTBILLING_PSY_A_CORE
95515-Vvkriokhmd OBS or IP - high complexity OR 50-79 mins
69899-Mjoektvmis OBS or IP - moderate complexity OR 35-49 mins
21742-Bylutbiywa OBS or IP - high complexity OR 50-79 mins
61389-Lnmctzeiyp OBS or IP - high complexity OR 50-79 mins
35377-Apwmlqfjud OBS or IP - moderate complexity OR 35-49 mins
29578-Rsfnbxyeui OBS or IP - moderate complexity OR 35-49 mins
64780-Wbjgcuwalc OBS or IP - moderate complexity OR 35-49 mins
29440-Zxdqdmfzbr OBS or IP - high complexity OR 50-79 mins
19922-Gkunqhutoq OBS or IP - moderate complexity OR 35-49 mins
36877-Cfglplrjkr OBS or IP - high complexity OR 50-79 mins
77013-Czheofbcvr OBS or IP - moderate complexity OR 35-49 mins
65124-Gxvnrodzyf OBS or IP - moderate complexity OR 35-49 mins
93708-Toxwmhdhos OBS or IP - high complexity OR 50-79 mins
40557-Emgnfokqwk OBS or IP - moderate complexity OR 35-49 mins
12792-Xitkxxzltq OBS or IP - moderate complexity OR 35-49 mins
63078-Vjeoefbkxn OBS or IP - moderate complexity OR 35-49 mins
21550-Vcsvrjhyfi OBS or IP - moderate complexity OR 35-49 mins
51686-Monbchorbc OBS or IP - moderate complexity OR 35-49 mins
88388-Voamjidtwo OBS or IP - low complexity OR 25-34 mins
45523-Jsgfyopjuf OBS or IP - high complexity OR 50-79 mins

## 2024-02-13 NOTE — BH INPATIENT PSYCHIATRY PROGRESS NOTE - LEVEL OF CONSCIOUSNESS
Other
Other
Alert
Other
Alert
Alert
Other
Alert
Other
Other
Alert
Other
Alert

## 2024-02-13 NOTE — BH INPATIENT PSYCHIATRY PROGRESS NOTE - OTHER
less apathetic, with hypervigilant stare, flat bmi=44 limited but improving, less apathetic, consistent with chronic SCZ, however improved with brighter affect emerging pt narrative more affect emerging paranoia further attenuated, chronic, more engaged in tx concrete, vague, paucity of ideas and affect much improved, showered in AM today compliant with clozapine, less apathetic, shows more engagement in G&M and meeting with residence staff prominent alogia, slightly improved

## 2024-02-13 NOTE — BH INPATIENT PSYCHIATRY PROGRESS NOTE - NSTXDISORGGOAL_PSY_ALL_CORE
Will identify 2 coping skills that assist in organizing

## 2024-02-13 NOTE — BH INPATIENT PSYCHIATRY PROGRESS NOTE - NSTXDISORGINTERMD_PSY_ALL_CORE
Psychopharm with clozapine restart with MORSE continuance and supportive therapy with return to providers
Yes
Psychopharm with clozapine restart with MORSE continuance and supportive therapy with return to providers

## 2024-02-13 NOTE — BH INPATIENT PSYCHIATRY PROGRESS NOTE - NSBHFUPINTERVALCCFT_PSY_A_CORE
F/u for SAD/psychosis+agitation+insomnia
F/u for SAD/agitation/insomnia and depression vs neg sx's vs both
F/u for SAD/agitation/insomnia. 
F/u for SAD/psychosis+agitation+insomnia
F/u for SAD/agitation/insomnia and depression vs neg sx's vs both
F/u for SAD/psychosis+agitation+insomnia
F/u for SAD/agitation/insomnia and depression vs neg sx's vs both
F/u for SAD/psychosis+agitation+insomnia

## 2024-02-13 NOTE — BH INPATIENT PSYCHIATRY PROGRESS NOTE - NSBHASSESSSUMMFT_PSY_ALL_CORE
Patient is a 38-year-old woman, living on [place] at [facility], with PPH of schizoaffective disorder; multiple past inpatient admissions (last [place] 2022; [facility] Aug 2021); enrolled in AOT through [facility]; follows with outpatient psychiatrist Dr. Whitaker; history of severe psychotic decompensation with CAH when not on meds with h/o aggression towards mother and jumping off 2nd story building; h/o legal issues including mother with OOP against pt; no known substance use and PMH of seizure disorder, HTN, DM, HLD who was BIBEMS activated by facility due to acute influenza infection and lethargy on 1/4/24. She was medically admitted due to blood cultures growing staph capitis, now thought to be contaminant. [facility] psychiatry was following for medication management of home clozapine dose in the setting of increased sedation, decreasing ANC (1.07), and missed doses. She was discontinued off clozapine due to decrease in ANC whenever clozapine was reintroduced. On 1/11/24, she started reporting CAH to harm herself and others with a knife, though she denied intent. She was started Zyprexa up to 15 mg HS (first dose on 1/16) with some improvement in AH (no further commands, hears mumbling voices). She requires inpatient psych admission due to h/o severe decompensation with violence to self and others when off antipsychotics in the past and ongoing AH that are increased compared to baseline.  On assessment, pt appears internally preoccupied. Denies current AVH, SI or HI. Agrees to come to staff with new or worsening SI/HI. Appropriate for routine observation. Continues medications from [facility].     Plan:   1. Legals: admit on 9.27 status   2. Safety:    - Routine observation, denies SI/HI/I/P on the unit   - PRNs: Zyprexa 2.5 mg PO/IM q6h prn for agitation, may give additional Zyprexa 2.5 mg for refractory agitation (ensure QTc <500); avoid IM/IV Ativan within 1 hour of Zyprexa   3. Psychiatry:   - Continue Zyprexa 15 mg PO qHS (increased by CL from 10 mg on 1/16) for psychotic decompensation in setting of not receiving clozapine; decreased to 10 and now 7.5 mg qhs and tonight 5 mg qhs-- now stopped  restart at 50 mg qhs clozapine with labs as per hx and increase 25 mg nightly; tonight 175 mg qhs increased to 200 qhs then 225 and 250 then 275 and 300 mg qhs with weekly labs CBC+DIF and clozapine levels WED, ANC has been WNL--  qhs and  mg qhs target based on levels-- 375 then 400 mg qhs tonight  - Continue haldol decanoate 200 mg q4 weeks, last received 12/27/23, next injection is due on 1/24/24-- lowered to 150 mg qhs  - Continue Lithium 300 mg PO BID for mood stabilization. Lithium level 0.8 on 1/4 and 0.4 on 1/9 and 0.5 on 1/18-- now at 900 mg qhs as CR-- increased to 1125 mg qhs levels=0.6  - Continue Effexor  mg PO daily-- increased to 187.5 mg and rescheduled as QHS to aid compliance-- increased to 225 mg qhs/increased to 100 qhs as pristiq  Start abilify 5 mg qhs for negative symptoms-- increased to 10 mg qhs MON and FRI 15 mg qhs  Start metoprolol xl 25 mg qhs for clozapine tachycardia-- increased to 50 mg qhs  4. Group, milieu, individual therapy as appropriate;  psycn collateral-- updated dr. whitaker via email  5. Medical: no acute medical issues.   - Continue Depakote DR 1000 mg qHS- for seizures. VPA level 85.40 on 1/1 and 49.30 on 1/3   - Atorvastatin 20 mg qHS for HLD   - Vitamin D3 (cholecalciferol) 1000 units qd for Vitamin D deficiency-stopped  - Folic acid 1 mg qd for health maintenance-stopped  - Famotidine 20 mg qd PRN for acid reflux- stopped   - D/c'd on metformin, though A1c 4.8%, defer restarting to PCP  6. Dispo: pending clinical improvement.  Patient continues to require inpatient hospitalization for stabilization and safety.   7. Collaterals: outpatient psychiatrist Dr. Whitaker [phone], outpatient psychologist Dr. Treviño (weekly appt Thurs at 1:30 pm), and care coordinator Meagan Mckeon [phone]

## 2024-02-13 NOTE — BH INPATIENT PSYCHIATRY PROGRESS NOTE - NSTXDCFAMDATEEST_PSY_ALL_CORE
17-Jan-2024
07-Feb-2024
17-Jan-2024
17-Jan-2024
07-Feb-2024
17-Jan-2024
07-Feb-2024

## 2024-02-13 NOTE — BH INPATIENT PSYCHIATRY PROGRESS NOTE - NSTXANXGOAL_PSY_ALL_CORE
Be able to participate in activities despite lingering anxiety/panic

## 2024-02-13 NOTE — BH INPATIENT PSYCHIATRY PROGRESS NOTE - NSBHMSEAFFRANGE_PSY_A_CORE
Blunted/Constricted
Constricted
Constricted
Blunted/Constricted
Blunted
Blunted/Constricted
Blunted/Constricted
Blunted
Blunted/Constricted
Blunted/Constricted
Blunted/Constricted/Other
Blunted/Constricted

## 2024-02-13 NOTE — BH INPATIENT PSYCHIATRY PROGRESS NOTE - NSBHFUPMEDSE_PSY_A_CORE
None known
Pt is in room alert and oriented. rr are even and unlabored. Lung sounds are course. She is on 3L HI luis no distress noted. PEG in place jevity 1.2 infusing. Tolerates well. abd is semisoft bowel sounds are active. No increased bleeding or bruising noted. Avila in place patent. Pt is very concerned about going home. She was able to speak with her  this AM and she stated that made her feel better. She is stable. Safety measures in place will continue to monitor.
None known

## 2024-02-13 NOTE — BH INPATIENT PSYCHIATRY PROGRESS NOTE - MSE OPTIONS
Structured MSE
Unstructured MSE
Structured MSE
Unstructured MSE
Structured MSE

## 2024-02-13 NOTE — BH INPATIENT PSYCHIATRY PROGRESS NOTE - NSTXPSYCHODATETRGT_PSY_ALL_CORE
07-Feb-2024
14-Feb-2024
07-Feb-2024
14-Feb-2024
07-Feb-2024
14-Feb-2024
07-Feb-2024

## 2024-02-13 NOTE — BH INPATIENT PSYCHIATRY PROGRESS NOTE - NSBHCHARTREVIEWVS_PSY_A_CORE FT
Vital Signs Last 24 Hrs  T(C): 36 (01-21-24 @ 06:50), Max: 36 (01-21-24 @ 06:50)  T(F): 96.8 (01-21-24 @ 06:50), Max: 96.8 (01-21-24 @ 06:50)  HR: --  BP: --  BP(mean): --  RR: --  SpO2: --    Orthostatic VS  01-21-24 @ 06:50  Lying BP: 126/81 HR: 101  Sitting BP: 113/69 HR: 100  Standing BP: --/-- HR: --  Site: --  Mode: --  
Vital Signs Last 24 Hrs  T(C): 36.3 (01-29-24 @ 08:00), Max: 36.3 (01-29-24 @ 08:00)  T(F): 97.3 (01-29-24 @ 08:00), Max: 97.3 (01-29-24 @ 08:00)  HR: --  BP: --  BP(mean): --  RR: --  SpO2: --    Orthostatic VS  01-29-24 @ 08:00  Lying BP: --/-- HR: --  Sitting BP: 115/95 HR: 108  Standing BP: 131/63 HR: 117  Site: --  Mode: --  Orthostatic VS  01-28-24 @ 07:46  Lying BP: --/-- HR: --  Sitting BP: 109/68 HR: 92  Standing BP: --/-- HR: --  Site: --  Mode: --  Orthostatic VS  01-27-24 @ 20:38  Lying BP: --/-- HR: --  Sitting BP: 120/71 HR: 100  Standing BP: 125/72 HR: 83  Site: --  Mode: --  
Vital Signs Last 24 Hrs  T(C): 36.6 (01-23-24 @ 20:25), Max: 36.6 (01-23-24 @ 20:25)  T(F): 97.8 (01-23-24 @ 20:25), Max: 97.8 (01-23-24 @ 20:25)  HR: --  BP: 124/73 (01-23-24 @ 20:25) (124/73 - 124/73)  BP(mean): 113 (01-23-24 @ 20:25) (113 - 113)  RR: --  SpO2: --    
Vital Signs Last 24 Hrs  T(C): --  T(F): --  HR: --  BP: --  BP(mean): --  RR: --  SpO2: --    Orthostatic VS  01-21-24 @ 06:50  Lying BP: 126/81 HR: 101  Sitting BP: 113/69 HR: 100  Standing BP: --/-- HR: --  Site: --  Mode: --  
Vital Signs Last 24 Hrs  T(C): --  T(F): --  HR: --  BP: --  BP(mean): --  RR: --  SpO2: --    Orthostatic VS  01-30-24 @ 20:04  Lying BP: --/-- HR: --  Sitting BP: 119/68 HR: 98  Standing BP: 122/74 HR: 100  Site: --  Mode: --  Orthostatic VS  01-30-24 @ 08:28  Lying BP: --/-- HR: --  Sitting BP: 121/88 HR: 102  Standing BP: --/-- HR: --  Site: --  Mode: --  Orthostatic VS  01-29-24 @ 20:18  Lying BP: --/-- HR: --  Sitting BP: 112/84 HR: 107  Standing BP: 124/76 HR: 96  Site: --  Mode: --  
Vital Signs Last 24 Hrs  T(C): 36 (01-21-24 @ 06:50), Max: 36 (01-21-24 @ 06:50)  T(F): 96.8 (01-21-24 @ 06:50), Max: 96.8 (01-21-24 @ 06:50)  HR: --  BP: --  BP(mean): --  RR: --  SpO2: --    Orthostatic VS  01-21-24 @ 06:50  Lying BP: 126/81 HR: 101  Sitting BP: 113/69 HR: 100  Standing BP: --/-- HR: --  Site: --  Mode: --  
Vital Signs Last 24 Hrs  T(C): 36.2 (01-20-24 @ 07:53), Max: 36.2 (01-19-24 @ 19:47)  T(F): 97.2 (01-20-24 @ 07:53), Max: 97.2 (01-20-24 @ 07:53)  HR: 79 (01-20-24 @ 07:53) (79 - 79)  BP: 107/67 (01-20-24 @ 07:53) (107/67 - 107/67)  BP(mean): --  RR: --  SpO2: --    
Vital Signs Last 24 Hrs  T(C): 36.2 (01-19-24 @ 19:47), Max: 36.8 (01-19-24 @ 07:55)  T(F): 97.1 (01-19-24 @ 19:47), Max: 98.3 (01-19-24 @ 07:55)  HR: 82 (01-19-24 @ 07:55) (82 - 85)  BP: 113/68 (01-19-24 @ 07:55) (107/87 - 113/68)  BP(mean): --  RR: --  SpO2: --    
Vital Signs Last 24 Hrs  T(C): 36.7 (01-24-24 @ 18:11), Max: 36.7 (01-24-24 @ 18:11)  T(F): 98 (01-24-24 @ 18:11), Max: 98 (01-24-24 @ 18:11)  HR: --  BP: --  BP(mean): --  RR: --  SpO2: --    Orthostatic VS  01-24-24 @ 20:36  Lying BP: --/-- HR: --  Sitting BP: 128/81 HR: 114  Standing BP: 116/61 HR: 120  Site: --  Mode: --  Orthostatic VS  01-24-24 @ 08:21  Lying BP: --/-- HR: --  Sitting BP: 110/84 HR: 120  Standing BP: --/-- HR: --  Site: --  Mode: --  
Vital Signs Last 24 Hrs  T(C): 36.6 (02-02-24 @ 08:13), Max: 36.6 (02-02-24 @ 08:13)  T(F): 97.9 (02-02-24 @ 08:13), Max: 97.9 (02-02-24 @ 08:13)  HR: 98 (02-02-24 @ 08:13) (98 - 98)  BP: 123/85 (02-02-24 @ 08:13) (123/85 - 123/85)  BP(mean): --  RR: --  SpO2: --    Orthostatic VS  02-01-24 @ 20:19  Lying BP: --/-- HR: --  Sitting BP: 124/81 HR: 108  Standing BP: 121/64 HR: 118  Site: --  Mode: --  
Vital Signs Last 24 Hrs  T(C): 36.4 (02-09-24 @ 08:21), Max: 36.9 (02-08-24 @ 20:37)  T(F): 97.5 (02-09-24 @ 08:21), Max: 98.4 (02-08-24 @ 20:37)  HR: --  BP: --  BP(mean): --  RR: --  SpO2: --    Orthostatic VS  02-09-24 @ 08:21  Lying BP: --/-- HR: --  Sitting BP: 112/64 HR: 86  Standing BP: 98/62 HR: 112  Site: --  Mode: --  Orthostatic VS  02-08-24 @ 20:37  Lying BP: --/-- HR: --  Sitting BP: 119/82 HR: 96  Standing BP: 108/72 HR: 105  Site: --  Mode: --  Orthostatic VS  02-08-24 @ 07:58  Lying BP: --/-- HR: --  Sitting BP: 103/75 HR: 89  Standing BP: 100/65 HR: 98  Site: upper right arm  Mode: electronic  Orthostatic VS  02-07-24 @ 21:23  Lying BP: --/-- HR: --  Sitting BP: 130/72 HR: 113  Standing BP: 116/74 HR: 116  Site: --  Mode: --  
Vital Signs Last 24 Hrs  T(C): 37.1 (02-13-24 @ 07:58), Max: 37.1 (02-13-24 @ 07:58)  T(F): 98.7 (02-13-24 @ 07:58), Max: 98.7 (02-13-24 @ 07:58)  HR: --  BP: --  BP(mean): --  RR: --  SpO2: --    Orthostatic VS  02-13-24 @ 07:58  Lying BP: --/-- HR: --  Sitting BP: 105/68 HR: 79  Standing BP: 112/62 HR: 84  Site: upper right arm  Mode: --  Orthostatic VS  02-12-24 @ 20:18  Lying BP: --/-- HR: --  Sitting BP: 99/82 HR: 113  Standing BP: 92/60 HR: 97  Site: --  Mode: --  Orthostatic VS  02-11-24 @ 20:43  Lying BP: --/-- HR: --  Sitting BP: 120/82 HR: 94  Standing BP: 134/66 HR: 107  Site: --  Mode: --  
Vital Signs Last 24 Hrs  T(C): 36.9 (02-04-24 @ 20:33), Max: 36.9 (02-04-24 @ 20:33)  T(F): 98.5 (02-04-24 @ 20:33), Max: 98.5 (02-04-24 @ 20:33)  HR: 98 (02-04-24 @ 08:12) (98 - 98)  BP: 119/85 (02-04-24 @ 08:12) (119/85 - 119/85)  BP(mean): --  RR: --  SpO2: --    Orthostatic VS  02-03-24 @ 20:17  Lying BP: --/-- HR: --  Sitting BP: 121/82 HR: 103  Standing BP: 114/66 HR: 112  Site: --  Mode: --  Orthostatic VS  02-03-24 @ 08:03  Lying BP: --/-- HR: --  Sitting BP: 124/84 HR: 109  Standing BP: 115/90 HR: 100  Site: --  Mode: --  
Vital Signs Last 24 Hrs  T(C): 36.3 (02-01-24 @ 20:19), Max: 36.3 (02-01-24 @ 20:19)  T(F): 97.4 (02-01-24 @ 20:19), Max: 97.4 (02-01-24 @ 20:19)  HR: --  BP: --  BP(mean): --  RR: --  SpO2: --    Orthostatic VS  02-01-24 @ 20:19  Lying BP: --/-- HR: --  Sitting BP: 124/81 HR: 108  Standing BP: 121/64 HR: 118  Site: --  Mode: --  
Vital Signs Last 24 Hrs  T(C): 36.9 (01-26-24 @ 08:00), Max: 36.9 (01-26-24 @ 08:00)  T(F): 98.4 (01-26-24 @ 08:00), Max: 98.4 (01-26-24 @ 08:00)  HR: 109 (01-26-24 @ 08:00) (109 - 109)  BP: 104/75 (01-26-24 @ 08:00) (104/75 - 104/75)  BP(mean): --  RR: --  SpO2: --    Orthostatic VS  01-25-24 @ 20:42  Lying BP: --/-- HR: --  Sitting BP: 118/65 HR: 77  Standing BP: 95/79 HR: 90  Site: --  Mode: --  Orthostatic VS  01-25-24 @ 07:54  Lying BP: --/-- HR: --  Sitting BP: 128/71 HR: 74  Standing BP: --/-- HR: --  Site: --  Mode: --  Orthostatic VS  01-24-24 @ 20:36  Lying BP: --/-- HR: --  Sitting BP: 128/81 HR: 114  Standing BP: 116/61 HR: 120  Site: --  Mode: --  
Vital Signs Last 24 Hrs  T(C): 37 (02-05-24 @ 11:08), Max: 37 (02-05-24 @ 11:08)  T(F): 98.6 (02-05-24 @ 11:08), Max: 98.6 (02-05-24 @ 11:08)  HR: 115 (02-05-24 @ 11:08) (115 - 115)  BP: 110/60 (02-05-24 @ 11:08) (110/60 - 110/60)  BP(mean): --  RR: --  SpO2: --    Orthostatic VS  02-03-24 @ 20:17  Lying BP: --/-- HR: --  Sitting BP: 121/82 HR: 103  Standing BP: 114/66 HR: 112  Site: --  Mode: --  
Vital Signs Last 24 Hrs  T(C): 36.3 (01-29-24 @ 08:00), Max: 36.3 (01-29-24 @ 08:00)  T(F): 97.3 (01-29-24 @ 08:00), Max: 97.3 (01-29-24 @ 08:00)  HR: --  BP: --  BP(mean): --  RR: --  SpO2: --    Orthostatic VS  01-29-24 @ 20:18  Lying BP: --/-- HR: --  Sitting BP: 112/84 HR: 107  Standing BP: 124/76 HR: 96  Site: --  Mode: --  Orthostatic VS  01-29-24 @ 08:00  Lying BP: --/-- HR: --  Sitting BP: 115/95 HR: 108  Standing BP: 131/63 HR: 117  Site: --  Mode: --  Orthostatic VS  01-28-24 @ 07:46  Lying BP: --/-- HR: --  Sitting BP: 109/68 HR: 92  Standing BP: --/-- HR: --  Site: --  Mode: --  
Vital Signs Last 24 Hrs  T(C): 35.6 (02-06-24 @ 20:54), Max: 36.5 (02-06-24 @ 08:10)  T(F): 96.1 (02-06-24 @ 20:54), Max: 97.7 (02-06-24 @ 08:10)  HR: --  BP: --  BP(mean): --  RR: --  SpO2: --    Orthostatic VS  02-06-24 @ 20:54  Lying BP: --/-- HR: --  Sitting BP: 124/95 HR: 107  Standing BP: 114/63 HR: 112  Site: --  Mode: --  Orthostatic VS  02-06-24 @ 08:10  Lying BP: --/-- HR: --  Sitting BP: 91/66 HR: 91  Standing BP: 98/70 HR: 100  Site: --  Mode: --  Orthostatic VS  02-05-24 @ 20:31  Lying BP: --/-- HR: --  Sitting BP: 109/85 HR: 101  Standing BP: 113/55 HR: 113  Site: --  Mode: --  
Vital Signs Last 24 Hrs  T(C): 36.3 (02-07-24 @ 08:27), Max: 36.3 (02-07-24 @ 08:27)  T(F): 97.3 (02-07-24 @ 08:27), Max: 97.3 (02-07-24 @ 08:27)  HR: --  BP: --  BP(mean): --  RR: --  SpO2: --    Orthostatic VS  02-07-24 @ 08:27  Lying BP: --/-- HR: --  Sitting BP: 108/74 HR: 91  Standing BP: 110/66 HR: 112  Site: --  Mode: --  Orthostatic VS  02-06-24 @ 20:54  Lying BP: --/-- HR: --  Sitting BP: 124/95 HR: 107  Standing BP: 114/63 HR: 112  Site: --  Mode: --  Orthostatic VS  02-06-24 @ 08:10  Lying BP: --/-- HR: --  Sitting BP: 91/66 HR: 91  Standing BP: 98/70 HR: 100  Site: --  Mode: --  Orthostatic VS  02-05-24 @ 20:31  Lying BP: --/-- HR: --  Sitting BP: 109/85 HR: 101  Standing BP: 113/55 HR: 113  Site: --  Mode: --  
Vital Signs Last 24 Hrs  T(C): 37.1 (02-08-24 @ 07:58), Max: 37.1 (02-08-24 @ 07:58)  T(F): 98.7 (02-08-24 @ 07:58), Max: 98.7 (02-08-24 @ 07:58)  HR: --  BP: --  BP(mean): --  RR: --  SpO2: --    Orthostatic VS  02-08-24 @ 07:58  Lying BP: --/-- HR: --  Sitting BP: 103/75 HR: 89  Standing BP: 100/65 HR: 98  Site: upper right arm  Mode: electronic  Orthostatic VS  02-07-24 @ 21:23  Lying BP: --/-- HR: --  Sitting BP: 130/72 HR: 113  Standing BP: 116/74 HR: 116  Site: --  Mode: --  Orthostatic VS  02-07-24 @ 08:27  Lying BP: --/-- HR: --  Sitting BP: 108/74 HR: 91  Standing BP: 110/66 HR: 112  Site: --  Mode: --  Orthostatic VS  02-06-24 @ 20:54  Lying BP: --/-- HR: --  Sitting BP: 124/95 HR: 107  Standing BP: 114/63 HR: 112  Site: --  Mode: --

## 2024-02-13 NOTE — BH INPATIENT PSYCHIATRY PROGRESS NOTE - NSBHMSEBEHAV_PSY_A_CORE
Cooperative/Other
Cooperative
Cooperative/Other
Cooperative
Cooperative
Cooperative/Other
Cooperative
Cooperative/Other

## 2024-02-13 NOTE — BH INPATIENT PSYCHIATRY PROGRESS NOTE - NSTXPSYCHODATEEST_PSY_ALL_CORE
17-Jan-2024
17-Jan-2024
07-Feb-2024
17-Jan-2024

## 2024-02-13 NOTE — BH INPATIENT PSYCHIATRY PROGRESS NOTE - NSBHMSEAFFQUAL_PSY_A_CORE
Euthymic/Other
Euthymic/Other
Depressed/Other
Depressed/Other
Euthymic/Other
Depressed/Other
Euthymic/Other
Depressed/Other
Euthymic
Euthymic/Other
Euthymic
Depressed/Other
Depressed/Other
Euthymic/Other
Depressed/Other

## 2024-02-13 NOTE — BH INPATIENT PSYCHIATRY PROGRESS NOTE - NSTXDISORGDATETRGT_PSY_ALL_CORE
07-Feb-2024
07-Feb-2024
24-Jan-2024
24-Jan-2024
14-Feb-2024
24-Jan-2024
07-Feb-2024
24-Jan-2024
07-Feb-2024
14-Feb-2024
24-Jan-2024
07-Feb-2024
07-Feb-2024
24-Jan-2024
14-Feb-2024
24-Jan-2024
24-Jan-2024
07-Feb-2024

## 2024-02-13 NOTE — BH INPATIENT PSYCHIATRY PROGRESS NOTE - NSTXDISORGDATEEST_PSY_ALL_CORE
17-Jan-2024
07-Feb-2024
17-Jan-2024
07-Feb-2024
17-Jan-2024
07-Feb-2024
17-Jan-2024
17-Jan-2024

## 2024-02-13 NOTE — BH INPATIENT PSYCHIATRY PROGRESS NOTE - NSBHMSEHYG_PSY_A_CORE
Other
Other
Fair
Other
Other
Fair
Other
Poor
Other
Poor
Fair
Other

## 2024-02-13 NOTE — BH INPATIENT PSYCHIATRY PROGRESS NOTE - NSBHMETABOLIC_PSY_ALL_CORE_FT
BMI: BMI (kg/m2): 44.6 (01-17-24 @ 00:46)  HbA1c: A1C with Estimated Average Glucose Result: 4.8 % (01-04-24 @ 02:03)    Glucose: POCT Blood Glucose.: 105 mg/dL (01-11-24 @ 12:26)    BP: 107/67 (01-20-24 @ 07:53) (107/67 - 113/68)Vital Signs Last 24 Hrs  T(C): 36 (01-21-24 @ 06:50), Max: 36 (01-21-24 @ 06:50)  T(F): 96.8 (01-21-24 @ 06:50), Max: 96.8 (01-21-24 @ 06:50)  HR: --  BP: --  BP(mean): --  RR: --  SpO2: --    Orthostatic VS  01-21-24 @ 06:50  Lying BP: 126/81 HR: 101  Sitting BP: 113/69 HR: 100  Standing BP: --/-- HR: --  Site: --  Mode: --    Lipid Panel: Date/Time: 01-09-24 @ 07:06  Cholesterol, Serum: 79  LDL Cholesterol Calculated: 24  HDL Cholesterol, Serum: 32  Total Cholesterol/HDL Ration Measurement: --  Triglycerides, Serum: 115  
BMI: BMI (kg/m2): 44.3 (01-27-24 @ 07:46)  HbA1c: A1C with Estimated Average Glucose Result: 4.8 % (01-04-24 @ 02:03)    Glucose: POCT Blood Glucose.: 105 mg/dL (01-11-24 @ 12:26)    BP: 119/85 (02-04-24 @ 08:12) (119/85 - 123/85)Vital Signs Last 24 Hrs  T(C): 36.9 (02-04-24 @ 20:33), Max: 36.9 (02-04-24 @ 20:33)  T(F): 98.5 (02-04-24 @ 20:33), Max: 98.5 (02-04-24 @ 20:33)  HR: 98 (02-04-24 @ 08:12) (98 - 98)  BP: 119/85 (02-04-24 @ 08:12) (119/85 - 119/85)  BP(mean): --  RR: --  SpO2: --    Orthostatic VS  02-03-24 @ 20:17  Lying BP: --/-- HR: --  Sitting BP: 121/82 HR: 103  Standing BP: 114/66 HR: 112  Site: --  Mode: --  Orthostatic VS  02-03-24 @ 08:03  Lying BP: --/-- HR: --  Sitting BP: 124/84 HR: 109  Standing BP: 115/90 HR: 100  Site: --  Mode: --    Lipid Panel: Date/Time: 01-09-24 @ 07:06  Cholesterol, Serum: 79  LDL Cholesterol Calculated: 24  HDL Cholesterol, Serum: 32  Total Cholesterol/HDL Ration Measurement: --  Triglycerides, Serum: 115  
BMI: BMI (kg/m2): 44.6 (01-17-24 @ 00:46)  HbA1c: A1C with Estimated Average Glucose Result: 4.8 % (01-04-24 @ 02:03)    Glucose: POCT Blood Glucose.: 105 mg/dL (01-11-24 @ 12:26)    BP: 107/67 (01-20-24 @ 07:53) (107/67 - 107/67)Vital Signs Last 24 Hrs  T(C): --  T(F): --  HR: --  BP: --  BP(mean): --  RR: --  SpO2: --    Orthostatic VS  01-21-24 @ 06:50  Lying BP: 126/81 HR: 101  Sitting BP: 113/69 HR: 100  Standing BP: --/-- HR: --  Site: --  Mode: --    Lipid Panel: Date/Time: 01-09-24 @ 07:06  Cholesterol, Serum: 79  LDL Cholesterol Calculated: 24  HDL Cholesterol, Serum: 32  Total Cholesterol/HDL Ration Measurement: --  Triglycerides, Serum: 115  
BMI: BMI (kg/m2): 44.3 (01-27-24 @ 07:46)  HbA1c: A1C with Estimated Average Glucose Result: 4.8 % (01-04-24 @ 02:03)    Glucose: POCT Blood Glucose.: 105 mg/dL (01-11-24 @ 12:26)    BP: 123/85 (02-02-24 @ 08:13) (123/85 - 123/85)Vital Signs Last 24 Hrs  T(C): 36.6 (02-02-24 @ 08:13), Max: 36.6 (02-02-24 @ 08:13)  T(F): 97.9 (02-02-24 @ 08:13), Max: 97.9 (02-02-24 @ 08:13)  HR: 98 (02-02-24 @ 08:13) (98 - 98)  BP: 123/85 (02-02-24 @ 08:13) (123/85 - 123/85)  BP(mean): --  RR: --  SpO2: --    Orthostatic VS  02-01-24 @ 20:19  Lying BP: --/-- HR: --  Sitting BP: 124/81 HR: 108  Standing BP: 121/64 HR: 118  Site: --  Mode: --    Lipid Panel: Date/Time: 01-09-24 @ 07:06  Cholesterol, Serum: 79  LDL Cholesterol Calculated: 24  HDL Cholesterol, Serum: 32  Total Cholesterol/HDL Ration Measurement: --  Triglycerides, Serum: 115  
BMI: BMI (kg/m2): 44.3 (01-27-24 @ 07:46)  HbA1c: A1C with Estimated Average Glucose Result: 4.8 % (01-04-24 @ 02:03)    Glucose: POCT Blood Glucose.: 105 mg/dL (01-11-24 @ 12:26)    BP: 103/72 (02-12-24 @ 07:58) (103/72 - 103/72)Vital Signs Last 24 Hrs  T(C): 37.1 (02-13-24 @ 07:58), Max: 37.1 (02-13-24 @ 07:58)  T(F): 98.7 (02-13-24 @ 07:58), Max: 98.7 (02-13-24 @ 07:58)  HR: --  BP: --  BP(mean): --  RR: --  SpO2: --    Orthostatic VS  02-13-24 @ 07:58  Lying BP: --/-- HR: --  Sitting BP: 105/68 HR: 79  Standing BP: 112/62 HR: 84  Site: upper right arm  Mode: --  Orthostatic VS  02-12-24 @ 20:18  Lying BP: --/-- HR: --  Sitting BP: 99/82 HR: 113  Standing BP: 92/60 HR: 97  Site: --  Mode: --  Orthostatic VS  02-11-24 @ 20:43  Lying BP: --/-- HR: --  Sitting BP: 120/82 HR: 94  Standing BP: 134/66 HR: 107  Site: --  Mode: --    Lipid Panel: Date/Time: 01-09-24 @ 07:06  Cholesterol, Serum: 79  LDL Cholesterol Calculated: 24  HDL Cholesterol, Serum: 32  Total Cholesterol/HDL Ration Measurement: --  Triglycerides, Serum: 115  
BMI: BMI (kg/m2): 44.3 (01-27-24 @ 07:46)  HbA1c: A1C with Estimated Average Glucose Result: 4.8 % (01-04-24 @ 02:03)    Glucose: POCT Blood Glucose.: 105 mg/dL (01-11-24 @ 12:26)    BP: 110/60 (02-05-24 @ 11:08) (110/60 - 110/60)Vital Signs Last 24 Hrs  T(C): 36.3 (02-07-24 @ 08:27), Max: 36.3 (02-07-24 @ 08:27)  T(F): 97.3 (02-07-24 @ 08:27), Max: 97.3 (02-07-24 @ 08:27)  HR: --  BP: --  BP(mean): --  RR: --  SpO2: --    Orthostatic VS  02-07-24 @ 08:27  Lying BP: --/-- HR: --  Sitting BP: 108/74 HR: 91  Standing BP: 110/66 HR: 112  Site: --  Mode: --  Orthostatic VS  02-06-24 @ 20:54  Lying BP: --/-- HR: --  Sitting BP: 124/95 HR: 107  Standing BP: 114/63 HR: 112  Site: --  Mode: --  Orthostatic VS  02-06-24 @ 08:10  Lying BP: --/-- HR: --  Sitting BP: 91/66 HR: 91  Standing BP: 98/70 HR: 100  Site: --  Mode: --  Orthostatic VS  02-05-24 @ 20:31  Lying BP: --/-- HR: --  Sitting BP: 109/85 HR: 101  Standing BP: 113/55 HR: 113  Site: --  Mode: --    Lipid Panel: Date/Time: 01-09-24 @ 07:06  Cholesterol, Serum: 79  LDL Cholesterol Calculated: 24  HDL Cholesterol, Serum: 32  Total Cholesterol/HDL Ration Measurement: --  Triglycerides, Serum: 115  
BMI: BMI (kg/m2): 44.3 (01-27-24 @ 07:46)  HbA1c: A1C with Estimated Average Glucose Result: 4.8 % (01-04-24 @ 02:03)    Glucose: POCT Blood Glucose.: 105 mg/dL (01-11-24 @ 12:26)    BP: --Vital Signs Last 24 Hrs  T(C): 37.1 (02-08-24 @ 07:58), Max: 37.1 (02-08-24 @ 07:58)  T(F): 98.7 (02-08-24 @ 07:58), Max: 98.7 (02-08-24 @ 07:58)  HR: --  BP: --  BP(mean): --  RR: --  SpO2: --    Orthostatic VS  02-08-24 @ 07:58  Lying BP: --/-- HR: --  Sitting BP: 103/75 HR: 89  Standing BP: 100/65 HR: 98  Site: upper right arm  Mode: electronic  Orthostatic VS  02-07-24 @ 21:23  Lying BP: --/-- HR: --  Sitting BP: 130/72 HR: 113  Standing BP: 116/74 HR: 116  Site: --  Mode: --  Orthostatic VS  02-07-24 @ 08:27  Lying BP: --/-- HR: --  Sitting BP: 108/74 HR: 91  Standing BP: 110/66 HR: 112  Site: --  Mode: --  Orthostatic VS  02-06-24 @ 20:54  Lying BP: --/-- HR: --  Sitting BP: 124/95 HR: 107  Standing BP: 114/63 HR: 112  Site: --  Mode: --    Lipid Panel: Date/Time: 01-09-24 @ 07:06  Cholesterol, Serum: 79  LDL Cholesterol Calculated: 24  HDL Cholesterol, Serum: 32  Total Cholesterol/HDL Ration Measurement: --  Triglycerides, Serum: 115  
BMI: BMI (kg/m2): 44.3 (01-27-24 @ 07:46)  HbA1c: A1C with Estimated Average Glucose Result: 4.8 % (01-04-24 @ 02:03)    Glucose: POCT Blood Glucose.: 105 mg/dL (01-11-24 @ 12:26)    BP: 110/60 (02-05-24 @ 11:08) (110/60 - 119/85)Vital Signs Last 24 Hrs  T(C): 37 (02-05-24 @ 11:08), Max: 37 (02-05-24 @ 11:08)  T(F): 98.6 (02-05-24 @ 11:08), Max: 98.6 (02-05-24 @ 11:08)  HR: 115 (02-05-24 @ 11:08) (115 - 115)  BP: 110/60 (02-05-24 @ 11:08) (110/60 - 110/60)  BP(mean): --  RR: --  SpO2: --    Orthostatic VS  02-03-24 @ 20:17  Lying BP: --/-- HR: --  Sitting BP: 121/82 HR: 103  Standing BP: 114/66 HR: 112  Site: --  Mode: --    Lipid Panel: Date/Time: 01-09-24 @ 07:06  Cholesterol, Serum: 79  LDL Cholesterol Calculated: 24  HDL Cholesterol, Serum: 32  Total Cholesterol/HDL Ration Measurement: --  Triglycerides, Serum: 115  
BMI: BMI (kg/m2): 44.3 (01-27-24 @ 07:46)  HbA1c: A1C with Estimated Average Glucose Result: 4.8 % (01-04-24 @ 02:03)    Glucose: POCT Blood Glucose.: 105 mg/dL (01-11-24 @ 12:26)    BP: --Vital Signs Last 24 Hrs  T(C): 36.3 (01-29-24 @ 08:00), Max: 36.3 (01-29-24 @ 08:00)  T(F): 97.3 (01-29-24 @ 08:00), Max: 97.3 (01-29-24 @ 08:00)  HR: --  BP: --  BP(mean): --  RR: --  SpO2: --    Orthostatic VS  01-29-24 @ 20:18  Lying BP: --/-- HR: --  Sitting BP: 112/84 HR: 107  Standing BP: 124/76 HR: 96  Site: --  Mode: --  Orthostatic VS  01-29-24 @ 08:00  Lying BP: --/-- HR: --  Sitting BP: 115/95 HR: 108  Standing BP: 131/63 HR: 117  Site: --  Mode: --  Orthostatic VS  01-28-24 @ 07:46  Lying BP: --/-- HR: --  Sitting BP: 109/68 HR: 92  Standing BP: --/-- HR: --  Site: --  Mode: --    Lipid Panel: Date/Time: 01-09-24 @ 07:06  Cholesterol, Serum: 79  LDL Cholesterol Calculated: 24  HDL Cholesterol, Serum: 32  Total Cholesterol/HDL Ration Measurement: --  Triglycerides, Serum: 115  
BMI: BMI (kg/m2): 44.6 (01-17-24 @ 00:46)  HbA1c: A1C with Estimated Average Glucose Result: 4.8 % (01-04-24 @ 02:03)    Glucose: POCT Blood Glucose.: 105 mg/dL (01-11-24 @ 12:26)    BP: 124/73 (01-23-24 @ 20:25) (124/73 - 124/73)Vital Signs Last 24 Hrs  T(C): 36.6 (01-23-24 @ 20:25), Max: 36.6 (01-23-24 @ 20:25)  T(F): 97.8 (01-23-24 @ 20:25), Max: 97.8 (01-23-24 @ 20:25)  HR: --  BP: 124/73 (01-23-24 @ 20:25) (124/73 - 124/73)  BP(mean): 113 (01-23-24 @ 20:25) (113 - 113)  RR: --  SpO2: --      Lipid Panel: Date/Time: 01-09-24 @ 07:06  Cholesterol, Serum: 79  LDL Cholesterol Calculated: 24  HDL Cholesterol, Serum: 32  Total Cholesterol/HDL Ration Measurement: --  Triglycerides, Serum: 115  
BMI: BMI (kg/m2): 44.3 (01-27-24 @ 07:46)  HbA1c: A1C with Estimated Average Glucose Result: 4.8 % (01-04-24 @ 02:03)    Glucose: POCT Blood Glucose.: 105 mg/dL (01-11-24 @ 12:26)    BP: 110/60 (02-05-24 @ 11:08) (110/60 - 119/85)Vital Signs Last 24 Hrs  T(C): 35.6 (02-06-24 @ 20:54), Max: 36.5 (02-06-24 @ 08:10)  T(F): 96.1 (02-06-24 @ 20:54), Max: 97.7 (02-06-24 @ 08:10)  HR: --  BP: --  BP(mean): --  RR: --  SpO2: --    Orthostatic VS  02-06-24 @ 20:54  Lying BP: --/-- HR: --  Sitting BP: 124/95 HR: 107  Standing BP: 114/63 HR: 112  Site: --  Mode: --  Orthostatic VS  02-06-24 @ 08:10  Lying BP: --/-- HR: --  Sitting BP: 91/66 HR: 91  Standing BP: 98/70 HR: 100  Site: --  Mode: --  Orthostatic VS  02-05-24 @ 20:31  Lying BP: --/-- HR: --  Sitting BP: 109/85 HR: 101  Standing BP: 113/55 HR: 113  Site: --  Mode: --    Lipid Panel: Date/Time: 01-09-24 @ 07:06  Cholesterol, Serum: 79  LDL Cholesterol Calculated: 24  HDL Cholesterol, Serum: 32  Total Cholesterol/HDL Ration Measurement: --  Triglycerides, Serum: 115  
BMI: BMI (kg/m2): 44.6 (01-17-24 @ 00:46)  HbA1c: A1C with Estimated Average Glucose Result: 4.8 % (01-04-24 @ 02:03)    Glucose: POCT Blood Glucose.: 105 mg/dL (01-11-24 @ 12:26)    BP: 113/68 (01-19-24 @ 07:55) (93/49 - 113/82)Vital Signs Last 24 Hrs  T(C): 36.2 (01-19-24 @ 19:47), Max: 36.8 (01-19-24 @ 07:55)  T(F): 97.1 (01-19-24 @ 19:47), Max: 98.3 (01-19-24 @ 07:55)  HR: 82 (01-19-24 @ 07:55) (82 - 85)  BP: 113/68 (01-19-24 @ 07:55) (107/87 - 113/68)  BP(mean): --  RR: --  SpO2: --      Lipid Panel: Date/Time: 01-09-24 @ 07:06  Cholesterol, Serum: 79  LDL Cholesterol Calculated: 24  HDL Cholesterol, Serum: 32  Total Cholesterol/HDL Ration Measurement: --  Triglycerides, Serum: 115  
BMI: BMI (kg/m2): 44.3 (01-27-24 @ 07:46)  HbA1c: A1C with Estimated Average Glucose Result: 4.8 % (01-04-24 @ 02:03)    Glucose: POCT Blood Glucose.: 105 mg/dL (01-11-24 @ 12:26)    BP: --Vital Signs Last 24 Hrs  T(C): 36.4 (02-09-24 @ 08:21), Max: 36.9 (02-08-24 @ 20:37)  T(F): 97.5 (02-09-24 @ 08:21), Max: 98.4 (02-08-24 @ 20:37)  HR: --  BP: --  BP(mean): --  RR: --  SpO2: --    Orthostatic VS  02-09-24 @ 08:21  Lying BP: --/-- HR: --  Sitting BP: 112/64 HR: 86  Standing BP: 98/62 HR: 112  Site: --  Mode: --  Orthostatic VS  02-08-24 @ 20:37  Lying BP: --/-- HR: --  Sitting BP: 119/82 HR: 96  Standing BP: 108/72 HR: 105  Site: --  Mode: --  Orthostatic VS  02-08-24 @ 07:58  Lying BP: --/-- HR: --  Sitting BP: 103/75 HR: 89  Standing BP: 100/65 HR: 98  Site: upper right arm  Mode: electronic  Orthostatic VS  02-07-24 @ 21:23  Lying BP: --/-- HR: --  Sitting BP: 130/72 HR: 113  Standing BP: 116/74 HR: 116  Site: --  Mode: --    Lipid Panel: Date/Time: 01-09-24 @ 07:06  Cholesterol, Serum: 79  LDL Cholesterol Calculated: 24  HDL Cholesterol, Serum: 32  Total Cholesterol/HDL Ration Measurement: --  Triglycerides, Serum: 115  
BMI: BMI (kg/m2): 44.3 (01-27-24 @ 07:46)  HbA1c: A1C with Estimated Average Glucose Result: 4.8 % (01-04-24 @ 02:03)    Glucose: POCT Blood Glucose.: 105 mg/dL (01-11-24 @ 12:26)    BP: --Vital Signs Last 24 Hrs  T(C): 36.3 (02-01-24 @ 20:19), Max: 36.3 (02-01-24 @ 20:19)  T(F): 97.4 (02-01-24 @ 20:19), Max: 97.4 (02-01-24 @ 20:19)  HR: --  BP: --  BP(mean): --  RR: --  SpO2: --    Orthostatic VS  02-01-24 @ 20:19  Lying BP: --/-- HR: --  Sitting BP: 124/81 HR: 108  Standing BP: 121/64 HR: 118  Site: --  Mode: --    Lipid Panel: Date/Time: 01-09-24 @ 07:06  Cholesterol, Serum: 79  LDL Cholesterol Calculated: 24  HDL Cholesterol, Serum: 32  Total Cholesterol/HDL Ration Measurement: --  Triglycerides, Serum: 115  
BMI: BMI (kg/m2): 44.3 (01-27-24 @ 07:46)  HbA1c: A1C with Estimated Average Glucose Result: 4.8 % (01-04-24 @ 02:03)    Glucose: POCT Blood Glucose.: 105 mg/dL (01-11-24 @ 12:26)    BP: --Vital Signs Last 24 Hrs  T(C): 36.3 (01-29-24 @ 08:00), Max: 36.3 (01-29-24 @ 08:00)  T(F): 97.3 (01-29-24 @ 08:00), Max: 97.3 (01-29-24 @ 08:00)  HR: --  BP: --  BP(mean): --  RR: --  SpO2: --    Orthostatic VS  01-29-24 @ 08:00  Lying BP: --/-- HR: --  Sitting BP: 115/95 HR: 108  Standing BP: 131/63 HR: 117  Site: --  Mode: --  Orthostatic VS  01-28-24 @ 07:46  Lying BP: --/-- HR: --  Sitting BP: 109/68 HR: 92  Standing BP: --/-- HR: --  Site: --  Mode: --  Orthostatic VS  01-27-24 @ 20:38  Lying BP: --/-- HR: --  Sitting BP: 120/71 HR: 100  Standing BP: 125/72 HR: 83  Site: --  Mode: --    Lipid Panel: Date/Time: 01-09-24 @ 07:06  Cholesterol, Serum: 79  LDL Cholesterol Calculated: 24  HDL Cholesterol, Serum: 32  Total Cholesterol/HDL Ration Measurement: --  Triglycerides, Serum: 115  
BMI: BMI (kg/m2): 44.6 (01-17-24 @ 00:46)  HbA1c: A1C with Estimated Average Glucose Result: 4.8 % (01-04-24 @ 02:03)    Glucose: POCT Blood Glucose.: 105 mg/dL (01-11-24 @ 12:26)    BP: 104/75 (01-26-24 @ 08:00) (104/75 - 124/73)Vital Signs Last 24 Hrs  T(C): 36.9 (01-26-24 @ 08:00), Max: 36.9 (01-26-24 @ 08:00)  T(F): 98.4 (01-26-24 @ 08:00), Max: 98.4 (01-26-24 @ 08:00)  HR: 109 (01-26-24 @ 08:00) (109 - 109)  BP: 104/75 (01-26-24 @ 08:00) (104/75 - 104/75)  BP(mean): --  RR: --  SpO2: --    Orthostatic VS  01-25-24 @ 20:42  Lying BP: --/-- HR: --  Sitting BP: 118/65 HR: 77  Standing BP: 95/79 HR: 90  Site: --  Mode: --  Orthostatic VS  01-25-24 @ 07:54  Lying BP: --/-- HR: --  Sitting BP: 128/71 HR: 74  Standing BP: --/-- HR: --  Site: --  Mode: --  Orthostatic VS  01-24-24 @ 20:36  Lying BP: --/-- HR: --  Sitting BP: 128/81 HR: 114  Standing BP: 116/61 HR: 120  Site: --  Mode: --    Lipid Panel: Date/Time: 01-09-24 @ 07:06  Cholesterol, Serum: 79  LDL Cholesterol Calculated: 24  HDL Cholesterol, Serum: 32  Total Cholesterol/HDL Ration Measurement: --  Triglycerides, Serum: 115  
BMI: BMI (kg/m2): 44.6 (01-17-24 @ 00:46)  HbA1c: A1C with Estimated Average Glucose Result: 4.8 % (01-04-24 @ 02:03)    Glucose: POCT Blood Glucose.: 105 mg/dL (01-11-24 @ 12:26)    BP: 107/67 (01-20-24 @ 07:53) (107/67 - 113/68)Vital Signs Last 24 Hrs  T(C): 36 (01-21-24 @ 06:50), Max: 36 (01-21-24 @ 06:50)  T(F): 96.8 (01-21-24 @ 06:50), Max: 96.8 (01-21-24 @ 06:50)  HR: --  BP: --  BP(mean): --  RR: --  SpO2: --    Orthostatic VS  01-21-24 @ 06:50  Lying BP: 126/81 HR: 101  Sitting BP: 113/69 HR: 100  Standing BP: --/-- HR: --  Site: --  Mode: --    Lipid Panel: Date/Time: 01-09-24 @ 07:06  Cholesterol, Serum: 79  LDL Cholesterol Calculated: 24  HDL Cholesterol, Serum: 32  Total Cholesterol/HDL Ration Measurement: --  Triglycerides, Serum: 115  
BMI: BMI (kg/m2): 44.6 (01-17-24 @ 00:46)  HbA1c: A1C with Estimated Average Glucose Result: 4.8 % (01-04-24 @ 02:03)    Glucose: POCT Blood Glucose.: 105 mg/dL (01-11-24 @ 12:26)    BP: 124/73 (01-23-24 @ 20:25) (124/73 - 124/73)Vital Signs Last 24 Hrs  T(C): 36.7 (01-24-24 @ 18:11), Max: 36.7 (01-24-24 @ 18:11)  T(F): 98 (01-24-24 @ 18:11), Max: 98 (01-24-24 @ 18:11)  HR: --  BP: --  BP(mean): --  RR: --  SpO2: --    Orthostatic VS  01-24-24 @ 20:36  Lying BP: --/-- HR: --  Sitting BP: 128/81 HR: 114  Standing BP: 116/61 HR: 120  Site: --  Mode: --  Orthostatic VS  01-24-24 @ 08:21  Lying BP: --/-- HR: --  Sitting BP: 110/84 HR: 120  Standing BP: --/-- HR: --  Site: --  Mode: --    Lipid Panel: Date/Time: 01-09-24 @ 07:06  Cholesterol, Serum: 79  LDL Cholesterol Calculated: 24  HDL Cholesterol, Serum: 32  Total Cholesterol/HDL Ration Measurement: --  Triglycerides, Serum: 115  
BMI: BMI (kg/m2): 44.6 (01-17-24 @ 00:46)  HbA1c: A1C with Estimated Average Glucose Result: 4.8 % (01-04-24 @ 02:03)    Glucose: POCT Blood Glucose.: 105 mg/dL (01-11-24 @ 12:26)    BP: 107/67 (01-20-24 @ 07:53) (107/67 - 113/82)Vital Signs Last 24 Hrs  T(C): 36.2 (01-20-24 @ 07:53), Max: 36.2 (01-19-24 @ 19:47)  T(F): 97.2 (01-20-24 @ 07:53), Max: 97.2 (01-20-24 @ 07:53)  HR: 79 (01-20-24 @ 07:53) (79 - 79)  BP: 107/67 (01-20-24 @ 07:53) (107/67 - 107/67)  BP(mean): --  RR: --  SpO2: --      Lipid Panel: Date/Time: 01-09-24 @ 07:06  Cholesterol, Serum: 79  LDL Cholesterol Calculated: 24  HDL Cholesterol, Serum: 32  Total Cholesterol/HDL Ration Measurement: --  Triglycerides, Serum: 115  
BMI: BMI (kg/m2): 44.3 (01-27-24 @ 07:46)  HbA1c: A1C with Estimated Average Glucose Result: 4.8 % (01-04-24 @ 02:03)    Glucose: POCT Blood Glucose.: 105 mg/dL (01-11-24 @ 12:26)    BP: --Vital Signs Last 24 Hrs  T(C): --  T(F): --  HR: --  BP: --  BP(mean): --  RR: --  SpO2: --    Orthostatic VS  01-30-24 @ 20:04  Lying BP: --/-- HR: --  Sitting BP: 119/68 HR: 98  Standing BP: 122/74 HR: 100  Site: --  Mode: --  Orthostatic VS  01-30-24 @ 08:28  Lying BP: --/-- HR: --  Sitting BP: 121/88 HR: 102  Standing BP: --/-- HR: --  Site: --  Mode: --  Orthostatic VS  01-29-24 @ 20:18  Lying BP: --/-- HR: --  Sitting BP: 112/84 HR: 107  Standing BP: 124/76 HR: 96  Site: --  Mode: --    Lipid Panel: Date/Time: 01-09-24 @ 07:06  Cholesterol, Serum: 79  LDL Cholesterol Calculated: 24  HDL Cholesterol, Serum: 32  Total Cholesterol/HDL Ration Measurement: --  Triglycerides, Serum: 115

## 2024-02-13 NOTE — BH INPATIENT PSYCHIATRY PROGRESS NOTE - NSTXDCOTHRDATETRGT_PSY_ALL_CORE
14-Feb-2024
24-Jan-2024
07-Feb-2024
24-Jan-2024
14-Feb-2024
31-Jan-2024
07-Feb-2024
14-Feb-2024
31-Jan-2024
07-Feb-2024
07-Feb-2024
14-Feb-2024
24-Jan-2024
07-Feb-2024
24-Jan-2024
31-Jan-2024
31-Jan-2024
07-Feb-2024

## 2024-02-13 NOTE — BH INPATIENT PSYCHIATRY PROGRESS NOTE - NSTXCOPEDATETRGT_PSY_ALL_CORE
07-Feb-2024
07-Feb-2024
24-Jan-2024
24-Jan-2024
31-Jan-2024
24-Jan-2024
14-Feb-2024
24-Jan-2024
07-Feb-2024
31-Jan-2024
07-Feb-2024
31-Jan-2024
31-Jan-2024
07-Feb-2024
07-Feb-2024
14-Feb-2024
07-Feb-2024
14-Feb-2024

## 2024-02-13 NOTE — BH INPATIENT PSYCHIATRY PROGRESS NOTE - NSBHMSEGAIT_PSY_A_CORE
Normal gait / station
Unable to assess
Unable to assess
Normal gait / station
Unable to assess
Normal gait / station

## 2024-02-13 NOTE — BH INPATIENT PSYCHIATRY PROGRESS NOTE - NSBHMSEINTELL_PSY_A_CORE
Below Average

## 2024-02-13 NOTE — BH INPATIENT PSYCHIATRY PROGRESS NOTE - NSTXPROBDCFAM_PSY_ALL_CORE
DISCHARGE ISSUE - POOR ENGAGEMENT WITH SUPPORTS/FAMILY

## 2024-02-13 NOTE — BH INPATIENT PSYCHIATRY PROGRESS NOTE - NSTXCOPEDATEEST_PSY_ALL_CORE
17-Jan-2024

## 2024-02-13 NOTE — BH INPATIENT PSYCHIATRY PROGRESS NOTE - NSBHMSEBODY_PSY_A_CORE
Obese/Other
Obese
Overweight
Obese/Other
Obese
Obese/Other
Obese
Obese/Other
Overweight
Obese/Other
Obese

## 2024-02-14 LAB
CLOZAPINE SERPL-MCNC: 493 NG/ML — SIGNIFICANT CHANGE UP (ref 350–600)
CLOZAPINE SPEC-SCNC: 686 NG/ML — SIGNIFICANT CHANGE UP
NORCLOZAPINE SERPL-MCNC: 193 NG/ML — SIGNIFICANT CHANGE UP

## 2024-02-29 ENCOUNTER — EMERGENCY (EMERGENCY)
Facility: HOSPITAL | Age: 39
LOS: 1 days | Discharge: HOME | End: 2024-02-29
Attending: EMERGENCY MEDICINE | Admitting: EMERGENCY MEDICINE
Payer: MEDICAID

## 2024-02-29 VITALS
RESPIRATION RATE: 16 BRPM | HEIGHT: 64 IN | OXYGEN SATURATION: 97 % | HEART RATE: 89 BPM | TEMPERATURE: 98 F | DIASTOLIC BLOOD PRESSURE: 79 MMHG | SYSTOLIC BLOOD PRESSURE: 108 MMHG

## 2024-02-29 PROCEDURE — 99283 EMERGENCY DEPT VISIT LOW MDM: CPT

## 2024-02-29 NOTE — ED ADULT TRIAGE NOTE - CHIEF COMPLAINT QUOTE
pt to ED s/p fall earlier today due to "upper body and facial twitching" pt denies pain, LOC, head strike, anticoagulant use. per EMS twitching has been going on for month and recently getting worse, medication recently changed by Dr. Jon. noted to have episodes of intermittent twitching in triage.   phx: schizophrenia, seizure disorder.

## 2024-02-29 NOTE — ED ADULT NURSE NOTE - OBJECTIVE STATEMENT
INTAKE RN: Patient arrives to intake. AOx3. Hx of Schizophrenia, seizure disorder. c/o subjective shaking, and reports fall yesterday r/t 'shaking' as per pt. Pt states she is shaking at this time. No tremors noted. Denies pain, LOC, head strike, AC use. Was hospitalized at [facility] 1/17 and started on new medications at that time w/ discharge. Breathing even and unlabored on  room air, no signs of dyspnea or respiratory distress. No signs of cyanosis/pallor noted. No injuries, deformities or signs of trauma noted, no lacs/bleeding/hemtomas, ecchymosis or swelling noted. Safety maintained, stretcher locked in lowest position with siderails up x2. pt seen, evaluated and discharged by MD Solano, no nursing interventions needed at this time. Pt pending SW.

## 2024-02-29 NOTE — ED PROVIDER NOTE - CLINICAL SUMMARY MEDICAL DECISION MAKING FREE TEXT BOX
38 F with history of schizophrenia, "shaking", and witnessed fall prior to arrival.  Patient offers no complaints in ED, ambulating easily in ED, has episodes of shaking chronically, and typically they are not associated with unsteadiness of gait or falls.  Reviewed recent history, patient does not seem to be at risk for falls or with medical illness.  Will DC back to [facility].  Social work aware.

## 2024-02-29 NOTE — ED ADULT NURSE NOTE - PAIN RATING/NUMBER SCALE (0-10): ACTIVITY
Pharmacy Kinetics Addendum:    54 y.o. male on vancomycin day # 1 9/11/2019    Currently on Vancomycin 1900 mg iv q12hr (1400, 0200)  Provider specified end date: TBD    Indication for Treatment: SSTI, septic arthritis    Recent Labs     09/11/19  0346   BUN 20   CREATININE 0.92   ALBUMIN 3.7     No results for input(s): VANCOTROUGH, VANCOPEAK, VANCORANDOM in the last 72 hours.    A/P   1. Vancomycin dose change: not indicated at this time  2. Next vancomycin level: 9/12/19 at 0130 prior to third total dose  3. Goal trough: 12-16 mcg/mL  4. Comments: Cultures from [facility] growing GPC. Vancomycin to continue at current dose. Level ordered for tomorrow at 0130 prior to third total dose. Pharmacy will continue to monitor.    Melba Herzog, PharmD     0 (no pain/absence of nonverbal indicators of pain)

## 2024-02-29 NOTE — ED ADULT NURSE NOTE - NSFALLRISKINTERV_ED_ALL_ED
Assistance OOB with selected safe patient handling equipment if applicable/Assistance with ambulation/Communicate fall risk and risk factors to all staff, patient, and family/Monitor gait and stability/Monitor for mental status changes and reorient to person, place, and time, as needed/Provide visual cue: yellow wristband, yellow gown, etc/Reinforce activity limits and safety measures with patient and family/Toileting schedule using arm’s reach rule for commode and bathroom/Use of alarms - bed, stretcher, chair and/or video monitoring/Call bell, personal items and telephone in reach/Instruct patient to call for assistance before getting out of bed/chair/stretcher/Non-slip footwear applied when patient is off stretcher/[place] to call system/Physically safe environment - no spills, clutter or unnecessary equipment/Purposeful Proactive Rounding/Room/bathroom lighting operational, light cord in reach

## 2024-02-29 NOTE — ED PROVIDER NOTE - PATIENT PORTAL LINK FT
You can access the FollowMyHealth Patient Portal offered by [facility] by registering at the following website: [email or web address]. By joining [employer]’s FollowMyHealth portal, you will also be able to view your health information using other applications (apps) compatible with our system.

## 2024-02-29 NOTE — ED PROVIDER NOTE - NSFOLLOWUPINSTRUCTIONS_ED_ALL_ED_FT
You were evaluated after fall.    There were no obvious injuries or concerns.    Continue your medications as prescribed.    Follow-up with your regular medical doctor.    Return to emergency for any worsening pain, fever, weakness, numbness, dizziness, fainting, falls or any signs of distress.

## 2024-02-29 NOTE — ED PROVIDER NOTE - OBJECTIVE STATEMENT
38 F status post witnessed fall at [facility] where she resides.  Patient states the fall was due to "shaking."  Patient states has episodes of shaking all the time, and they may be related to her medications.  Patient states "my face and chin are shaking right now like they were when I fell" while patient is verbalizing, holding perfectly still, has full active range of motion, obeys commands to move head and chin.  Patient denies body shaking or tremor, denies unsteadiness or dizziness, able to ambulate in the ED NAD.  No recent illness, was hospitalized at [facility] 1/17 and had new medications at that time upon discharge but this week has been otherwise feeling well.  Normal p.o. intake, no N/V/D, no cough/SOB, no urinary complaints.  After witnessed fall patient was helped up by staff.  No head injury, no LOC, no change in behavior but was sent to ED for eval.

## 2024-02-29 NOTE — PROVIDER CONTACT NOTE (OTHER) - ASSESSMENT
Pt is cleared to return back to [place] [address] [phone], spoke with Ms. Barertt , she asked to send pt via [facility]. SW arranged S [facility] [phone]. P/U 8 PM. Trip# [id number]. Verbal Huddle completed.

## 2024-03-01 ENCOUNTER — EMERGENCY (EMERGENCY)
Facility: HOSPITAL | Age: 39
LOS: 1 days | Discharge: HOME | End: 2024-03-01
Attending: EMERGENCY MEDICINE | Admitting: EMERGENCY MEDICINE
Payer: MEDICAID

## 2024-03-01 VITALS
HEART RATE: 85 BPM | RESPIRATION RATE: 16 BRPM | OXYGEN SATURATION: 94 % | DIASTOLIC BLOOD PRESSURE: 66 MMHG | TEMPERATURE: 98 F | SYSTOLIC BLOOD PRESSURE: 98 MMHG | HEIGHT: 64 IN

## 2024-03-01 VITALS
OXYGEN SATURATION: 100 % | DIASTOLIC BLOOD PRESSURE: 80 MMHG | RESPIRATION RATE: 17 BRPM | TEMPERATURE: 98 F | HEART RATE: 90 BPM | SYSTOLIC BLOOD PRESSURE: 135 MMHG

## 2024-03-01 LAB
ALBUMIN SERPL ELPH-MCNC: 3.8 G/DL — SIGNIFICANT CHANGE UP (ref 3.3–5)
ALP SERPL-CCNC: 70 U/L — SIGNIFICANT CHANGE UP (ref 40–120)
ALT FLD-CCNC: 21 U/L — SIGNIFICANT CHANGE UP (ref 4–33)
ANION GAP SERPL CALC-SCNC: 7 MMOL/L — SIGNIFICANT CHANGE UP (ref 7–14)
AST SERPL-CCNC: 14 U/L — SIGNIFICANT CHANGE UP (ref 4–32)
BASOPHILS # BLD AUTO: 0.03 K/UL — SIGNIFICANT CHANGE UP (ref 0–0.2)
BASOPHILS NFR BLD AUTO: 0.4 % — SIGNIFICANT CHANGE UP (ref 0–2)
BILIRUB SERPL-MCNC: 0.3 MG/DL — SIGNIFICANT CHANGE UP (ref 0.2–1.2)
BUN SERPL-MCNC: 8 MG/DL — SIGNIFICANT CHANGE UP (ref 7–23)
CALCIUM SERPL-MCNC: 9.3 MG/DL — SIGNIFICANT CHANGE UP (ref 8.4–10.5)
CHLORIDE SERPL-SCNC: 108 MMOL/L — HIGH (ref 98–107)
CO2 SERPL-SCNC: 28 MMOL/L — SIGNIFICANT CHANGE UP (ref 22–31)
CREAT SERPL-MCNC: 0.99 MG/DL — SIGNIFICANT CHANGE UP (ref 0.5–1.3)
EGFR: 74 ML/MIN/1.73M2 — SIGNIFICANT CHANGE UP
EOSINOPHIL # BLD AUTO: 0.13 K/UL — SIGNIFICANT CHANGE UP (ref 0–0.5)
EOSINOPHIL NFR BLD AUTO: 1.7 % — SIGNIFICANT CHANGE UP (ref 0–6)
GLUCOSE SERPL-MCNC: 88 MG/DL — SIGNIFICANT CHANGE UP (ref 70–99)
HCT VFR BLD CALC: 43.1 % — SIGNIFICANT CHANGE UP (ref 34.5–45)
HGB BLD-MCNC: 13.5 G/DL — SIGNIFICANT CHANGE UP (ref 11.5–15.5)
IANC: 3.55 K/UL — SIGNIFICANT CHANGE UP (ref 1.8–7.4)
IMM GRANULOCYTES NFR BLD AUTO: 0.1 % — SIGNIFICANT CHANGE UP (ref 0–0.9)
LITHIUM SERPL-MCNC: 1.2 MMOL/L — SIGNIFICANT CHANGE UP (ref 0.6–1.2)
LYMPHOCYTES # BLD AUTO: 3.53 K/UL — HIGH (ref 1–3.3)
LYMPHOCYTES # BLD AUTO: 45 % — HIGH (ref 13–44)
MCHC RBC-ENTMCNC: 28.5 PG — SIGNIFICANT CHANGE UP (ref 27–34)
MCHC RBC-ENTMCNC: 31.3 GM/DL — LOW (ref 32–36)
MCV RBC AUTO: 91.1 FL — SIGNIFICANT CHANGE UP (ref 80–100)
MONOCYTES # BLD AUTO: 0.6 K/UL — SIGNIFICANT CHANGE UP (ref 0–0.9)
MONOCYTES NFR BLD AUTO: 7.6 % — SIGNIFICANT CHANGE UP (ref 2–14)
NEUTROPHILS # BLD AUTO: 3.55 K/UL — SIGNIFICANT CHANGE UP (ref 1.8–7.4)
NEUTROPHILS NFR BLD AUTO: 45.2 % — SIGNIFICANT CHANGE UP (ref 43–77)
NRBC # BLD: 0 /100 WBCS — SIGNIFICANT CHANGE UP (ref 0–0)
NRBC # FLD: 0 K/UL — SIGNIFICANT CHANGE UP (ref 0–0)
PLATELET # BLD AUTO: 185 K/UL — SIGNIFICANT CHANGE UP (ref 150–400)
POTASSIUM SERPL-MCNC: 4.4 MMOL/L — SIGNIFICANT CHANGE UP (ref 3.5–5.3)
POTASSIUM SERPL-SCNC: 4.4 MMOL/L — SIGNIFICANT CHANGE UP (ref 3.5–5.3)
PROT SERPL-MCNC: 7.3 G/DL — SIGNIFICANT CHANGE UP (ref 6–8.3)
RBC # BLD: 4.73 M/UL — SIGNIFICANT CHANGE UP (ref 3.8–5.2)
RBC # FLD: 17.5 % — HIGH (ref 10.3–14.5)
SODIUM SERPL-SCNC: 143 MMOL/L — SIGNIFICANT CHANGE UP (ref 135–145)
WBC # BLD: 7.85 K/UL — SIGNIFICANT CHANGE UP (ref 3.8–10.5)
WBC # FLD AUTO: 7.85 K/UL — SIGNIFICANT CHANGE UP (ref 3.8–10.5)

## 2024-03-01 PROCEDURE — 73564 X-RAY EXAM KNEE 4 OR MORE: CPT | Mod: 26,RT

## 2024-03-01 PROCEDURE — 99285 EMERGENCY DEPT VISIT HI MDM: CPT

## 2024-03-01 PROCEDURE — 93010 ELECTROCARDIOGRAM REPORT: CPT

## 2024-03-01 RX ORDER — LITHIUM CARBONATE 300 MG/1
450 TABLET, EXTENDED RELEASE ORAL ONCE
Refills: 0 | Status: DISCONTINUED | OUTPATIENT
Start: 2024-03-01 | End: 2024-03-01

## 2024-03-01 RX ORDER — ATORVASTATIN CALCIUM 80 MG/1
40 TABLET, FILM COATED ORAL ONCE
Refills: 0 | Status: COMPLETED | OUTPATIENT
Start: 2024-03-01 | End: 2024-03-01

## 2024-03-01 RX ORDER — ARIPIPRAZOLE 15 MG/1
15 TABLET ORAL ONCE
Refills: 0 | Status: COMPLETED | OUTPATIENT
Start: 2024-03-01 | End: 2024-03-01

## 2024-03-01 RX ORDER — LITHIUM CARBONATE 300 MG/1
900 TABLET, EXTENDED RELEASE ORAL ONCE
Refills: 0 | Status: COMPLETED | OUTPATIENT
Start: 2024-03-01 | End: 2024-03-01

## 2024-03-01 RX ORDER — DIVALPROEX SODIUM 500 MG/1
1000 TABLET, DELAYED RELEASE ORAL ONCE
Refills: 0 | Status: COMPLETED | OUTPATIENT
Start: 2024-03-01 | End: 2024-03-01

## 2024-03-01 RX ORDER — IBUPROFEN 200 MG
600 TABLET ORAL ONCE
Refills: 0 | Status: COMPLETED | OUTPATIENT
Start: 2024-03-01 | End: 2024-03-01

## 2024-03-01 RX ORDER — ACETAMINOPHEN 500 MG
975 TABLET ORAL ONCE
Refills: 0 | Status: COMPLETED | OUTPATIENT
Start: 2024-03-01 | End: 2024-03-01

## 2024-03-01 RX ORDER — SODIUM CHLORIDE 9 MG/ML
1000 INJECTION INTRAMUSCULAR; INTRAVENOUS; SUBCUTANEOUS ONCE
Refills: 0 | Status: COMPLETED | OUTPATIENT
Start: 2024-03-01 | End: 2024-03-01

## 2024-03-01 RX ADMIN — DIVALPROEX SODIUM 1000 MILLIGRAM(S): 500 TABLET, DELAYED RELEASE ORAL at 22:13

## 2024-03-01 RX ADMIN — LITHIUM CARBONATE 900 MILLIGRAM(S): 300 TABLET, EXTENDED RELEASE ORAL at 22:13

## 2024-03-01 RX ADMIN — Medication 600 MILLIGRAM(S): at 14:39

## 2024-03-01 RX ADMIN — Medication 975 MILLIGRAM(S): at 19:59

## 2024-03-01 RX ADMIN — ATORVASTATIN CALCIUM 40 MILLIGRAM(S): 80 TABLET, FILM COATED ORAL at 21:41

## 2024-03-01 RX ADMIN — Medication 600 MILLIGRAM(S): at 20:26

## 2024-03-01 RX ADMIN — SODIUM CHLORIDE 1000 MILLILITER(S): 9 INJECTION INTRAMUSCULAR; INTRAVENOUS; SUBCUTANEOUS at 13:27

## 2024-03-01 RX ADMIN — ARIPIPRAZOLE 15 MILLIGRAM(S): 15 TABLET ORAL at 21:41

## 2024-03-01 NOTE — PROVIDER CONTACT NOTE (OTHER) - ASSESSMENT
29-Feb-2024 16:46  Pt is cleared to return back to [place] [address]   [phone], spoke with Ms. Connors , she asked to send pt via [facility]. HENRIETTA arranged [employer] [facility] [phone]. P/U 8 PM. Trip# [id number] . Verbal Huddle completed.

## 2024-03-01 NOTE — ED ADULT TRIAGE NOTE - CHIEF COMPLAINT QUOTE
c/o unwitnessed fall. Found on floor by [facility] staff. Reports she has episodes of "shaking, twitching" which causes her to fall on floor. Denies hitting head, use of blood thinners or LOC. Had recent medication change about 3 months ago and has had "shaking" episodes since- unknown medication change. Noted with flat affect. phx seizure disorder, schizophrenia

## 2024-03-01 NOTE — ED ADULT NURSE NOTE - NSFALLRISKINTERV_ED_ALL_ED

## 2024-03-01 NOTE — PROVIDER CONTACT NOTE (OTHER) - DATE AND TIME:
Orders pended.    Nystatin was already sent on 10/15/21.  Hilda Milan CMA ............... 2:06 PM, 10/28/21       01-Mar-2024 18:54

## 2024-03-01 NOTE — ED PROVIDER NOTE - PROGRESS NOTE DETAILS
Dr. Barajas: Pt was signed out to me awaiting X-ray for a fall. Pt resting in NAD. X-rays negative, pain well-controlled, patient able to ambulate dependently without assistance,  medically cleared for DC to home, social work contacted for transfer back to [facility].  Patient updated regarding results of blood work and x-rays.  All questions answered prior to DC, results, return precautions, follow-up instructions discussed and provided  in DC paperwork. - Ama Evans, PGY-3

## 2024-03-01 NOTE — ED ADULT NURSE NOTE - CHIEF COMPLAINT QUOTE
c/o unwitnessed fall. Found on floor by [facility] staff. Reports she has episodes of "shaking, twitching" which causes her to fall on floor. Denies hitting head, use of blood thinners or LOC. Had recent medication change about 3 months ago and has had "shaking" episodes since- unknown medication change. Noted with flat affect. phx seizure disorder, schizophrenia

## 2024-03-01 NOTE — ED PROVIDER NOTE - PATIENT PORTAL LINK FT
no chest pain, no cough, and no shortness of breath.
You can access the FollowMyHealth Patient Portal offered by [facility] by registering at the following website: [email or web address]. By joining [employer]’s FollowMyHealth portal, you will also be able to view your health information using other applications (apps) compatible with our system.

## 2024-03-01 NOTE — ED PROVIDER NOTE - CLINICAL SUMMARY MEDICAL DECISION MAKING FREE TEXT BOX
37 yo F hx of schizophrenia on lithium, clozapine, aripriprazole, desvenlafaxine, haldol, divalproex presents s/p fall with R knee pain - vss, limited ROM in R knee - will XR for acute fractures. Will check labs and medication levels to rule out toxicity.

## 2024-03-01 NOTE — ED ADULT NURSE REASSESSMENT NOTE - NS ED NURSE REASSESS COMMENT FT1
Patient received at 1820 from the break relief RN, awake, oriented x 4, pain meds given as per Dr Evans, mild pain on her ankles, walking fine at the moment.
break RN; pt a&ox4, denying chest pain, sob, n+v, headache, dizziness, twitching at this time. Breathing even, unlabored. pt ambulated with no device, approx 20 steps with no signs of twitching or unsteady gait. MD Oliveros aware. Safety maintained.
Received report from Day RN: Pt A&Ox4, ambulatory, appears to be resting comfortably, NAD, respirations are even and unlabored, denies any complaints at this moment, VS noted, medicated as per orders. Safety precautions implemented as per protocol, awaiting further MD orders, plan of care ongoing.

## 2024-03-01 NOTE — ED PROVIDER NOTE - PHYSICAL EXAMINATION
GENERAL: no acute distress, non-toxic appearing  HEAD: normocephalic, atraumatic  HEENT: normal conjunctiva, oral mucosa moist, neck supple  CARDIAC: regular rate and rhythm, normal S1 and S2,  no appreciable murmurs  PULM: clear to ascultation bilaterally, no crackles, rales, rhonchi, or wheezing  NEURO: alert and oriented x 3, normal speech, moving all extremities   MSK: +R knee TTP medially, no effusion, swelling, limited ROM 2/2 pain, distally neurovascularly intact, no visible deformities, no peripheral edema, calf tenderness/redness/swelling  SKIN: no visible rashes, dry, well-perfused  PSYCH: appropriate mood and affect

## 2024-03-01 NOTE — ED PROVIDER NOTE - OBJECTIVE STATEMENT
37 yo F PMHx of schizophrenia, HTN presents with fall onto knees this morning at 11 AM. No headstrike, no LOC, no a/c. Patient c/o R knee pain. States she gets the shakes that caused her to fall on the way to breakfast. Says this has been going on for 3 months. As per notes at [facility], patient had this tremor on her recent admission and it was thought to be from haldol. 37 yo F PMHx of schizophrenia, HTN presents with fall onto knees this morning at 11 AM. No headstrike, no LOC, no a/c. Patient c/o R knee pain. States she gets the shakes that caused her to fall on the way to breakfast. Says this has been going on for 3 months. As per notes at [facility], patient had this tremor on her recent admission and it was thought to be from [facility].    Att[place]  states she started having a shaking episode which she has been having for the past 3 months.  had pain to the right knee after the fall.  no head strike.

## 2024-03-01 NOTE — ED PROVIDER NOTE - NSFOLLOWUPINSTRUCTIONS_ED_ALL_ED_FT
You were seen and evaluated in the Emergency Department for your mechanical fall.     Clinical examination and history demonstrated no acute evidence of any life-threatening risks to your health as a result of your fall.     Xrays demonstrated no acute fractures, dislocations, or hemorrhage.     While emergent evaluation does not demonstrate any immediate life-threats, we strongly recommend you follow up with primary care doctor for a comprehensive evaluation of your health. Please follow up with your doctor within 1 week. Bring copies of your results with you (provided in your discharge paperwork). Please stay well-hydrated.    You may take 500-1000 mg acetaminophen (tylenol) every 6 hours, as needed for pain.  You may take 600 mg ibuprofen every 8 hours, with food, as needed for pain.  You can take tylenol and ibuprofen at the same time.     Should you develop nausea, vomiting, worsening headaches, inability to walk properly, numbness or tingling in the extremities, dizziness, or changes to your hearing/vision - please immediately return to the Emergency Department for evaluation of your symptoms.     Should your difficulty walking persists, you can call the following number to schedule an appointment with our Neurology partners:    [facility]  Neurology  [address] [place] [address] [address] [place] [address]  Phone: [phone]

## 2024-03-01 NOTE — ED ADULT NURSE NOTE - OBJECTIVE STATEMENT
Received this 38 year old female from [facility]. History of fall yesterday and today as per patient. Was in ED yesterday for same.  Psychiatric history of depression,Good eye contact, flat affect, asnwers questions appropriately, quiet voice. Episode of mild twitching of lower extremetiy noted while being assessed, with reassurance twitching diminished. States she is scared to stand she might fall, reassurance was pertaining to this that she is safe on the bed, side rails up, bed in lowest position. No head injury, no deformity to extremity noted. Cooperative and calm, no SOB noted breathing is even, not labored.

## 2024-03-02 LAB — CLOZAPINE SERPL-MCNC: 578 NG/ML — SIGNIFICANT CHANGE UP (ref 350–600)

## 2024-03-08 LAB — ARIPIPRAZOLE SERPL-MCNC: 161.2 NG/ML — SIGNIFICANT CHANGE UP

## 2024-11-21 NOTE — PROGRESS NOTE ADULT - PROBLEM SELECTOR PLAN 5
1. Low TSH level  Labs reviewed, tsh low x2 needs to see endo for eval  - Endocrine Referral - In Network     outpt meds: Clozapine 175mg qhs,  lithium 300mg bid, effexor 150mg qd and Depakote 1g qhs, Haldol dec inj 200mg q4 weeks, last received 12/27/23, next injection is due on 1/24/24.   outpt psych: Dr. Whitaker [phone]    -pt denies A/V hallucinations, no evidence of decompensation   -adjusting psych medications as above  -appreciate psych recs outpt meds: Clozapine 175mg qhs,  lithium 300mg bid, effexor 150mg qd and Depakote 1g qhs, Haldol dec inj 200mg q4 weeks, last received 12/27/23, next injection is due on 1/24/24.   outpt psych: Dr. Whitaker [phone]    -pt denies A/V hallucinations, no evidence of decompensation   -adjusting psych medications as above  -appreciate psych recs

## 2025-01-08 NOTE — ED PROVIDER NOTE - PROGRESS NOTE DETAILS
Anabell PGY2: orthostatics (+). Patient signed out to me and had already tolerated PO. Will further hydrate and reassess. Anabell PGY2: orthostatics (-). signed out to on call [facility] doctor. Patient feels well. Will dc. none

## 2025-01-14 NOTE — BH INPATIENT PSYCHIATRY PROGRESS NOTE - NSICDXBHPRIMARYDX_PSY_ALL_CORE
Spoke with patient to inform her that the CMR she had at [facility] was the same one she would have had at [place]. The echo is a separate testing looking at pressures, gradients, obstruction to help quantify findings on CMR. Patient verbalized understanding and will make sure her echo is done in [address].  
Schizoaffective disorder   F25.9  

## 2025-01-17 NOTE — ED ADULT TRIAGE NOTE - MODE OF ARRIVAL
Contact:  Phone number:[phone] (mobile)    Name of person spoken with and relationship to patient: Tess patient   Patient’s Adherence:  How patient is doing on medication: Very Well    How many missed doses and reason: 0 N/A    Any new medications: No    Any new conditions: No    Any new allergies: No    Any new side effects: No    Any new diagnoses: No    How many doses remainin    Did patient want to speak with pharmacist: No   Delivery:  Delivery date and method: 25 via mail    Needs by Date: 2025    Signature required: No     Any additional details for : N/A   Teach Appointment Date:  N/A   Shipping Address:  [address] [place] [id number]   Medication(name,strength and dose):  OZEMPIC (1 MG/DOSE) SOLUTION PEN-INJECTOR 4 MG/3ML   Copay:  $60.00   Payment Method:  Credit card on file   Supplies:  Alcohol Swabs   Additional Information:  NONE     Naomie Bentley, Pharmacy Liaison/ RX Coordinator       [facility]

## 2025-04-12 NOTE — PATIENT PROFILE ADULT - TOBACCO USE
Problem: Pain  Goal: Acceptable pain level achieved/maintained at rest using appropriate pain scale for the patient  Outcome: Monitoring/Evaluating progress  Goal: Acceptable pain level achieved/maintained with activity using appropriate pain scale for the patient  Outcome: Monitoring/Evaluating progress  Goal: Acceptable pain level achieved/maintained without oversedation  Outcome: Monitoring/Evaluating progress     Problem: At Risk for Falls  Goal: Patient does not fall  Outcome: Monitoring/Evaluating progress  Goal: Patient takes action to control fall-related risks  Outcome: Monitoring/Evaluating progress     Problem: Breathing Pattern Ineffective  Goal: Air exchange is effective, demonstrated by Sp02 sat of greater then or = 92% (or as ordered)  Outcome: Monitoring/Evaluating progress  Goal: Respiratory pattern is quiet and regular without report of SOB  Outcome: Monitoring/Evaluating progress  Goal: Breathing pattern demonstrates minimal apnea during sleep with appropriate use of airway pressure support devices  Outcome: Monitoring/Evaluating progress  Goal: Verbalizes/demonstrates effective breathing management strategies  Description: Document education using the patient education activity.   Outcome: Monitoring/Evaluating progress  Goal: Minimize respiratory effort related to dyspnea/shortness of breath (Hospice)  Outcome: Monitoring/Evaluating progress     Problem: Pneumonia  Goal: S/S of acute pneumonia are resolved  Description: If acute pneumonia is present, monitor for resolution of fever, cough, secretions and other test values based on presentation.  Outcome: Monitoring/Evaluating progress  Goal: Verbalizes understanding of pneumonia, treatment, and ongoing prevention  Description: Document on Patient Education Activity  Outcome: Monitoring/Evaluating progress      Unknown if ever smoked